# Patient Record
Sex: FEMALE | Race: WHITE | NOT HISPANIC OR LATINO | Employment: PART TIME | URBAN - METROPOLITAN AREA
[De-identification: names, ages, dates, MRNs, and addresses within clinical notes are randomized per-mention and may not be internally consistent; named-entity substitution may affect disease eponyms.]

---

## 2017-01-23 ENCOUNTER — GENERIC CONVERSION - ENCOUNTER (OUTPATIENT)
Dept: OTHER | Facility: OTHER | Age: 47
End: 2017-01-23

## 2017-01-31 ENCOUNTER — GENERIC CONVERSION - ENCOUNTER (OUTPATIENT)
Dept: OTHER | Facility: OTHER | Age: 47
End: 2017-01-31

## 2017-03-01 ENCOUNTER — GENERIC CONVERSION - ENCOUNTER (OUTPATIENT)
Dept: OTHER | Facility: OTHER | Age: 47
End: 2017-03-01

## 2017-03-09 ENCOUNTER — GENERIC CONVERSION - ENCOUNTER (OUTPATIENT)
Dept: OTHER | Facility: OTHER | Age: 47
End: 2017-03-09

## 2017-05-18 ENCOUNTER — ALLSCRIPTS OFFICE VISIT (OUTPATIENT)
Dept: OTHER | Facility: OTHER | Age: 47
End: 2017-05-18

## 2017-05-18 LAB
BILIRUB UR QL STRIP: NORMAL
CLARITY UR: NORMAL
COLOR UR: YELLOW
GLUCOSE (HISTORICAL): NORMAL
HGB UR QL STRIP.AUTO: NORMAL
KETONES UR STRIP-MCNC: NORMAL MG/DL
LEUKOCYTE ESTERASE UR QL STRIP: NORMAL
NITRITE UR QL STRIP: NORMAL
PH UR STRIP.AUTO: 5 [PH]
PROT UR STRIP-MCNC: NORMAL MG/DL
SP GR UR STRIP.AUTO: 1
UROBILINOGEN UR QL STRIP.AUTO: NORMAL

## 2017-06-23 ENCOUNTER — ALLSCRIPTS OFFICE VISIT (OUTPATIENT)
Dept: OTHER | Facility: OTHER | Age: 47
End: 2017-06-23

## 2017-06-23 PROCEDURE — 87624 HPV HI-RISK TYP POOLED RSLT: CPT | Performed by: OBSTETRICS & GYNECOLOGY

## 2017-06-23 PROCEDURE — G0145 SCR C/V CYTO,THINLAYER,RESCR: HCPCS | Performed by: OBSTETRICS & GYNECOLOGY

## 2017-06-27 ENCOUNTER — LAB REQUISITION (OUTPATIENT)
Dept: LAB | Facility: HOSPITAL | Age: 47
End: 2017-06-27
Payer: COMMERCIAL

## 2017-06-27 DIAGNOSIS — Z12.4 ENCOUNTER FOR SCREENING FOR MALIGNANT NEOPLASM OF CERVIX: ICD-10-CM

## 2017-06-27 DIAGNOSIS — Z11.51 ENCOUNTER FOR SCREENING FOR HUMAN PAPILLOMAVIRUS (HPV): ICD-10-CM

## 2017-06-29 LAB — HPV RRNA GENITAL QL NAA+PROBE: NORMAL

## 2017-07-10 LAB
LAB AP GYN PRIMARY INTERPRETATION: NORMAL
LAB AP LMP: NORMAL
Lab: NORMAL

## 2017-10-28 DIAGNOSIS — Z12.31 ENCOUNTER FOR SCREENING MAMMOGRAM FOR MALIGNANT NEOPLASM OF BREAST: ICD-10-CM

## 2017-10-28 DIAGNOSIS — R92.2 INCONCLUSIVE MAMMOGRAM: ICD-10-CM

## 2017-11-02 ENCOUNTER — HOSPITAL ENCOUNTER (OUTPATIENT)
Dept: MAMMOGRAPHY | Facility: CLINIC | Age: 47
Discharge: HOME | End: 2017-11-02
Payer: COMMERCIAL

## 2017-11-02 DIAGNOSIS — Z12.31 ENCOUNTER FOR SCREENING MAMMOGRAM FOR MALIGNANT NEOPLASM OF BREAST: ICD-10-CM

## 2017-11-02 PROCEDURE — G0202 SCR MAMMO BI INCL CAD: HCPCS

## 2017-11-02 PROCEDURE — 77063 BREAST TOMOSYNTHESIS BI: CPT

## 2017-11-03 ENCOUNTER — GENERIC CONVERSION - ENCOUNTER (OUTPATIENT)
Dept: OTHER | Facility: OTHER | Age: 47
End: 2017-11-03

## 2017-11-14 ENCOUNTER — HOSPITAL ENCOUNTER (OUTPATIENT)
Dept: ULTRASOUND IMAGING | Facility: CLINIC | Age: 47
Discharge: HOME | End: 2017-11-14
Payer: COMMERCIAL

## 2017-11-14 DIAGNOSIS — R92.2 INCONCLUSIVE MAMMOGRAM: ICD-10-CM

## 2017-11-14 PROCEDURE — 76641 ULTRASOUND BREAST COMPLETE: CPT

## 2017-11-14 PROCEDURE — 76377 3D RENDER W/INTRP POSTPROCES: CPT

## 2018-01-11 NOTE — MISCELLANEOUS
Message   Date: 03 Jun 2016 11:32 AM EST, Recorded By: Zelalem Zamudio For: Sterling Delgado   Caller: José Dockery   Phone: [phone] (Home), [phone] (Work)   Reason: Medical Complaint   Patient called c/o unhappy with using Nuva Ring for 28 days in and then going directly to another ring  Prefers to use it as she did before, 3 weeks in and 1 week out  States has had light bleeding x 2 weeks now  Advised ok to go back to previous directions  Count day she inserted last Nuva Ring as day 1  Advised call back if problems  Active Problems    1  Dense breast tissue (793 82) (R92 2)   2  Encounter for routine gynecological examination (V72 31) (Z01 419)   3  Heavy menses (626 2) (N92 0)   4  Denied: History of self breast exam   5  Submucous leiomyoma of uterus (218 0) (D25 0)   6  Visit for screening mammogram (V76 12) (Z12 31)    Current Meds   1  NuvaRing 0 12-0 015 MG/24HR Vaginal Ring; INSERT 1 RING VAGINALLY FOR 3   WEEKS THEN 1 WEEK OFF; Therapy: 29NZN1399 to Lenny Andrade)  Requested for: 87Xck8814; Last   Rx:05Gaj6474 Ordered    Allergies    1  Surgical TAPE    2   Adhesive Tape    Signatures   Electronically signed by : Mulugeta Abdalla, ; Gerardo  3 2016 11:36AM EST                       (Author)

## 2018-01-11 NOTE — MISCELLANEOUS
Message  patient notified of urine culture  bactrim was sensitive to infection      Signatures   Electronically signed by : Kevin Ennis DO; Jul 11 2016 10:08AM EST                       (Author)

## 2018-01-13 VITALS
TEMPERATURE: 99.5 F | SYSTOLIC BLOOD PRESSURE: 106 MMHG | HEART RATE: 72 BPM | HEIGHT: 67 IN | WEIGHT: 159 LBS | BODY MASS INDEX: 24.96 KG/M2 | DIASTOLIC BLOOD PRESSURE: 62 MMHG

## 2018-01-13 NOTE — MISCELLANEOUS
Message   Recorded as Task   Date: 11/03/2017 08:55 AM, Created By: Leveda Schlatter   Task Name: Miscellaneous   Assigned To: Isabela Landis   Regarding Patient: Linda Covarrubias, Status: Active   CommentBurnard Dies - 03 Nov 2017 8:55 AM     TASK CREATED  Mammogram with increased density, elevated Tyrer [place] risk of 27 6%  She did have left breast ultrasound last year  Could consider automated breast ultrasound soon and/or 3D mammogram in 1 year's time  Effie Carias - 03 Nov 2017 9:32 AM     TASK EDITED  letter and script mailed to pt        Active Problems    1  Abdominal pain, left lower quadrant (789 04) (R10 32)   2  Allergic rhinitis (477 9) (J30 9)   3  Breast disorder (611 9) (N64 9)   4  Bright red rectal bleeding (569 3) (K62 5)   5  Cervical cancer screening (V76 2) (Z12 4)   6  Chronic constipation (564 00) (K59 09)   7  Contraception (V25 9) (Z30 9)   8  Dense breast tissue (793 82) (R92 2)   9  Encounter for routine gynecological examination with Papanicolaou smear of cervix   (V72 31,V76 2) (Z01 419)   10  Heavy menses (626 2) (N92 0)   11  Ileitis (558 9) (K52 9)   12  Nausea (787 02) (R11 0)   13  Pelvic pain (R10 2)   14  Screening for HPV (human papillomavirus) (V73 81) (Z11 51)   15  Submucous leiomyoma of uterus (218 0) (D25 0)   16  Tension headache (307 81) (G44 209)   17  Visit for screening mammogram (V76 12) (Z12 31)    Current Meds   1  Allegra-D Allergy & Congestion 180-240 MG Oral Tablet Extended Release 24 Hour; TAKE   1 TABLET DAILY; Therapy: 81AJZ3631 to (Evaluate:81Mkb2406); Last Rx:62Xfk1892 Ordered   2  NuvaRing 0 12-0 015 MG/24HR Vaginal Ring; INSERT 1 RING VAGINALLY FOR 3   WEEKS THEN 1 WEEK OFF; Therapy: 24MWD2809 to (NYGLHIMX:71QQX9040)  Requested for: 58BHO3498; Last   YZ:98XGE8779 Ordered    Allergies    1  Surgical TAPE    2   Adhesive Tape    Signatures   Electronically signed by : Kem Lane, ; Nov  3 2017  9:32AM EST (Author)

## 2018-01-14 VITALS
BODY MASS INDEX: 24.74 KG/M2 | DIASTOLIC BLOOD PRESSURE: 68 MMHG | HEIGHT: 68 IN | SYSTOLIC BLOOD PRESSURE: 110 MMHG | WEIGHT: 163.25 LBS

## 2018-01-16 NOTE — MISCELLANEOUS
Message   Recorded as Task   Date: 07/08/2016 12:37 PM, Created By: Afshan Velazquez   Task Name: Medical Complaint Callback   Assigned To: Ronnie Alfonso   Regarding Patient: Cathryn Krishnamurthy, Status: Active   Comment:    Manisha Cuevas - 08 Jul 2016 12:37 PM     TASK CREATED  Caller: Self; Medical Complaint; [phone] (Home); [phone] (Work)  ANNA IS FEELS THE BACTRIM IS MAKING HER NAUSEOUS, SHE IS FEELING A BIT BETTER BUT STILL UNCOMFORTABLE, NOT SURE WHAT TO DO, IF SHE DIDN'T GIVE IT ENOUGH TIME OR IF SHE NEEDS A DIFFERENT MEDICATION?    [phone]   Ronnie Alfonso - 08 Jul 2016 4:02 PM     TASK EDITED  called and discussed  has only been 4 tabs -- would give over the weekend and see how she does before any medication changes          Signatures   Electronically signed by : Koko Roblero DO; Jul 8 2016  4:02PM EST                       (Author)

## 2018-01-16 NOTE — MISCELLANEOUS
Message   Recorded as Task   Date: 10/06/2016 08:29 AM, Created By: Nelia Chin   Task Name: Medical Complaint Callback   Assigned To: Hao Owen   Regarding Patient: Alexandre Keene, Status: Active   CommentJose Jones - 06 Oct 2016 8:29 AM     TASK CREATED  Caller: Self; Medical Complaint; [phone] (Home); [phone] (Work)  PT HAD TESTING YESTERDAY THAT WAS NORMAL  SHE WOKE UP THIS MORNING AND IS STILL NOT FEELING WELL  C/O HEADACHE, LOW BACK PAIN, BELLY   WHAT NOW? SHE CAN BE REACHED AT [address] - 06 Oct 2016 8:55 PM     TASK EDITED                 I spoke with the patient on 10/06/2016  The patient was seen in the emergency room last night to rule out diverticulitis  She had a negative CT and are unremarkable blood work  She states that she woke up this morning still having pain in the left lower quadrant area and having some low back pain  She does feel feverish on and off  There is no further nausea or vomiting  I advised her that we will order the pelvic ultrasound to rule out any ovarian pathology  We will follow-up with the results  It is possible that her symptoms could be part of a stomach virus  I advised her to follow a bland diet and to avoid dairy and to increase her fluid intake  She will call with any worsening symptoms          Signatures   Electronically signed by : Sunil Clements DO; Oct  6 2016  8:55PM EST                       (Author)

## 2018-01-17 NOTE — MISCELLANEOUS
Message   Recorded as Task   Date: 11/02/2016 11:25 AM, Created By: Elysia Oconnor   Task Name: Go to Result   Assigned To: Vipul Bennett   Regarding Patient: Desire Esteves, Status: In Progress   Rosalie Scruggs - 02 Nov 2016 11:25 AM     TASK CREATED  Ultrasound of left breast is unremarkable  Please inform the patient  Should she continue with burning sensation over the breast as documented at her mammogram, would recommend she come for breast check   Effie Carias - 03 Nov 2016 10:55 AM     TASK IN PROGRESS        Active Problems    1  Abdominal pain, left lower quadrant (789 04) (R10 32)   2  Breast disorder (611 9) (N64 9)   3  Bright red rectal bleeding (569 3) (K62 5)   4  Dense breast tissue (793 82) (R92 2)   5  Heavy menses (626 2) (N92 0)   6  Nausea (787 02) (R11 0)   7  Pelvic pain (R10 2)   8  Submucous leiomyoma of uterus (218 0) (D25 0)    Current Meds   1  Dicyclomine HCl - 20 MG Oral Tablet; TAKE 1 TABLET EVERY 6 HOURS AS NEEDED; Therapy: 19ODG6536 to (Evaluate:36Ycf1321)  Requested for: 11JRF9531; Last   Rx:37Nud1807 Ordered   2  NuvaRing 0 12-0 015 MG/24HR Vaginal Ring; INSERT 1 RING VAGINALLY FOR 3   WEEKS THEN 1 WEEK OFF; Therapy: 16BSD2950 to [phone])  Requested for: 62Mpe9599; Last   Rx:88Emv3283 Ordered    Allergies    1  Surgical TAPE    2   Adhesive Tape    Signatures   Electronically signed by : Fish Larry, ; Nov  3 2016 10:58AM EST                       (Author)

## 2018-01-17 NOTE — RESULT NOTES
Verified Results  (1) SED RATE 64Hod7627 03:32PZECHARIAH Chang    Order Number: [id number]     Test Name Result Flag Reference   SED RATE 5 mm/hour  0-20     (1) C-REACTIVE PROTEIN 77Tbj2432 03:32PZECHARIAH Smith Order Number: [id number]     Test Name Result Flag Reference   C-REACT PROTEIN 4 1 mg/L H <3 0     (1) BASIC METABOLIC PROFILE 04VAQ2028 03:GHAZALA Smith Order Number: [id number]     Test Name Result Flag Reference   GLUCOSE,RANDM 99 mg/dL     If the patient is fasting, the ADA then defines impaired fasting glucose as > 100 mg/dL and diabetes as > or equal to 123 mg/dL  SODIUM 142 mmol/L  136-145   POTASSIUM 3 6 mmol/L  3 5-5 3   CHLORIDE 109 mmol/L H 100-108   CARBON DIOXIDE 26 mmol/L  21-32   ANION GAP (CALC) 7 mmol/L  4-13   BLOOD UREA NITROGEN 15 mg/dL  5-25   CREATININE 0 78 mg/dL  0 60-1 30   Standardized to IDMS reference method   CALCIUM 8 3 mg/dL  8 3-10 1   eGFR Non-African American      >60 0 ml/min/1 73[address] [facility] Disease Education Program recommendations are as follows:  GFR calculation is accurate only with a steady state creatinine  Chronic Kidney disease less than 60 ml/min/1 73 sq  meters  Kidney failure less than 15 ml/min/1 73 sq  meters       (1) TISSUE EXAM [id number] 11:02AM Maddison Chang     Test Name Result Flag Reference   LAB AP CASE REPORT (Report)     Surgical Pathology Report             Case: [id number]                   Authorizing Provider: Marissa Sanchez MD      Collected:      11/14/2016 1102        Ordering Location:   [facility]    Received:      11/15/2016 [address] [place] [address] Endoscopy                            Pathologist:      Allison Sterling DO                               Specimens:  A) - Duodenum, duodenum biopsy r/o celiac disease                           B) - Stomach, antrum biopsy/r/o h pylori                                C) - Esophagus, distal esophagus biopsy/r/o eosinophil esophagus                    D) - Esophagus, mid esophagus                                     E) - Esophagus, proximal esophagus biopsy r/o eosinophil esophagus                   F) - Small Bowel, NOS, small bowel biopsy/ileitis/ro chrons disease                  G) - Colon, random colon biopsies/r/o colitis   LAB AP FINAL DIAGNOSIS (Report)     A  Duodenum, biopsy:  - Duodenal mucosa with no significant pathologic abnormalities  - No villous atrophy or increased intraepithelial lymphocytes identified  B  Stomach, antrum, biopsy:  - Minimal chronic inactive antral gastritis  - No H  pylori organisms identified on H&E and immunohistochemical stains  C  Esophagus, distal, biopsy:  - Esophageal squamous mucosa with no significant pathologic abnormalities  - No intraepithelial eosinophils, columnar mucosa, intestinal metaplasia   or dysplasia identified  D  Esophagus, mid, biopsy:  - Esophageal squamous mucosa with no significant pathologic abnormalities  - No intraepithelial eosinophils, columnar mucosa, intestinal metaplasia   or dysplasia identified  E  Esophagus, proximal, biopsy:  - Esophageal squamous mucosa with no significant pathologic abnormalities  - No intraepithelial eosinophils, columnar mucosa, intestinal metaplasia   or dysplasia identified  F  Small bowel, biopsy:  - Ileitis with foci of surface epithelial erosion, see note  - No granulomata, changes of chronicity or dysplasia identified  Note: Clinical and endoscopic correlation needed  G  Colon, random, biopsy:  - Colonic mucosa with intramucosal and submucosal lymphoid aggregates and   follicles  - No changes of chronic, active or microscopic colitis identified   - No dysplasia identified  Appropriate positive and negative controls obtained    Interpretation performed at [facility], [facility]    Electronically signed by Rufino Cheema DO on 11/17/2016 at 9:06 AM   LAB AP SURGICAL ADDITIONAL INFORMATION (Report)     These tests were developed and their performance characteristics   determined by [facility] or [address] [place] [address]  They may not be cleared or approved by the [place]  Food and   Drug Administration  The FDA has determined that such clearance or   approval is not necessary  These tests are used for clinical purposes  They should not be regarded as investigational or for research  This   laboratory has been approved by [place] [address], designated as a high-complexity   laboratory and is qualified to perform these tests  LAB AP GROSS DESCRIPTION (Report)     A  The specimen is received in formalin, labeled with the patient's name   and hospital number, and is designated duodenum biopsy rule out celiac   disease, are three irregularly shaped fragments of tan soft tissue   measuring 0 5 x 0 3 x 0 1 cm in aggregate  Entirely submitted  One   cassette  B  The specimen is received in formalin, labeled with the patient's name   and hospital number, and is designated antrum biopsy rule out H  pylori,   is a single irregularly shaped fragment of tan soft tissue measuring 0 4   cm in greatest dimension  Entirely submitted  One cassette  C  The specimen is received in formalin, labeled with the patient's name   and hospital number, and is designated distal esophagus biopsy/rule out   eosinophilic esophagitis, is a single irregularly shaped fragment of tan   soft tissue measuring 0 2 cm in greatest dimension  Entirely submitted  One cassette  D  The specimen is received in formalin, labeled with the patient's name   and hospital number, and is designated mid esophagus, are two   irregularly shaped fragments of pale tan soft tissue each measuring 0 3 cm   in greatest dimension  Entirely submitted  One cassette      E  The specimen is received in formalin, labeled with the patient's name   and hospital number, and is designated proximal esophagus biopsy rule out   eosinophilic esophagitis, is a single irregularly shaped fragment of pale   tan soft tissue measuring 0 2 cm in greatest dimension  Entirely   submitted  One cassette  F  The specimen is received in formalin, labeled with the patient's name   and hospital number, and is designated small bowel biopsy/ileitis/rule   out Crohn's disease, are multiple irregularly shaped fragments of tan   soft tissue measuring 1 0 x 0 5 x 0 1 cm in aggregate  Entirely submitted  One cassette  G  The specimen is received in formalin, labeled with the patient's name   and hospital number, and is designated random colon biopsy/rule out   colitis, are multiple irregularly shaped fragments of tan soft tissue   measuring 0 8 x 0 5 x 0 1 cm in aggregate  Entirely submitted  One   cassette  Note: The estimated total formalin fixation time based upon information   provided by the submitting clinician and the standard processing schedule   is 41 5 hours        RLR   LAB AP CLINICAL INFORMATION R/o celiac disease     R/o celiac disease

## 2018-02-22 ENCOUNTER — TELEPHONE (OUTPATIENT)
Dept: OBGYN CLINIC | Facility: CLINIC | Age: 48
End: 2018-02-22

## 2018-02-22 NOTE — TELEPHONE ENCOUNTER
Patient called c/o severe back pain and back spasms last month when she removed her Nuva Ring  Had trouble walking, went to chiropractor  Pain subsided when she inserted another Nuva Ring at proper time  Now patient states is due to remove it again tomorrow and is concerned the pain will recur  Seeks your advice  Questions if she should leave Nuva Ring in and skip the off week

## 2018-02-22 NOTE — TELEPHONE ENCOUNTER
As we discussed, recommend continue NuvaRing for continuous use for 4 weeks and then replace it for another 4 weeks  She could consider getting a menstrual cycle in the future at the appropriate time    Thanks

## 2018-03-16 ENCOUNTER — OFFICE VISIT (OUTPATIENT)
Dept: OBGYN CLINIC | Facility: CLINIC | Age: 48
End: 2018-03-16
Payer: COMMERCIAL

## 2018-03-16 VITALS — BODY MASS INDEX: 25.12 KG/M2 | DIASTOLIC BLOOD PRESSURE: 62 MMHG | WEIGHT: 162.8 LBS | SYSTOLIC BLOOD PRESSURE: 108 MMHG

## 2018-03-16 DIAGNOSIS — R10.2 PELVIC PAIN: ICD-10-CM

## 2018-03-16 DIAGNOSIS — N92.6 IRREGULAR MENSES: Primary | ICD-10-CM

## 2018-03-16 DIAGNOSIS — Z30.44 ENCOUNTER FOR SURVEILLANCE OF VAGINAL RING HORMONAL CONTRACEPTIVE DEVICE: ICD-10-CM

## 2018-03-16 PROBLEM — J30.9 ALLERGIC RHINITIS: Status: RESOLVED | Noted: 2017-05-18 | Resolved: 2018-03-16

## 2018-03-16 PROBLEM — G44.209 TENSION TYPE HEADACHE: Status: RESOLVED | Noted: 2017-05-18 | Resolved: 2018-03-16

## 2018-03-16 PROBLEM — J30.9 ALLERGIC RHINITIS: Status: ACTIVE | Noted: 2017-05-18

## 2018-03-16 PROBLEM — G44.209 TENSION TYPE HEADACHE: Status: ACTIVE | Noted: 2017-05-18

## 2018-03-16 PROCEDURE — 99214 OFFICE O/P EST MOD 30 MIN: CPT | Performed by: OBSTETRICS & GYNECOLOGY

## 2018-03-16 RX ORDER — ETONOGESTREL AND ETHINYL ESTRADIOL 11.7; 2.7 MG/1; MG/1
1 INSERT, EXTENDED RELEASE VAGINAL
COMMUNITY
Start: 2015-04-06 | End: 2018-07-02

## 2018-03-16 NOTE — PROGRESS NOTES
Assessment/Plan:  1  Irregular bleeding-patient with continuous bleeding since 2/27/18  She had call the office on 02/22 with complaints of back pain and cramps  She had an used NuvaRing continuously for 4 weeks and then another 2 weeks, now 6 weeks total   She has been having bleeding for the past 17 days  It could be related to this change in the NuvaRing usage  The patient will continue the ring for 1 more week and then take it out and then start at the usual 3 weeks on 1 week off as recommended by the device  Additionally, she will have laboratory studies with TSH, CBC, and HCG  Patient states she took a urine pregnancy test recently and was negative  She will return in the near future for sonohysterogram with endometrial biopsy  She was counseled to take ibuprofen prior to the procedure  2   History of submucous myoma-patient had D and C with hysteroscopy June 2015 with no definitive myoma appreciated  Pathology was negative at that time  3   History of menorrhagia-no heavy bleeding noted at this time  She will continue NuvaRing  4   Contraception-NuvaRing as above  5   Increased breast density-patient with elevated Tyrer [place] risk as well  She was counseled in the past about this  She had 3D mammogram and automated breast ultrasound November 2017 with negative results  She will continue with this  6   Family history of breast cancer-patient's mother and maternal aunt both developed breast cancer  She was previously counseled about genetic testing  She will follow-up in the next few weeks for sonohysterogram, endometrial biopsy, and possible RN/cc visit  No problem-specific Assessment & Plan notes found for this encounter  Diagnoses and all orders for this visit:    Irregular menses  -     CBC; Future  -     TSH, 3rd generation; Future  -     hCG, quantitative;  Future    Encounter for surveillance of vaginal ring hormonal contraceptive device    Pelvic pain    Other orders  - etonogestrel-ethinyl estradiol (NUVARING) 0 12-0 015 MG/24HR vaginal ring; Insert 1 Device into the vagina every 21 days          Subjective:      Patient ID: Quan Hancock is a 52 y o  female  The patient was seen today for follow-up visit  Please see assessment plan for details  The following portions of the patient's history were reviewed and updated as appropriate: allergies, current medications, past family history, past medical history, past social history, past surgical history and problem list     Review of Systems   Constitutional: Negative for chills, diaphoresis, fatigue and fever  Respiratory: Negative for apnea, cough, chest tightness, shortness of breath and wheezing  Cardiovascular: Negative for chest pain, palpitations and leg swelling  Gastrointestinal: Negative for abdominal distention, abdominal pain, anal bleeding, constipation, diarrhea, nausea, rectal pain and vomiting  Genitourinary: Positive for menstrual problem and pelvic pain  Negative for difficulty urinating, dyspareunia, dysuria, frequency, hematuria, urgency, vaginal bleeding, vaginal discharge and vaginal pain  Musculoskeletal: Negative for arthralgias, back pain and myalgias  Skin: Negative for color change and rash  Neurological: Negative for dizziness, syncope, light-headedness, numbness and headaches  Hematological: Negative for adenopathy  Does not bruise/bleed easily  Psychiatric/Behavioral: Negative for dysphoric mood and sleep disturbance  The patient is not nervous/anxious            Objective:      /62 (BP Location: Left arm, Patient Position: Sitting, Cuff Size: Standard)   Wt 73 8 kg (162 lb 12 8 oz)   LMP 02/28/2018 (Approximate)   BMI 25 12 kg/m²          Physical Exam    Objective      /62 (BP Location: Left arm, Patient Position: Sitting, Cuff Size: Standard)   Wt 73 8 kg (162 lb 12 8 oz)   LMP 02/28/2018 (Approximate)   BMI 25 12 kg/m²     General:   alert and oriented, in no acute distress, alert, appears stated age and cooperative   Neck:    Breast:    Heart:    Lungs:    Abdomen: soft, non-tender, without masses or organomegaly   Vulva: normal   Vagina: Small amount of dark menstrual blood, without erythema or lesions or discharge   Cervix: Small amount of dark menstrual blood, without lesions or cervicitis    No Cervical motion tenderness and normal   Uterus: top normal size, anteverted, non-tender   Adnexa: no mass, fullness, tenderness   Rectum: negative

## 2018-03-20 LAB
B-HCG SERPL-ACNC: <2 MIU/ML
BASOPHILS # BLD AUTO: 41 CELLS/UL (ref 0–200)
BASOPHILS NFR BLD AUTO: 0.6 %
EOSINOPHIL # BLD AUTO: 41 CELLS/UL (ref 15–500)
EOSINOPHIL NFR BLD AUTO: 0.6 %
ERYTHROCYTE [DISTWIDTH] IN BLOOD BY AUTOMATED COUNT: 12.7 % (ref 11–15)
HCT VFR BLD AUTO: 41.6 % (ref 35–45)
HGB BLD-MCNC: 14.5 G/DL (ref 11.7–15.5)
LYMPHOCYTES # BLD AUTO: 1816 CELLS/UL (ref 850–3900)
LYMPHOCYTES NFR BLD AUTO: 26.7 %
MCH RBC QN AUTO: 32.7 PG (ref 27–33)
MCHC RBC AUTO-ENTMCNC: 34.9 G/DL (ref 32–36)
MCV RBC AUTO: 93.7 FL (ref 80–100)
MONOCYTES # BLD AUTO: 279 CELLS/UL (ref 200–950)
MONOCYTES NFR BLD AUTO: 4.1 %
NEUTROPHILS # BLD AUTO: 4624 CELLS/UL (ref 1500–7800)
NEUTROPHILS NFR BLD AUTO: 68 %
PLATELET # BLD AUTO: 249 THOUSAND/UL (ref 140–400)
PMV BLD REES-ECKER: 11.5 FL (ref 7.5–12.5)
RBC # BLD AUTO: 4.44 MILLION/UL (ref 3.8–5.1)
TSH SERPL-ACNC: 1.13 MIU/L
WBC # BLD AUTO: 6.8 THOUSAND/UL (ref 3.8–10.8)

## 2018-06-25 ENCOUNTER — TELEPHONE (OUTPATIENT)
Dept: OBGYN CLINIC | Facility: CLINIC | Age: 48
End: 2018-06-25

## 2018-06-25 NOTE — TELEPHONE ENCOUNTER
Patient called stating she has been having random seizures  She has been evaluated and now is thinking that it may be related to her nuva ring as it is her only medication and she has been testing positive for everything else  Her mother reports a similar situation on oral contraceptives as well  I advised her to take out the ring and gave her an appointment in  on Wednesday

## 2018-06-27 ENCOUNTER — OFFICE VISIT (OUTPATIENT)
Dept: OBGYN CLINIC | Facility: CLINIC | Age: 48
End: 2018-06-27
Payer: COMMERCIAL

## 2018-06-27 ENCOUNTER — OFFICE VISIT (OUTPATIENT)
Dept: URGENT CARE | Facility: CLINIC | Age: 48
End: 2018-06-27
Payer: COMMERCIAL

## 2018-06-27 VITALS
WEIGHT: 166 LBS | TEMPERATURE: 100 F | HEART RATE: 109 BPM | RESPIRATION RATE: 16 BRPM | SYSTOLIC BLOOD PRESSURE: 124 MMHG | BODY MASS INDEX: 25.16 KG/M2 | DIASTOLIC BLOOD PRESSURE: 80 MMHG | HEIGHT: 68 IN

## 2018-06-27 VITALS
WEIGHT: 166 LBS | BODY MASS INDEX: 25.16 KG/M2 | HEIGHT: 68 IN | SYSTOLIC BLOOD PRESSURE: 122 MMHG | DIASTOLIC BLOOD PRESSURE: 74 MMHG

## 2018-06-27 DIAGNOSIS — Z87.898 HISTORY OF SEIZURE: ICD-10-CM

## 2018-06-27 DIAGNOSIS — Z30.44 ENCOUNTER FOR SURVEILLANCE OF VAGINAL RING HORMONAL CONTRACEPTIVE DEVICE: Primary | ICD-10-CM

## 2018-06-27 DIAGNOSIS — J01.90 ACUTE SINUSITIS, RECURRENCE NOT SPECIFIED, UNSPECIFIED LOCATION: Primary | ICD-10-CM

## 2018-06-27 PROCEDURE — S9083 URGENT CARE CENTER GLOBAL: HCPCS | Performed by: PHYSICIAN ASSISTANT

## 2018-06-27 PROCEDURE — 99212 OFFICE O/P EST SF 10 MIN: CPT | Performed by: OBSTETRICS & GYNECOLOGY

## 2018-06-27 PROCEDURE — G0382 LEV 3 HOSP TYPE B ED VISIT: HCPCS | Performed by: PHYSICIAN ASSISTANT

## 2018-06-27 RX ORDER — ETONOGESTREL AND ETHINYL ESTRADIOL 11.7; 2.7 MG/1; MG/1
1 INSERT, EXTENDED RELEASE VAGINAL
Qty: 1 EACH | Refills: 0 | Status: SHIPPED | OUTPATIENT
Start: 2018-06-27 | End: 2018-07-17 | Stop reason: SDUPTHER

## 2018-06-27 RX ORDER — AMOXICILLIN AND CLAVULANATE POTASSIUM 875; 125 MG/1; MG/1
1 TABLET, FILM COATED ORAL EVERY 12 HOURS SCHEDULED
Qty: 20 TABLET | Refills: 0 | Status: SHIPPED | OUTPATIENT
Start: 2018-06-27 | End: 2018-07-07

## 2018-06-27 NOTE — PATIENT INSTRUCTIONS
1  Drink plenty fluids  2   Take probiotics [i e  Yogurt, Acidophilus, Florastor (liquid)] daily  3   Over-the-counter cough and cold medications as needed for symptomatic care  4    Advance activities as tolerated  5    Follow-up with your primary care physician today as scheduled  6   Go to emergency room if symptoms are worsening  7   Recommend no driving until evaluated

## 2018-06-27 NOTE — PROGRESS NOTES
[facility]        NAME: Raji Segal is a 50 y o  female  : 1970    MRN: [id number]  DATE: 2018  TIME: 9:29 AM    Assessment and Plan   Acute sinusitis, recurrence not specified, unspecified location [J01 90]  1  Acute sinusitis, recurrence not specified, unspecified location  amoxicillin-clavulanate (AUGMENTIN) 875-125 mg per tablet   2  History of seizure           Patient Instructions       Follow up with PCP in 3-5 days  Proceed to  ER if symptoms worsen  Chief Complaint     Chief Complaint   Patient presents with    Cold Like Symptoms     chills, Ha, cough with thick green sputum since last thurs/friday         History of Present Illness       Patient here for evaluation of cough, congestion, sinus pressure, thick green sputum which started last Thursday  Patient has been taking over-the-counter medications with minimal relief  She denies any headache, dizziness, ear pain  The patient states that  night in the middle night while she was sleeping her  walker and states she had had a seizure  She has not seen anybody for this or had an evaluation  She has had a history of seizures in the past with the last one approximately 6 years ago  At the time patient had a full workup with an EEG which was negative for epilepsy  She has not had any activity since  She denies any dizziness, nausea, headache, vomiting, weakness, fatigue, double vision, blurred vision  Review of Systems   Review of Systems   Constitutional: Positive for fever  Negative for activity change, appetite change and chills  HENT: Positive for congestion, postnasal drip, rhinorrhea, sinus pressure and sore throat  Negative for ear discharge, ear pain, facial swelling, trouble swallowing and voice change  Eyes: Negative  Respiratory: Positive for cough  Negative for shortness of breath and wheezing  Cardiovascular: Negative      Neurological: Negative for dizziness, tremors, syncope, facial asymmetry, speech difficulty, weakness, light-headedness, numbness and headaches  Current Medications       Current Outpatient Prescriptions:     amoxicillin-clavulanate (AUGMENTIN) 875-125 mg per tablet, Take 1 tablet by mouth every 12 (twelve) hours for 10 days, Disp: 20 tablet, Rfl: 0    dicyclomine (BENTYL) 20 mg tablet, Take 20 mg by mouth every 6 (six) hours, Disp: , Rfl:     etonogestrel-ethinyl estradiol (NUVARING) 0 12-0 015 MG/24HR vaginal ring, Insert 1 each into the vagina every 28 days Insert vaginally and leave in place for 3 consecutive weeks, then remove for 1 week , Disp: , Rfl:     etonogestrel-ethinyl estradiol (NUVARING) 0 12-0 015 MG/24HR vaginal ring, Insert 1 Device into the vagina every 21 days, Disp: , Rfl:     Current Allergies     Allergies as of 06/27/2018 - Reviewed 03/16/2018   Allergen Reaction Noted    Medical tape Rash 05/18/2015    Wound dressings Rash 04/25/2014            The following portions of the patient's history were reviewed and updated as appropriate: allergies, current medications, past family history, past medical history, past social history, past surgical history and problem list      Past Medical History:   Diagnosis Date    Abdominal pain     Encounter for screening colonoscopy     Heavy menses     Intestinal ulcer     Nausea     Seizures ([address] )     Submucous leiomyoma of uterus        Past Surgical History:   Procedure Laterality Date    DILATION AND CURETTAGE OF UTERUS      INCONTINENCE SURGERY      bladder sling    [place] COLONOSCOPY FLX DX W/COLLJ SPEC WHEN PFRMD N/A 11/14/2016    Procedure: EGD AND COLONOSCOPY;  Surgeon: Franklin Pack MD;  Location: [facility]; Service: Gastroenterology       Family History   Problem Relation Age of Onset    Cancer Mother     Diabetes Father     Hypertension Father          Medications have been verified          Objective   /80   Pulse (!) 109   Temp 100 °F (37 8 °C) (Tympanic) Resp 16   Ht 5' 7 6" (1 717 m)   Wt 75 3 kg (166 lb)   BMI 25 54 kg/m²        Physical Exam     Physical Exam   Constitutional: She is oriented to person, place, and time  She appears well-developed and well-nourished  No distress  HENT:   Head: Normocephalic and atraumatic  Right Ear: External ear normal    Left Ear: External ear normal    Bilateral nasal congestion erythema with mucopurulent drainage  Bilateral tonsillar erythema with no soft tissue swelling  No exudate  TMs intact bilaterally with clear fluid in the middle ear bilaterally  No erythema  Eyes: Conjunctivae and EOM are normal  Pupils are equal, round, and reactive to light  Right eye exhibits no discharge  Left eye exhibits no discharge  Cardiovascular: Normal rate, regular rhythm and normal heart sounds  No murmur heard  Pulmonary/Chest: Effort normal and breath sounds normal  She has no wheezes  She has no rales  Lymphadenopathy:     She has cervical adenopathy  Neurological: She is alert and oriented to person, place, and time  No cranial nerve deficit  She exhibits normal muscle tone  Coordination normal    Patient with normal speech and gait  Patient does have a twitch of the left eye which she has had in the past   Skin: Skin is warm and dry  Psychiatric: She has a normal mood and affect  Her behavior is normal    Nursing note and vitals reviewed

## 2018-06-28 NOTE — PATIENT INSTRUCTIONS
Vaginal Ring   WHAT YOU NEED TO KNOW:   What is a vaginal ring? A vaginal ring is a small flexible ring that contains medicine  The vaginal ring may be used to prevent pregnancy  It may also be used to treat symptoms, such as vaginal dryness and itching, that occur with menopause  What do I need to know about the vaginal ring before I use it? You need to see your healthcare provider to get a prescription  Tell your healthcare provider if you are allergic to any medicine  You should not use this medicine if you are breastfeeding or you have vaginal bleeding that has not been checked by a doctor  You should not use the vaginal ring if you have certain medical conditions, such as breast cancer, endometrial cancer, or liver or heart disease  Other medical conditions include severe high blood pressure, circulation problems, and previous stroke or blood clot  You also should not use the vaginal ring if you are a heavy smoker and over the age of 28  Rarely, the vaginal ring may increase your risk for blood clots, heart attack, stroke, and toxic shock syndrome  How do I use my vaginal ring? Your healthcare provider will tell you when you can start using the vaginal ring  He will also show you how to put in your vaginal ring  You should receive another information sheet from your healthcare provider or pharmacist about this medicine  Read the information, and talk with your healthcare provider if you have questions  It is very important to use your vaginal ring exactly as directed  · If you are using the vaginal ring as birth control , keep it in place for 3 weeks in a row  Take your ring out for a 1-week break  When the week has gone by and you have probably had your menstrual period, place a new ring into your vagina  · If you are using the vaginal ring for menopause symptoms , keep the ring in place for 90 days (3 months)  After 3 months, take out your old ring and put in a new one    How do I insert my vaginal ring? · You may lie down, stand with one leg up, or squat when you insert the ring  Choose a position that feels best to you  · Wash your hands with soap and water  Dry your hands  Remove your vaginal ring from its pouch  · Press the sides of the ring together with your thumb and pointer finger  · Push the ring high up into your vagina like a tampon  · When your vaginal ring is in place, you should not feel anything  If you feel discomfort when the ring is in place, it may not be far enough inside your vagina  Gently use your finger to push the ring higher into your vagina  It is important that your vaginal ring is placed in the upper part of the vagina  · You do not have to worry that you will push the ring up too far or that it will get lost  Your cervix (the narrow, lower end of your uterus) will block the ring from going too high in your vagina  · If the ring slips down into your lower vagina, use your finger to push it back up  If the ring comes completely out, rinse it in warm (not hot) water and put it back in  How do I take out my vaginal ring? · Wash and dry your hands  · You may lie down, stand with one leg up, or squat when you remove the ring  Choose a position that feels best to you  · Use your finger to hook the ring and gently pull it out  Do not flush the old ring down the toilet  Throw the ring away where children or pets cannot get to it  When should I contact my healthcare provider? · You are bleeding from your vagina and you do not know why  · Your vaginal ring causes itching, pain, or burning  · You have stomach pain or tenderness, or you are vomiting  · You have trouble sleeping, feel tired, or your mood changes  · You have questions or concerns about your condition or care  When should I seek immediate care or call 911? · Your arm or leg feels warm, tender, and painful  It may look swollen and red      · You feel lightheaded, short of breath, and have chest pain  · You cough up blood  · You have any of the following signs of a stroke:      ¨ Numbness or drooping on one side of your face     ¨ Weakness in an arm or leg    ¨ Confusion or difficulty speaking    ¨ Dizziness, a severe headache, or vision loss  CARE AGREEMENT:   You have the right to help plan your care  Learn about your health condition and how it may be treated  Discuss treatment options with your caregivers to decide what care you want to receive  You always have the right to refuse treatment  The above information is an  only  It is not intended as medical advice for individual conditions or treatments  Talk to your doctor, nurse or pharmacist before following any medical regimen to see if it is safe and effective for you  © 2017 [facility] Information is for End User's use only and may not be sold, redistributed or otherwise used for commercial purposes  All illustrations and images included in CareNotes® are the copyrighted property of [employer]  or [employer] Ponce
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
## 2018-07-02 ENCOUNTER — OFFICE VISIT (OUTPATIENT)
Dept: FAMILY MEDICINE CLINIC | Facility: CLINIC | Age: 48
End: 2018-07-02
Payer: COMMERCIAL

## 2018-07-02 VITALS
RESPIRATION RATE: 14 BRPM | HEIGHT: 68 IN | DIASTOLIC BLOOD PRESSURE: 82 MMHG | SYSTOLIC BLOOD PRESSURE: 122 MMHG | BODY MASS INDEX: 25.16 KG/M2 | HEART RATE: 88 BPM | TEMPERATURE: 97.8 F | OXYGEN SATURATION: 97 % | WEIGHT: 166 LBS

## 2018-07-02 DIAGNOSIS — J30.9 ALLERGIC RHINITIS, UNSPECIFIED SEASONALITY, UNSPECIFIED TRIGGER: ICD-10-CM

## 2018-07-02 DIAGNOSIS — J45.21 MILD INTERMITTENT REACTIVE AIRWAY DISEASE WITH ACUTE EXACERBATION: ICD-10-CM

## 2018-07-02 DIAGNOSIS — J01.00 ACUTE MAXILLARY SINUSITIS, RECURRENCE NOT SPECIFIED: Primary | ICD-10-CM

## 2018-07-02 PROCEDURE — 3008F BODY MASS INDEX DOCD: CPT | Performed by: FAMILY MEDICINE

## 2018-07-02 PROCEDURE — 99213 OFFICE O/P EST LOW 20 MIN: CPT | Performed by: FAMILY MEDICINE

## 2018-07-02 RX ORDER — ALBUTEROL SULFATE 90 UG/1
2 AEROSOL, METERED RESPIRATORY (INHALATION) EVERY 4 HOURS PRN
Qty: 1 INHALER | Refills: 0 | Status: SHIPPED | OUTPATIENT
Start: 2018-07-02 | End: 2020-06-29 | Stop reason: ALTCHOICE

## 2018-07-02 RX ORDER — METHYLPREDNISOLONE 4 MG/1
TABLET ORAL
Qty: 21 TABLET | Refills: 0 | Status: SHIPPED | OUTPATIENT
Start: 2018-07-02 | End: 2019-08-24 | Stop reason: ALTCHOICE

## 2018-07-02 NOTE — PATIENT INSTRUCTIONS
How to Use a Metered-Dose Inhaler   AMBULATORY CARE:   A metered-dose inhaler  is a handheld device that gives you a dose of medicine as a mist  You breathe the medicine deep into your lungs to open your airways  The medicine either gives quick relief or long term control of symptoms  Common medicines include the following:  · Bronchodilators open your airways  · Mucolytics thin secretions and make them easier to cough up  · Steroids decrease inflammation in your airways  Seek immediate care if:   · Your lips or nails turn blue or gray  · The skin between your ribs or around your neck pulls in with every breath  · You feel short of breath, even after you use your inhaler  Contact your healthcare provider if:   · You feel the medicine spray on your tongue or throat, rather than going into your lungs  · You have to take more puffs from the inhaler than directed, in order to get relief  · You run out of medicine before your next refill is due  · You feel like your medicine is not making your symptoms better  · You have questions or concerns about your condition or care  How to use a metered-dose inhaler:  Practice using your inhaler  Your medicine will work best if you use them correctly  The following steps will help you use your inhaler correctly:  · Prepare your inhaler:     ¨ Remove the cap  Check to make sure there is nothing in the mouthpiece that could block the medicine from coming out  ¨ Shake the inhaler to mix the medicine  ¨ Hold the inhaler upright, with the mouthpiece pointing towards your mouth  · Get ready to breathe in the medicine:      ¨ Keep your mouth away from the inhaler mouthpiece, and breathe out fully to clear your lungs  ¨ Place the mouthpiece between your lips  Close your lips around the mouthpiece to form a seal and prevent a medicine leak  · Start to breathe in slowly through your mouth  as  you press down the canister   This helps the medicine get into your lungs  · Hold your breath for at least 5 seconds  This will help the medicine reach all parts of your lungs, including the smaller parts called the alveoli  · Breathe out slowly through pursed lips  This helps keep your airway open and allows the medicine to be absorbed into more areas  · Repeat puffs of medicine as directed by your healthcare provider  Wait about 2 minutes between puffs  If you need to use a bronchodilator and a steroid inhaler, use the bronchodilator first  Wait 5 minutes then use the steroid inhaler  · Gargle with warm water to remove any leftover medicine from your mouth and throat  Care for your inhaler properly:  Put the cap back on the inhaler after each use to keep the mouthpiece clean  Clean the inhaler at least once a week  Remove the canister and wash the delgado with warm soapy water  Rinse and allow to air dry  Make sure the delgado is completely dry before putting the canister back in  Follow up with your healthcare provider or specialist as directed:  Bring your inhaler to all of your visits  You may be asked to use your inhaler at these visits so your healthcare provider can make sure you are using it correctly  Write down your questions, so you remember to ask them during your visits  © 2017 [address] Information is for End User's use only and may not be sold, redistributed or otherwise used for commercial purposes  All illustrations and images included in CareNotes® are the copyrighted property of [employer]  or Jeff Ponce  The above information is an  only  It is not intended as medical advice for individual conditions or treatments  Talk to your doctor, nurse or pharmacist before following any medical regimen to see if it is safe and effective for you

## 2018-07-02 NOTE — PROGRESS NOTES
Assessment/Plan:  1  Acute maxillary sinusitis-the patient has had some improvement since starting the antibiotic that was prescribed Urgent Care  We will continue her on the course of the Augmentin  I have also added on a Medrol Dosepak to help with the sinus pressure and congestion  She will continue to increase her fluids and rest   2   Reactive airway disease-I believe that the patient is having reactive airway disease related to the sinus infection  I have given her a albuterol inhaler to use as needed along with the Medrol Dosepak  She will increase her fluids and rest   She will call or follow up in the emergency room with any worsening shortness of breath or chest pain or fever  We will see her back as needed  3  Allergic rhinitis-the patient will start taking her Allegra 180 milligrams on a daily basis  We will see her back in the office as needed  Diagnoses and all orders for this visit:    Acute maxillary sinusitis, recurrence not specified  -     albuterol (PROAIR HFA) 90 mcg/act inhaler; Inhale 2 puffs every 4 (four) hours as needed for wheezing or shortness of breath  -     Methylprednisolone 4 MG TBPK; Use as directed on package    Mild intermittent reactive airway disease with acute exacerbation  -     albuterol (PROAIR HFA) 90 mcg/act inhaler; Inhale 2 puffs every 4 (four) hours as needed for wheezing or shortness of breath  -     Methylprednisolone 4 MG TBPK; Use as directed on package    Allergic rhinitis, unspecified seasonality, unspecified trigger  -     albuterol (PROAIR HFA) 90 mcg/act inhaler; Inhale 2 puffs every 4 (four) hours as needed for wheezing or shortness of breath  -     Methylprednisolone 4 MG TBPK; Use as directed on package       Return if symptoms worsen or fail to improve  Subjective:   Chief Complaint   Patient presents with    Cough     cough with shortness of breath  begining approx 2 weeks ago   [facility] on [facility]18        Patient ID: Melburn Baptise Mary Pacheco is a 50 y o  female presents today for cough and shortness of breath for 2 weeks  Izaiah Garcia is a 50 y o  female who presents today with a cough and shortness of breath for 2 weeks  She was seen at [facility] on 6/27/2018 and was treated for a sinusitis with Augmentin  The fever has resolved, but she still has congestion and coughing  She is still taking the antibiotic  She has to take a deep breath to catch her breath  There is no wheezing and the cough is dry  There is no history of asthma  There is no nausea, vomiting, or diarrhea  There is less sinus pressure  There is PND and it is thick  She is taking Mucinex with no relief  She takes Allegra for her allergies as needed  Cough   This is a new problem  The current episode started 1 to 4 weeks ago  The problem has been gradually improving  The problem occurs constantly  The cough is non-productive  Associated symptoms include headaches, nasal congestion, postnasal drip, rhinorrhea and shortness of breath  Pertinent negatives include no chest pain, chills, ear congestion, ear pain, fever, heartburn, hemoptysis, myalgias, rash, sore throat, sweats, weight loss or wheezing  Nothing aggravates the symptoms  She has tried OTC cough suppressant for the symptoms  The treatment provided no relief       The following portions of the patient's history were reviewed and updated as appropriate: allergies, current medications, past family history, past medical history, past social history, past surgical history and problem list   Patient Active Problem List   Diagnosis    Irregular menses    Encounter for surveillance of vaginal ring hormonal contraceptive device    Pelvic pain    Dense breast tissue    Acute sinusitis    History of seizure     Past Medical History:   Diagnosis Date    Abdominal pain     Encounter for screening colonoscopy     Heavy menses     Intestinal ulcer     Nausea     Seizures ([address] )     Submucous leiomyoma of uterus      Past Surgical History:   Procedure Laterality Date    DILATION AND CURETTAGE OF UTERUS      INCONTINENCE SURGERY      bladder sling    [place] COLONOSCOPY FLX DX W/COLLJ SPEC WHEN PFRMD N/A 11/14/2016    Procedure: EGD AND COLONOSCOPY;  Surgeon: Dk Cisneros MD;  [facility]; Service: Gastroenterology     Allergies   Allergen Reactions    Medical Tape Rash    Wound Dressings Rash     Family History   Problem Relation Age of Onset    Cancer Mother     Diabetes Father     Hypertension Father      Social History     Social History    Marital status: /Civil Union     Spouse name: N/A    Number of children: N/A    Years of education: N/A     Occupational History    Not on file  Social History Main Topics    Smoking status: Never Smoker    Smokeless tobacco: Never Used    Alcohol use Yes      Comment: social     Drug use: No    Sexual activity: Yes     Partners: Male     Birth control/ protection: Ring      Comment: nuva ring     Other Topics Concern    Not on file     Social History Narrative    No narrative on file     Current Outpatient Prescriptions on File Prior to Visit   Medication Sig Dispense Refill    amoxicillin-clavulanate (AUGMENTIN) 875-125 mg per tablet Take 1 tablet by mouth every 12 (twelve) hours for 10 days 20 tablet 0    etonogestrel-ethinyl estradiol (NUVARING) 0 12-0 015 MG/24HR vaginal ring Insert 1 each into the vagina every 28 days Insert vaginally and leave in place for 3 consecutive weeks, then remove for 1 week  1 each 0    [DISCONTINUED] dicyclomine (BENTYL) 20 mg tablet Take 20 mg by mouth every 6 (six) hours      [DISCONTINUED] etonogestrel-ethinyl estradiol (NUVARING) 0 12-0 015 MG/24HR vaginal ring Insert 1 Device into the vagina every 21 days       No current facility-administered medications on file prior to visit  Review of Systems   Constitutional: Negative  Negative for chills, fever and weight loss     HENT: Positive for postnasal drip and rhinorrhea  Negative for ear pain and sore throat  Eyes: Negative  Respiratory: Positive for cough and shortness of breath  Negative for hemoptysis and wheezing  Cardiovascular: Negative  Negative for chest pain  Gastrointestinal: Negative  Negative for heartburn  Endocrine: Negative  Genitourinary: Negative  Musculoskeletal: Negative  Negative for myalgias  Skin: Negative  Negative for rash  Allergic/Immunologic: Negative  Neurological: Positive for headaches  Hematological: Negative  Psychiatric/Behavioral: Negative  Objective:  Vitals:    07/02/18 0944 07/02/18 1019   BP: 122/82    BP Location: Right arm    Patient Position: Sitting    Cuff Size: Standard    Pulse: 88    Resp: 14    Temp: 97 8 °F (36 6 °C)    TempSrc: Tympanic    SpO2:  97%   Weight: 75 3 kg (166 lb)    Height: 5' 7 5" (1 715 m)      Body mass index is 25 62 kg/m²  Wt Readings from Last 3 Encounters:   07/02/18 75 3 kg (166 lb)   06/27/18 75 3 kg (166 lb)   06/27/18 75 3 kg (166 lb)     Temp Readings from Last 3 Encounters:   07/02/18 97 8 °F (36 6 °C) (Tympanic)   06/27/18 100 °F (37 8 °C) (Tympanic)   05/18/17 99 5 °F (37 5 °C) (Tympanic)     BP Readings from Last 3 Encounters:   07/02/18 122/82   06/27/18 122/74   06/27/18 124/80     Pulse Readings from Last 3 Encounters:   07/02/18 88   06/27/18 (!) 109   05/18/17 72        Physical Exam   Constitutional: She is oriented to person, place, and time  She appears well-developed and well-nourished  HENT:   Head: Normocephalic and atraumatic  Nose: Mucosal edema, rhinorrhea and sinus tenderness present  Right sinus exhibits maxillary sinus tenderness  Left sinus exhibits maxillary sinus tenderness  Mouth/Throat: Posterior oropharyngeal erythema present  Eyes: Conjunctivae and EOM are normal  Pupils are equal, round, and reactive to light  Neck: Normal range of motion     Cardiovascular: Normal rate, regular rhythm and normal heart sounds  Pulmonary/Chest: Effort normal and breath sounds normal    Abdominal: Soft  Bowel sounds are normal    Musculoskeletal: Normal range of motion  Lymphadenopathy:     She has cervical adenopathy  Neurological: She is alert and oriented to person, place, and time  She has normal reflexes  Skin: Skin is warm and dry

## 2018-07-12 ENCOUNTER — TELEPHONE (OUTPATIENT)
Dept: OBGYN CLINIC | Facility: CLINIC | Age: 48
End: 2018-07-12

## 2018-07-12 DIAGNOSIS — Z30.44 ENCOUNTER FOR SURVEILLANCE OF VAGINAL RING HORMONAL CONTRACEPTIVE DEVICE: Primary | ICD-10-CM

## 2018-07-12 RX ORDER — ETONOGESTREL AND ETHINYL ESTRADIOL 11.7; 2.7 MG/1; MG/1
INSERT, EXTENDED RELEASE VAGINAL
Qty: 3 EACH | Refills: 4 | Status: SHIPPED | OUTPATIENT
Start: 2018-07-12 | End: 2019-09-14 | Stop reason: SDUPTHER

## 2018-07-12 NOTE — TELEPHONE ENCOUNTER
[facility] pharmacist called for refill of patient's Nuvaring  Patient had appointment in June, had a seizure, but was allowed to stay on [address]  [facility] pharmacy is requesting refill

## 2018-07-17 DIAGNOSIS — Z30.44 ENCOUNTER FOR SURVEILLANCE OF VAGINAL RING HORMONAL CONTRACEPTIVE DEVICE: ICD-10-CM

## 2018-07-18 RX ORDER — ETONOGESTREL AND ETHINYL ESTRADIOL 11.7; 2.7 MG/1; MG/1
1 INSERT, EXTENDED RELEASE VAGINAL
Qty: 1 EACH | Refills: 0 | Status: SHIPPED | OUTPATIENT
Start: 2018-07-18 | End: 2019-10-02 | Stop reason: SDUPTHER

## 2018-07-23 DIAGNOSIS — Z30.44 ENCOUNTER FOR SURVEILLANCE OF VAGINAL RING HORMONAL CONTRACEPTIVE DEVICE: ICD-10-CM

## 2018-07-24 RX ORDER — ETONOGESTREL/ETHINYL ESTRADIOL .12-.015MG
RING, VAGINAL VAGINAL
Qty: 3 EACH | Refills: 4 | Status: SHIPPED | OUTPATIENT
Start: 2018-07-24 | End: 2019-08-24 | Stop reason: ALTCHOICE

## 2019-01-04 ENCOUNTER — TELEPHONE (OUTPATIENT)
Dept: OBGYN CLINIC | Facility: CLINIC | Age: 49
End: 2019-01-04

## 2019-01-04 DIAGNOSIS — Z12.31 SCREENING MAMMOGRAM, ENCOUNTER FOR: Primary | ICD-10-CM

## 2019-01-09 ENCOUNTER — HOSPITAL ENCOUNTER (OUTPATIENT)
Dept: MAMMOGRAPHY | Facility: CLINIC | Age: 49
Discharge: HOME | End: 2019-01-09
Payer: COMMERCIAL

## 2019-01-09 VITALS — BODY MASS INDEX: 25.16 KG/M2 | HEIGHT: 68 IN | WEIGHT: 166 LBS

## 2019-01-09 DIAGNOSIS — Z12.31 SCREENING MAMMOGRAM, ENCOUNTER FOR: ICD-10-CM

## 2019-01-09 PROCEDURE — 77067 SCR MAMMO BI INCL CAD: CPT

## 2019-01-09 PROCEDURE — 77063 BREAST TOMOSYNTHESIS BI: CPT

## 2019-01-16 ENCOUNTER — TELEPHONE (OUTPATIENT)
Dept: OBGYN CLINIC | Facility: CLINIC | Age: 49
End: 2019-01-16

## 2019-01-16 DIAGNOSIS — R92.2 DENSE BREAST TISSUE: Primary | ICD-10-CM

## 2019-01-16 NOTE — TELEPHONE ENCOUNTER
----- Message from Madi Yeung MD sent at 1/10/2019  8:02 AM EST -----  3D Mammogram within normal limits with increased breast density noted  Could consider automated breast ultrasound soon and/or 3D mammogram in 1 year's time  Additionally, could consider referral to breast specialist as documented in the impression and patient could be given the number for the hope line

## 2019-03-05 ENCOUNTER — OFFICE VISIT (OUTPATIENT)
Dept: FAMILY MEDICINE CLINIC | Facility: CLINIC | Age: 49
End: 2019-03-05
Payer: COMMERCIAL

## 2019-03-05 VITALS
HEIGHT: 67 IN | WEIGHT: 175 LBS | TEMPERATURE: 97.6 F | RESPIRATION RATE: 12 BRPM | HEART RATE: 84 BPM | SYSTOLIC BLOOD PRESSURE: 120 MMHG | BODY MASS INDEX: 27.47 KG/M2 | DIASTOLIC BLOOD PRESSURE: 80 MMHG

## 2019-03-05 DIAGNOSIS — J20.9 ACUTE BRONCHITIS, UNSPECIFIED ORGANISM: Primary | ICD-10-CM

## 2019-03-05 PROCEDURE — 3008F BODY MASS INDEX DOCD: CPT | Performed by: FAMILY MEDICINE

## 2019-03-05 PROCEDURE — 99213 OFFICE O/P EST LOW 20 MIN: CPT | Performed by: FAMILY MEDICINE

## 2019-03-05 PROCEDURE — 1036F TOBACCO NON-USER: CPT | Performed by: FAMILY MEDICINE

## 2019-03-05 RX ORDER — PROMETHAZINE HYDROCHLORIDE AND CODEINE PHOSPHATE 6.25; 1 MG/5ML; MG/5ML
5 SYRUP ORAL
Qty: 120 ML | Refills: 0 | Status: SHIPPED | OUTPATIENT
Start: 2019-03-05 | End: 2019-08-24 | Stop reason: ALTCHOICE

## 2019-03-05 RX ORDER — LEVETIRACETAM 250 MG/1
TABLET ORAL
COMMUNITY
Start: 2019-01-29 | End: 2020-06-29 | Stop reason: ALTCHOICE

## 2019-03-05 RX ORDER — AZITHROMYCIN 250 MG/1
TABLET, FILM COATED ORAL
Qty: 6 TABLET | Refills: 0 | Status: SHIPPED | OUTPATIENT
Start: 2019-03-05 | End: 2019-03-09

## 2019-03-05 NOTE — PROGRESS NOTES
Assessment/Plan:     Diagnoses and all orders for this visit:    Acute bronchitis, unspecified organism  -     azithromycin (ZITHROMAX) 250 mg tablet; Take 2 tablets today then 1 tablet daily x 4 days  -     promethazine-codeine (PHENERGAN WITH CODEINE) 6 25-10 mg/5 mL syrup; Take 5 mL by mouth daily at bedtime as needed for cough    Other orders  -     levETIRAcetam (KEPPRA) 250 mg tablet; Take 2 tabs twice daily        Likely viral  Script for azithromycin given since her symptoms have been a week  But I instructed her to only take if her symptoms are worsening over the next few days  Promethazine with codeine as ordered  Follow-up as needed    Subjective:      CC: URI symptoms     Patient ID: Sejal Bird is a 50 y o  female  Patient states she has been having upper respiratory symptoms for a week  She states she has some cough congestion  She feels the cough is dry and deep in her chest  However she does feel that she is getting better over the past day and half      The following portions of the patient's history were reviewed and updated as appropriate: allergies, current medications, past family history, past medical history, past social history, past surgical history and problem list     Review of Systems   Constitutional: Negative  HENT: Positive for congestion  Eyes: Negative  Respiratory: Positive for cough  Cardiovascular: Negative  Gastrointestinal: Negative  Endocrine: Negative  Genitourinary: Negative  Musculoskeletal: Negative  Skin: Negative  Allergic/Immunologic: Negative  Neurological: Negative  Hematological: Negative  Psychiatric/Behavioral: Negative  All other systems reviewed and are negative  Objective:    Vitals:    03/05/19 0851   BP: 120/80   Pulse: 84   Resp: 12   Temp: 97 6 °F (36 4 °C)   Weight: 79 4 kg (175 lb)   Height: 5' 7" (1 702 m)          Physical Exam   Constitutional: She is oriented to person, place, and time   She appears well-developed and well-nourished  HENT:   Head: Normocephalic and atraumatic  Right Ear: External ear normal    Left Ear: External ear normal    Mouth/Throat: Oropharynx is clear and moist    Eyes: Pupils are equal, round, and reactive to light  Conjunctivae and EOM are normal    Neck: Normal range of motion  Cardiovascular: Normal rate, regular rhythm and normal heart sounds  Pulmonary/Chest: Effort normal and breath sounds normal    Abdominal: Soft  Bowel sounds are normal    Musculoskeletal: Normal range of motion  Neurological: She is alert and oriented to person, place, and time  She has normal reflexes  Skin: Skin is warm and dry  Psychiatric: She has a normal mood and affect  Her behavior is normal  Judgment and thought content normal    Nursing note and vitals reviewed

## 2019-07-17 ENCOUNTER — TELEPHONE (OUTPATIENT)
Dept: FAMILY MEDICINE CLINIC | Facility: CLINIC | Age: 49
End: 2019-07-17

## 2019-07-17 NOTE — TELEPHONE ENCOUNTER
Pt called, she needs a TB test done for her employment  I'm just checking to make sure that this is ok  I told her someone would call her back  [phone]   Thank you

## 2019-07-22 ENCOUNTER — CLINICAL SUPPORT (OUTPATIENT)
Dept: FAMILY MEDICINE CLINIC | Facility: CLINIC | Age: 49
End: 2019-07-22
Payer: COMMERCIAL

## 2019-07-22 DIAGNOSIS — Z11.1 SCREENING FOR TUBERCULOSIS: Primary | ICD-10-CM

## 2019-07-22 PROCEDURE — 86580 TB INTRADERMAL TEST: CPT

## 2019-07-25 ENCOUNTER — CLINICAL SUPPORT (OUTPATIENT)
Dept: FAMILY MEDICINE CLINIC | Facility: CLINIC | Age: 49
End: 2019-07-25

## 2019-07-25 DIAGNOSIS — Z11.1 SCREENING FOR TUBERCULOSIS: Primary | ICD-10-CM

## 2019-07-25 LAB
INDURATION: 0 MM
TB SKIN TEST: NEGATIVE

## 2019-08-24 ENCOUNTER — OFFICE VISIT (OUTPATIENT)
Dept: FAMILY MEDICINE CLINIC | Facility: CLINIC | Age: 49
End: 2019-08-24
Payer: COMMERCIAL

## 2019-08-24 VITALS
WEIGHT: 178 LBS | SYSTOLIC BLOOD PRESSURE: 100 MMHG | TEMPERATURE: 98.4 F | DIASTOLIC BLOOD PRESSURE: 74 MMHG | HEIGHT: 67 IN | OXYGEN SATURATION: 98 % | HEART RATE: 95 BPM | BODY MASS INDEX: 27.94 KG/M2

## 2019-08-24 DIAGNOSIS — G40.909 EPILEPSY UNDETERMINED AS TO FOCAL OR GENERALIZED (HCC): ICD-10-CM

## 2019-08-24 DIAGNOSIS — L23.9 ALLERGIC DERMATITIS: Primary | ICD-10-CM

## 2019-08-24 PROCEDURE — 99213 OFFICE O/P EST LOW 20 MIN: CPT | Performed by: FAMILY MEDICINE

## 2019-08-24 PROCEDURE — 3008F BODY MASS INDEX DOCD: CPT | Performed by: FAMILY MEDICINE

## 2019-08-24 RX ORDER — LAMOTRIGINE 25 MG/1
TABLET ORAL
Refills: 3 | COMMUNITY
Start: 2019-06-16 | End: 2020-06-29 | Stop reason: SDUPTHER

## 2019-08-24 RX ORDER — PREDNISONE 20 MG/1
TABLET ORAL
Qty: 15 TABLET | Refills: 0 | Status: SHIPPED | OUTPATIENT
Start: 2019-08-24 | End: 2020-06-29 | Stop reason: ALTCHOICE

## 2019-08-24 RX ORDER — LAMOTRIGINE 100 MG/1
150 TABLET ORAL 2 TIMES DAILY
Refills: 1 | COMMUNITY
Start: 2019-06-20 | End: 2020-06-29 | Stop reason: ALTCHOICE

## 2019-08-24 NOTE — PROGRESS NOTES
Assessment/Plan:  Allergic dermatitis-patient's rash is consistent with some type of allergic dermatitis  We will place her on the prednisone taper for few days to decrease the inflammation resolved the rash  She will try cool compresses to the affected areas  She can continue with Benadryl as needed for the itching  She will call if there is no improvement within the next 3-4 days or if the condition worsens  Diagnoses and all orders for this visit:    Allergic dermatitis  -     predniSONE 20 mg tablet; Take 3 tablets for 2 days then 2 tablets for 3 days then 1 tablet for 3 days then stop    BMI 27 0-27 9,adult    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 (HCC)  -     lamoTRIgine (LaMICtal) 100 mg tablet; Take 150 mg by mouth 2 (two) times a day  -     lamoTRIgine (LaMICtal) 25 mg tablet; PLEASE SEE ATTACHED FOR DETAILED DIRECTIONS       Return if symptoms worsen or fail to improve  Subjective:   Chief Complaint   Patient presents with    Rash     rash on face chest and left arm for one month  raised bumps, slight itching  Patient ID: Bruce Balderrama is a 52 y o  female presents today for [reason]  Bruce Balderrama is a 52 y o  Female who presents with a worsening rash for a month  It started on her face and now has spread to her chest and left arm  It is itchy  She tried acne medication - no relief  Zack Rena this before the rash started and there was no relief  She is trying Cedaphil with no relief       There are no new skin products or soaps before this started  She does go to the gym regularly  She denies any new facial lotions, make hip, skin products, or foods  She has not started any new medications  She does have a history of seasonal allergies  She did try Benadryl to help with the itching with mild relief  She denies any chest pain or shortness of breath  There is no wheezing  The rash is getting significantly worse  Rash   This is a new problem  The current episode started more than 1 month ago  The problem has been gradually worsening since onset  The affected locations include the neck, chest, face, left arm and right arm  The rash is characterized by redness and itchiness  It is unknown if there was an exposure to a precipitant  Pertinent negatives include no anorexia, congestion, cough, diarrhea, eye pain, facial edema, fatigue, fever, joint pain, nail changes, rhinorrhea, shortness of breath, sore throat or vomiting  Past treatments include antihistamine, cold compress and antibiotic cream  The treatment provided no relief  The following portions of the patient's history were reviewed and updated as appropriate: allergies, current medications, past family history, past medical history, past social history, past surgical history and problem list   Patient Active Problem List   Diagnosis    Irregular menses    Encounter for surveillance of vaginal ring hormonal contraceptive device    Dense breast tissue    History of seizure    Allergic dermatitis    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 [facility])     Past Medical History:   Diagnosis Date    Abdominal pain     Achrochordon     one in the left dorsal area and on the lower back- last assessed 04/30/13    Dysuria     resolved 10/05/16    Encounter for screening colonoscopy     Heavy menses     Intestinal ulcer     Nausea     Seizures ([address] )     Submucous leiomyoma of uterus      Past Surgical History:   Procedure Laterality Date    DILATION AND CURETTAGE OF UTERUS      HYSTEROSCOPY      INCONTINENCE SURGERY      bladder sling    [place] COLONOSCOPY FLX DX W/COLLJ SPEC WHEN PFRMD N/A 11/14/2016    Procedure: EGD AND COLONOSCOPY;  Surgeon: Chelly Reece MD;  Location: [facility];   Service: Gastroenterology     Allergies   Allergen Reactions    Medical Tape Rash    Wound Dressings Rash     Family History   Problem Relation Age of Onset    Cancer Mother     Breast cancer Mother  Colon polyps Mother     Diabetes Father         Type 2    Hypertension Father     Breast cancer Family      Social History     Socioeconomic History    Marital status: /Civil Union     Spouse name: Not on file    Number of children: 2    Years of education: Not on file    Highest education level: Not on file   Occupational History    Not on file   Social Needs    Financial resource strain: Not on file    Food insecurity:     Worry: Not on file     Inability: Not on file    Transportation needs:     Medical: Not on file     Non-medical: Not on file   Tobacco Use    Smoking status: Never Smoker    Smokeless tobacco: Never Used   Substance and Sexual Activity    Alcohol use: Yes     Comment: social     Drug use: No    Sexual activity: Yes     Partners: Male     Birth control/protection: Ring     Comment: nuva ring   Lifestyle    Physical activity:     Days per week: Not on file     Minutes per session: Not on file    Stress: Not on file   Relationships    Social connections:     Talks on phone: Not on file     Gets together: Not on file     Attends Ep[employer] service: Not on file     Active member of club or organization: Not on file     Attends meetings of clubs or organizations: Not on file     Relationship status: Not on file    Intimate partner violence:     Fear of current or ex partner: Not on file     Emotionally abused: Not on file     Physically abused: Not on file     Forced sexual activity: Not on file   Other Topics Concern    Not on file   Social History Narrative    Caffeine use    [address]    Uses safety equipment Seatbelts     Current Outpatient Medications on File Prior to Visit   Medication Sig Dispense Refill    albuterol (PROAIR HFA) 90 mcg/act inhaler Inhale 2 puffs every 4 (four) hours as needed for wheezing or shortness of breath 1 Inhaler 0    etonogestrel-ethinyl estradiol (NUVARING) 0 12-0 015 MG/24HR vaginal ring Insert vaginally and leave in place for 3 consecutive weeks, then remove for 1 week  3 each 4    etonogestrel-ethinyl estradiol (NUVARING) 0 12-0 015 MG/24HR vaginal ring Insert 1 each into the vagina every 28 days Insert vaginally and leave in place for 3 consecutive weeks, then remove for 1 week  1 each 0    lamoTRIgine (LaMICtal) 100 mg tablet Take 150 mg by mouth 2 (two) times a day  1    lamoTRIgine (LaMICtal) 25 mg tablet PLEASE SEE ATTACHED FOR DETAILED DIRECTIONS  3    levETIRAcetam (KEPPRA) 250 mg tablet Take 2 tabs twice daily       No current facility-administered medications on file prior to visit  Review of Systems   Constitutional: Negative  Negative for fatigue and fever  HENT: Negative  Negative for congestion, rhinorrhea and sore throat  Eyes: Negative  Negative for pain  Respiratory: Negative  Negative for cough and shortness of breath  Cardiovascular: Negative  Gastrointestinal: Negative  Negative for anorexia, diarrhea and vomiting  Endocrine: Negative  Genitourinary: Negative  Musculoskeletal: Negative  Negative for joint pain  Skin: Positive for rash  Negative for nail changes  Allergic/Immunologic: Negative  Neurological: Negative  Hematological: Negative  Psychiatric/Behavioral: Negative  Objective:  Vitals:    08/24/19 1029   BP: 100/74   BP Location: Left arm   Patient Position: Sitting   Cuff Size: Large   Pulse: 95   Temp: 98 4 °F (36 9 °C)   TempSrc: Tympanic   SpO2: 98%   Weight: 80 7 kg (178 lb)   Height: 5' 7" (1 702 m)     Body mass index is 27 88 kg/m²  Physical Exam   Constitutional: She is oriented to person, place, and time  She appears well-developed and well-nourished  HENT:   Head: Normocephalic and atraumatic  Mouth/Throat: No oropharyngeal exudate  Eyes: Pupils are equal, round, and reactive to light  Conjunctivae and EOM are normal    Neck: Normal range of motion  No JVD present  No tracheal deviation present   No thyromegaly present  Cardiovascular: Normal rate, regular rhythm, normal heart sounds and intact distal pulses  Exam reveals no gallop and no friction rub  No murmur heard  Pulmonary/Chest: Effort normal and breath sounds normal  No stridor  No respiratory distress  She has no wheezes  She has no rales  She exhibits no tenderness  Abdominal: Soft  Bowel sounds are normal  She exhibits no distension and no mass  There is no tenderness  There is no rebound and no guarding  Musculoskeletal: Normal range of motion  She exhibits no edema, tenderness or deformity  Lymphadenopathy:     She has no cervical adenopathy  Neurological: She is alert and oriented to person, place, and time  She has normal reflexes  She displays normal reflexes  No cranial nerve deficit  She exhibits normal muscle tone  Coordination normal    Skin: Skin is warm and dry  Rash noted  There is diffuse red macular papular rash in the patient's face, neck, upper chest, and upper arms  BMI Counseling: Body mass index is 27 88 kg/m²  Discussed the patient's BMI with her  The BMI is above average  BMI counseling and education was provided to the patient  Nutrition recommendations include reducing portion sizes, decreasing overall calorie intake, 3-5 servings of fruits/vegetables daily, reducing fast food intake, consuming healthier snacks, decreasing soda and/or juice intake, moderation in carbohydrate intake, increasing intake of lean protein, reducing intake of saturated fat and trans fat and reducing intake of cholesterol  Exercise recommendations include exercising 3-5 times per week and strength training exercises

## 2019-08-26 PROBLEM — R10.2 PELVIC PAIN: Status: RESOLVED | Noted: 2018-03-16 | Resolved: 2019-08-26

## 2019-08-26 PROBLEM — J01.90 ACUTE SINUSITIS: Status: RESOLVED | Noted: 2018-06-27 | Resolved: 2019-08-26

## 2019-08-26 PROBLEM — G40.909 EPILEPSY UNDETERMINED AS TO FOCAL OR GENERALIZED (HCC): Status: ACTIVE | Noted: 2018-10-02

## 2019-09-14 DIAGNOSIS — Z30.44 ENCOUNTER FOR SURVEILLANCE OF VAGINAL RING HORMONAL CONTRACEPTIVE DEVICE: ICD-10-CM

## 2019-09-24 RX ORDER — ETONOGESTREL AND ETHINYL ESTRADIOL 11.7; 2.7 MG/1; MG/1
INSERT, EXTENDED RELEASE VAGINAL
Qty: 3 EACH | Refills: 4 | Status: SHIPPED | OUTPATIENT
Start: 2019-09-24 | End: 2020-06-30 | Stop reason: SDUPTHER

## 2019-09-25 NOTE — TELEPHONE ENCOUNTER
Left message to schedule yearly 
Please make her and annual apt
Spontaneous, unlabored and symmetrical

## 2019-09-30 ENCOUNTER — TELEPHONE (OUTPATIENT)
Dept: OBGYN CLINIC | Facility: CLINIC | Age: 49
End: 2019-09-30

## 2019-10-02 DIAGNOSIS — Z30.44 ENCOUNTER FOR SURVEILLANCE OF VAGINAL RING HORMONAL CONTRACEPTIVE DEVICE: ICD-10-CM

## 2019-10-02 RX ORDER — ETONOGESTREL AND ETHINYL ESTRADIOL 11.7; 2.7 MG/1; MG/1
1 INSERT, EXTENDED RELEASE VAGINAL
Qty: 1 EACH | Refills: 0 | Status: SHIPPED | OUTPATIENT
Start: 2019-10-02 | End: 2019-10-09

## 2019-10-02 NOTE — TELEPHONE ENCOUNTER
Sent over her ring  She had a pap in 6/2017  Please make an annual exam apt for her I gave her 1 ring

## 2019-10-09 ENCOUNTER — ANNUAL EXAM (OUTPATIENT)
Dept: OBGYN CLINIC | Facility: CLINIC | Age: 49
End: 2019-10-09
Payer: COMMERCIAL

## 2019-10-09 VITALS
WEIGHT: 178 LBS | DIASTOLIC BLOOD PRESSURE: 68 MMHG | HEIGHT: 67 IN | SYSTOLIC BLOOD PRESSURE: 112 MMHG | BODY MASS INDEX: 27.94 KG/M2

## 2019-10-09 DIAGNOSIS — Z12.31 ENCOUNTER FOR SCREENING MAMMOGRAM FOR MALIGNANT NEOPLASM OF BREAST: ICD-10-CM

## 2019-10-09 DIAGNOSIS — Z30.44 ENCOUNTER FOR SURVEILLANCE OF VAGINAL RING HORMONAL CONTRACEPTIVE DEVICE: ICD-10-CM

## 2019-10-09 DIAGNOSIS — R92.2 DENSE BREAST TISSUE: ICD-10-CM

## 2019-10-09 DIAGNOSIS — Z12.4 SCREENING FOR CERVICAL CANCER: Primary | ICD-10-CM

## 2019-10-09 DIAGNOSIS — Z87.898 HISTORY OF SEIZURE: ICD-10-CM

## 2019-10-09 DIAGNOSIS — Z01.419 WOMEN'S ANNUAL ROUTINE GYNECOLOGICAL EXAMINATION: ICD-10-CM

## 2019-10-09 PROCEDURE — G0145 SCR C/V CYTO,THINLAYER,RESCR: HCPCS | Performed by: OBSTETRICS & GYNECOLOGY

## 2019-10-09 PROCEDURE — 99396 PREV VISIT EST AGE 40-64: CPT | Performed by: OBSTETRICS & GYNECOLOGY

## 2019-10-09 NOTE — PROGRESS NOTES
Assessment/Plan   Diagnoses and all orders for this visit:    Screening for cervical cancer  -     Liquid-based pap, screening    Encounter for screening mammogram for malignant neoplasm of breast  -     Mammo screening bilateral w 3d & cad; Future    Dense breast tissue    Encounter for surveillance of vaginal ring hormonal contraceptive device    Women's annual routine gynecological examination    History of seizure     1  Yearly exam-Pap smear done with reflex HPV at patient request, self-breast awareness reviewed, calcium/vitamin-D recommendations discussed, mammogram request given, colonoscopy recommended age 48   2  History of irregular menses/history of menorrhagia-patient did have 17 day bleeding episode last year for which sonohysterogram with endometrial biopsy were recommended  She never did get this done and she was encouraged to consider this but declines it today  She has had normal   Since last year  She will continue with NuvaRing for cycle regulation   She has current prescription  3  History of submucous myoma-not noted on previous D&C with hysteroscopy June 2015 with negative pathology  She will call with issues  4  Contraception- status post vasectomy  Additionally, she continues NuvaRing as noted above  5  Increased breast density-patient with increased density along with Tyrer Cami risk of 34 2% due to strong family history of breast cancer with mother and maternal aunt  She was counseled about seeing breast specialist and she was given card for Dr Denita Duff and she will consider this  Reportedly, her sister was tested and was negative for any genetic syndrome  6  History of seizure disorder-patient to continue follow-up with Neurology  7  Perimenopausal symptoms-patient with night sweats/hot flushes along with vaginal dryness  She was counseled about this  She will continue with NuvaRing  I did discuss her that she may artificially continue to get menses with NuvaRing    We will consider stopping it over the next few years  8  Mild vaginal atrophy-patient counseled about this  Vaginal lubrication/moisturizer sheet given  To follow-up 1 year for yearly exam or as needed  Subjective   Patient ID: Jeffery Irby is a 52 y o  female  Vitals:    10/09/19 1033   BP: 112/68     Patient was seen today for yearly exam   Please see assessment plan for details  The following portions of the patient's history were reviewed and updated as appropriate: allergies, current medications, past family history, past medical history, past social history, past surgical history and problem list   Past Medical History:   Diagnosis Date    Abdominal pain     Achrochordon     one in the left dorsal area and on the lower back- last assessed 13    Dysuria     resolved 10/05/16    Encounter for screening colonoscopy     Heavy menses     Intestinal ulcer     Nausea     Seizures ([address] )     Submucous leiomyoma of uterus      Past Surgical History:   Procedure Laterality Date    DILATION AND CURETTAGE OF UTERUS      HYSTEROSCOPY      INCONTINENCE SURGERY      bladder sling    [place] COLONOSCOPY FLX DX W/COLLJ SPEC WHEN PFRMD N/A 2016    Procedure: EGD AND COLONOSCOPY;  Surgeon: Sariah Michael MD;  Location: [facility]; Service: Gastroenterology     OB History    Para Term  AB Living   2 2 2         SAB TAB Ectopic Multiple Live Births                  # Outcome Date GA Lbr Noam/2nd Weight Sex Delivery Anes PTL Lv   2 Term            1 Term                Current Outpatient Medications:     etonogestrel-ethinyl estradiol (NUVARING) 0 12-0 015 MG/24HR vaginal ring, INSERT 1 RING VAGINALLY AS DIRECTED   REMOVE AFTER 3 WEEKS & WAIT 7 DAYS BEFORE INSERTING A NEW RING, Disp: 3 each, Rfl: 4    lamoTRIgine (LaMICtal) 25 mg tablet, PLEASE SEE ATTACHED FOR DETAILED DIRECTIONS, Disp: , Rfl: 3    levETIRAcetam (KEPPRA) 250 mg tablet, Take 2 tabs twice daily, Disp: , Rfl:   albuterol (PROAIR HFA) 90 mcg/act inhaler, Inhale 2 puffs every 4 (four) hours as needed for wheezing or shortness of breath (Patient not taking: Reported on 10/9/2019), Disp: 1 Inhaler, Rfl: 0    lamoTRIgine (LaMICtal) 100 mg tablet, Take 150 mg by mouth 2 (two) times a day, Disp: , Rfl: 1    predniSONE 20 mg tablet, Take 3 tablets for 2 days then 2 tablets for 3 days then 1 tablet for 3 days then stop (Patient not taking: Reported on 10/9/2019), Disp: 15 tablet, Rfl: 0  Allergies   Allergen Reactions    Medical Tape Rash    Wound Dressings Rash     Social History     Socioeconomic History    Marital status: /Civil Union     Spouse name: None    Number of children: 2    Years of education: None    Highest education level: None   Occupational History    None   Social Needs    Financial resource strain: None    Food insecurity:     Worry: None     Inability: None    Transportation needs:     Medical: None     Non-medical: None   Tobacco Use    Smoking status: Never Smoker    Smokeless tobacco: Never Used   Substance and Sexual Activity    Alcohol use: Yes     Comment: social     Drug use: No    Sexual activity: Yes     Partners: Male     Birth control/protection: Ring     Comment: nuva ring   Lifestyle    Physical activity:     Days per week: None     Minutes per session: None    Stress: None   Relationships    Social connections:     Talks on phone: None     Gets together: None     Attends Buddhist service: None     Active member of club or organization: None     Attends meetings of clubs or organizations: None     Relationship status: None    Intimate partner violence:     Fear of current or ex partner: None     Emotionally abused: None     Physically abused: None     Forced sexual activity: None   Other Topics Concern    None   Social History Narrative    Caffeine use    [employer]    Uses safety equipment Seatbelts     Family History   Problem Relation Age of Onset    Cancer Mother     Breast cancer Mother     Colon polyps Mother     Diabetes Father         Type 2    Hypertension Father     Breast cancer Family        Review of Systems   Constitutional: Negative for chills, diaphoresis, fatigue and fever  Respiratory: Negative for apnea, cough, chest tightness, shortness of breath and wheezing  Cardiovascular: Negative for chest pain, palpitations and leg swelling  Gastrointestinal: Negative for abdominal distention, abdominal pain, anal bleeding, constipation, diarrhea, nausea, rectal pain and vomiting  Genitourinary: Negative for difficulty urinating, dyspareunia, dysuria, frequency, hematuria, menstrual problem, pelvic pain, urgency, vaginal bleeding, vaginal discharge and vaginal pain  Musculoskeletal: Negative for arthralgias, back pain and myalgias  Skin: Negative for color change and rash  Neurological: Negative for dizziness, syncope, light-headedness, numbness and headaches  Hematological: Negative for adenopathy  Does not bruise/bleed easily  Psychiatric/Behavioral: Negative for dysphoric mood and sleep disturbance  The patient is not nervous/anxious  Objective   Physical Exam    Objective      /68 (BP Location: Left arm, Patient Position: Sitting, Cuff Size: Large)   Ht 5' 7" (1 702 m)   Wt 80 7 kg (178 lb)   LMP 09/28/2019 (Exact Date)   BMI 27 88 kg/m²     General:   alert and oriented, in no acute distress   Neck: normal to inspection and palpation   Breast: normal appearance, no masses or tenderness   Heart:    Lungs:    Abdomen: soft, non-tender, without masses or organomegaly   Vulva: normal   Vagina: Without erythema or lesions or discharge  Normal   NuvaRing in good position in the vagina  Cervix: Without lesions or discharge or cervicitis    No Cervical motion tenderness   Uterus: top normal size, anteverted, non-tender   Adnexa: no mass, fullness, tenderness   Rectum: negative    Psych:  Normal mood and affect   Skin:  Without obvious lesions   Eyes: symmetric, with normal movements and reactivity   Musculoskeletal:  Normal muscle tone and movements appreciated

## 2019-10-09 NOTE — PATIENT INSTRUCTIONS
Menopause   WHAT YOU NEED TO KNOW:   What is menopause? Menopause is a normal stage in a woman's life when her monthly periods stop  Menopause starts when the ovaries slowly stop making the female hormones estrogen and progesterone  After menopause, a woman is no longer able to become pregnant  A woman who has not had a period for a full year after the age of 39 is considered to be in menopause  Perimenopause is a stage before menopause that may cause signs and symptoms similar to menopause  Perimenopause can last an average of 4 to 5 years  What are the signs and symptoms of menopause? The signs and symptoms of menopause can be different from woman to woman:  · Menstrual period changes such as skipped periods or periods that are closer together, or lighter or heavier than usual    · Hot flashes (feeling warm, flushed, and sweaty)     · Mood changes such as irritability or decreased desire to have sex    · Breast changes such as tenderness or pain    · Hair changes such as thinning hair or increased hair on your face    · Vaginal changes such as increased dryness     · Urinary changes such as increased urinary tract infections (UTIs) or urgency (feeling that you need to urinate right away)    · Other symptoms such as headaches, trouble sleeping, fatigue, or heart palpitations (strong, fast heartbeats)  What do I need to know about menopause? · You can still get pregnant while you have periods  Continue to use birth control if you do not want to get pregnant  You may need to use birth control until it has been 1 year since your periods stopped  Ask your healthcare provider when you can stop using birth control to prevent pregnancy  · Hormone replacement therapy can be used to treat symptoms of menopause  Hormone replacement therapy (HRT) is medicine that replaces your low hormone levels  HRT contains estrogen and sometimes progestin  HRT has benefits and risks   HRT decreases your risk for bone fractures by helping to prevent osteoporosis  HRT also protects you from colon cancer  HRT may increase your risk for breast cancer, blood clots, heart disease, and stroke  Ask your healthcare provider if HRT is right for you  How can I live a healthy lifestyle during and after menopause? After menopause, your risk for heart disease and bone loss increases  Ask about these and other ways to stay healthy:  · Exercise regularly  Exercise helps you maintain a healthy weight  Exercise can also help to control your blood pressure and cholesterol levels  Include weight-bearing exercise for strong bones  Ask your healthcare provider about the best exercise plan for you  · Eat a variety of healthy foods  Include fruits, vegetables, whole grains (whole-wheat bread, pasta, and cereals), low-fat dairy, and lean protein foods (beans, poultry, and fish)  Limit foods high in sodium (salt)  Ask your healthcare provider for more information about a meal plan that is right for you  · Maintain a healthy weight  Check with your healthcare provider before you start any weight loss program      · Take supplements as directed  You may need extra calcium and vitamin D to help prevent osteoporosis  · Limit alcohol and caffeine  Alcohol and caffeine may worsen your symptoms  · Do not smoke  If you smoke, it is never too late to quit  You are more likely to have a heart attack, lung disease, blood clots, and cancer if you smoke  Ask your healthcare provider for information if you need help quitting  When should I contact my healthcare provider? · You have vaginal bleeding after menopause  · You have questions or concerns about your condition or care  CARE AGREEMENT:   You have the right to help plan your care  Learn about your health condition and how it may be treated  Discuss treatment options with your caregivers to decide what care you want to receive  You always have the right to refuse treatment   The above information is an  only  It is not intended as medical advice for individual conditions or treatments  Talk to your doctor, nurse or pharmacist before following any medical regimen to see if it is safe and effective for you  © 2017 [employer] Information is for End User's use only and may not be sold, redistributed or otherwise used for commercial purposes  All illustrations and images included in CareNotes® are the copyrighted property of A D A M [employer] Inc  or Jeff Ponce

## 2019-10-15 LAB
LAB AP GYN PRIMARY INTERPRETATION: NORMAL
Lab: NORMAL

## 2019-12-23 ENCOUNTER — TELEPHONE (OUTPATIENT)
Dept: OBGYN CLINIC | Facility: CLINIC | Age: 49
End: 2019-12-23

## 2019-12-24 ENCOUNTER — OFFICE VISIT (OUTPATIENT)
Dept: OBGYN CLINIC | Facility: CLINIC | Age: 49
End: 2019-12-24
Payer: COMMERCIAL

## 2019-12-24 VITALS
DIASTOLIC BLOOD PRESSURE: 82 MMHG | HEIGHT: 67 IN | SYSTOLIC BLOOD PRESSURE: 110 MMHG | BODY MASS INDEX: 27.94 KG/M2 | WEIGHT: 178 LBS

## 2019-12-24 DIAGNOSIS — G40.909 SEIZURE DISORDER (HCC): ICD-10-CM

## 2019-12-24 DIAGNOSIS — N60.01 BREAST CYST, RIGHT: ICD-10-CM

## 2019-12-24 DIAGNOSIS — Z80.3 FAMILY HISTORY OF BREAST CANCER IN FIRST DEGREE RELATIVE: Primary | ICD-10-CM

## 2019-12-24 PROCEDURE — 99213 OFFICE O/P EST LOW 20 MIN: CPT | Performed by: OBSTETRICS & GYNECOLOGY

## 2019-12-24 RX ORDER — LAMOTRIGINE 150 MG/1
150 TABLET ORAL 2 TIMES DAILY
COMMUNITY
Start: 2019-11-13

## 2019-12-24 RX ORDER — CEPHALEXIN 500 MG/1
500 CAPSULE ORAL EVERY 12 HOURS SCHEDULED
Qty: 10 CAPSULE | Refills: 1 | Status: SHIPPED | OUTPATIENT
Start: 2019-12-24 | End: 2019-12-29

## 2019-12-30 ENCOUNTER — TELEPHONE (OUTPATIENT)
Dept: HEMATOLOGY ONCOLOGY | Facility: CLINIC | Age: 49
End: 2019-12-30

## 2019-12-30 NOTE — TELEPHONE ENCOUNTER
New Patient Encounter    New Patient Intake Form   Patient Details:  So Villalba  1970  [id number]    Background Information:  [address] starts by opening a telephone encounter and gathering the following information   Who is calling to schedule? If not self, relationship to patient? self   Referring Provider Dr Manju Marshall   What is the diagnosis? Family history of cancer/ mom   Is there any prior history of Cancer? No   If yes, please explain n/a   When was the diagnosis? 12/2019   Is patient aware of diagnosis? Yes   Reason for visit? History Of   Have you had any testing done? If so: when, where? Yes   Are records in [place]? yes   Was the patient told to bring a disk? yes   Scheduling Information:   Preferred [place]:  [place]     Requesting Specific Provider? Dr Angel Stewart   Are there any dates/time the patient cannot be seen? no      Miscellaneous: n/a    After completing the above information, please route to Financial Counselor and the appropriate Nurse Navigator for review

## 2020-01-10 ENCOUNTER — HOSPITAL ENCOUNTER (OUTPATIENT)
Dept: BONE DENSITY | Facility: CLINIC | Age: 50
Discharge: HOME | End: 2020-01-10
Payer: COMMERCIAL

## 2020-01-10 VITALS — WEIGHT: 175 LBS | HEIGHT: 67 IN | BODY MASS INDEX: 27.47 KG/M2

## 2020-01-10 DIAGNOSIS — Z12.31 ENCOUNTER FOR SCREENING MAMMOGRAM FOR MALIGNANT NEOPLASM OF BREAST: ICD-10-CM

## 2020-01-10 PROCEDURE — 77063 BREAST TOMOSYNTHESIS BI: CPT

## 2020-01-10 PROCEDURE — 77067 SCR MAMMO BI INCL CAD: CPT

## 2020-01-15 DIAGNOSIS — Z30.44 ENCOUNTER FOR SURVEILLANCE OF VAGINAL RING HORMONAL CONTRACEPTIVE DEVICE: Primary | ICD-10-CM

## 2020-01-15 DIAGNOSIS — R92.2 DENSE BREAST TISSUE: Primary | ICD-10-CM

## 2020-01-15 RX ORDER — ETONOGESTREL AND ETHINYL ESTRADIOL 11.7; 2.7 MG/1; MG/1
INSERT, EXTENDED RELEASE VAGINAL
Qty: 3 EACH | Refills: 3 | Status: SHIPPED | OUTPATIENT
Start: 2020-01-15 | End: 2020-06-29 | Stop reason: SDUPTHER

## 2020-01-16 ENCOUNTER — OFFICE VISIT (OUTPATIENT)
Dept: OBGYN CLINIC | Facility: CLINIC | Age: 50
End: 2020-01-16
Payer: COMMERCIAL

## 2020-01-16 VITALS
BODY MASS INDEX: 28.12 KG/M2 | SYSTOLIC BLOOD PRESSURE: 126 MMHG | DIASTOLIC BLOOD PRESSURE: 80 MMHG | WEIGHT: 179.2 LBS | HEIGHT: 67 IN

## 2020-01-16 DIAGNOSIS — N60.01 BREAST CYST, RIGHT: ICD-10-CM

## 2020-01-16 DIAGNOSIS — Z80.3 FAMILY HISTORY OF BREAST CANCER IN MOTHER: ICD-10-CM

## 2020-01-16 DIAGNOSIS — B35.4 TINEA CORPORIS: Primary | ICD-10-CM

## 2020-01-16 PROCEDURE — 99213 OFFICE O/P EST LOW 20 MIN: CPT | Performed by: OBSTETRICS & GYNECOLOGY

## 2020-01-16 RX ORDER — METRONIDAZOLE 7.5 MG/G
LOTION TOPICAL
COMMUNITY
Start: 2020-01-04 | End: 2020-06-29 | Stop reason: ALTCHOICE

## 2020-01-16 RX ORDER — CLOTRIMAZOLE AND BETAMETHASONE DIPROPIONATE 10; .64 MG/G; MG/G
CREAM TOPICAL 2 TIMES DAILY
Qty: 30 G | Refills: 0 | Status: SHIPPED | OUTPATIENT
Start: 2020-01-16 | End: 2020-06-29 | Stop reason: ALTCHOICE

## 2020-01-16 NOTE — PROGRESS NOTES
Assessment/Plan:   Diagnoses and all orders for this visit:    Tinea corporis  -     clotrimazole-betamethasone (LOTRISONE) 1-0 05 % cream; Apply topically 2 (two) times a day    Breast cyst, right    Family history of breast cancer in mother    Other orders  -     METRONIDAZOLE, TOPICAL, 0 75 % LOTN        Subjective:  Here for follow-up     Patient ID: Felicitas Wild is a 52 y o  female  HPI    41-year-old female  2 para 2 recently here for annual exam in October this past year  She is seen here 2 weeks ago with complaints of a reddened slightly raised area on her right breast adjacent to her areola  Her breast exam was otherwise normal there was no drainage from this area it was nontender  There was no discharge from the nipple there is no supraclavicular axillary lymphadenopathy noted  Initially appeared to be a sebaceous cyst and was treated with antibiotics and warm compresses  There has been little resolution to this area which is approximately 1 cm in circumference now with appearance of possible tinea corporis  Will treat with topical Lotrisone cream   She does have a follow-up with breast surgery regarding this cyst and a positive family history of breast cancer in her mother and maternal aunt  Review of Systems   Respiratory: Negative  Cardiovascular: Negative  Gastrointestinal: Negative  Genitourinary: Negative  Neurological: Negative  Psychiatric/Behavioral: Negative  All other systems reviewed and are negative  Objective:  No acute distress  /80 (BP Location: Right arm, Patient Position: Sitting, Cuff Size: Standard)   Ht 5' 7" (1 702 m)   Wt 81 3 kg (179 lb 3 2 oz)   LMP 2019 (Exact Date)   BMI 28 07 kg/m²      Physical Exam   Pulmonary/Chest:            Bilateral breast exam in the sitting and supine position with chaperone present, no visible or palpable breast lesions identified  No breast masses noted      No supraclavicular or axillary lymphadenopathy noted  No nipple discharge  Reviewed self-breast exam techniques  red flat circumferential lesion consistent with tinea corporis no  Subcutaneous mass or lumps are identified

## 2020-01-27 ENCOUNTER — CONSULT (OUTPATIENT)
Dept: SURGICAL ONCOLOGY | Facility: CLINIC | Age: 50
End: 2020-01-27
Payer: COMMERCIAL

## 2020-01-27 VITALS
HEIGHT: 67 IN | RESPIRATION RATE: 16 BRPM | BODY MASS INDEX: 28.63 KG/M2 | HEART RATE: 86 BPM | TEMPERATURE: 98.7 F | SYSTOLIC BLOOD PRESSURE: 122 MMHG | WEIGHT: 182.4 LBS | DIASTOLIC BLOOD PRESSURE: 86 MMHG

## 2020-01-27 DIAGNOSIS — R92.2 DENSE BREAST TISSUE: ICD-10-CM

## 2020-01-27 DIAGNOSIS — L98.8 SKIN LESION OF BREAST: ICD-10-CM

## 2020-01-27 DIAGNOSIS — Z91.89 INCREASED RISK OF BREAST CANCER: Primary | ICD-10-CM

## 2020-01-27 DIAGNOSIS — Z80.3 FAMILY HISTORY OF BREAST CANCER: ICD-10-CM

## 2020-01-27 PROCEDURE — 99244 OFF/OP CNSLTJ NEW/EST MOD 40: CPT | Performed by: NURSE PRACTITIONER

## 2020-01-27 NOTE — PROGRESS NOTES
Surgical Oncology Follow Up       [facility]  [facility] [address]  [facility] [id number]    [facility]  1970  [id number]      Chief Complaint   Patient presents with    Consult     Family Hx of Breast Cancer small cyst on right breast       Assessment/Plan:  1  Family history of breast cancer  - Does not meet criteria for genetic testing at this time    2  Dense breast tissue  - MRI breast bilateral w and wo contrast w cad; Future    3  Increased risk of breast cancer  - MRI breast bilateral w and wo contrast w cad; Future  - Consume healthy diet, exercise regularly, maintain healthy weight  - Discussed chemoprevention- patient will call if she is interested in meeting with medical oncology  - Clinical breast exams every 6 months    4  Skin lesion of breast  -  F/u with dermatology    Discussion/Summary:  Patient is a 41-year-old female who presents today as a new consultation for a family history of breast cancer as well as dense breasts and a skin lesion of her right breast   I have calculated her lifetime TC risk to be 42 5%  She had a normal mammogram earlier this month  She does have extremely dense breast tissue and a family history of breast cancer in her mother and maternal aunt  She does not currently meet criteria for genetic testing  Her sister did have genetic testing which was negative  There are no concerns on her breast exam today  She does have a skin lesion of her right breast just lateral to her areolar tissue  This is not worrisome for Paget's disease and appears to only involve the skin  She states that she had a recent facial biopsy performed which was inconclusive for mycosis fungoides  She did meet with a specialist at [facility]   Given her recent dermatology concerns, I have recommended she f/u with her dermatologist  Of note, she was treated with oral antibiotics and a topical fungal cream by her gynecologist with no improvement  She states she will f/u with derm  In regards to her elevated risk,  I have recommended the patient have a clinical breast exam performed every 6 months and continue annual 3D mammography  I have made risk reducing recommendations  Given her increased risk I have recommended adjuvant screening with alternating breast MRI and automated breast ultrasound  I will order an MRI to be performed in July  I will plan to see the patient back in 3 months and then annually thereafter  She will follow with her gynecologist in October  She was instructed to call with any new concerns or symptoms prior to her next visit  She is in agreement this plan  All of her questions were answered today  History of Present Illness:     -Interval History:  Patient is a 51-year-old female who presents today as a new consultation for dense breasts, a skin lesion of the right breast and a family history of breast cancer  She had a bilateral 3D diagnostic mammogram on January 10, 2020 which was BI-RADS 2, category 4 density  She has a family history of breast cancer in her mother diagnosed at age 47 and a maternal aunt diagnosed at age 62  She has no other family history of cancers and no personal history of cancer  She is not of Ashkenazi Latter-day descent  Menarche age 15, 2 pregnancies, 2 live births  Age 32 at the time her 1st child was born  She has used birth control pills in the past and currently uses the new for ring  She has never used hormone replacement therapy she is a nonsmoker and consumes 1-2 alcoholic beverages per week  Review of Systems:  Review of Systems   Constitutional: Negative for activity change, appetite change, chills, fatigue, fever and unexpected weight change  HENT: Negative for trouble swallowing  Eyes: Negative for pain, redness and visual disturbance  Respiratory: Negative for cough, shortness of breath and wheezing      Cardiovascular: Negative for chest pain, palpitations and leg swelling  Gastrointestinal: Negative for abdominal pain, constipation, diarrhea, nausea and vomiting  Endocrine: Negative for cold intolerance and heat intolerance  Musculoskeletal: Negative for arthralgias, back pain, gait problem and myalgias  Skin: Negative for color change and rash  Neurological: Negative for dizziness, syncope, light-headedness, numbness and headaches  Hematological: Negative for adenopathy  Psychiatric/Behavioral: Negative for agitation and confusion  All other systems reviewed and are negative  Patient Active Problem List   Diagnosis    Irregular menses    Encounter for surveillance of vaginal ring hormonal contraceptive device    Dense breast tissue    History of seizure    Allergic dermatitis    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 ([facility] )    Family history of breast cancer     Past Medical History:   Diagnosis Date    Abdominal pain     Achrochordon     one in the left dorsal area and on the lower back- last assessed 04/30/13    Dysuria     resolved 10/05/16    Encounter for screening colonoscopy     Heavy menses     Intestinal ulcer     Nausea     Seizures ([facility] )     Submucous leiomyoma of uterus      Past Surgical History:   Procedure Laterality Date    DILATION AND CURETTAGE OF UTERUS      HYSTEROSCOPY      INCONTINENCE SURGERY      bladder sling    AK COLONOSCOPY FLX DX W/COLLJ SPEC WHEN PFRMD N/A 11/14/2016    Procedure: EGD AND COLONOSCOPY;  Surgeon: Dany Duffy MD;  Location: [facility];   Service: Gastroenterology     Family History   Problem Relation Age of Onset    Cancer Mother     Breast cancer Mother 64    Colon polyps Mother     Diabetes Father         Type 2    Hypertension Father     Breast cancer Family     BRCA1 Negative Sister     BRCA2 Negative Sister     Breast cancer Maternal Aunt 62     Social History     Socioeconomic History    Marital status: /Civil Union     Spouse name: Not on file  Number of children: 2    Years of education: Not on file    Highest education level: Not on file   Occupational History    Not on file   Social Needs    Financial resource strain: Not on file    Food insecurity:     Worry: Not on file     Inability: Not on file    Transportation needs:     Medical: Not on file     Non-medical: Not on file   Tobacco Use    Smoking status: Never Smoker    Smokeless tobacco: Never Used   Substance and Sexual Activity    Alcohol use: Yes     Comment: social     Drug use: No    Sexual activity: Yes     Partners: Male     Birth control/protection: Ring     Comment: nuva ring   Lifestyle    Physical activity:     Days per week: Not on file     Minutes per session: Not on file    Stress: Not on file   Relationships    Social connections:     Talks on phone: Not on file     Gets together: Not on file     Attends Confucianism service: Not on file     Active member of club or organization: Not on file     Attends meetings of clubs or organizations: Not on file     Relationship status: Not on file    Intimate partner violence:     Fear of current or ex partner: Not on file     Emotionally abused: Not on file     Physically abused: Not on file     Forced sexual activity: Not on file   Other Topics Concern    Not on file   Social History Narrative    Caffeine use    [address]    Uses safety equipment Seatbelts       Current Outpatient Medications:     albuterol (PROAIR HFA) 90 mcg/act inhaler, Inhale 2 puffs every 4 (four) hours as needed for wheezing or shortness of breath (Patient not taking: Reported on 10/9/2019), Disp: 1 Inhaler, Rfl: 0    clotrimazole-betamethasone (LOTRISONE) 1-0 05 % cream, Apply topically 2 (two) times a day, Disp: 30 g, Rfl: 0    etonogestrel-ethinyl estradiol (NUVARING) 0 12-0 015 MG/24HR vaginal ring, INSERT 1 RING VAGINALLY AS DIRECTED   REMOVE AFTER 3 WEEKS & WAIT 7 DAYS BEFORE INSERTING A NEW RING, Disp: 3 each, Rfl: 4    etonogestrel-ethinyl estradiol (NUVARING) 0 12-0 015 MG/24HR vaginal ring, Insert vaginally and leave in place for 3 consecutive weeks, then remove for 1 week , Disp: 3 each, Rfl: 3    lamoTRIgine (LaMICtal) 100 mg tablet, Take 150 mg by mouth 2 (two) times a day, Disp: , Rfl: 1    lamoTRIgine (LaMICtal) 150 MG tablet, Take 150 mg by mouth 2 (two) times a day, Disp: , Rfl:     lamoTRIgine (LaMICtal) 25 mg tablet, PLEASE SEE ATTACHED FOR DETAILED DIRECTIONS, Disp: , Rfl: 3    levETIRAcetam (KEPPRA) 250 mg tablet, Take 2 tabs twice daily, Disp: , Rfl:     METRONIDAZOLE, TOPICAL, 0 75 % LOTN, , Disp: , Rfl:     predniSONE 20 mg tablet, Take 3 tablets for 2 days then 2 tablets for 3 days then 1 tablet for 3 days then stop (Patient not taking: Reported on 10/9/2019), Disp: 15 tablet, Rfl: 0  Allergies   Allergen Reactions    Medical Tape Rash    Wound Dressings Rash     Vitals:    01/27/20 0905   BP: 122/86   Pulse: 86   Resp: 16   Temp: 98 7 °F (37 1 °C)       Physical Exam   Constitutional: She is oriented to person, place, and time  Vital signs are normal  She appears well-developed and well-nourished  No distress  HENT:   Head: Normocephalic and atraumatic  Neck: Normal range of motion  Cardiovascular: Normal rate, regular rhythm and normal heart sounds  Pulmonary/Chest: Effort normal and breath sounds normal    Bilateral breasts were examined in the sitting and supine position  At the 9:00 position of the right breast there is a 1 cm reddened skin lesion  There is no mass  This does not appear as a Paget's disease  There are no masses, skin nodules, nipple changes or nipple discharge  There is no bilateral supraclavicular or axillary lymphadenopathy noted  Abdominal: Soft  Normal appearance  She exhibits no mass  There is no hepatosplenomegaly  There is no tenderness  Musculoskeletal: Normal range of motion  Lymphadenopathy:     She has no axillary adenopathy          Right: No supraclavicular adenopathy present  Left: No supraclavicular adenopathy present  Neurological: She is alert and oriented to person, place, and time  Skin: Skin is warm, dry and intact  No rash noted  She is not diaphoretic  Psychiatric: She has a normal mood and affect  Her speech is normal    Vitals reviewed  Results:    Imaging      Mammo Screening Bilateral W 3d & Cad    Result Date: 1/14/2020  Narrative: DIAGNOSIS: Encounter for screening mammogram for malignant neoplasm of breast TECHNIQUE: Digital screening mammography was performed  Computer Aided Detection (CAD) analyzed all applicable images  COMPARISONS:  Multiple prior exams dating between 08/27/2010 and 01/09/2019  RELEVANT HISTORY: Family Breast Cancer History: History of breast cancer in Mother, Family, Maternal Aunt  Family Medical History: Family medical history includes BRCA1 Negative in sister, BRCA2 Negative in sister, breast cancer in family, breast cancer in maternal aunt, and breast cancer in mother  Personal History: Hormone history includes birth control  No known relevant surgical history  No known relevant medical history  The patient is scheduled in a reminder system for screening mammography  8-10% of cancers will be missed on mammography  Management of a palpable abnormality must be based on clinical grounds  Patients will be notified of their results via letter from our facility  Accredited by [employer] of Radiology and FDA  RISK ASSESSMENT: 5 Year Tyrer-Cuzick: 5 32 % 10 Year Tyrer-Cuzick: 10 93 % Lifetime Tyrer-Cuzick: 42 22 % TISSUE DENSITY: The breasts are extremely dense, which lowers the sensitivity of mammography  INDICATION: Sheryle Najjar is a 52 y o  female presenting for screening mammography  FINDINGS: Bilateral There are no suspicious masses, grouped microcalcifications or areas of architectural distortion  The skin and nipple areolar complex are unremarkable    Few diffusely distributed calcifications are noted in each breast      Impression: No mammographic evidence of malignancy  This patient has been identified as being at elevated risk for development of breast cancer based on the Tyrer-Cuzick model  She may benefit from supplemental screening  ASSESSMENT/BI-RADS CATEGORY: Left: 2 - Benign Right: 2 - Benign Overall: 2 - Benign RECOMMENDATION:      - Routine screening mammogram in 1 year for both breasts  Workstation ID: [id number]       I reviewed the above imaging data  Advance Care Planning/Advance Directives:  Discussed disease status and treatment goals with the patient

## 2020-04-08 ENCOUNTER — TELEPHONE (OUTPATIENT)
Dept: UROLOGY | Facility: MEDICAL CENTER | Age: 50
End: 2020-04-08

## 2020-04-08 ENCOUNTER — HOSPITAL ENCOUNTER (EMERGENCY)
Facility: HOSPITAL | Age: 50
Discharge: HOME | End: 2020-04-08
Attending: EMERGENCY MEDICINE | Admitting: EMERGENCY MEDICINE
Payer: COMMERCIAL

## 2020-04-08 ENCOUNTER — APPOINTMENT (EMERGENCY)
Dept: CT IMAGING | Facility: HOSPITAL | Age: 50
End: 2020-04-08
Payer: COMMERCIAL

## 2020-04-08 VITALS
RESPIRATION RATE: 18 BRPM | WEIGHT: 165 LBS | OXYGEN SATURATION: 98 % | HEART RATE: 72 BPM | TEMPERATURE: 97.1 F | SYSTOLIC BLOOD PRESSURE: 161 MMHG | DIASTOLIC BLOOD PRESSURE: 78 MMHG | HEIGHT: 67 IN | BODY MASS INDEX: 25.9 KG/M2

## 2020-04-08 DIAGNOSIS — N20.0 KIDNEY STONE ON LEFT SIDE: Primary | ICD-10-CM

## 2020-04-08 LAB
ALBUMIN SERPL BCP-MCNC: 3.9 G/DL (ref 3.5–5)
ALP SERPL-CCNC: 73 U/L (ref 46–116)
ALT SERPL W P-5'-P-CCNC: 23 U/L (ref 12–78)
ANION GAP SERPL CALCULATED.3IONS-SCNC: 11 MMOL/L (ref 4–13)
APTT PPP: 24 SECONDS (ref 23–37)
AST SERPL W P-5'-P-CCNC: 18 U/L (ref 5–45)
BACTERIA UR QL AUTO: ABNORMAL /HPF
BASOPHILS # BLD AUTO: 0.02 THOUSANDS/ΜL (ref 0–0.1)
BASOPHILS NFR BLD AUTO: 0 % (ref 0–1)
BILIRUB SERPL-MCNC: 0.4 MG/DL (ref 0.2–1)
BILIRUB UR QL STRIP: NEGATIVE
BUN SERPL-MCNC: 22 MG/DL (ref 5–25)
CALCIUM SERPL-MCNC: 8.9 MG/DL (ref 8.3–10.1)
CHLORIDE SERPL-SCNC: 104 MMOL/L (ref 100–108)
CLARITY UR: CLEAR
CO2 SERPL-SCNC: 24 MMOL/L (ref 21–32)
COLOR UR: YELLOW
CREAT SERPL-MCNC: 1.18 MG/DL (ref 0.6–1.3)
EOSINOPHIL # BLD AUTO: 0.01 THOUSAND/ΜL (ref 0–0.61)
EOSINOPHIL NFR BLD AUTO: 0 % (ref 0–6)
ERYTHROCYTE [DISTWIDTH] IN BLOOD BY AUTOMATED COUNT: 11.9 % (ref 11.6–15.1)
GFR SERPL CREATININE-BSD FRML MDRD: 54 ML/MIN/1.73SQ M
GLUCOSE SERPL-MCNC: 107 MG/DL (ref 65–140)
GLUCOSE UR STRIP-MCNC: NEGATIVE MG/DL
HCT VFR BLD AUTO: 44 % (ref 34.8–46.1)
HGB BLD-MCNC: 15.3 G/DL (ref 11.5–15.4)
HGB UR QL STRIP.AUTO: ABNORMAL
IMM GRANULOCYTES # BLD AUTO: 0.03 THOUSAND/UL (ref 0–0.2)
IMM GRANULOCYTES NFR BLD AUTO: 0 % (ref 0–2)
INR PPP: 0.99 (ref 0.84–1.19)
KETONES UR STRIP-MCNC: NEGATIVE MG/DL
LEUKOCYTE ESTERASE UR QL STRIP: NEGATIVE
LIPASE SERPL-CCNC: 157 U/L (ref 73–393)
LYMPHOCYTES # BLD AUTO: 0.98 THOUSANDS/ΜL (ref 0.6–4.47)
LYMPHOCYTES NFR BLD AUTO: 12 % (ref 14–44)
MCH RBC QN AUTO: 31.9 PG (ref 26.8–34.3)
MCHC RBC AUTO-ENTMCNC: 34.8 G/DL (ref 31.4–37.4)
MCV RBC AUTO: 92 FL (ref 82–98)
MONOCYTES # BLD AUTO: 0.21 THOUSAND/ΜL (ref 0.17–1.22)
MONOCYTES NFR BLD AUTO: 3 % (ref 4–12)
NEUTROPHILS # BLD AUTO: 7.3 THOUSANDS/ΜL (ref 1.85–7.62)
NEUTS SEG NFR BLD AUTO: 85 % (ref 43–75)
NITRITE UR QL STRIP: NEGATIVE
NON-SQ EPI CELLS URNS QL MICRO: ABNORMAL /HPF
NRBC BLD AUTO-RTO: 0 /100 WBCS
PH UR STRIP.AUTO: 6 [PH]
PLATELET # BLD AUTO: 238 THOUSANDS/UL (ref 149–390)
PMV BLD AUTO: 10.6 FL (ref 8.9–12.7)
POTASSIUM SERPL-SCNC: 4.2 MMOL/L (ref 3.5–5.3)
PROT SERPL-MCNC: 7.4 G/DL (ref 6.4–8.2)
PROT UR STRIP-MCNC: ABNORMAL MG/DL
PROTHROMBIN TIME: 12.8 SECONDS (ref 11.6–14.5)
RBC # BLD AUTO: 4.8 MILLION/UL (ref 3.81–5.12)
RBC #/AREA URNS AUTO: ABNORMAL /HPF
SODIUM SERPL-SCNC: 139 MMOL/L (ref 136–145)
SP GR UR STRIP.AUTO: 1.02 (ref 1–1.03)
UROBILINOGEN UR QL STRIP.AUTO: 0.2 E.U./DL
WBC # BLD AUTO: 8.55 THOUSAND/UL (ref 4.31–10.16)
WBC #/AREA URNS AUTO: ABNORMAL /HPF

## 2020-04-08 PROCEDURE — 96375 TX/PRO/DX INJ NEW DRUG ADDON: CPT

## 2020-04-08 PROCEDURE — 36415 COLL VENOUS BLD VENIPUNCTURE: CPT | Performed by: EMERGENCY MEDICINE

## 2020-04-08 PROCEDURE — 85025 COMPLETE CBC W/AUTO DIFF WBC: CPT | Performed by: EMERGENCY MEDICINE

## 2020-04-08 PROCEDURE — 74176 CT ABD & PELVIS W/O CONTRAST: CPT

## 2020-04-08 PROCEDURE — 99284 EMERGENCY DEPT VISIT MOD MDM: CPT

## 2020-04-08 PROCEDURE — 81001 URINALYSIS AUTO W/SCOPE: CPT | Performed by: EMERGENCY MEDICINE

## 2020-04-08 PROCEDURE — 80053 COMPREHEN METABOLIC PANEL: CPT | Performed by: EMERGENCY MEDICINE

## 2020-04-08 PROCEDURE — 83690 ASSAY OF LIPASE: CPT | Performed by: EMERGENCY MEDICINE

## 2020-04-08 PROCEDURE — 99284 EMERGENCY DEPT VISIT MOD MDM: CPT | Performed by: EMERGENCY MEDICINE

## 2020-04-08 PROCEDURE — 85730 THROMBOPLASTIN TIME PARTIAL: CPT | Performed by: EMERGENCY MEDICINE

## 2020-04-08 PROCEDURE — 96374 THER/PROPH/DIAG INJ IV PUSH: CPT

## 2020-04-08 PROCEDURE — 85610 PROTHROMBIN TIME: CPT | Performed by: EMERGENCY MEDICINE

## 2020-04-08 PROCEDURE — 96361 HYDRATE IV INFUSION ADD-ON: CPT

## 2020-04-08 RX ORDER — MORPHINE SULFATE 4 MG/ML
4 INJECTION, SOLUTION INTRAMUSCULAR; INTRAVENOUS ONCE
Status: COMPLETED | OUTPATIENT
Start: 2020-04-08 | End: 2020-04-08

## 2020-04-08 RX ORDER — KETOROLAC TROMETHAMINE 30 MG/ML
30 INJECTION, SOLUTION INTRAMUSCULAR; INTRAVENOUS ONCE
Status: COMPLETED | OUTPATIENT
Start: 2020-04-08 | End: 2020-04-08

## 2020-04-08 RX ORDER — OXYCODONE HYDROCHLORIDE AND ACETAMINOPHEN 5; 325 MG/1; MG/1
1 TABLET ORAL EVERY 6 HOURS PRN
Qty: 15 TABLET | Refills: 0 | Status: SHIPPED | OUTPATIENT
Start: 2020-04-08 | End: 2020-04-18

## 2020-04-08 RX ORDER — CEPHALEXIN 500 MG/1
500 CAPSULE ORAL 2 TIMES DAILY
Qty: 14 CAPSULE | Refills: 0 | Status: SHIPPED | OUTPATIENT
Start: 2020-04-08 | End: 2020-04-15

## 2020-04-08 RX ORDER — ONDANSETRON 2 MG/ML
4 INJECTION INTRAMUSCULAR; INTRAVENOUS ONCE
Status: COMPLETED | OUTPATIENT
Start: 2020-04-08 | End: 2020-04-08

## 2020-04-08 RX ORDER — ONDANSETRON 4 MG/1
4 TABLET, FILM COATED ORAL 3 TIMES DAILY PRN
Qty: 12 TABLET | Refills: 0 | Status: SHIPPED | OUTPATIENT
Start: 2020-04-08 | End: 2020-06-29 | Stop reason: ALTCHOICE

## 2020-04-08 RX ADMIN — MORPHINE SULFATE 4 MG: 4 INJECTION INTRAVENOUS at 08:38

## 2020-04-08 RX ADMIN — SODIUM CHLORIDE 1000 ML: 0.9 INJECTION, SOLUTION INTRAVENOUS at 07:24

## 2020-04-08 RX ADMIN — ONDANSETRON 4 MG: 2 INJECTION INTRAMUSCULAR; INTRAVENOUS at 07:24

## 2020-04-08 RX ADMIN — KETOROLAC TROMETHAMINE 30 MG: 30 INJECTION, SOLUTION INTRAMUSCULAR; INTRAVENOUS at 07:24

## 2020-04-14 ENCOUNTER — TELEMEDICINE (OUTPATIENT)
Dept: UROLOGY | Facility: AMBULATORY SURGERY CENTER | Age: 50
End: 2020-04-14
Payer: COMMERCIAL

## 2020-04-14 DIAGNOSIS — N20.0 CALCULUS OF KIDNEY: Primary | ICD-10-CM

## 2020-04-14 PROCEDURE — 99244 OFF/OP CNSLTJ NEW/EST MOD 40: CPT | Performed by: UROLOGY

## 2020-04-14 RX ORDER — TAMSULOSIN HYDROCHLORIDE 0.4 MG/1
0.4 CAPSULE ORAL
Qty: 14 CAPSULE | Refills: 0 | Status: SHIPPED | OUTPATIENT
Start: 2020-04-14 | End: 2020-05-28 | Stop reason: ALTCHOICE

## 2020-04-14 RX ORDER — OXYCODONE HYDROCHLORIDE AND ACETAMINOPHEN 5; 325 MG/1; MG/1
1 TABLET ORAL EVERY 4 HOURS PRN
Qty: 20 TABLET | Refills: 0 | Status: SHIPPED | OUTPATIENT
Start: 2020-04-14 | End: 2020-06-29 | Stop reason: ALTCHOICE

## 2020-05-13 ENCOUNTER — TELEPHONE (OUTPATIENT)
Dept: UROLOGY | Facility: AMBULATORY SURGERY CENTER | Age: 50
End: 2020-05-13

## 2020-05-27 ENCOUNTER — TELEPHONE (OUTPATIENT)
Dept: UROLOGY | Facility: AMBULATORY SURGERY CENTER | Age: 50
End: 2020-05-27

## 2020-05-28 ENCOUNTER — TELEMEDICINE (OUTPATIENT)
Dept: UROLOGY | Facility: AMBULATORY SURGERY CENTER | Age: 50
End: 2020-05-28
Payer: COMMERCIAL

## 2020-05-28 DIAGNOSIS — N20.0 NEPHROLITHIASIS: Primary | ICD-10-CM

## 2020-05-28 PROCEDURE — 99442 PR PHYS/QHP TELEPHONE EVALUATION 11-20 MIN: CPT | Performed by: NURSE PRACTITIONER

## 2020-06-01 ENCOUNTER — HOSPITAL ENCOUNTER (OUTPATIENT)
Dept: ULTRASOUND IMAGING | Facility: HOSPITAL | Age: 50
Discharge: HOME | End: 2020-06-01
Payer: COMMERCIAL

## 2020-06-01 DIAGNOSIS — N20.0 NEPHROLITHIASIS: ICD-10-CM

## 2020-06-01 PROCEDURE — 76770 US EXAM ABDO BACK WALL COMP: CPT

## 2020-06-02 ENCOUNTER — TELEPHONE (OUTPATIENT)
Dept: UROLOGY | Facility: AMBULATORY SURGERY CENTER | Age: 50
End: 2020-06-02

## 2020-06-29 ENCOUNTER — TELEMEDICINE (OUTPATIENT)
Dept: FAMILY MEDICINE CLINIC | Facility: CLINIC | Age: 50
End: 2020-06-29
Payer: COMMERCIAL

## 2020-06-29 ENCOUNTER — TELEPHONE (OUTPATIENT)
Dept: OBGYN CLINIC | Facility: CLINIC | Age: 50
End: 2020-06-29

## 2020-06-29 VITALS — TEMPERATURE: 97.7 F

## 2020-06-29 DIAGNOSIS — J01.00 ACUTE NON-RECURRENT MAXILLARY SINUSITIS: Primary | ICD-10-CM

## 2020-06-29 PROCEDURE — 99213 OFFICE O/P EST LOW 20 MIN: CPT | Performed by: FAMILY MEDICINE

## 2020-06-29 RX ORDER — METHYLPREDNISOLONE 4 MG/1
TABLET ORAL
Qty: 21 TABLET | Refills: 0 | Status: SHIPPED | OUTPATIENT
Start: 2020-06-29 | End: 2020-08-20 | Stop reason: ALTCHOICE

## 2020-06-29 RX ORDER — FEXOFENADINE HCL 180 MG/1
180 TABLET ORAL EVERY EVENING
COMMUNITY
End: 2022-04-06

## 2020-06-29 RX ORDER — AMOXICILLIN AND CLAVULANATE POTASSIUM 875; 125 MG/1; MG/1
1 TABLET, FILM COATED ORAL EVERY 12 HOURS SCHEDULED
Qty: 14 TABLET | Refills: 0 | Status: SHIPPED | OUTPATIENT
Start: 2020-06-29 | End: 2020-07-06

## 2020-06-30 DIAGNOSIS — Z30.44 ENCOUNTER FOR SURVEILLANCE OF VAGINAL RING HORMONAL CONTRACEPTIVE DEVICE: ICD-10-CM

## 2020-06-30 RX ORDER — ETONOGESTREL AND ETHINYL ESTRADIOL 11.7; 2.7 MG/1; MG/1
INSERT, EXTENDED RELEASE VAGINAL
Qty: 3 EACH | Refills: 1 | Status: SHIPPED | OUTPATIENT
Start: 2020-06-30 | End: 2020-11-04 | Stop reason: SDUPTHER

## 2020-08-19 ENCOUNTER — TELEPHONE (OUTPATIENT)
Dept: OBGYN CLINIC | Facility: CLINIC | Age: 50
End: 2020-08-19

## 2020-08-20 ENCOUNTER — OFFICE VISIT (OUTPATIENT)
Dept: FAMILY MEDICINE CLINIC | Facility: CLINIC | Age: 50
End: 2020-08-20
Payer: COMMERCIAL

## 2020-08-20 VITALS
TEMPERATURE: 99 F | BODY MASS INDEX: 28.72 KG/M2 | HEART RATE: 78 BPM | WEIGHT: 183 LBS | SYSTOLIC BLOOD PRESSURE: 120 MMHG | DIASTOLIC BLOOD PRESSURE: 86 MMHG | HEIGHT: 67 IN

## 2020-08-20 DIAGNOSIS — N39.0 URINARY TRACT INFECTION WITH HEMATURIA, SITE UNSPECIFIED: Primary | ICD-10-CM

## 2020-08-20 DIAGNOSIS — R31.9 URINARY TRACT INFECTION WITH HEMATURIA, SITE UNSPECIFIED: Primary | ICD-10-CM

## 2020-08-20 PROBLEM — G40.B09 JME (JUVENILE MYOCLONIC EPILEPSY) (HCC): Status: ACTIVE | Noted: 2018-10-02

## 2020-08-20 PROBLEM — J01.00 ACUTE NON-RECURRENT MAXILLARY SINUSITIS: Status: RESOLVED | Noted: 2018-06-27 | Resolved: 2020-08-20

## 2020-08-20 LAB
SL AMB  POCT GLUCOSE, UA: NEGATIVE
SL AMB LEUKOCYTE ESTERASE,UA: ABNORMAL
SL AMB POCT BILIRUBIN,UA: NEGATIVE
SL AMB POCT BLOOD,UA: ABNORMAL
SL AMB POCT CLARITY,UA: ABNORMAL
SL AMB POCT COLOR,UA: YELLOW
SL AMB POCT KETONES,UA: NEGATIVE
SL AMB POCT NITRITE,UA: POSITIVE
SL AMB POCT PH,UA: 6
SL AMB POCT SPECIFIC GRAVITY,UA: 1.02
SL AMB POCT URINE PROTEIN: ABNORMAL
SL AMB POCT UROBILINOGEN: NORMAL

## 2020-08-20 PROCEDURE — 99213 OFFICE O/P EST LOW 20 MIN: CPT | Performed by: FAMILY MEDICINE

## 2020-08-20 PROCEDURE — 3008F BODY MASS INDEX DOCD: CPT | Performed by: FAMILY MEDICINE

## 2020-08-20 PROCEDURE — 3725F SCREEN DEPRESSION PERFORMED: CPT | Performed by: FAMILY MEDICINE

## 2020-08-20 PROCEDURE — 81002 URINALYSIS NONAUTO W/O SCOPE: CPT | Performed by: FAMILY MEDICINE

## 2020-08-20 PROCEDURE — 1036F TOBACCO NON-USER: CPT | Performed by: FAMILY MEDICINE

## 2020-08-20 RX ORDER — NITROFURANTOIN 25; 75 MG/1; MG/1
100 CAPSULE ORAL 2 TIMES DAILY
Qty: 10 CAPSULE | Refills: 0 | Status: SHIPPED | OUTPATIENT
Start: 2020-08-20 | End: 2020-08-25 | Stop reason: SDUPTHER

## 2020-08-20 NOTE — PATIENT INSTRUCTIONS

## 2020-08-20 NOTE — PROGRESS NOTES
Assessment/Plan:  Problem List Items Addressed This Visit     None      Visit Diagnoses     Urinary tract infection with hematuria, site unspecified    -  Primary    Relevant Medications    nitrofurantoin (MACROBID) 100 mg capsule    Other Relevant Orders    POCT urine dip (Completed)    Urine culture      The patient has an acute urinary tract infection  We will treat her with the Macrobid as ordered  She will increase her fluids and rest   She will call if there is no improvement within 2-3 days or if the condition worsens  We will see her back as scheduled  Return if symptoms worsen or fail to improve  Subjective:   Chief Complaint   Patient presents with    Urinary Urgency     x 1 week    urinary burning        Patient ID: Rafael Morales is a 48 y o  female presents today for increased urgency of urination  Rafael Morales is a 48 y o  female who presents with urinary symptoms for 10 days  There is increased urgency and burning  There was some gross hematuria  She has increased frequency and is voiding in small amounts  She does have some urinary retention  She denies any fevers  There is no pelvic pain  She does have some lower abdominal pressure  There is no vaginal discharge or itching  There is no back pain  Urinary Frequency    This is a new problem  The current episode started 1 to 4 weeks ago  The problem occurs every urination  The problem has been unchanged  The quality of the pain is described as burning  The pain is at a severity of 5/10  The pain is moderate  There has been no fever  Associated symptoms include frequency, hematuria and urgency  Pertinent negatives include no chills, discharge, flank pain, hesitancy, nausea, possible pregnancy, sweats or vomiting  She has tried nothing for the symptoms       The following portions of the patient's history were reviewed and updated as appropriate: allergies, current medications, past family history, past medical history, past social history, past surgical history and problem list   Patient Active Problem List   Diagnosis    Irregular menses    Encounter for surveillance of vaginal ring hormonal contraceptive device    Dense breast tissue    History of seizure    Allergic dermatitis    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 ([facility] )    Family history of breast cancer    Skin lesion of breast    SANDRA (juvenile myoclonic epilepsy) ([facility] )     Past Medical History:   Diagnosis Date    Abdominal pain     Achrochordon     one in the left dorsal area and on the lower back- last assessed 04/30/13    Dysuria     resolved 10/05/16    Encounter for screening colonoscopy     Heavy menses     Intestinal ulcer     Nausea     Seizures ([facility] )     Submucous leiomyoma of uterus      Past Surgical History:   Procedure Laterality Date    DILATION AND CURETTAGE OF UTERUS      HYSTEROSCOPY      INCONTINENCE SURGERY      bladder sling    ME COLONOSCOPY FLX DX W/COLLJ SPEC WHEN PFRMD N/A 11/14/2016    Procedure: EGD AND COLONOSCOPY;  Surgeon: Karl Martin MD;  Location: [facility];   Service: Gastroenterology     Allergies   Allergen Reactions    Medical Tape Rash    Other Rash    Wound Dressings Rash     Family History   Problem Relation Age of Onset    Cancer Mother     Breast cancer Mother 64    Colon polyps Mother     Diabetes Father         Type 2    Hypertension Father     Breast cancer Family     BRCA1 Negative Sister     BRCA2 Negative Sister     Breast cancer Maternal Aunt 62     Social History     Socioeconomic History    Marital status: /Civil Union     Spouse name: Not on file    Number of children: 2    Years of education: Not on file    Highest education level: Not on file   Occupational History    Not on file   Social Needs    Financial resource strain: Not hard at all   [place] insecurity     Worry: Never true     Inability: Never true   [employer] needs     Medical: No Non-medical: No   Tobacco Use    Smoking status: Never Smoker    Smokeless tobacco: Never Used   Substance and Sexual Activity    Alcohol use: Yes     Frequency: Monthly or less     Drinks per session: 1 or 2     Binge frequency: Never    Drug use: No    Sexual activity: Yes     Partners: Male     Birth control/protection: Ring     Comment: nuva ring   Lifestyle    Physical activity     Days per week: 0 days     Minutes per session: 0 min    Stress: Only a little   Relationships    Social connections     Talks on phone: More than three times a week     Gets together: Once a week     Attends Synagogue service: Never     Active member of club or organization: No     Attends meetings of clubs or organizations: Never     Relationship status:     Intimate partner violence     Fear of current or ex partner: No     Emotionally abused: No     Physically abused: No     Forced sexual activity: No   Other Topics Concern    Not on file   Social History Narrative    Caffeine use    [address]    Uses safety equipment Seatbelts     Current Outpatient Medications on File Prior to Visit   Medication Sig Dispense Refill    etonogestrel-ethinyl estradiol (NuvaRing) 0 12-0 015 MG/24HR vaginal ring INSERT 1 RING VAGINALLY AS DIRECTED  REMOVE AFTER 3 WEEKS & WAIT 7 DAYS BEFORE INSERTING A NEW RING 3 each 1    fexofenadine (ALLEGRA) 180 MG tablet Take 180 mg by mouth daily      lamoTRIgine (LaMICtal) 150 MG tablet Take 150 mg by mouth 2 (two) times a day       No current facility-administered medications on file prior to visit  Review of Systems   Constitutional: Negative  Negative for chills  HENT: Negative  Eyes: Negative  Respiratory: Negative  Cardiovascular: Negative  Gastrointestinal: Negative  Negative for nausea and vomiting  Endocrine: Negative  Genitourinary: Positive for dysuria, frequency, hematuria and urgency   Negative for decreased urine volume, difficulty urinating, dyspareunia, enuresis, flank pain, genital sores, hesitancy, menstrual problem, pelvic pain, vaginal bleeding, vaginal discharge and vaginal pain  Musculoskeletal: Negative  Skin: Negative  Allergic/Immunologic: Negative  Neurological: Negative  Hematological: Negative  Psychiatric/Behavioral: Negative  Objective:  Vitals:    08/20/20 0858   BP: 120/86   BP Location: Left arm   Patient Position: Sitting   Cuff Size: Standard   Pulse: 78   Temp: 99 °F (37 2 °C)   TempSrc: Tympanic   Weight: 83 kg (183 lb)   Height: 5' 7" (1 702 m)     Body mass index is 28 66 kg/m²  Physical Exam  Constitutional:       Appearance: She is well-developed  HENT:      Head: Normocephalic and atraumatic  Mouth/Throat:      Pharynx: No oropharyngeal exudate  Eyes:      Conjunctiva/sclera: Conjunctivae normal       Pupils: Pupils are equal, round, and reactive to light  Neck:      Musculoskeletal: Normal range of motion  Thyroid: No thyromegaly  Vascular: No JVD  Trachea: No tracheal deviation  Cardiovascular:      Rate and Rhythm: Normal rate and regular rhythm  Heart sounds: Normal heart sounds  No murmur  No friction rub  No gallop  Pulmonary:      Effort: Pulmonary effort is normal  No respiratory distress  Breath sounds: Normal breath sounds  No stridor  No wheezing or rales  Chest:      Chest wall: No tenderness  Abdominal:      General: Bowel sounds are normal  There is no distension  Palpations: Abdomen is soft  There is no mass  Tenderness: There is no abdominal tenderness  There is no guarding or rebound  Musculoskeletal: Normal range of motion  General: No tenderness or deformity  Lymphadenopathy:      Cervical: No cervical adenopathy  Skin:     General: Skin is warm and dry  Neurological:      Mental Status: She is alert and oriented to person, place, and time  Cranial Nerves: No cranial nerve deficit  Motor: No abnormal muscle tone  Coordination: Coordination normal       Deep Tendon Reflexes: Reflexes are normal and symmetric  Reflexes normal          Recent Results (from the past 1008 hour(s))   POCT urine dip    Collection Time: 08/20/20  9:09 AM   Result Value Ref Range    LEUKOCYTE ESTERASE,UA 2+     NITRITE,UA positive     SL AMB POCT UROBILINOGEN normal     POCT URINE PROTEIN trace      PH,UA 6 0     BLOOD,UA hemolyzed trace     SPECIFIC GRAVITY,UA 1 020     KETONES,UA negative     BILIRUBIN,UA negative     GLUCOSE, UA negative      COLOR,UA yellow     CLARITY,UA cloudy          BMI Counseling: Body mass index is 28 66 kg/m²  The BMI is above normal  Nutrition recommendations include decreasing portion sizes, encouraging healthy choices of fruits and vegetables, decreasing fast food intake, consuming healthier snacks, limiting drinks that contain sugar, moderation in carbohydrate intake, increasing intake of lean protein, reducing intake of saturated and trans fat and reducing intake of cholesterol  Exercise recommendations include exercising 3-5 times per week and strength training exercises  No pharmacotherapy was ordered  Patient referred to PCP due to patient being overweight

## 2020-08-25 ENCOUNTER — TELEPHONE (OUTPATIENT)
Dept: FAMILY MEDICINE CLINIC | Facility: CLINIC | Age: 50
End: 2020-08-25

## 2020-08-25 DIAGNOSIS — R31.9 URINARY TRACT INFECTION WITH HEMATURIA, SITE UNSPECIFIED: ICD-10-CM

## 2020-08-25 DIAGNOSIS — N39.0 URINARY TRACT INFECTION WITH HEMATURIA, SITE UNSPECIFIED: ICD-10-CM

## 2020-08-25 RX ORDER — NITROFURANTOIN 25; 75 MG/1; MG/1
100 CAPSULE ORAL 2 TIMES DAILY
Qty: 10 CAPSULE | Refills: 0 | Status: SHIPPED | OUTPATIENT
Start: 2020-08-25 | End: 2020-08-30

## 2020-08-25 NOTE — PROGRESS NOTES
Reports 50 % better but urine remains cloudy, will extend for another 5 days for resolution, initially thought to change to cipro, but currently on lamictal and interaction of two medications

## 2020-08-25 NOTE — TELEPHONE ENCOUNTER
PT CALLED WAS SEEN LAST WEEK FOR UTI BUT STILL HAVING CLOUDY URINE ,SLIGHT BURNING FINISHED THE ANTIBIOTIC REQUESTING SOMETHING ELSE PT GOES TO [address]  [phone]

## 2020-11-04 ENCOUNTER — ANNUAL EXAM (OUTPATIENT)
Dept: OBGYN CLINIC | Facility: CLINIC | Age: 50
End: 2020-11-04
Payer: COMMERCIAL

## 2020-11-04 ENCOUNTER — DOCUMENTATION (OUTPATIENT)
Dept: OBGYN CLINIC | Facility: CLINIC | Age: 50
End: 2020-11-04

## 2020-11-04 VITALS
DIASTOLIC BLOOD PRESSURE: 84 MMHG | TEMPERATURE: 97.6 F | WEIGHT: 184 LBS | HEIGHT: 67 IN | BODY MASS INDEX: 28.88 KG/M2 | SYSTOLIC BLOOD PRESSURE: 148 MMHG

## 2020-11-04 DIAGNOSIS — Z12.31 ENCOUNTER FOR SCREENING MAMMOGRAM FOR MALIGNANT NEOPLASM OF BREAST: ICD-10-CM

## 2020-11-04 DIAGNOSIS — B37.3 VAGINAL CANDIDIASIS: ICD-10-CM

## 2020-11-04 DIAGNOSIS — Z30.44 ENCOUNTER FOR SURVEILLANCE OF VAGINAL RING HORMONAL CONTRACEPTIVE DEVICE: ICD-10-CM

## 2020-11-04 DIAGNOSIS — Z01.419 WOMEN'S ANNUAL ROUTINE GYNECOLOGICAL EXAMINATION: Primary | ICD-10-CM

## 2020-11-04 DIAGNOSIS — R92.2 DENSE BREAST TISSUE: ICD-10-CM

## 2020-11-04 DIAGNOSIS — R35.0 URINARY FREQUENCY: ICD-10-CM

## 2020-11-04 LAB
BILIRUB UR QL STRIP: NEGATIVE
BV WHIFF TEST VAG QL: NEGATIVE
CLARITY UR: CLEAR
CLUE CELLS SPEC QL WET PREP: NEGATIVE
COLOR UR: YELLOW
GLUCOSE UR STRIP-MCNC: NEGATIVE MG/DL
HGB UR QL STRIP.AUTO: NEGATIVE
KETONES UR STRIP-MCNC: NEGATIVE MG/DL
LEUKOCYTE ESTERASE UR QL STRIP: NEGATIVE
NITRITE UR QL STRIP: NEGATIVE
PH SMN: 4.5 [PH]
PH UR STRIP.AUTO: 6.5 [PH]
PROT UR STRIP-MCNC: NEGATIVE MG/DL
SL AMB POCT WET MOUNT: YES
SP GR UR STRIP.AUTO: 1.01 (ref 1–1.03)
T VAGINALIS VAG QL WET PREP: NEGATIVE
UROBILINOGEN UR QL STRIP.AUTO: 0.2 E.U./DL
YEAST VAG QL WET PREP: POSITIVE

## 2020-11-04 PROCEDURE — 87210 SMEAR WET MOUNT SALINE/INK: CPT | Performed by: OBSTETRICS & GYNECOLOGY

## 2020-11-04 PROCEDURE — 87186 SC STD MICRODIL/AGAR DIL: CPT | Performed by: OBSTETRICS & GYNECOLOGY

## 2020-11-04 PROCEDURE — 87086 URINE CULTURE/COLONY COUNT: CPT | Performed by: OBSTETRICS & GYNECOLOGY

## 2020-11-04 PROCEDURE — 81003 URINALYSIS AUTO W/O SCOPE: CPT | Performed by: OBSTETRICS & GYNECOLOGY

## 2020-11-04 PROCEDURE — 87077 CULTURE AEROBIC IDENTIFY: CPT | Performed by: OBSTETRICS & GYNECOLOGY

## 2020-11-04 PROCEDURE — S0612 ANNUAL GYNECOLOGICAL EXAMINA: HCPCS | Performed by: OBSTETRICS & GYNECOLOGY

## 2020-11-04 RX ORDER — ETONOGESTREL AND ETHINYL ESTRADIOL 11.7; 2.7 MG/1; MG/1
INSERT, EXTENDED RELEASE VAGINAL
Qty: 3 EACH | Refills: 1 | Status: SHIPPED | OUTPATIENT
Start: 2020-11-04 | End: 2020-11-04 | Stop reason: SDUPTHER

## 2020-11-04 RX ORDER — FLUCONAZOLE 150 MG/1
150 TABLET ORAL ONCE
Qty: 2 TABLET | Refills: 0 | Status: SHIPPED | OUTPATIENT
Start: 2020-11-04 | End: 2020-11-04

## 2020-11-04 RX ORDER — ETONOGESTREL AND ETHINYL ESTRADIOL 11.7; 2.7 MG/1; MG/1
INSERT, EXTENDED RELEASE VAGINAL
Qty: 3 EACH | Refills: 3 | Status: SHIPPED | OUTPATIENT
Start: 2020-11-04 | End: 2021-04-08 | Stop reason: ALTCHOICE

## 2020-11-05 ENCOUNTER — TELEPHONE (OUTPATIENT)
Dept: OBGYN CLINIC | Facility: CLINIC | Age: 50
End: 2020-11-05

## 2020-11-06 ENCOUNTER — TELEPHONE (OUTPATIENT)
Dept: GASTROENTEROLOGY | Facility: CLINIC | Age: 50
End: 2020-11-06

## 2020-11-06 ENCOUNTER — OFFICE VISIT (OUTPATIENT)
Dept: GASTROENTEROLOGY | Facility: CLINIC | Age: 50
End: 2020-11-06
Payer: COMMERCIAL

## 2020-11-06 VITALS
HEART RATE: 93 BPM | HEIGHT: 67 IN | WEIGHT: 183.6 LBS | BODY MASS INDEX: 28.82 KG/M2 | DIASTOLIC BLOOD PRESSURE: 82 MMHG | SYSTOLIC BLOOD PRESSURE: 126 MMHG | TEMPERATURE: 98 F

## 2020-11-06 DIAGNOSIS — R13.19 ESOPHAGEAL DYSPHAGIA: ICD-10-CM

## 2020-11-06 DIAGNOSIS — R12 HEARTBURN: Primary | ICD-10-CM

## 2020-11-06 DIAGNOSIS — Z83.71 FAMILY HISTORY OF COLONIC POLYPS: ICD-10-CM

## 2020-11-06 DIAGNOSIS — K50.00 TERMINAL ILEITIS WITHOUT COMPLICATION (HCC): ICD-10-CM

## 2020-11-06 LAB
BACTERIA UR CULT: ABNORMAL
BACTERIA UR CULT: ABNORMAL

## 2020-11-06 PROCEDURE — 1036F TOBACCO NON-USER: CPT | Performed by: INTERNAL MEDICINE

## 2020-11-06 PROCEDURE — 99214 OFFICE O/P EST MOD 30 MIN: CPT | Performed by: INTERNAL MEDICINE

## 2020-11-06 PROCEDURE — 3008F BODY MASS INDEX DOCD: CPT | Performed by: INTERNAL MEDICINE

## 2020-11-06 RX ORDER — OMEPRAZOLE 40 MG/1
40 CAPSULE, DELAYED RELEASE ORAL DAILY
Qty: 30 CAPSULE | Refills: 2 | Status: SHIPPED | OUTPATIENT
Start: 2020-11-06 | End: 2021-01-29

## 2020-11-09 DIAGNOSIS — N39.0 URINARY TRACT INFECTION WITHOUT HEMATURIA, SITE UNSPECIFIED: Primary | ICD-10-CM

## 2020-11-09 RX ORDER — SULFAMETHOXAZOLE AND TRIMETHOPRIM 800; 160 MG/1; MG/1
1 TABLET ORAL EVERY 12 HOURS SCHEDULED
Qty: 6 TABLET | Refills: 0 | Status: SHIPPED | OUTPATIENT
Start: 2020-11-09 | End: 2020-11-12

## 2020-11-12 ENCOUNTER — ANESTHESIA (OUTPATIENT)
Dept: GASTROENTEROLOGY | Facility: AMBULATORY SURGERY CENTER | Age: 50
End: 2020-11-12

## 2020-11-12 ENCOUNTER — HOSPITAL ENCOUNTER (OUTPATIENT)
Dept: GASTROENTEROLOGY | Facility: AMBULATORY SURGERY CENTER | Age: 50
Discharge: HOME | End: 2020-11-12
Payer: COMMERCIAL

## 2020-11-12 ENCOUNTER — ANESTHESIA EVENT (OUTPATIENT)
Dept: GASTROENTEROLOGY | Facility: AMBULATORY SURGERY CENTER | Age: 50
End: 2020-11-12

## 2020-11-12 VITALS
RESPIRATION RATE: 18 BRPM | WEIGHT: 183 LBS | HEART RATE: 79 BPM | DIASTOLIC BLOOD PRESSURE: 77 MMHG | OXYGEN SATURATION: 100 % | SYSTOLIC BLOOD PRESSURE: 131 MMHG | TEMPERATURE: 98.1 F | HEIGHT: 67 IN | BODY MASS INDEX: 28.72 KG/M2

## 2020-11-12 VITALS — HEART RATE: 92 BPM

## 2020-11-12 DIAGNOSIS — R13.19 ESOPHAGEAL DYSPHAGIA: ICD-10-CM

## 2020-11-12 DIAGNOSIS — R12 HEARTBURN: ICD-10-CM

## 2020-11-12 LAB
EXT PREGNANCY TEST URINE: NEGATIVE
EXT. CONTROL: NORMAL

## 2020-11-12 PROCEDURE — 88305 TISSUE EXAM BY PATHOLOGIST: CPT | Performed by: PATHOLOGY

## 2020-11-12 PROCEDURE — 43239 EGD BIOPSY SINGLE/MULTIPLE: CPT | Performed by: INTERNAL MEDICINE

## 2020-11-12 RX ORDER — MELATONIN
1000 DAILY
COMMUNITY

## 2020-11-12 RX ORDER — PROPOFOL 10 MG/ML
INJECTION, EMULSION INTRAVENOUS AS NEEDED
Status: DISCONTINUED | OUTPATIENT
Start: 2020-11-12 | End: 2020-11-12

## 2020-11-12 RX ORDER — FAMOTIDINE 40 MG/1
40 TABLET, FILM COATED ORAL
Qty: 30 TABLET | Refills: 5 | Status: SHIPPED | OUTPATIENT
Start: 2020-11-12 | End: 2021-02-03

## 2020-11-12 RX ORDER — SODIUM CHLORIDE 9 MG/ML
50 INJECTION, SOLUTION INTRAVENOUS CONTINUOUS
Status: DISCONTINUED | OUTPATIENT
Start: 2020-11-12 | End: 2020-11-16 | Stop reason: HOSPADM

## 2020-11-12 RX ADMIN — PROPOFOL 20 MG: 10 INJECTION, EMULSION INTRAVENOUS at 11:16

## 2020-11-12 RX ADMIN — PROPOFOL 20 MG: 10 INJECTION, EMULSION INTRAVENOUS at 11:04

## 2020-11-12 RX ADMIN — PROPOFOL 50 MG: 10 INJECTION, EMULSION INTRAVENOUS at 11:15

## 2020-11-12 RX ADMIN — SODIUM CHLORIDE 50 ML/HR: 9 INJECTION, SOLUTION INTRAVENOUS at 11:06

## 2020-11-12 RX ADMIN — PROPOFOL 150 MG: 10 INJECTION, EMULSION INTRAVENOUS at 11:13

## 2020-11-12 RX ADMIN — PROPOFOL 30 MG: 10 INJECTION, EMULSION INTRAVENOUS at 11:18

## 2021-01-28 DIAGNOSIS — R13.19 ESOPHAGEAL DYSPHAGIA: ICD-10-CM

## 2021-01-28 DIAGNOSIS — R12 HEARTBURN: ICD-10-CM

## 2021-01-29 RX ORDER — OMEPRAZOLE 40 MG/1
CAPSULE, DELAYED RELEASE ORAL
Qty: 90 CAPSULE | Refills: 0 | Status: SHIPPED | OUTPATIENT
Start: 2021-01-29 | End: 2021-02-03

## 2021-02-03 ENCOUNTER — OFFICE VISIT (OUTPATIENT)
Dept: GASTROENTEROLOGY | Facility: CLINIC | Age: 51
End: 2021-02-03
Payer: COMMERCIAL

## 2021-02-03 VITALS
HEIGHT: 67 IN | DIASTOLIC BLOOD PRESSURE: 78 MMHG | BODY MASS INDEX: 28.09 KG/M2 | WEIGHT: 179 LBS | SYSTOLIC BLOOD PRESSURE: 132 MMHG

## 2021-02-03 DIAGNOSIS — K50.00 TERMINAL ILEITIS WITHOUT COMPLICATION (HCC): Primary | ICD-10-CM

## 2021-02-03 DIAGNOSIS — R13.19 ESOPHAGEAL DYSPHAGIA: ICD-10-CM

## 2021-02-03 DIAGNOSIS — Z83.71 FAMILY HISTORY OF COLONIC POLYPS: ICD-10-CM

## 2021-02-03 DIAGNOSIS — R12 HEARTBURN: ICD-10-CM

## 2021-02-03 PROCEDURE — 99214 OFFICE O/P EST MOD 30 MIN: CPT | Performed by: INTERNAL MEDICINE

## 2021-02-03 RX ORDER — FAMOTIDINE 40 MG/1
40 TABLET, FILM COATED ORAL
Qty: 90 TABLET | Refills: 3 | Status: SHIPPED | OUTPATIENT
Start: 2021-02-03 | End: 2022-02-01

## 2021-02-03 NOTE — PROGRESS NOTES
[facility] - Outpatient Follow-up Note  [place] 48 y o  female MRN: [id number]  Encounter: [id number]    ASSESSMENT AND PLAN:      1  Esophageal dysphagia  50F here today for follow up  Doing better better after EGD and dilation  Bx neg for EOE  However, intermittently still having some issues with dry food causing her to cough  Unclear if 2/2 post nasal drip of an esophageal dysmotility  - famotidine (PEPCID) 40 MG tablet; Take 1 tablet (40 mg total) by mouth daily at bedtime  Dispense: 90 tablet; Refill: 3  - Check FL barium swallow; Future  - If symptoms worsen, consider esoph manometry at one point  2  Heartburn  Improved on pepcid alone  Not on PPI      - GERD lifestyle modifications  - famotidine (PEPCID) 40 MG tablet; Take 1 tablet (40 mg total) by mouth daily at bedtime  Dispense: 90 tablet; Refill: 3    3  Terminal ileitis without complication ([facility] )  H/o terminal ileitis while on NSAIDS  Work up negative in 2017  Will just continue to monitor for evidence of inflammatory bowel disease  4  Family history of colonic polyps  Neg colon in 2017, recall 3/2022      Followup Appointment: 1 year  ______________________________________________________________________    Chief Complaint   Patient presents with    Follow-up     HPI:  50F here today for f/u  After EGD/dilation in 11/2020, overall symptoms are better  She is only on pepcid at night  No PPI  Heartburn ok  However, still having intermittent issues with dry foods feeling like they get hung up  Sometimes with coughing after swallowing  Does have post nasal drip  Allergies possible  Trying to lose weight - lost about 4lbs since November  Working out more and trying to eat slower  Also feels like she gets full faster       Historical Information   Past Medical History:   Diagnosis Date    Abdominal pain     Achrochordon     one in the left dorsal area and on the lower back- last assessed 04/30/13    Dysuria     resolved 10/05/16    Encounter for screening colonoscopy     Heavy menses     Intestinal ulcer     Irritable bowel syndrome     Nausea     Seizures (HCC)     Submucous leiomyoma of uterus     Terminal ileitis ([address] )     unclear etiology, work up w EGD/COLON/TI bx/SBC/CT done  Past Surgical History:   Procedure Laterality Date    COLONOSCOPY  03/01/2017    5 yr recall    DILATION AND CURETTAGE OF UTERUS      HYSTEROSCOPY      INCONTINENCE SURGERY      bladder sling    CO COLONOSCOPY FLX DX W/COLLJ SPEC WHEN PFRMD N/A 11/14/2016    Procedure: EGD AND COLONOSCOPY;  Surgeon: Ava Franklin MD;  Location: [facility];   Service: Gastroenterology     Social History     Substance and Sexual Activity   Alcohol Use Yes    Frequency: Monthly or less    Drinks per session: 1 or 2    Binge frequency: Never     Social History     Substance and Sexual Activity   Drug Use No     Social History     Tobacco Use   Smoking Status Never Smoker   Smokeless Tobacco Never Used     Family History   Problem Relation Age of Onset    Cancer Mother     Breast cancer Mother 64    Colon polyps Mother     Diabetes Father         Type 2    Hypertension Father     Breast cancer Family     BRCA1 Negative Sister     BRCA2 Negative Sister     Breast cancer Maternal Aunt 62    Colon cancer Neg Hx          Current Outpatient Medications:     cholecalciferol (VITAMIN D3) 1,000 units tablet    etonogestrel-ethinyl estradiol (NuvaRing) 0 12-0 015 MG/24HR vaginal ring    famotidine (PEPCID) 40 MG tablet    fexofenadine (ALLEGRA) 180 MG tablet    lamoTRIgine (LaMICtal) 150 MG tablet    magnesium oxide (MAG-OX) 400 mg    Probiotic Product (PROBIOTIC PO)    nystatin-triamcinolone (MYCOLOG-II) cream  Allergies   Allergen Reactions    Medical Tape Rash    Other Rash    Wound Dressings Rash     Reviewed medications and allergies and updated as indicated    PHYSICAL EXAM:    Blood pressure 132/78, height 5' 7" (1 702 m), weight 81 2 kg (179 lb), not currently breastfeeding  Body mass index is 28 04 kg/m²  General Appearance: NAD, cooperative, alert  Eyes: Anicteric, PERRLA, EOMI  ENT:  Normocephalic, atraumatic, normal mucosa  Neck:  Supple, symmetrical, trachea midline  Resp:  Clear to auscultation bilaterally; no rales, rhonchi or wheezing; respirations unlabored   CV:  S1 S2, Regular rate and rhythm; no murmur, rub, or gallop  GI:  Soft, non-tender, non-distended; normal bowel sounds; no masses, no organomegaly   Rectal: Deferred  Musculoskeletal: No cyanosis, clubbing or edema  Normal ROM  Skin:  No jaundice, rashes, or lesions   Heme/Lymph: No palpable cervical lymphadenopathy  Psych: Normal affect, good eye contact  Neuro: No gross deficits, AAOx3    Lab Results:   Lab Results   Component Value Date    WBC 8 55 04/08/2020    HGB 15 3 04/08/2020    HCT 44 0 04/08/2020    MCV 92 04/08/2020     04/08/2020     Lab Results   Component Value Date     07/22/2015    K 4 2 04/08/2020     04/08/2020    CO2 24 04/08/2020    ANIONGAP 10 07/22/2015    BUN 22 04/08/2020    CREATININE 1 18 04/08/2020    GLUCOSE 78 07/22/2015    CALCIUM 8 9 04/08/2020    AST 18 04/08/2020    ALT 23 04/08/2020    ALKPHOS 73 04/08/2020    PROT 6 7 07/22/2015    BILITOT 0 43 07/22/2015    EGFR 54 04/08/2020     No results found for: IRON, TIBC, FERRITIN  Lab Results   Component Value Date    LIPASE 157 04/08/2020       Radiology Results:   No results found

## 2021-02-03 NOTE — LETTER
February 3, 2021     Adis Lowe, [address] [facility] [address] [facility] [address]  [facility] [address]    Patient: Felecia Arroyo   YOB: 1970   Date of Visit: 2/3/2021       Dear Dr Ismael Pierre: Thank you for referring Faye Mpcherson to me for evaluation  Below are my notes for this consultation  If you have questions, please do not hesitate to call me  I look forward to following your patient along with you  Sincerely,        Jose Alberto Dumas MD        CC: No Recipients  Jose Alberto Dumas MD  2/3/2021  1:50 PM  Sign when Signing Visit  [facility] - Outpatient Follow-up Note  Felecia Arroyo 48 y o  female MRN: [id number]  Encounter: [id number]    ASSESSMENT AND PLAN:      1  Esophageal dysphagia  50F here today for follow up  Doing better better after EGD and dilation  Bx neg for EOE  However, intermittently still having some issues with dry food causing her to cough  Unclear if 2/2 post nasal drip of an esophageal dysmotility  - famotidine (PEPCID) 40 MG tablet; Take 1 tablet (40 mg total) by mouth daily at bedtime  Dispense: 90 tablet; Refill: 3  - Check FL barium swallow; Future  - If symptoms worsen, consider esoph manometry at one point  2  Heartburn  Improved on pepcid alone  Not on PPI      - GERD lifestyle modifications  - famotidine (PEPCID) 40 MG tablet; Take 1 tablet (40 mg total) by mouth daily at bedtime  Dispense: 90 tablet; Refill: 3    3  Terminal ileitis without complication ([address] )  H/o terminal ileitis while on NSAIDS  Work up negative in 2017  Will just continue to monitor for evidence of inflammatory bowel disease  4  Family history of colonic polyps  Neg colon in 2017, recall 3/2022      Followup Appointment: 1 year  ______________________________________________________________________    Chief Complaint   Patient presents with    Follow-up     HPI:  50F here today for f/u  After EGD/dilation in 11/2020, overall symptoms are better   She is only on pepcid at night  No PPI  Heartburn ok  However, still having intermittent issues with dry foods feeling like they get hung up  Sometimes with coughing after swallowing  Does have post nasal drip  Allergies possible  Trying to lose weight - lost about 4lbs since November  Working out more and trying to eat slower  Also feels like she gets full faster  Historical Information   Past Medical History:   Diagnosis Date    Abdominal pain     Achrochordon     one in the left dorsal area and on the lower back- last assessed 04/30/13    Dysuria     resolved 10/05/16    Encounter for screening colonoscopy     Heavy menses     Intestinal ulcer     Irritable bowel syndrome     Nausea     Seizures (HCC)     Submucous leiomyoma of uterus     Terminal ileitis ([address] )     unclear etiology, work up w EGD/COLON/TI bx/SBC/CT done  Past Surgical History:   Procedure Laterality Date    COLONOSCOPY  03/01/2017    5 yr recall    DILATION AND CURETTAGE OF UTERUS      HYSTEROSCOPY      INCONTINENCE SURGERY      bladder sling    AL COLONOSCOPY FLX DX W/COLLJ SPEC WHEN PFRMD N/A 11/14/2016    Procedure: EGD AND COLONOSCOPY;  Surgeon: Celina Chapman MD;  Location: [facility];   Service: Gastroenterology     Social History     Substance and Sexual Activity   Alcohol Use Yes    Frequency: Monthly or less    Drinks per session: 1 or 2    Binge frequency: Never     Social History     Substance and Sexual Activity   Drug Use No     Social History     Tobacco Use   Smoking Status Never Smoker   Smokeless Tobacco Never Used     Family History   Problem Relation Age of Onset    Cancer Mother     Breast cancer Mother 64    Colon polyps Mother     Diabetes Father         Type 2    Hypertension Father     Breast cancer Family     BRCA1 Negative Sister     BRCA2 Negative Sister     Breast cancer Maternal Aunt 62    Colon cancer Neg Hx          Current Outpatient Medications:     cholecalciferol (VITAMIN D3) 1,000 units tablet    etonogestrel-ethinyl estradiol (NuvaRing) 0 12-0 015 MG/24HR vaginal ring    famotidine (PEPCID) 40 MG tablet    fexofenadine (ALLEGRA) 180 MG tablet    lamoTRIgine (LaMICtal) 150 MG tablet    magnesium oxide (MAG-OX) 400 mg    Probiotic Product (PROBIOTIC PO)    nystatin-triamcinolone (MYCOLOG-II) cream  Allergies   Allergen Reactions    Medical Tape Rash    Other Rash    Wound Dressings Rash     Reviewed medications and allergies and updated as indicated    PHYSICAL EXAM:    Blood pressure 132/78, height 5' 7" (1 702 m), weight 81 2 kg (179 lb), not currently breastfeeding  Body mass index is 28 04 kg/m²  General Appearance: NAD, cooperative, alert  Eyes: Anicteric, PERRLA, EOMI  ENT:  Normocephalic, atraumatic, normal mucosa  Neck:  Supple, symmetrical, trachea midline  Resp:  Clear to auscultation bilaterally; no rales, rhonchi or wheezing; respirations unlabored   CV:  S1 S2, Regular rate and rhythm; no murmur, rub, or gallop  GI:  Soft, non-tender, non-distended; normal bowel sounds; no masses, no organomegaly   Rectal: Deferred  Musculoskeletal: No cyanosis, clubbing or edema  Normal ROM    Skin:  No jaundice, rashes, or lesions   Heme/Lymph: No palpable cervical lymphadenopathy  Psych: Normal affect, good eye contact  Neuro: No gross deficits, AAOx3    Lab Results:   Lab Results   Component Value Date    WBC 8 55 04/08/2020    HGB 15 3 04/08/2020    HCT 44 0 04/08/2020    MCV 92 04/08/2020     04/08/2020     Lab Results   Component Value Date     07/22/2015    K 4 2 04/08/2020     04/08/2020    CO2 24 04/08/2020    ANIONGAP 10 07/22/2015    BUN 22 04/08/2020    CREATININE 1 18 04/08/2020    GLUCOSE 78 07/22/2015    CALCIUM 8 9 04/08/2020    AST 18 04/08/2020    ALT 23 04/08/2020    ALKPHOS 73 04/08/2020    PROT 6 7 07/22/2015    BILITOT 0 43 07/22/2015    EGFR 54 04/08/2020     No results found for: IRON, TIBC, FERRITIN  Lab Results Component Value Date    LIPASE 157 04/08/2020       Radiology Results:   No results found

## 2021-02-03 NOTE — PATIENT INSTRUCTIONS
Gastroesophageal Reflux Disease   WHAT YOU NEED TO KNOW:   Gastroesophageal reflux disease (GERD) is reflux that occurs more than twice a week for a few weeks  Reflux means acid and food in the stomach back up into the esophagus  It usually causes heartburn and other symptoms  GERD can cause other health problems over time if it is not treated  DISCHARGE INSTRUCTIONS:   Call your local emergency number (911 in the [address] [facility] [facility]) if:   · You have severe chest pain and sudden trouble breathing  Return to the emergency department if:   · You have trouble breathing after you vomit  · You have trouble swallowing, or pain with swallowing  · Your bowel movements are black, bloody, or tarry-looking  · Your vomit looks like coffee grounds or has blood in it  Call your doctor or gastroenterologist if:   · You feel full and cannot burp or vomit  · You vomit large amounts, or you vomit often  · You are losing weight without trying  · Your symptoms get worse or do not improve with treatment  · You have questions or concerns about your condition or care  Medicines:   · Medicines  are used to decrease stomach acid  Medicine may also be used to help your lower esophageal sphincter and stomach contract (tighten) more  · Take your medicine as directed  Contact your healthcare provider if you think your medicine is not helping or if you have side effects  Tell him of her if you are allergic to any medicine  Keep a list of the medicines, vitamins, and herbs you take  Include the amounts, and when and why you take them  Bring the list or the pill bottles to follow-up visits  Carry your medicine list with you in case of an emergency  Manage GERD:   · Do not have foods or drinks that may increase heartburn  These include chocolate, peppermint, fried or fatty foods, drinks that contain caffeine, or carbonated drinks (soda)  Other foods include spicy foods, onions, tomatoes, and tomato-based foods   Do not have foods or drinks that can irritate your esophagus, such as citrus fruits, juices, and alcohol  · Do not eat large meals  When you eat a lot of food at one time, your stomach needs more acid to digest it  Eat 6 small meals each day instead of 3 large ones, and eat slowly  Do not eat meals 2 to 3 hours before bedtime  · Elevate the head of your bed  Place 6-inch blocks under the head of your bed frame  You may also use more than one pillow under your head and shoulders while you sleep  · Maintain a healthy weight  If you are overweight, weight loss may help relieve symptoms of GERD  · Do not smoke  Smoking weakens the lower esophageal sphincter and increases the risk of GERD  Ask your healthcare provider for information if you currently smoke and need help to quit  E-cigarettes or smokeless tobacco still contain nicotine  Talk to your healthcare provider before you use these products  · Do not wear clothing that is tight around your waist   Tight clothing can put pressure on your stomach and cause or worsen GERD symptoms  Follow up with your doctor or gastroenterologist as directed:  Write down your questions so you remember to ask them during your visits  © Copyright [address] [place] [address] Drive Information is for End User's use only and may not be sold, redistributed or otherwise used for commercial purposes  All illustrations and images included in CareNotes® are the copyrighted property of A D A M , Inc  or [facility] Eliza Tate   The above information is an  only  It is not intended as medical advice for individual conditions or treatments  Talk to your doctor, nurse or pharmacist before following any medical regimen to see if it is safe and effective for you

## 2021-02-04 ENCOUNTER — HOSPITAL ENCOUNTER (OUTPATIENT)
Dept: MAMMOGRAPHY | Facility: CLINIC | Age: 51
Discharge: HOME | End: 2021-02-04
Payer: COMMERCIAL

## 2021-02-04 VITALS — WEIGHT: 179 LBS | BODY MASS INDEX: 28.09 KG/M2 | HEIGHT: 67 IN

## 2021-02-04 DIAGNOSIS — Z12.31 ENCOUNTER FOR SCREENING MAMMOGRAM FOR MALIGNANT NEOPLASM OF BREAST: ICD-10-CM

## 2021-02-04 PROCEDURE — 77067 SCR MAMMO BI INCL CAD: CPT

## 2021-02-04 PROCEDURE — 77063 BREAST TOMOSYNTHESIS BI: CPT

## 2021-02-10 ENCOUNTER — TELEPHONE (OUTPATIENT)
Dept: GASTROENTEROLOGY | Facility: CLINIC | Age: 51
End: 2021-02-10

## 2021-02-10 ENCOUNTER — HOSPITAL ENCOUNTER (OUTPATIENT)
Dept: RADIOLOGY | Facility: HOSPITAL | Age: 51
Discharge: HOME | End: 2021-02-10
Attending: INTERNAL MEDICINE
Payer: COMMERCIAL

## 2021-02-10 DIAGNOSIS — R13.19 ESOPHAGEAL DYSPHAGIA: ICD-10-CM

## 2021-02-10 PROCEDURE — 74220 X-RAY XM ESOPHAGUS 1CNTRST: CPT

## 2021-02-10 NOTE — TELEPHONE ENCOUNTER
Pt left  mssg stating she had barium swallow this morn at [facility]; tech said [facility] would have results this afternoon  # [phone]

## 2021-02-16 ENCOUNTER — HOSPITAL ENCOUNTER (OUTPATIENT)
Dept: MAMMOGRAPHY | Facility: CLINIC | Age: 51
Discharge: HOME | End: 2021-02-16
Payer: COMMERCIAL

## 2021-02-16 VITALS — BODY MASS INDEX: 28.09 KG/M2 | HEIGHT: 67 IN | WEIGHT: 179 LBS

## 2021-02-16 DIAGNOSIS — R92.8 ABNORMAL MAMMOGRAM: ICD-10-CM

## 2021-02-16 PROCEDURE — 77065 DX MAMMO INCL CAD UNI: CPT

## 2021-02-22 ENCOUNTER — TELEMEDICINE (OUTPATIENT)
Dept: FAMILY MEDICINE CLINIC | Facility: CLINIC | Age: 51
End: 2021-02-22
Payer: COMMERCIAL

## 2021-02-22 VITALS — BODY MASS INDEX: 28.04 KG/M2 | TEMPERATURE: 99.2 F | WEIGHT: 179 LBS

## 2021-02-22 DIAGNOSIS — Z03.818 ENCOUNTER FOR OBSERVATION FOR SUSPECTED EXPOSURE TO OTHER BIOLOGICAL AGENTS RULED OUT: ICD-10-CM

## 2021-02-22 DIAGNOSIS — J01.00 ACUTE NON-RECURRENT MAXILLARY SINUSITIS: Primary | ICD-10-CM

## 2021-02-22 DIAGNOSIS — B34.9 VIRAL INFECTION, UNSPECIFIED: ICD-10-CM

## 2021-02-22 PROCEDURE — 3725F SCREEN DEPRESSION PERFORMED: CPT | Performed by: FAMILY MEDICINE

## 2021-02-22 PROCEDURE — 99213 OFFICE O/P EST LOW 20 MIN: CPT | Performed by: FAMILY MEDICINE

## 2021-02-22 RX ORDER — AMOXICILLIN AND CLAVULANATE POTASSIUM 875; 125 MG/1; MG/1
1 TABLET, FILM COATED ORAL EVERY 12 HOURS SCHEDULED
Qty: 14 TABLET | Refills: 0 | Status: SHIPPED | OUTPATIENT
Start: 2021-02-22 | End: 2021-03-01

## 2021-02-22 NOTE — PROGRESS NOTES
Virtual Regular Visit      Assessment/Plan:    Problem List Items Addressed This Visit     None      Visit Diagnoses     Acute non-recurrent maxillary sinusitis    -  Primary    Relevant Medications    amoxicillin-clavulanate (AUGMENTIN) 875-125 mg per tablet    Encounter for observation for suspected exposure to other biological agents ruled out        Relevant Orders    Novel Coronavirus (Covid-19),PCR [facility] - Collected at [facility] or [facility]    Viral infection, unspecified        Relevant Orders    Novel Coronavirus (Covid-19),PCR [facility] - Collected at [facility] or [facility]      The patient will take the antibiotic as ordered and will increase her fluids and rest   She can try nasal saline rinses as well to help with her sinus symptoms  She can also try either Tylenol or Motrin as needed for fever  She will call if there's no improvement in her symptoms within a week or if the condition worsens  We will follow-up with her as needed  I will send her for the COVID-19 test as precaution to rule that out  I have asked her to quarantine until we get the results back from her testing  Fortunately, she works from home  We will see her back as needed  BMI Counseling: Body mass index is 28 04 kg/m²  The BMI is above normal  Nutrition recommendations include decreasing portion sizes, encouraging healthy choices of fruits and vegetables, decreasing fast food intake, consuming healthier snacks, limiting drinks that contain sugar, moderation in carbohydrate intake, increasing intake of lean protein, reducing intake of saturated and trans fat and reducing intake of cholesterol  Exercise recommendations include exercising 3-5 times per week and strength training exercises  No pharmacotherapy was ordered  Patient referred to PCP due to patient being overweight             Reason for visit is   Chief Complaint   Patient presents with    Headache     Pressure eye, green nasal mucous and headaches for 2-3 weeks  Virtual Regular Visit        Encounter provider Hyacinth Doyle DO    Provider located at [address] [address] [phone]      Recent Visits  No visits were found meeting these conditions  Showing recent visits within past 7 days and meeting all other requirements     Today's Visits  Date Type Provider Dept   02/22/21 Telemedicine Hyacinth Doyle DO [facility]   Showing today's visits and meeting all other requirements     Future Appointments  No visits were found meeting these conditions  Showing future appointments within next 150 days and meeting all other requirements        The patient was identified by name and date of birth  Felicitas Wild was informed that this is a telemedicine visit and that the visit is being conducted through [facility] [place] [place] and patient was informed that this is a secure, HIPAA-compliant platform  She agrees to proceed     My office door was closed  No one else was in the room  She acknowledged consent and understanding of privacy and security of the video platform  The patient has agreed to participate and understands they can discontinue the visit at any time  Patient is aware this is a billable service  Chief Complaint   Patient presents with    Headache     Pressure eye, green nasal mucous and headaches for 2-3 weeks    Virtual Regular Visit       Subjective  Felicitas Wild is a 48 y o  female  who presents with sinus symptoms for 2-3 weeks that are getting worse  Antelmo Gaona is a 48 y o  female who presents with cold symptoms for 2-3 weeks  She has been taking Sudafed as needed at night mostly for relief  She has sinus pain and pressure with green nasal mucus  There is a lot of postnasal drainage  She does have a mildly elevated temp at 99 2° but no true fever  She does have a mild productive cough  There is no shortness of breath    There is no nausea, vomiting, or diarrhea  She has not been exposed to anyone with COVID-19 as far she knows  She feels that her symptoms are getting worse  URI   This is a new problem  The current episode started 1 to 4 weeks ago  The problem has been gradually worsening  There has been no fever  Associated symptoms include congestion, coughing, headaches, rhinorrhea, sinus pain, sneezing and swollen glands  Pertinent negatives include no abdominal pain, chest pain, diarrhea, dysuria, ear pain, joint pain, joint swelling, nausea, neck pain, plugged ear sensation, rash, sore throat, vomiting or wheezing  She has tried decongestant for the symptoms  The treatment provided mild relief  Past Medical History:   Diagnosis Date    Abdominal pain     Achrochordon     one in the left dorsal area and on the lower back- last assessed 04/30/13    Dysuria     resolved 10/05/16    Encounter for screening colonoscopy     Heavy menses     Intestinal ulcer     Irritable bowel syndrome     Nausea     Seizures (HCC)     Submucous leiomyoma of uterus     Terminal ileitis ([address] )     unclear etiology, work up w EGD/COLON/TI bx/SBC/CT done  Past Surgical History:   Procedure Laterality Date    COLONOSCOPY  03/01/2017    5 yr recall    DILATION AND CURETTAGE OF UTERUS      HYSTEROSCOPY      INCONTINENCE SURGERY      bladder sling    MN COLONOSCOPY FLX DX W/COLLJ SPEC WHEN PFRMD N/A 11/14/2016    Procedure: EGD AND COLONOSCOPY;  Surgeon: Franca Dolan MD;  Location: [facility]; Service: Gastroenterology       Current Outpatient Medications   Medication Sig Dispense Refill    cholecalciferol (VITAMIN D3) 1,000 units tablet Take 1,000 Units by mouth daily      etonogestrel-ethinyl estradiol (NuvaRing) 0 12-0 015 MG/24HR vaginal ring INSERT 1 RING VAGINALLY AS DIRECTED   REMOVE AFTER 3 WEEKS & WAIT 7 DAYS BEFORE INSERTING A NEW RING 3 each 3    famotidine (PEPCID) 40 MG tablet Take 1 tablet (40 mg total) by mouth daily at bedtime 90 tablet 3    fexofenadine (ALLEGRA) 180 MG tablet Take 180 mg by mouth daily      lamoTRIgine (LaMICtal) 150 MG tablet Take 150 mg by mouth 2 (two) times a day      magnesium oxide (MAG-OX) 400 mg Take 400 mg by mouth daily      amoxicillin-clavulanate (AUGMENTIN) 875-125 mg per tablet Take 1 tablet by mouth every 12 (twelve) hours for 7 days 14 tablet 0    nystatin-triamcinolone (MYCOLOG-II) cream Apply topically 2 (two) times a day as needed (Itching/irritation) (Patient not taking: Reported on 2/3/2021) 30 g 0    Probiotic Product (PROBIOTIC PO) Take by mouth       No current facility-administered medications for this visit  Allergies   Allergen Reactions    Medical Tape Rash    Other Rash    Wound Dressings Rash       Review of Systems   Constitutional: Negative  HENT: Positive for congestion, rhinorrhea, sinus pain and sneezing  Negative for ear pain and sore throat  Eyes: Negative  Respiratory: Positive for cough  Negative for wheezing  Cardiovascular: Negative  Negative for chest pain  Gastrointestinal: Negative  Negative for abdominal pain, diarrhea, nausea and vomiting  Endocrine: Negative  Genitourinary: Negative  Negative for dysuria  Musculoskeletal: Negative  Negative for joint pain and neck pain  Skin: Negative  Negative for rash  Allergic/Immunologic: Negative  Neurological: Positive for headaches  Hematological: Negative  Psychiatric/Behavioral: Negative  Video Exam    Vitals:    02/22/21 1251   Temp: 99 2 °F (37 3 °C)   TempSrc: Tympanic   Weight: 81 2 kg (179 lb)   The above vitals were obtained by the patient at home and reported to our office since this is a virtual visit  Physical Exam  Constitutional:       General: She is not in acute distress  Appearance: She is well-developed  She is not diaphoretic  HENT:      Head: Normocephalic and atraumatic  Eyes:      Pupils: Pupils are equal, round, and reactive to light  Neck:      Musculoskeletal: Normal range of motion and neck supple  Pulmonary:      Effort: Pulmonary effort is normal       Breath sounds: Normal breath sounds  Neurological:      Mental Status: She is alert and oriented to person, place, and time  Psychiatric:         Behavior: Behavior normal          Thought Content: Thought content normal          Judgment: Judgment normal           I spent 15 minutes directly with the patient during this visit      [facility] acknowledges that she has consented to an online visit or consultation  She understands that the online visit is based solely on information provided by her, and that, in the absence of a face-to-face physical evaluation by the physician, the diagnosis she receives is both limited and provisional in terms of accuracy and completeness  This is not intended to replace a full medical face-to-face evaluation by the physician  Fabiano Porter understands and accepts these terms

## 2021-02-23 DIAGNOSIS — Z03.818 ENCOUNTER FOR OBSERVATION FOR SUSPECTED EXPOSURE TO OTHER BIOLOGICAL AGENTS RULED OUT: ICD-10-CM

## 2021-02-23 DIAGNOSIS — B34.9 VIRAL INFECTION, UNSPECIFIED: ICD-10-CM

## 2021-02-23 LAB — SARS-COV-2 RNA RESP QL NAA+PROBE: NEGATIVE

## 2021-02-23 PROCEDURE — U0005 INFEC AGEN DETEC AMPLI PROBE: HCPCS | Performed by: FAMILY MEDICINE

## 2021-02-23 PROCEDURE — U0003 INFECTIOUS AGENT DETECTION BY NUCLEIC ACID (DNA OR RNA); SEVERE ACUTE RESPIRATORY SYNDROME CORONAVIRUS 2 (SARS-COV-2) (CORONAVIRUS DISEASE [COVID-19]), AMPLIFIED PROBE TECHNIQUE, MAKING USE OF HIGH THROUGHPUT TECHNOLOGIES AS DESCRIBED BY CMS-2020-01-R: HCPCS | Performed by: FAMILY MEDICINE

## 2021-02-23 NOTE — RESULT ENCOUNTER NOTE
Please call Lisa Buchanan and let her know that her COVID-19 swab was negative  I would advise her to continue social distancing procedures  She should take the antibiotic as ordered

## 2021-03-01 ENCOUNTER — HOSPITAL ENCOUNTER (OUTPATIENT)
Dept: MAMMOGRAPHY | Facility: CLINIC | Age: 51
Discharge: HOME | End: 2021-03-01
Payer: COMMERCIAL

## 2021-03-01 VITALS
HEART RATE: 99 BPM | HEIGHT: 67 IN | BODY MASS INDEX: 28.09 KG/M2 | DIASTOLIC BLOOD PRESSURE: 77 MMHG | SYSTOLIC BLOOD PRESSURE: 135 MMHG | WEIGHT: 179 LBS

## 2021-03-01 DIAGNOSIS — R92.8 ABNORMAL MAMMOGRAM: ICD-10-CM

## 2021-03-01 PROCEDURE — 88342 IMHCHEM/IMCYTCHM 1ST ANTB: CPT | Performed by: PATHOLOGY

## 2021-03-01 PROCEDURE — 88341 IMHCHEM/IMCYTCHM EA ADD ANTB: CPT | Performed by: PATHOLOGY

## 2021-03-01 PROCEDURE — 19081 BX BREAST 1ST LESION STRTCTC: CPT

## 2021-03-01 PROCEDURE — 88305 TISSUE EXAM BY PATHOLOGIST: CPT | Performed by: PATHOLOGY

## 2021-03-01 PROCEDURE — A4648 IMPLANTABLE TISSUE MARKER: HCPCS

## 2021-03-01 PROCEDURE — 88361 TUMOR IMMUNOHISTOCHEM/COMPUT: CPT | Performed by: PATHOLOGY

## 2021-03-01 RX ORDER — LIDOCAINE HYDROCHLORIDE AND EPINEPHRINE BITARTRATE 20; .01 MG/ML; MG/ML
10 INJECTION, SOLUTION SUBCUTANEOUS ONCE
Status: COMPLETED | OUTPATIENT
Start: 2021-03-01 | End: 2021-03-01

## 2021-03-01 RX ORDER — LIDOCAINE HYDROCHLORIDE 10 MG/ML
5 INJECTION, SOLUTION EPIDURAL; INFILTRATION; INTRACAUDAL; PERINEURAL ONCE
Status: COMPLETED | OUTPATIENT
Start: 2021-03-01 | End: 2021-03-01

## 2021-03-01 RX ADMIN — LIDOCAINE HYDROCHLORIDE 5 ML: 10 INJECTION, SOLUTION EPIDURAL; INFILTRATION; INTRACAUDAL; PERINEURAL at 13:19

## 2021-03-01 RX ADMIN — LIDOCAINE HYDROCHLORIDE AND EPINEPHRINE 10 ML: 20; 10 INJECTION, SOLUTION INFILTRATION; PERINEURAL at 13:19

## 2021-03-01 NOTE — PROGRESS NOTES
Procedure type:    _____ultrasound guided ___x__stereotactic    Breast:    _____Left __x___Right    Location: 12 oclock    Needle: 8g Revolve    # of passes: 10 cores (3 cores with calcs, 7 cores without calcs)    Clip: Mammomark Triple Twist     Performed by: Dr Anila Navarro held for 5 minutes by: Katheryne Gosselin    Sterbrenton Strips:    __x___yes _____no    Band aid:    __x___yes_____no    Tape and guaze:    _____yes __x___no    Tolerated procedure:    ___x__yes _____no

## 2021-03-01 NOTE — PROGRESS NOTES
Ice pack given:    __x__yes _____no    Discharge instructions signed by patient:    __x___yes _____no    Discharge instructions given to patient:    __x___yes _____no    Discharged via:    __x___amulatory    _____wheelchair    _____stretcher    Stable on discharge:    __x___yes ____no

## 2021-03-01 NOTE — DISCHARGE INSTR - OTHER ORDERS
POST LARGE CORE BREAST BIOPSY PATIENT INFORMATION      1  Place an ice pack inside your bra over the top of the dressing every hour for 20 minutes (20 minutes on, 60 minutes off)  Do this until bedtime  2  Do not shower or bathe until the following morning  3  You may bathe your breast carefully with the steri-strips in place  Be careful    Not to loosen them  The steri-strips will fall off in 3-5 days  4  You may have mild discomfort, and you may have some bruising where the   Needle entered the skin  This should clear within 5-7 days  5  If you need medicine for discomfort, take acetaminophen products such as   Tylenol  You may also take Advil or Motrin products  6  Do not participate in strenuous activities such as-tennis, aerobics, skiing,  Weight lifting, etc  for 24 hours  Refrain from swimming/soaking for 72 hours  7  Wearing a bra for sleeping may be more comfortable for the first 24-48 hours  8  Watch for continued bleeding, pain or fever over 101; please call with any questions or concerns  For procedures done at the [facility] call:  Breanna Solares RN at [place] [address] RN at [phone]                    *After 4 PM call the Interventional Radiology Department                    [phone] and ask to speak with the nurse on call  For procedures done at the [facility] call:         Belinda Richards RN at   *After 4 PM call the Interventional Radiology Department   [phone] and ask to speak with the nurse on call  For procedures done at [address] [place] [address] call: The Radiology Nurse at [phone]  *After 4 PM call your physician, or go to the Emergency Department  9          The final results of your biopsy are usually available within one week

## 2021-03-02 NOTE — PROGRESS NOTES
Post procedure call completed    Bleeding: _____yes __X___no    Pain: _____yes ___X___no    Redness/Swelling: ______yes ___X___no    Band aid removed: _____yes __X___no (patient will remove later today)      Steri-Strips intact: ___X___yes _____no (discussed with patient to remove steri strips after 5 days if they have not come off on their own)    Pt with no questions at this time, adv will call when results available, adv to call with any questions or concerns, has name/# for contact

## 2021-03-03 ENCOUNTER — TELEPHONE (OUTPATIENT)
Dept: HEMATOLOGY ONCOLOGY | Facility: CLINIC | Age: 51
End: 2021-03-03

## 2021-03-03 ENCOUNTER — TELEPHONE (OUTPATIENT)
Dept: MAMMOGRAPHY | Facility: CLINIC | Age: 51
End: 2021-03-03

## 2021-03-03 NOTE — TELEPHONE ENCOUNTER
New Patient Breast Form   Patient Details:     Ketty Cuevas     1970     [id number]     Background Information:   [address] starts by opening a telephone encounter and gathering the following information   Who is calling to schedule and relationship? [facility]   Referring Provider [facility]   To which speciality is the referral?  Surgical Oncology   Reason for Visit? 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 Yes   Date of Tissue Diagnosis (If done outside of [facility] please obtain report and slides) 3/1/21   Is patient aware of diagnosis, and who made them aware? Yes   If no tissue diagnosis, please stop and discuss with Navigator prior to scheduling   When was the diagnosis made? 3/3/21   Were outside slides requested  No   If biopsy done externally, obtain reports and slides for internal review   Have you had any imaging or labs done? Yes   If YES, when and  where was the blood work done? [facility]   If outside of [facility] obtain records   Was the patient told to bring a disk? no   Are records in EPIC? yes   Is there a personal history of cancer? no   If YES please list type and YR diagnosed    If patient has a prior history of breast cancer were old records obtained? na   Have you ever had genetic testing done for breast cancer? If so, can you please bring a copy to appointment for timely treatment planning No   Is there a family history of cancer? Yes   If YES please list type Mom/mat aunt - breast   Does the patient smoke or Vape? no   If yes, how many packs or cartridges per day? Scheduling Information:   Preferred [address]   Are there any days the patient cannot be seen? Miscellaneous:   After completing the above information, please route to finance, nurse navigation and clinical trials for review

## 2021-03-16 NOTE — TELEPHONE ENCOUNTER
Left detailed message and offered for pt to call back if she needs to discuss her situation  Isreal Chamorro

## 2021-03-16 NOTE — TELEPHONE ENCOUNTER
Pt called back and she is doing ok    she is happy to be able to see Dr Bruce Bishop and feels like she just wants everything taken care of, whatever it may entail  Leida Serum

## 2021-03-30 ENCOUNTER — TELEPHONE (OUTPATIENT)
Dept: SURGICAL ONCOLOGY | Facility: CLINIC | Age: 51
End: 2021-03-30

## 2021-03-30 NOTE — TELEPHONE ENCOUNTER
[facility] answered no to symptoms, yes to having a covid test back in February, and no to not being exposed to anyone with covid

## 2021-03-31 ENCOUNTER — PATIENT OUTREACH (OUTPATIENT)
Dept: SURGICAL ONCOLOGY | Facility: CLINIC | Age: 51
End: 2021-03-31

## 2021-03-31 ENCOUNTER — CONSULT (OUTPATIENT)
Dept: SURGICAL ONCOLOGY | Facility: CLINIC | Age: 51
End: 2021-03-31
Payer: COMMERCIAL

## 2021-03-31 ENCOUNTER — APPOINTMENT (OUTPATIENT)
Dept: LAB | Facility: MEDICAL CENTER | Age: 51
End: 2021-03-31
Payer: COMMERCIAL

## 2021-03-31 ENCOUNTER — TELEPHONE (OUTPATIENT)
Dept: HEMATOLOGY ONCOLOGY | Facility: CLINIC | Age: 51
End: 2021-03-31

## 2021-03-31 ENCOUNTER — CLINICAL SUPPORT (OUTPATIENT)
Dept: GENETICS | Facility: CLINIC | Age: 51
End: 2021-03-31

## 2021-03-31 ENCOUNTER — TRANSCRIBE ORDERS (OUTPATIENT)
Dept: URGENT CARE | Facility: MEDICAL CENTER | Age: 51
End: 2021-03-31

## 2021-03-31 VITALS
TEMPERATURE: 97.1 F | SYSTOLIC BLOOD PRESSURE: 120 MMHG | WEIGHT: 180 LBS | HEIGHT: 67 IN | BODY MASS INDEX: 28.25 KG/M2 | DIASTOLIC BLOOD PRESSURE: 84 MMHG | HEART RATE: 108 BPM

## 2021-03-31 DIAGNOSIS — C50.911 MALIGNANT NEOPLASM OF RIGHT FEMALE BREAST, UNSPECIFIED ESTROGEN RECEPTOR STATUS, UNSPECIFIED SITE OF BREAST (HCC): ICD-10-CM

## 2021-03-31 DIAGNOSIS — Z19.1 MALIGNANT NEOPLASM WITH HORMONE SENSITIVE STATUS: ICD-10-CM

## 2021-03-31 DIAGNOSIS — C50.111 MALIGNANT NEOPLASM OF CENTRAL PORTION OF RIGHT BREAST IN FEMALE, ESTROGEN RECEPTOR POSITIVE (HCC): Primary | ICD-10-CM

## 2021-03-31 DIAGNOSIS — Z80.3 FAMILY HISTORY OF BREAST CANCER: ICD-10-CM

## 2021-03-31 DIAGNOSIS — Z80.3 FAMILY HISTORY OF MALIGNANT NEOPLASM OF BREAST: ICD-10-CM

## 2021-03-31 DIAGNOSIS — Z17.0 MALIGNANT NEOPLASM OF CENTRAL PORTION OF RIGHT BREAST IN FEMALE, ESTROGEN RECEPTOR POSITIVE (HCC): ICD-10-CM

## 2021-03-31 DIAGNOSIS — R92.2 DENSE BREAST TISSUE: ICD-10-CM

## 2021-03-31 DIAGNOSIS — C50.111 MALIGNANT NEOPLASM OF CENTRAL PORTION OF RIGHT BREAST IN FEMALE, ESTROGEN RECEPTOR POSITIVE (HCC): ICD-10-CM

## 2021-03-31 DIAGNOSIS — Z17.0 MALIGNANT NEOPLASM OF CENTRAL PORTION OF RIGHT BREAST IN FEMALE, ESTROGEN RECEPTOR POSITIVE (HCC): Primary | ICD-10-CM

## 2021-03-31 DIAGNOSIS — C50.911 MALIGNANT NEOPLASM OF RIGHT FEMALE BREAST, UNSPECIFIED ESTROGEN RECEPTOR STATUS, UNSPECIFIED SITE OF BREAST (HCC): Primary | ICD-10-CM

## 2021-03-31 PROCEDURE — NC001 PR NO CHARGE: Performed by: GENETIC COUNSELOR, MS

## 2021-03-31 PROCEDURE — 1036F TOBACCO NON-USER: CPT | Performed by: SURGERY

## 2021-03-31 PROCEDURE — 3008F BODY MASS INDEX DOCD: CPT | Performed by: SURGERY

## 2021-03-31 PROCEDURE — 99204 OFFICE O/P NEW MOD 45 MIN: CPT | Performed by: SURGERY

## 2021-03-31 PROCEDURE — 36415 COLL VENOUS BLD VENIPUNCTURE: CPT

## 2021-03-31 NOTE — PROGRESS NOTES
Breast Oncology Nurse Navigator    Met with patient, who was accompanied by her spouse, during consult with Dr Christine Daniel  Explained the role of breast oncology nurse navigator as well as additional cancer support services available to utilize as needed  Patient well supported by her spouse, she does not work but stays home and takes care of her special needs son  She appeared to be holding back her emotions and I advised that if she needs emotional support  is available to assist with those and other needs throughout treatment course  She denies any questions or support needs at this time and stated that she needs time to process information received today  Offered emotional support  Provided with my contact information and availability  Encourage to call as needed throughout treatment course with any questions or support needs  I will follow up with patient as needed

## 2021-03-31 NOTE — TELEPHONE ENCOUNTER
Genetics New Patient Intake Form     Patient Details:     Henna Moore     1970     [id number]     Background Information:           Who is calling to schedule?                                            self   If not self, relationship to patient? Referring Provider Mane Montero   Is the referral marked STAT Yes   Is patient newly diagnosed with cancer, have metastatic disease, or pending surgery? Yes   If yes, which is it?  new dx   If the patient is pending surgery Needs to be scheduled within 48 hours   If none available, schedule patient then email Stat cancer genetics   If the patient is metastatic or newly diagnosed Needs to be scheduled within 2 weeks   If none available, schedule patient then email Stat cancer genetics   Have you had genetic testing that showed a positive genetic mutation? (If yes, schedule within 2 weeks or email STAT cancer genetics) No   Has your family member had genetic testing that resulted in a positive genetic mutation? (If yes in the last 6 months, schedule within 3 weeks )  (If yes but over 6 months, schedule as usual)    Is this a personal or family history? personal and family   What is the type of tumor? breast   Scheduling Information:   Preferred [place]:  [place]   Are there any dates/time the patient cannot be seen? No   Did the patient schedule an appointment?  Pt was seen today w/ [facility]   If yes, list appointment date and provider name    If no, briefly state why    Miscellaneous    After completing the above information, please route to Oncology Genetics

## 2021-03-31 NOTE — LETTER
2021     Magalis Vicente MD  [address]  [facility],[address]    Patient: Jocelynn Villanueva  YOB: 1970  Date of Visit: 3/31/2021      Dear Dr Restrepo Im: Thank you for referring Matt Estrella to me for evaluation  Below are my notes for this consultation  If you have questions, please do not hesitate to call me  I look forward to following your patient along with you  Sincerely,        Bebe Green GC        CC: No Recipients        Pre-Test Genetic Counseling Consult Note    Patient Name: Jocelynn Villanueva   /Age: 1970/51 y o  Referring Provider: Magalis Vicente MD, FACS    Date of Service: 2021  Genetic Counselor: Ashli Nguyen, MS, [facility]  Interpretation Services: None  [facility]      Morris Beverly was referred to the [address] [place] [address] and Genetic Assessment Program due to her personal history of breast cancer and family history of breast cancer  She presents today to discuss the possibility of a hereditary cancer syndrome, options for genetic testing, and implications for her and her family  Cancer History and Treatment:     Personal History: Personal history of breast cancer at age 46    Oncology History   Malignant neoplasm of central portion of right breast in female, estrogen receptor positive ([facility] [address] )   3/31/2021 Initial Diagnosis     Malignant neoplasm of central portion of right breast in female, estrogen receptor positive ([facility] [address] )      3/31/2021 -  Cancer Staged     Staging form: Breast, AJCC 8th Edition  - Clinical: Stage IA (cT1, cN0, cM0, G2, ER+, AL+, HER2-) - Signed by Magalis Vicente MD on 3/31/2021  Stage prefix: Initial diagnosis  Method of lymph node assessment: Clinical  Histologic grading system: 3 grade system        Per Dr [place] Note 3 31 21: breast density is in the extremely dense category    On her stereotactic biopsy specimen radiograph, there were very few  If any calcifications present and none were noted on her surgical pathology  I am therefore referring her for breast MRI as well  If her genetic testing is negative and there are no other areas noted are further extent of disease on the breast MRI, she may be a candidate for breast conservation  Medical and Surgical History  Pertinent surgical history:   Past Surgical History:   Procedure Laterality Date    BREAST BIOPSY Right 03/01/2021    stereo    COLONOSCOPY  03/01/2017    5 yr recall    DILATION AND CURETTAGE OF UTERUS      HYSTEROSCOPY      INCONTINENCE SURGERY      bladder sling    MAMMO STEREOTACTIC BREAST BIOPSY RIGHT (ALL INC) Right 3/1/2021    NC COLONOSCOPY FLX DX W/COLLJ SPEC WHEN PFRMD N/A 11/14/2016    Procedure: EGD AND COLONOSCOPY;  Surgeon: Pennie Castellanos MD;  Location: [facility]; Service: Gastroenterology      Pertinent medical history:  Past Medical History:   Diagnosis Date    Abdominal pain     Achrochordon     one in the left dorsal area and on the lower back- last assessed 04/30/13    Dysuria     resolved 10/05/16    Encounter for screening colonoscopy     Heavy menses     History of kidney stones     Intestinal ulcer     Irritable bowel syndrome     Nausea     Seasonal allergies     Seizures (HCC)     Submucous leiomyoma of uterus     Terminal ileitis ([address] )     unclear etiology, work up w EGD/COLON/TI bx/SBC/CT done  Other History:  Height:   Ht Readings from Last 1 Encounters:   03/31/21 5' 7" (1 702 m)     Weight:   Wt Readings from Last 1 Encounters:   03/31/21 81 6 kg (180 lb)     Relevant Family History   Patient reports no Ashkenazi Zoroastrianism ancestry  - Mother: Breast cancer age 54  - Maternal Aunt: Breast cancer age 64    Please refer to the scanned pedigree in the Media Tab for a complete family history     *All history is reported as provided by the patient; records are not available for review, except where indicated  Assessment:      We reviewed the indications suggestive of a hereditary predisposition to cancer  Genetic testing is indicated for John Vazquez based on the following criteria: Meets NCCN V2 2021 Testing Criteria for High-Penetrance Breast and/or Ovarian Cancer Susceptibility Genes based on 3 women Christina Rome, her mother and maternal aunt) diagnosed with breast cancer in their 52's  The risks, benefits, and limitations of genetic testing were reviewed with the patient, as well as genetic discrimination laws, and possible test results (positive, negative, variants of uncertain significance) and their clinical implications  If positive for a mutation, options for managing cancer risk including increased surveillance, chemoprevention, and in some cases prophylactic surgery were discussed  John Vazquez was informed that if a hereditary cancer syndrome was identified in her, first degree relatives (parents, siblings, and children) have a chance of also inheriting the condition  Genetic testing would allow for predictive genetic testing in other relatives, who may also be at risk depending on their degree of relation  Plan: Patient decided to proceed with testing and provided consent  Summary:     Sample Collection:  The patient was given a blood kit and instructed to go to a [facility] lab    Genetic Testing Preformed:    1  [facility] BRCAplus (8 genes): ALEXANDER, BRCA1, BRCA2, CDH1, CHEK2, PALB2, PTEN, TP53 - STAT    2  CancerNext (36 genes): APC, ALEXANDER, AXIN2 BARD1, BRCA1, BRCA2, BRIP1, BMPR1A, CDH1, CDK4, CDKN2A, CHEK2, DICER1, EPCAM, GREM1, HOXB13, MLH1, MSH2, MSH3, MSH6, MUTYH, NBN, NF1, NTHL1, PALB2, PMS2, POLD1, POLE, PTEN, RAD51C, RAD51D, RECQL SMAD4, SMARCA4, STK11, TP53    Results of the STAT test take approximately 7-10 days to complete with final results in 2-3 weeks  We will contact John Vazquez once results are available  Additional recommendations for surveillance/medical management will be made pending genetic test results

## 2021-03-31 NOTE — PROGRESS NOTES
Breast Consultation-Surgical Oncology     [address] [facility] SURGICAL [address] [id number]    Name:  Fabiano Porter  YOB: 1970  MRN:  [id number]    Assessment/Plan   Diagnoses and all orders for this visit:    Malignant neoplasm of central portion of right breast in female, estrogen receptor positive ([facility] [address] )  -     MRI breast bilateral w and wo contrast w cad; Future  -     Ambulatory Referral to Oncology Genetics; Future    Dense breast tissue  -     MRI breast bilateral w and wo contrast w cad; Future    Family history of breast cancer  -     Ambulatory Referral to Oncology Genetics; Future            HPI: Fabiano Porter is a 46y o  year old female who presents with   Newly diagnosed carcinoma of the right breast   She does report family history of breast cancer in her mother and maternal aunt  She states that her sister had genetic testing a few years ago and was negative  She is not sure if the sister had just BRCA testing or a panel test   The patient was asymptomatic referable to the breast     Surgical treatment to date consisted of   Not applicable      Oncology History:    Oncology History   Malignant neoplasm of central portion of right breast in female, estrogen receptor positive [facility] )   3/31/2021 Initial Diagnosis    Malignant neoplasm of central portion of right breast in female, estrogen receptor positive ([facility] )     3/31/2021 -  Cancer Staged    Staging form: Breast, AJCC 8th Edition  - Clinical: Stage IA (cT1, cN0, cM0, G2, ER+, NC+, HER2-) - Signed by Enrique Millan MD on 3/31/2021  Stage prefix: Initial diagnosis  Method of lymph node assessment: Clinical  Histologic grading system: 3 grade system           Pertinent reproductive history:    OB History        2    Para   2    Term   2            AB        Living           SAB        TAB        Ectopic        Multiple        Live Births Obstetric Comments   Menarche-13  Age at first pregnancy-26  nuvaring-about 8 yrs, currently using               Problem List:   Patient Active Problem List   Diagnosis    Irregular menses    Encounter for surveillance of vaginal ring hormonal contraceptive device    Dense breast tissue    History of seizure    Allergic dermatitis    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 ([facility] [address] )    Family history of breast cancer    Skin lesion of breast    SANDRA (juvenile myoclonic epilepsy) ([facility] [facility] )    Terminal ileitis ([facility] [facility] )    Malignant neoplasm of central portion of right breast in female, estrogen receptor positive ([facility] [address] )     Past Medical History:   Diagnosis Date    Abdominal pain     Achrochordon     one in the left dorsal area and on the lower back- last assessed 04/30/13    Dysuria     resolved 10/05/16    Encounter for screening colonoscopy     Heavy menses     History of kidney stones     Intestinal ulcer     Irritable bowel syndrome     Nausea     Seasonal allergies     Seizures (HCC)     Submucous leiomyoma of uterus     Terminal ileitis ([facility] )     unclear etiology, work up w EGD/COLON/TI bx/SBC/CT done  Past Surgical History:   Procedure Laterality Date    BREAST BIOPSY Right 03/01/2021    stereo    COLONOSCOPY  03/01/2017    5 yr recall    DILATION AND CURETTAGE OF UTERUS      HYSTEROSCOPY      INCONTINENCE SURGERY      bladder sling    MAMMO STEREOTACTIC BREAST BIOPSY RIGHT (ALL INC) Right 3/1/2021    OR COLONOSCOPY FLX DX W/COLLJ SPEC WHEN PFRMD N/A 11/14/2016    Procedure: EGD AND COLONOSCOPY;  Surgeon: Franca Dolan MD;  Location: [facility] GI LAB;   Service: Gastroenterology     Family History   Problem Relation Age of Onset   Normie Glory Cancer Mother     Breast cancer Mother 54    Colon polyps Mother     Diabetes Father         Type 2    Hypertension Father     Breast cancer Family     BRCA1 Negative Sister     BRCA2 Negative Sister     Breast cancer Maternal Aunt 64    Colon cancer Neg Hx      Social History     Socioeconomic History    Marital status: /Civil Union     Spouse name: Not on file    Number of children: 2    Years of education: Not on file    Highest education level: Not on file   Occupational History    Not on file   Social Needs    Financial resource strain: Not hard at all   Heath-Jina insecurity     Worry: Never true     Inability: Never true   [employer] needs     Medical: No     Non-medical: No   Tobacco Use    Smoking status: Never Smoker    Smokeless tobacco: Never Used   Substance and Sexual Activity    Alcohol use: Yes     Frequency: Monthly or less     Drinks per session: 1 or 2     Binge frequency: Never    Drug use: No    Sexual activity: Yes     Partners: Male     Birth control/protection: Ring     Comment: nuva ring   Lifestyle    Physical activity     Days per week: 0 days     Minutes per session: 0 min    Stress: Only a little   Relationships    Social connections     Talks on phone: More than three times a week     Gets together: Once a week     Attends Catholic service: Never     Active member of club or organization: No     Attends meetings of clubs or organizations: Never     Relationship status:     Intimate partner violence     Fear of current or ex partner: No     Emotionally abused: No     Physically abused: No     Forced sexual activity: No   Other Topics Concern    Not on file   Social History Narrative    Caffeine use    [address]    Uses safety equipment Seatbelts     Current Outpatient Medications   Medication Sig Dispense Refill    cholecalciferol (VITAMIN D3) 1,000 units tablet Take 1,000 Units by mouth daily      etonogestrel-ethinyl estradiol (NuvaRing) 0 12-0 015 MG/24HR vaginal ring INSERT 1 RING VAGINALLY AS DIRECTED   REMOVE AFTER 3 WEEKS & WAIT 7 DAYS BEFORE INSERTING A NEW RING 3 each 3    famotidine (PEPCID) 40 MG tablet Take 1 tablet (40 mg total) by mouth daily at bedtime 90 tablet 3    fexofenadine (ALLEGRA) 180 MG tablet Take 180 mg by mouth daily      lamoTRIgine (LaMICtal) 150 MG tablet Take 150 mg by mouth 2 (two) times a day      magnesium oxide (MAG-OX) 400 mg Take 400 mg by mouth daily      Probiotic Product (PROBIOTIC PO) Take by mouth      nystatin-triamcinolone (MYCOLOG-II) cream Apply topically 2 (two) times a day as needed (Itching/irritation) (Patient not taking: Reported on 2/3/2021) 30 g 0     No current facility-administered medications for this visit  Allergies   Allergen Reactions    Medical Tape Rash    Other Sneezing     seasonal    Wound Dressings Rash         The following portions of the patient's history were reviewed and updated as appropriate: allergies, current medications, past family history, past medical history, past social history, past surgical history and problem list     Review of Systems:  Review of Systems   Constitutional: Negative  Negative for appetite change and fever  Eyes: Negative  Respiratory: Negative for shortness of breath  Cardiovascular: Negative  Gastrointestinal: Negative  Endocrine: Negative  Genitourinary: Negative  Musculoskeletal: Negative  Negative for arthralgias and myalgias  Skin: Negative  Allergic/Immunologic: Negative  Neurological: Positive for seizures (last sz 3 years ago)  Hematological: Negative  Negative for adenopathy  Does not bruise/bleed easily  Psychiatric/Behavioral: Negative  Physical Exam:  Physical Exam  Constitutional:       General: She is not in acute distress  Appearance: She is well-developed  HENT:      Head: Normocephalic and atraumatic  Cardiovascular:      Heart sounds: Normal heart sounds  Pulmonary:      Breath sounds: Normal breath sounds  Chest:      Breasts:         Right: Mass present  No inverted nipple, nipple discharge, skin change or tenderness           Left: No inverted nipple, mass, nipple discharge, skin change or tenderness  Abdominal:      Palpations: Abdomen is soft  Lymphadenopathy:      Upper Body:      Right upper body: No supraclavicular, axillary or pectoral adenopathy  Left upper body: No supraclavicular, axillary or pectoral adenopathy  Neurological:      Mental Status: She is alert and oriented to person, place, and time  Psychiatric:         Mood and Affect: Mood normal          Laboratory:   2021 stereotactic biopsy right breast 1200 hours    Pathology revealed: invasive ductal carcinoma    Histologic grade: moderately differentiated     Angiolymphatic invasion:  absent    Tumor node status:  clinically Negative    Hormone receptor status:   ER/CO highly positive, HER2 Zari is negative      Imagin2021 bilateral 3D screening mammogram is a BI-RADS 0 secondary to calcifications on the right side retroareolar, breast density is a four   2021 right diagnostic mammogram show suspicious retroareolar calcifications roughly 1 3 cm behind the nipple and spanning 3 1 cm in extent, biopsy was recommended as noted above          Discussion/Summary: 49-year-old female here today secondary to a newly diagnosed carcinoma of the right breast   This was detected as calcifications on a screening mammogram   She does however have a mass on my examination today  She also has family history of breast cancer in her mother and maternal aunt  I am referring her to our genetic counselor  Her breast density is in the extremely dense category  On her stereotactic biopsy specimen radiograph, there were very few  If any calcifications present and none were noted on her surgical pathology  I am therefore referring her for breast MRI as well  If her genetic testing is negative and there are no other areas noted are further extent of disease on the breast MRI, she may be a candidate for breast conservation  The location in the breast will be considered    She understands that she will definitely be referred to Medical Oncology and will need at least adjuvant endocrine therapy  She does have a nuvaring in place  I advised her to discuss this further with her gynecologist   We will call her with the results of the genetic testing and breast MRI  Further recommendations will be made at that time

## 2021-04-01 NOTE — PROGRESS NOTES
Pre-Test Genetic Counseling Consult Note    Patient Name: Jacki Carrasquillo DOB/Age: 1970/51 y o  Referring Provider: Lesley Smith MD, FACS    Date of Service: 2021  Genetic Counselor: Frances Castaneda MS, [facility]  Interpretation Services: None  [facility]: [facility]      Genevieve Spencer was referred to the [address] [place] [address] and Genetic Assessment Program due to her personal history of breast cancer and family history of breast cancer  She presents today to discuss the possibility of a hereditary cancer syndrome, options for genetic testing, and implications for her and her family  Cancer History and Treatment:     Personal History: Personal history of breast cancer at age 46    Oncology History   Malignant neoplasm of central portion of right breast in female, estrogen receptor positive ([facility] [address] )   3/31/2021 Initial Diagnosis     Malignant neoplasm of central portion of right breast in female, estrogen receptor positive ([facility] [address] )      3/31/2021 -  Cancer Staged     Staging form: Breast, AJCC 8th Edition  - Clinical: Stage IA (cT1, cN0, cM0, G2, ER+, NH+, HER2-) - Signed by Lesley Smith MD on 3/31/2021  Stage prefix: Initial diagnosis  Method of lymph node assessment: Clinical  Histologic grading system: 3 grade system        Per Dr Yonny Lofton Note 3 31 21: breast density is in the extremely dense category  On her stereotactic biopsy specimen radiograph, there were very few  If any calcifications present and none were noted on her surgical pathology  I am therefore referring her for breast MRI as well  If her genetic testing is negative and there are no other areas noted are further extent of disease on the breast MRI, she may be a candidate for breast conservation      Medical and Surgical History  Pertinent surgical history:   Past Surgical History:   Procedure Laterality Date    BREAST BIOPSY Right 2021    stereo    COLONOSCOPY  2017    5 yr recall  DILATION AND CURETTAGE OF UTERUS      HYSTEROSCOPY      INCONTINENCE SURGERY      bladder sling    MAMMO STEREOTACTIC BREAST BIOPSY RIGHT (ALL INC) Right 3/1/2021    IA COLONOSCOPY FLX DX W/COLLJ SPEC WHEN PFRMD N/A 11/14/2016    Procedure: EGD AND COLONOSCOPY;  Surgeon: Scott Sauceda MD;  Location: [facility] GI LAB; Service: Gastroenterology      Pertinent medical history:  Past Medical History:   Diagnosis Date    Abdominal pain     Achrochordon     one in the left dorsal area and on the lower back- last assessed 04/30/13    Dysuria     resolved 10/05/16    Encounter for screening colonoscopy     Heavy menses     History of kidney stones     Intestinal ulcer     Irritable bowel syndrome     Nausea     Seasonal allergies     Seizures (HCC)     Submucous leiomyoma of uterus     Terminal ileitis ([address] )     unclear etiology, work up w EGD/COLON/TI bx/SBC/CT done  Other History:  Height:   Ht Readings from Last 1 Encounters:   03/31/21 5' 7" (1 702 m)     Weight:   Wt Readings from Last 1 Encounters:   03/31/21 81 6 kg (180 lb)     Relevant Family History   Patient reports no Ashkenazi Restorationist ancestry  - Mother: Breast cancer age 54  - Maternal Aunt: Breast cancer age 64    Please refer to the scanned pedigree in the Media Tab for a complete family history     *All history is reported as provided by the patient; records are not available for review, except where indicated  Assessment:     We reviewed the indications suggestive of a hereditary predisposition to cancer  Genetic testing is indicated for Stella Le based on the following criteria: Meets NCCN V2 2021 Testing Criteria for High-Penetrance Breast and/or Ovarian Cancer Susceptibility Genes based on 3 women Marty Deneen, her mother and maternal aunt) diagnosed with breast cancer in their 52's      The risks, benefits, and limitations of genetic testing were reviewed with the patient, as well as genetic discrimination laws, and possible test results (positive, negative, variants of uncertain significance) and their clinical implications  If positive for a mutation, options for managing cancer risk including increased surveillance, chemoprevention, and in some cases prophylactic surgery were discussed  Wayne Streeter was informed that if a hereditary cancer syndrome was identified in her, first degree relatives (parents, siblings, and children) have a chance of also inheriting the condition  Genetic testing would allow for predictive genetic testing in other relatives, who may also be at risk depending on their degree of relation  Plan: Patient decided to proceed with testing and provided consent  Summary:     Sample Collection:  The patient was given a blood kit and instructed to go to a [facility] lab    Genetic Testing Preformed:    1  [facility] BRCAplus (8 genes): ALEXANDER, BRCA1, BRCA2, CDH1, CHEK2, PALB2, PTEN, TP53 - STAT    2  CancerNext (36 genes): APC, ALEXANDER, AXIN2 BARD1, BRCA1, BRCA2, BRIP1, BMPR1A, CDH1, CDK4, CDKN2A, CHEK2, DICER1, EPCAM, GREM1, HOXB13, MLH1, MSH2, MSH3, MSH6, MUTYH, NBN, NF1, NTHL1, PALB2, PMS2, POLD1, POLE, PTEN, RAD51C, RAD51D, RECQL SMAD4, SMARCA4, STK11, TP53    Results of the STAT test take approximately 7-10 days to complete with final results in 2-3 weeks  We will contact Wayne Streeter once results are available  Additional recommendations for surveillance/medical management will be made pending genetic test results

## 2021-04-07 ENCOUNTER — TELEPHONE (OUTPATIENT)
Dept: GENETICS | Facility: CLINIC | Age: 51
End: 2021-04-07

## 2021-04-07 ENCOUNTER — HOSPITAL ENCOUNTER (OUTPATIENT)
Dept: MRI IMAGING | Facility: HOSPITAL | Age: 51
Discharge: HOME | End: 2021-04-07
Payer: COMMERCIAL

## 2021-04-07 VITALS — HEIGHT: 67 IN | WEIGHT: 180 LBS | BODY MASS INDEX: 28.25 KG/M2

## 2021-04-07 DIAGNOSIS — C50.111 MALIGNANT NEOPLASM OF CENTRAL PORTION OF RIGHT BREAST IN FEMALE, ESTROGEN RECEPTOR POSITIVE (HCC): ICD-10-CM

## 2021-04-07 DIAGNOSIS — R92.2 DENSE BREAST TISSUE: ICD-10-CM

## 2021-04-07 DIAGNOSIS — Z17.0 MALIGNANT NEOPLASM OF CENTRAL PORTION OF RIGHT BREAST IN FEMALE, ESTROGEN RECEPTOR POSITIVE (HCC): ICD-10-CM

## 2021-04-07 PROCEDURE — G1004 CDSM NDSC: HCPCS

## 2021-04-07 PROCEDURE — C8908 MRI W/O FOL W/CONT, BREAST,: HCPCS

## 2021-04-07 PROCEDURE — A9585 GADOBUTROL INJECTION: HCPCS | Performed by: SURGERY

## 2021-04-07 RX ADMIN — GADOBUTROL 8 ML: 604.72 INJECTION INTRAVENOUS at 18:18

## 2021-04-07 NOTE — TELEPHONE ENCOUNTER
I spoke with Stella Le to review the result of her STAT genetic test      Test: [facility] BRCAplus (8 genes): ALEXANDER, BRCA1, BRCA2, CDH1, CHEK2, PALB2, PTEN, TP53     Result:   Negative - No Clinically Significant Variants Detected      Assessment:     Negative   A negative result significantly reduces the likelihood that Stella Le has a hereditary cancer syndrome related to the high-risk breast cancer genes listed above  This result does not have surgical implications and surgical options should be discussed with her healthcare provider  Additional Testing: The CancerNext panel test is pending  We will contact Stella Le once results are available  Additional recommendations for surveillance/medical management will be made upon final genetic test result  We will mail a copy of the test result and consult note upon completion of the final result  Sugar's surgeon was made aware of this test result

## 2021-04-08 ENCOUNTER — TELEPHONE (OUTPATIENT)
Dept: SURGICAL ONCOLOGY | Facility: CLINIC | Age: 51
End: 2021-04-08

## 2021-04-08 ENCOUNTER — TELEPHONE (OUTPATIENT)
Dept: OBGYN CLINIC | Facility: CLINIC | Age: 51
End: 2021-04-08

## 2021-04-08 NOTE — TELEPHONE ENCOUNTER
Agree, patient should stop NuvaRing given recent diagnosis of breast cancer  Could use condoms for contraception if wishes  If needs contraceptive counseling visit, please arrange for office visit    Otherwise, follow-up for yearly exam

## 2021-04-08 NOTE — TELEPHONE ENCOUNTER
----- Message from Jj Subramanian sent at 4/8/2021 12:28 PM EDT -----  Appointment has been booked in EPIC  ----- Message -----  From: Magalis Vicente MD  Sent: 4/8/2021  12:26 PM EDT  To: Rebekah Leung RN, Alecia Angeles MA    We need to bring her back in to set up surgery  The only opening we have next week is at 2pm on the 14th

## 2021-04-13 DIAGNOSIS — Z23 ENCOUNTER FOR IMMUNIZATION: ICD-10-CM

## 2021-04-14 ENCOUNTER — OFFICE VISIT (OUTPATIENT)
Dept: SURGICAL ONCOLOGY | Facility: CLINIC | Age: 51
End: 2021-04-14
Payer: COMMERCIAL

## 2021-04-14 VITALS
HEART RATE: 89 BPM | TEMPERATURE: 97.8 F | SYSTOLIC BLOOD PRESSURE: 112 MMHG | WEIGHT: 178 LBS | BODY MASS INDEX: 27.94 KG/M2 | HEIGHT: 67 IN | DIASTOLIC BLOOD PRESSURE: 82 MMHG

## 2021-04-14 DIAGNOSIS — Z13.71 BRCA NEGATIVE: ICD-10-CM

## 2021-04-14 DIAGNOSIS — Z17.0 MALIGNANT NEOPLASM OF CENTRAL PORTION OF RIGHT BREAST IN FEMALE, ESTROGEN RECEPTOR POSITIVE (HCC): Primary | ICD-10-CM

## 2021-04-14 DIAGNOSIS — C50.111 MALIGNANT NEOPLASM OF CENTRAL PORTION OF RIGHT BREAST IN FEMALE, ESTROGEN RECEPTOR POSITIVE (HCC): Primary | ICD-10-CM

## 2021-04-14 PROCEDURE — 99215 OFFICE O/P EST HI 40 MIN: CPT | Performed by: SURGERY

## 2021-04-14 RX ORDER — TRAMADOL HYDROCHLORIDE 50 MG/1
50 TABLET ORAL EVERY 8 HOURS PRN
Qty: 9 TABLET | Refills: 0 | Status: SHIPPED | OUTPATIENT
Start: 2021-04-14 | End: 2021-09-27

## 2021-04-14 RX ORDER — CEFAZOLIN SODIUM 2 G/50ML
2000 SOLUTION INTRAVENOUS
Status: CANCELLED | OUTPATIENT
Start: 2021-04-30

## 2021-04-14 NOTE — H&P (VIEW-ONLY)
Surgical Oncology Follow Up       [facility] SURGICAL ONCOLOGY DK  [facility] [address]    [facility] [place]  1970  [id number]  [facility] SURGICAL ONCOLOGY [place] [address]    Chief Complaint   Patient presents with    Follow-up       Assessment/Plan   Diagnoses and all orders for this visit:    Malignant neoplasm of central portion of right breast in female, estrogen receptor positive ([facility] )  -     traMADol (ULTRAM) 50 mg tablet; Take 1 tablet (50 mg total) by mouth every 8 (eight) hours as needed for moderate pain    BRCA negative    Other orders  -     ceFAZolin (ANCEF) IVPB (premix in dextrose) 2,000 mg 50 mL  -     Apply Sequential Compression Device; Standing        Advance Care Planning/Advance Directives:  Discussed disease status, cancer treatment plans and/or cancer treatment goals with the patient  Oncology History:    Oncology History   Malignant neoplasm of central portion of right breast in female, estrogen receptor positive ([facility] )   3/31/2021 Initial Diagnosis    Malignant neoplasm of central portion of right breast in female, estrogen receptor positive ([facility] )     3/31/2021 -  Cancer Staged    Staging form: Breast, AJCC 8th Edition  - Clinical: Stage IA (cT1, cN0, cM0, G2, ER+, DE+, HER2-) - Signed by Katty Nayak MD on 3/31/2021  Stage prefix: Initial diagnosis  Method of lymph node assessment: Clinical  Histologic grading system: 3 grade system           History of Present Illness:  Right breast cancer, here today to discuss surgery  -Interval History: genetic testing and breast MRI    Review of Systems:  Review of Systems   Constitutional: Negative  Negative for appetite change and fever  Eyes: Negative  Respiratory: Negative for shortness of breath  Cardiovascular: Negative  Gastrointestinal: Negative  Endocrine: Negative  Genitourinary: Negative  Musculoskeletal: Negative  Negative for arthralgias and myalgias  Skin: Negative  Allergic/Immunologic: Negative  Neurological: Negative  Hematological: Negative  Negative for adenopathy  Does not bruise/bleed easily  Psychiatric/Behavioral: Negative  Patient Active Problem List   Diagnosis    Irregular menses    Encounter for surveillance of vaginal ring hormonal contraceptive device    Dense breast tissue    History of seizure    Allergic dermatitis    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 ([facility] )    Family history of breast cancer    Skin lesion of breast    SANDRA (juvenile myoclonic epilepsy) ([facility] )    Terminal ileitis ([facility] )    Malignant neoplasm of central portion of right breast in female, estrogen receptor positive ([facility] )    BRCA negative     Past Medical History:   Diagnosis Date    Abdominal pain     Achrochordon     one in the left dorsal area and on the lower back- last assessed 04/30/13    Dysuria     resolved 10/05/16    Encounter for screening colonoscopy     Heavy menses     History of kidney stones     Intestinal ulcer     Irritable bowel syndrome     Nausea     Seasonal allergies     Seizures (HCC)     Submucous leiomyoma of uterus     Terminal ileitis ([facility] )     unclear etiology, work up w EGD/COLON/TI bx/SBC/CT done  Past Surgical History:   Procedure Laterality Date    BREAST BIOPSY Right 03/01/2021    stereo    COLONOSCOPY  03/01/2017    5 yr recall    DILATION AND CURETTAGE OF UTERUS      HYSTEROSCOPY      INCONTINENCE SURGERY      bladder sling    MAMMO STEREOTACTIC BREAST BIOPSY RIGHT (ALL INC) Right 3/1/2021    [place] COLONOSCOPY FLX DX W/COLLJ SPEC WHEN PFRMD N/A 11/14/2016    Procedure: EGD AND COLONOSCOPY;  Surgeon: Eduin Marion MD;  Location: [facility];   Service: Gastroenterology     Family History   Problem Relation Age of Onset   Leigh Dong Cancer Mother     Breast cancer Mother 54    Colon polyps Mother     Diabetes Father Type 2    Hypertension Father     Breast cancer Family     BRCA1 Negative Sister     BRCA2 Negative Sister     Breast cancer Maternal Aunt 64    Colon cancer Neg Hx      Social History     Socioeconomic History    Marital status: /Civil Union     Spouse name: Not on file    Number of children: 2    Years of education: Not on file    Highest education level: Not on file   Occupational History    Not on file   Social Needs    Financial resource strain: Not hard at all   [employer] insecurity     Worry: Never true     Inability: Never true   [employer] needs     Medical: No     Non-medical: No   Tobacco Use    Smoking status: Never Smoker    Smokeless tobacco: Never Used   Substance and Sexual Activity    Alcohol use: Yes     Frequency: Monthly or less     Drinks per session: 1 or 2     Binge frequency: Never    Drug use: No    Sexual activity: Yes     Partners: Male     Birth control/protection: Ring     Comment: nuva ring   Lifestyle    Physical activity     Days per week: 0 days     Minutes per session: 0 min    Stress:  Only a little   Relationships    Social connections     Talks on phone: More than three times a week     Gets together: Once a week     Attends Mosque service: Never     Active member of club or organization: No     Attends meetings of clubs or organizations: Never     Relationship status:     Intimate partner violence     Fear of current or ex partner: No     Emotionally abused: No     Physically abused: No     Forced sexual activity: No   Other Topics Concern    Not on file   Social History Narrative    Caffeine use    [address] [facility]    Uses safety equipment Seatbelts       Current Outpatient Medications:     cholecalciferol (VITAMIN D3) 1,000 units tablet, Take 1,000 Units by mouth daily, Disp: , Rfl:     famotidine (PEPCID) 40 MG tablet, Take 1 tablet (40 mg total) by mouth daily at bedtime, Disp: 90 tablet, Rfl: 3   fexofenadine (ALLEGRA) 180 MG tablet, Take 180 mg by mouth daily, Disp: , Rfl:     lamoTRIgine (LaMICtal) 150 MG tablet, Take 150 mg by mouth 2 (two) times a day, Disp: , Rfl:     magnesium oxide (MAG-OX) 400 mg, Take 400 mg by mouth daily, Disp: , Rfl:     nystatin-triamcinolone (MYCOLOG-II) cream, Apply topically 2 (two) times a day as needed (Itching/irritation) (Patient not taking: Reported on 2/3/2021), Disp: 30 g, Rfl: 0    Probiotic Product (PROBIOTIC PO), Take by mouth, Disp: , Rfl:     traMADol (ULTRAM) 50 mg tablet, Take 1 tablet (50 mg total) by mouth every 8 (eight) hours as needed for moderate pain, Disp: 9 tablet, Rfl: 0  Allergies   Allergen Reactions    Medical Tape Rash    Other Sneezing     seasonal    Wound Dressings Rash       The following portions of the patient's history were reviewed and updated as appropriate: allergies, current medications, past family history, past medical history, past social history, past surgical history and problem list         Vitals:    04/14/21 1357   BP: 112/82   Pulse: 89   Temp: 97 8 °F (36 6 °C)       Physical Exam  Constitutional:       General: She is not in acute distress  Cardiovascular:      Heart sounds: Normal heart sounds  Pulmonary:      Breath sounds: Normal breath sounds  Chest:      Breasts:         Right: Inverted nipple: Mild  Mass:  nodularity adjacent to the nipple on the lateral aspect  Neurological:      Mental Status: She is alert and oriented to person, place, and time  Psychiatric:         Mood and Affect: Mood normal            Results:  Labs:   genetic testing is negative    Imaging   04/07/2021 breast MRI shows two masses in the right breast measuring 19 mm and 8 mm both within the area of calcification on her mammogram and biopsy clip artifact in between the two masses with total extent of disease measuring 3 cm, left side is benign    I reviewed the above laboratory and imaging data      Discussion/Summary: 46year-old female here today  To discuss further management of her right breast cancer  She had genetic testing which was negative  Her breast MRI shows the extent of disease in the breast being 3 cm corresponding to the mammographic calcifications  She is therefore a candidate for breast conservation  I am recommending a bracketed localization with DINA reflector is a along with lymphatic mapping and sentinel node biopsy  She understands that she will need to meet with both Medical and Radiation Oncology in the postoperative setting  All of her questions were answered today  Consent was signed in the office  She will need to have the DINA reflectors was placed preoperatively  Surgery will be scheduled for the near term

## 2021-04-14 NOTE — PROGRESS NOTES
Surgical Oncology Follow Up       [facility] SURGICAL ONCOLOGY D[facility]  [facility] [address]    [facility] [place]  1970  [id number]  [facility] SURGICAL ONCOLOGY [facility] [address]    Chief Complaint   Patient presents with    Follow-up       Assessment/Plan   Diagnoses and all orders for this visit:    Malignant neoplasm of central portion of right breast in female, estrogen receptor positive ([facility] )  -     traMADol (ULTRAM) 50 mg tablet; Take 1 tablet (50 mg total) by mouth every 8 (eight) hours as needed for moderate pain    BRCA negative    Other orders  -     ceFAZolin (ANCEF) IVPB (premix in dextrose) 2,000 mg 50 mL  -     Apply Sequential Compression Device; Standing        Advance Care Planning/Advance Directives:  Discussed disease status, cancer treatment plans and/or cancer treatment goals with the patient  Oncology History:    Oncology History   Malignant [facility] of central portion of right breast in female, [facility]   3/31/2021 Initial Diagnosis    Malignant neoplasm of central portion of right breast in female, estrogen receptor positive ([facility] )     3/31/2021 -  Cancer Staged    Staging form: Breast, AJCC 8th Edition  - Clinical: Stage IA (cT1, cN0, cM0, G2, ER+, RI+, HER2-) - Signed by Urszula Oneal MD on 3/31/2021  Stage prefix: Initial diagnosis  Method of lymph node assessment: Clinical  Histologic grading system: 3 grade system           History of Present Illness:  Right breast cancer, here today to discuss surgery  -Interval History: genetic testing and breast MRI    Review of Systems:  Review of Systems   Constitutional: Negative  Negative for appetite change and fever  Eyes: Negative  Respiratory: Negative for shortness of breath  Cardiovascular: Negative  Gastrointestinal: Negative  Endocrine: Negative  Genitourinary: Negative  Musculoskeletal: Negative  Negative for arthralgias and myalgias  Skin: Negative  Allergic/Immunologic: Negative  Neurological: Negative  Hematological: Negative  Negative for adenopathy  Does not bruise/bleed easily  Psychiatric/Behavioral: Negative  Patient Active Problem List   Diagnosis    Irregular menses    Encounter for surveillance of vaginal ring hormonal contraceptive device    Dense breast tissue    History of seizure    Allergic dermatitis    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 [facility]    Family history of breast cancer    Skin lesion of breast    SANDRA (juvenile myoclonic epilepsy) ([facility] )    Terminal ileitis ([facility] )    Malignant neoplasm of central portion of right breast in female, estrogen receptor positive ([facility] )    BRCA negative     Past Medical History:   Diagnosis Date    Abdominal pain     Achrochordon     one in the left dorsal area and on the lower back- last assessed 04/30/13    Dysuria     resolved 10/05/16    Encounter for screening colonoscopy     Heavy menses     History of kidney stones     Intestinal ulcer     Irritable bowel syndrome     Nausea     Seasonal allergies     Seizures (HCC)     Submucous leiomyoma of uterus     Terminal ileitis ([facility] )     unclear etiology, work up w EGD/COLON/TI bx/SBC/CT done  Past Surgical History:   Procedure Laterality Date    BREAST BIOPSY Right 03/01/2021    stereo    COLONOSCOPY  03/01/2017    5 yr recall    DILATION AND CURETTAGE OF UTERUS      HYSTEROSCOPY      INCONTINENCE SURGERY      bladder sling    MAMMO STEREOTACTIC BREAST BIOPSY RIGHT (ALL INC) Right 3/1/2021    [place] COLONOSCOPY FLX DX W/COLLJ SPEC WHEN PFRMD N/A 11/14/2016    Procedure: EGD AND COLONOSCOPY;  Surgeon: Chelsea Key MD;  [facility]: [facility];   Service: Gastroenterology     Family History   Problem Relation Age of Onset   [employer] Cancer Mother     Breast cancer Mother 54    Colon polyps Mother     Diabetes Father Type 2    Hypertension Father     Breast cancer Family     BRCA1 Negative Sister     BRCA2 Negative Sister     Breast cancer Maternal Aunt 64    Colon cancer Neg Hx      Social History     Socioeconomic History    Marital status: /Civil Union     Spouse name: Not on file    Number of children: 2    Years of education: Not on file    Highest education level: Not on file   Occupational History    Not on file   Social Needs    Financial resource strain: Not hard at all   [employer] insecurity     Worry: Never true     Inability: Never true   [employer] needs     Medical: No     Non-medical: No   Tobacco Use    Smoking status: Never Smoker    Smokeless tobacco: Never Used   Substance and Sexual Activity    Alcohol use: Yes     Frequency: Monthly or less     Drinks per session: 1 or 2     Binge frequency: Never    Drug use: No    Sexual activity: Yes     Partners: Male     Birth control/protection: Ring     Comment: nuva ring   Lifestyle    Physical activity     Days per week: 0 days     Minutes per session: 0 min    Stress:  Only a little   Relationships    Social connections     Talks on phone: More than three times a week     Gets together: Once a week     Attends [employer] service: Never     Active member of club or organization: No     Attends meetings of clubs or organizations: Never     Relationship status:     Intimate partner violence     Fear of current or ex partner: No     Emotionally abused: No     Physically abused: No     Forced sexual activity: No   Other Topics Concern    Not on file   Social History Narrative    Caffeine use    [address] [facility]    Uses safety equipment Seatbelts       Current Outpatient Medications:     cholecalciferol (VITAMIN D3) 1,000 units tablet, Take 1,000 Units by mouth daily, Disp: , Rfl:     famotidine (PEPCID) 40 MG tablet, Take 1 tablet (40 mg total) by mouth daily at bedtime, Disp: 90 tablet, Rfl: 3   fexofenadine (ALLEGRA) 180 MG tablet, Take 180 mg by mouth daily, Disp: , Rfl:     lamoTRIgine (LaMICtal) 150 MG tablet, Take 150 mg by mouth 2 (two) times a day, Disp: , Rfl:     magnesium oxide (MAG-OX) 400 mg, Take 400 mg by mouth daily, Disp: , Rfl:     nystatin-triamcinolone (MYCOLOG-II) cream, Apply topically 2 (two) times a day as needed (Itching/irritation) (Patient not taking: Reported on 2/3/2021), Disp: 30 g, Rfl: 0    Probiotic Product (PROBIOTIC PO), Take by mouth, Disp: , Rfl:     traMADol (ULTRAM) 50 mg tablet, Take 1 tablet (50 mg total) by mouth every 8 (eight) hours as needed for moderate pain, Disp: 9 tablet, Rfl: 0  Allergies   Allergen Reactions    Medical Tape Rash    Other Sneezing     seasonal    Wound Dressings Rash       The following portions of the patient's history were reviewed and updated as appropriate: allergies, current medications, past family history, past medical history, past social history, past surgical history and problem list         Vitals:    04/14/21 1357   BP: 112/82   Pulse: 89   Temp: 97 8 °F (36 6 °C)       Physical Exam  Constitutional:       General: She is not in acute distress  Cardiovascular:      Heart sounds: Normal heart sounds  Pulmonary:      Breath sounds: Normal breath sounds  Chest:      Breasts:         Right: Inverted nipple: Mild  Mass:  nodularity adjacent to the nipple on the lateral aspect  Neurological:      Mental Status: She is alert and oriented to person, place, and time  Psychiatric:         Mood and Affect: Mood normal            Results:  Labs:   genetic testing is negative    Imaging   04/07/2021 breast MRI shows two masses in the right breast measuring 19 mm and 8 mm both within the area of calcification on her mammogram and biopsy clip artifact in between the two masses with total extent of disease measuring 3 cm, left side is benign    I reviewed the above laboratory and imaging data      Discussion/Summary: 46year-old female here today  To discuss further management of her right breast cancer  She had genetic testing which was negative  Her breast MRI shows the extent of disease in the breast being 3 cm corresponding to the mammographic calcifications  She is therefore a candidate for breast conservation  I am recommending a bracketed localization with DINA reflector is a along with lymphatic mapping and sentinel node biopsy  She understands that she will need to meet with both Medical and Radiation Oncology in the postoperative setting  All of her questions were answered today  Consent was signed in the office  She will need to have the DINA reflectors was placed preoperatively  Surgery will be scheduled for the near term

## 2021-04-15 ENCOUNTER — TELEPHONE (OUTPATIENT)
Dept: MAMMOGRAPHY | Facility: CLINIC | Age: 51
End: 2021-04-15

## 2021-04-15 NOTE — PROGRESS NOTES
Call placed to patient regarding recommendation for;    __X___ RIGHTmammo ______LEFT      __X___SAVI  placement  Procedure explained to patient, additional questions answered at this time    __X___Verbalized understanding        Blood thinners:  _____yes __X___no    Date stopped: ___N/A____    All teaching points discussed during call, pt with no questions at this time, pt adv to arrive at 0830 for 0900 insertion    Pt given name/# for any further questions/needs

## 2021-04-16 ENCOUNTER — TELEPHONE (OUTPATIENT)
Dept: GENETICS | Facility: CLINIC | Age: 51
End: 2021-04-16

## 2021-04-16 NOTE — TELEPHONE ENCOUNTER
Reflexed Result Call    Today I spoke with John Vazquez over the phone to review the reflexed results of her genetic test for hereditary cancer  She underwent genetic testing through our program on 3/31/2021 due to her recent diagnosis of breast cancer  Her initial genetic test results were released to her by Hipolito Ponce, [address] [place] [address] on 4/7/2021  Initial Test: BRCAplus    Result: Negative - No Clinically Significant Variants Detected    Reflexed Test: Cancernext    Result: Negative - No Additional Findings    Assessment:    I reviewed that Sugar's reflexed result revealed no additional findings  Sugar's initial result, which was disclosed to her by Hipolito Ponce, MS, CGC did not identify any significant genetic variants  This result similarly did not identify any pathogenic or likely pathogenic mutations in a cancer risk gene  John Vazquez will be receiving a copy of her genetic counselor's consult note via [employer] or mail which will review her result  The genetics team has no additional recommendations based on this reflexed result  Copy of her final report will be mailed home, and her breast surgeon Dr Brian Amaya will be notified  John Vazquez specifically asked about risk of pancreatic cancer, based on the absent family history, she would be at a general population risk of approximately 1-2%  All of Sugar's questions were answered  Assessment for a Negative Result:   A negative result significantly reduces the likelihood that John Vazquez has a hereditary cancer syndrome  However, this testing is unable to completely rule out the presence of hereditary cancer  It remains possible that:  - There is a variant in an area of a gene which was not tested or there is a variant not detectable due to technical limitations of this test      - There is a variant in another gene that was not included in this test or in a gene not known to be linked to cancer or tumors  - A family member has a genetic variant that the patient did not inherit     - The cancer in the family is sporadic and is related to non-hereditary factors  Risks and Testing for Family Members:    Despite a negative result, Sugar's first-degree relatives may be at increased risk for the cancers based on the family history  We recommend they discuss screening and management recommendations with their healthcare providers  At this time we do not recommend testing for Sandra Lisa 's children based on her negative test result  Sandra Lisa 's children still need to consider the history of cancer on the other side of their family when determining their risks  If Sandra Lisa has any affected family members with a cancer diagnosis, especially at a young age, they may still consider genetic testing  Relatives who wish to pursue genetic testing can reach out to the [address] [place] [address] Road ([phone] to schedule an appointment or visit www [facility] org to identify a local genetic counselor  Plan:   There are no additional recommendations based on Sugar's negative result  she should continue cancer screening and medical management as clinically indicated and as determined appropriate by her healthcare providers  Negative Result: Sandra Lisa was strongly encouraged to contact us regarding any changes in her personal or family history of cancer as these changes could alter our recommendation regarding genetic testing and/or cancer screening

## 2021-04-19 ENCOUNTER — HOSPITAL ENCOUNTER (OUTPATIENT)
Dept: MAMMOGRAPHY | Facility: CLINIC | Age: 51
Discharge: HOME | End: 2021-04-19
Admitting: RADIOLOGY
Payer: COMMERCIAL

## 2021-04-19 VITALS
SYSTOLIC BLOOD PRESSURE: 122 MMHG | HEART RATE: 84 BPM | HEIGHT: 67 IN | WEIGHT: 178 LBS | DIASTOLIC BLOOD PRESSURE: 84 MMHG | BODY MASS INDEX: 27.94 KG/M2

## 2021-04-19 DIAGNOSIS — C50.111 MALIGNANT NEOPLASM OF CENTRAL PORTION OF RIGHT BREAST IN FEMALE, ESTROGEN RECEPTOR POSITIVE (HCC): ICD-10-CM

## 2021-04-19 DIAGNOSIS — Z17.0 MALIGNANT NEOPLASM OF CENTRAL PORTION OF RIGHT BREAST IN FEMALE, ESTROGEN RECEPTOR POSITIVE (HCC): ICD-10-CM

## 2021-04-19 PROCEDURE — A4648 IMPLANTABLE TISSUE MARKER: HCPCS

## 2021-04-19 PROCEDURE — 19282 PERQ DEVICE BREAST EA IMAG: CPT

## 2021-04-19 PROCEDURE — 19281 PERQ DEVICE BREAST 1ST IMAG: CPT

## 2021-04-19 RX ORDER — LIDOCAINE HYDROCHLORIDE 10 MG/ML
5 INJECTION, SOLUTION EPIDURAL; INFILTRATION; INTRACAUDAL; PERINEURAL ONCE
Status: COMPLETED | OUTPATIENT
Start: 2021-04-19 | End: 2021-04-19

## 2021-04-19 RX ADMIN — LIDOCAINE HYDROCHLORIDE 5 ML: 10 INJECTION, SOLUTION EPIDURAL; INFILTRATION; INTRACAUDAL; PERINEURAL at 09:27

## 2021-04-19 RX ADMIN — LIDOCAINE HYDROCHLORIDE 5 ML: 10 INJECTION, SOLUTION EPIDURAL; INFILTRATION; INTRACAUDAL; PERINEURAL at 09:26

## 2021-04-19 NOTE — PROGRESS NOTES
Procedure type:    _____ultrasound guided _____mammo guided anton  placement    Breast:    _____Left __x___Right    Location: Retro Posterior     Needle: 7 5 cm [employer]    # of passes: 1    Clip: Anton      Performed by: Dr Cleo Teixeira held for 5 minutes by: Ellis Bynum    Steri Strips:    _____yes __x___no    Robert Black aid:    __x___yes_____no    Tape and guaze:    _____yes __x___no    Tolerated procedure:    __x___yes _____no                Procedure type:    _____ultrasound guided _____mammo guided anton  placement    Breast:    _____Left __x___Right    Location: Retro Anterior     Needle: 7 5 cm [employer]     # of passes: 1    Clip: Anton     Performed by: Dr Cleo Teixeira held for 5 minutes by: Ellis Bynum    Steri Strips:    _____yes _x____no    Band aid:    ___x__yes_____no    Tape and guaze:    _____yes __x___no    Tolerated procedure:    __x___yes _____no

## 2021-04-19 NOTE — DISCHARGE INSTR - OTHER ORDERS
POST LARGE CORE BREAST BIOPSY PATIENT INFORMATION      1  Place an ice pack inside your bra over the top of the dressing every hour for 20 minutes (20 minutes on, 60 minutes off)  Do this until bedtime  2  Do not shower or bathe until the following morning  3  You may bathe your breast carefully with the steri-strips in place  Be careful    Not to loosen them  The steri-strips will fall off in 3-5 days  4  You may have mild discomfort, and you may have some bruising where the   Needle entered the skin  This should clear within 5-7 days  5  If you need medicine for discomfort, take acetaminophen products such as   Tylenol  You may also take Advil or Motrin products  6  Do not participate in strenuous activities such as-tennis, aerobics, skiing,  Weight lifting, etc  for 24 hours  Refrain from swimming/soaking for 72 hours  7  Wearing a bra for sleeping may be more comfortable for the first 24-48 hours  8  Watch for continued bleeding, pain or fever over 101; please call with any questions or concerns  For procedures done at the [facility] call:  Bella Khalil RN at [facility] RN at [phone]                    *After 4 PM call the Interventional Radiology Department                    [phone] and ask to speak with the nurse on call  For procedures done at the [address] [place] [address] call:         Myrna Guadarrama RN at   *After 4 PM call the Interventional Radiology Department   [phone] and ask to speak with the nurse on call  For procedures done at [address] [place] [address] call: The Radiology Nurse at [phone]  *After 4 PM call your physician, or go to the Emergency Department  9          The final results of your biopsy are usually available within one week

## 2021-04-20 ENCOUNTER — APPOINTMENT (OUTPATIENT)
Dept: LAB | Facility: HOSPITAL | Age: 51
End: 2021-04-20
Attending: SURGERY
Payer: COMMERCIAL

## 2021-04-20 ENCOUNTER — HOSPITAL ENCOUNTER (OUTPATIENT)
Dept: RADIOLOGY | Facility: HOSPITAL | Age: 51
Discharge: HOME | End: 2021-04-20
Attending: SURGERY
Payer: COMMERCIAL

## 2021-04-20 ENCOUNTER — LAB (OUTPATIENT)
Dept: LAB | Facility: HOSPITAL | Age: 51
End: 2021-04-20
Attending: SURGERY
Payer: COMMERCIAL

## 2021-04-20 DIAGNOSIS — Z17.0 MALIGNANT NEOPLASM OF CENTRAL PORTION OF RIGHT BREAST IN FEMALE, ESTROGEN RECEPTOR POSITIVE (HCC): ICD-10-CM

## 2021-04-20 DIAGNOSIS — C50.111 MALIGNANT NEOPLASM OF CENTRAL PORTION OF RIGHT BREAST IN FEMALE, ESTROGEN RECEPTOR POSITIVE (HCC): ICD-10-CM

## 2021-04-20 LAB
ALBUMIN SERPL BCP-MCNC: 3.6 G/DL (ref 3.5–5)
ALP SERPL-CCNC: 84 U/L (ref 46–116)
ALT SERPL W P-5'-P-CCNC: 35 U/L (ref 12–78)
ANION GAP SERPL CALCULATED.3IONS-SCNC: 10 MMOL/L (ref 4–13)
AST SERPL W P-5'-P-CCNC: 20 U/L (ref 5–45)
BACTERIA UR QL AUTO: NORMAL /HPF
BASOPHILS # BLD AUTO: 0.04 THOUSANDS/ΜL (ref 0–0.1)
BASOPHILS NFR BLD AUTO: 1 % (ref 0–1)
BILIRUB SERPL-MCNC: 0.3 MG/DL (ref 0.2–1)
BILIRUB UR QL STRIP: NEGATIVE
BUN SERPL-MCNC: 17 MG/DL (ref 5–25)
CALCIUM SERPL-MCNC: 9 MG/DL (ref 8.3–10.1)
CHLORIDE SERPL-SCNC: 105 MMOL/L (ref 100–108)
CLARITY UR: CLEAR
CO2 SERPL-SCNC: 26 MMOL/L (ref 21–32)
COLOR UR: YELLOW
CREAT SERPL-MCNC: 1.04 MG/DL (ref 0.6–1.3)
EOSINOPHIL # BLD AUTO: 0.09 THOUSAND/ΜL (ref 0–0.61)
EOSINOPHIL NFR BLD AUTO: 2 % (ref 0–6)
ERYTHROCYTE [DISTWIDTH] IN BLOOD BY AUTOMATED COUNT: 11.9 % (ref 11.6–15.1)
GFR SERPL CREATININE-BSD FRML MDRD: 62 ML/MIN/1.73SQ M
GLUCOSE SERPL-MCNC: 89 MG/DL (ref 65–140)
GLUCOSE UR STRIP-MCNC: NEGATIVE MG/DL
HCT VFR BLD AUTO: 46.2 % (ref 34.8–46.1)
HGB BLD-MCNC: 15.7 G/DL (ref 11.5–15.4)
HGB UR QL STRIP.AUTO: NEGATIVE
IMM GRANULOCYTES # BLD AUTO: 0.01 THOUSAND/UL (ref 0–0.2)
IMM GRANULOCYTES NFR BLD AUTO: 0 % (ref 0–2)
KETONES UR STRIP-MCNC: NEGATIVE MG/DL
LEUKOCYTE ESTERASE UR QL STRIP: ABNORMAL
LYMPHOCYTES # BLD AUTO: 1.85 THOUSANDS/ΜL (ref 0.6–4.47)
LYMPHOCYTES NFR BLD AUTO: 34 % (ref 14–44)
MCH RBC QN AUTO: 31.3 PG (ref 26.8–34.3)
MCHC RBC AUTO-ENTMCNC: 34 G/DL (ref 31.4–37.4)
MCV RBC AUTO: 92 FL (ref 82–98)
MONOCYTES # BLD AUTO: 0.39 THOUSAND/ΜL (ref 0.17–1.22)
MONOCYTES NFR BLD AUTO: 7 % (ref 4–12)
NEUTROPHILS # BLD AUTO: 3.05 THOUSANDS/ΜL (ref 1.85–7.62)
NEUTS SEG NFR BLD AUTO: 56 % (ref 43–75)
NITRITE UR QL STRIP: NEGATIVE
NON-SQ EPI CELLS URNS QL MICRO: NORMAL /HPF
NRBC BLD AUTO-RTO: 0 /100 WBCS
OTHER STN SPEC: NORMAL
PH UR STRIP.AUTO: 7 [PH]
PLATELET # BLD AUTO: 282 THOUSANDS/UL (ref 149–390)
PMV BLD AUTO: 10.6 FL (ref 8.9–12.7)
POTASSIUM SERPL-SCNC: 4.2 MMOL/L (ref 3.5–5.3)
PROT SERPL-MCNC: 6.8 G/DL (ref 6.4–8.2)
PROT UR STRIP-MCNC: NEGATIVE MG/DL
RBC # BLD AUTO: 5.02 MILLION/UL (ref 3.81–5.12)
RBC #/AREA URNS AUTO: NORMAL /HPF
SODIUM SERPL-SCNC: 141 MMOL/L (ref 136–145)
SP GR UR STRIP.AUTO: 1.01 (ref 1–1.03)
UROBILINOGEN UR QL STRIP.AUTO: 0.2 E.U./DL
WBC # BLD AUTO: 5.43 THOUSAND/UL (ref 4.31–10.16)
WBC #/AREA URNS AUTO: NORMAL /HPF

## 2021-04-20 PROCEDURE — 85025 COMPLETE CBC W/AUTO DIFF WBC: CPT

## 2021-04-20 PROCEDURE — 93005 ELECTROCARDIOGRAM TRACING: CPT

## 2021-04-20 PROCEDURE — 80053 COMPREHEN METABOLIC PANEL: CPT

## 2021-04-20 PROCEDURE — 71046 X-RAY EXAM CHEST 2 VIEWS: CPT

## 2021-04-20 PROCEDURE — 81001 URINALYSIS AUTO W/SCOPE: CPT | Performed by: SURGERY

## 2021-04-20 PROCEDURE — 36415 COLL VENOUS BLD VENIPUNCTURE: CPT

## 2021-04-21 LAB
ATRIAL RATE: 95 BPM
P AXIS: 61 DEGREES
PR INTERVAL: 122 MS
QRS AXIS: 17 DEGREES
QRSD INTERVAL: 78 MS
QT INTERVAL: 352 MS
QTC INTERVAL: 442 MS
T WAVE AXIS: 11 DEGREES
VENTRICULAR RATE: 95 BPM

## 2021-04-21 PROCEDURE — 93010 ELECTROCARDIOGRAM REPORT: CPT | Performed by: INTERNAL MEDICINE

## 2021-04-21 NOTE — PROGRESS NOTES
Post procedure call completed    Bleeding: _____yes _x____no    Pain: _____yes ___x___no    Redness/Swelling: ______yes ___x___no "some bruising"    Band aid removed: __x___yes _____no    Steri-Strips intact: ______yes _____none applied

## 2021-04-22 ENCOUNTER — TELEPHONE (OUTPATIENT)
Dept: FAMILY MEDICINE CLINIC | Facility: CLINIC | Age: 51
End: 2021-04-22

## 2021-04-22 ENCOUNTER — TELEPHONE (OUTPATIENT)
Dept: SURGICAL ONCOLOGY | Facility: CLINIC | Age: 51
End: 2021-04-22

## 2021-04-22 NOTE — TELEPHONE ENCOUNTER
Please advise Dr Willis Fuel office that this is a new finding on the EKG and I would recommend a cardiac clearance before surgery  We can help arrange if necessary- I reviewed it

## 2021-04-22 NOTE — TELEPHONE ENCOUNTER
Just an FYI that [facility] called  They will be faxing over a clearance that the surgeon wants you to review before the patient has her breast cancer surgery  She had an abnormal ekg when she had her clearances there

## 2021-04-22 NOTE — TELEPHONE ENCOUNTER
As per dr Africa Villar, patient was called with abnormal EKG results from her pre admission testing and the recommendation for clearance from her PCP, Dr Ryann Saleem  Pt understands   Clearance form faxed to dr Ryann Saleem office

## 2021-04-23 ENCOUNTER — TELEPHONE (OUTPATIENT)
Dept: HEMATOLOGY ONCOLOGY | Facility: CLINIC | Age: 51
End: 2021-04-23

## 2021-04-23 NOTE — TELEPHONE ENCOUNTER
Received call from [facility] stating they are unable to provided surgery clearance for patient due to an abnormal EKG, recommends reaching out to cardio   [facility] call back number [phone]

## 2021-04-27 ENCOUNTER — OFFICE VISIT (OUTPATIENT)
Dept: CARDIOLOGY CLINIC | Facility: CLINIC | Age: 51
End: 2021-04-27
Payer: COMMERCIAL

## 2021-04-27 VITALS
WEIGHT: 177 LBS | HEIGHT: 67 IN | SYSTOLIC BLOOD PRESSURE: 110 MMHG | DIASTOLIC BLOOD PRESSURE: 78 MMHG | BODY MASS INDEX: 27.78 KG/M2 | HEART RATE: 90 BPM | OXYGEN SATURATION: 97 %

## 2021-04-27 DIAGNOSIS — Z01.818 PREOPERATIVE CLEARANCE: ICD-10-CM

## 2021-04-27 DIAGNOSIS — R06.02 SHORTNESS OF BREATH: ICD-10-CM

## 2021-04-27 DIAGNOSIS — R94.31 EKG ABNORMALITIES: Primary | ICD-10-CM

## 2021-04-27 PROCEDURE — 1036F TOBACCO NON-USER: CPT | Performed by: INTERNAL MEDICINE

## 2021-04-27 PROCEDURE — 3008F BODY MASS INDEX DOCD: CPT | Performed by: INTERNAL MEDICINE

## 2021-04-27 PROCEDURE — 99214 OFFICE O/P EST MOD 30 MIN: CPT | Performed by: INTERNAL MEDICINE

## 2021-04-27 NOTE — PROGRESS NOTES
Consult - Cardiology   Shaun García 46 y o  female MRN: [id number]        Problems    Problem List Items Addressed This Visit     None      Visit Diagnoses     EKG abnormalities    -  Primary    Preoperative clearance                Plan  We will attempt to get a stress echo prior to her scheduled lumpectomy  However, I was still approve her for the lumpectomy we can do the stress echo postoperatively  I do not think this EKG in her symptoms are and adequate reason to cancel her surgery  She is quite anxious about getting this lobectomy done        Reason for Consult / Principal Problem:  Abnormal EKG 3 days prior to a scheduled lumpectomy and sentinel low biopsy  This patient does admit to having some shortness of breath  However that shortness of breath is with of exercise such as "spinning" period   She has never had any cardiac issues  EKG shows poor progression of anterior forces  I am more inclined to think this is lead placement  She is asymptomatic with regard to angina  Here past medical history she does have a history of seizures  She has not had 1 for rather prolonged period of time  She is on medication for that  Her review systems is essentially unremarkable  She has had no previous surgery  She is   She is not smoke  She has not drink  She does have a lot of stress with a autistic son  She is quite anxious but not having this surgery canceled  HPI: Shaun García is a 46y o  year old female        Review of Systems   Constitutional: Negative  Respiratory:        States she is "short of breath" with exercise   Cardiovascular: Negative  Gastrointestinal: Negative  Musculoskeletal: Negative      Neurological:        History of seizures period on treatment         Past Medical History:   Diagnosis Date    Abdominal pain     Achrochordon     one in the left dorsal area and on the lower back- last assessed 04/30/13    Breast cancer ([facility] ) 03/01/2021    Right    Dysuria     resolved 10/05/16    Encounter for screening colonoscopy     Heavy menses     History of kidney stones     Intestinal ulcer     Irritable bowel syndrome     Nausea     Seasonal allergies     Seizures (HCC)     Submucous leiomyoma of uterus     Terminal ileitis ([facility] )     unclear etiology, work up w EGD/COLON/TI bx/SBC/CT done  Past Surgical History:   Procedure Laterality Date    BREAST BIOPSY Right 03/01/2021    stereo    COLONOSCOPY  03/01/2017    5 yr recall    DILATION AND CURETTAGE OF UTERUS      HYSTEROSCOPY      INCONTINENCE SURGERY      bladder sling    MAMMO NEEDLE LOCALIZATION LEFT (ALL INC) Right 4/19/2021    MAMMO NEEDLE LOCALIZATION LEFT (ALL INC) EACH ADD Right 4/19/2021    MAMMO STEREOTACTIC BREAST BIOPSY RIGHT (ALL INC) Right 3/1/2021    VA COLONOSCOPY FLX DX W/COLLJ SPEC WHEN PFRMD N/A 11/14/2016    Procedure: EGD AND COLONOSCOPY;  Surgeon: Johny Rosado MD;  Location: [facility] GI LAB; Service: Gastroenterology     Social History     Substance and Sexual Activity   Alcohol Use Yes    Frequency: Monthly or less    Drinks per session: 1 or 2    Binge frequency: Never     Social History     Substance and Sexual Activity   Drug Use No     Social History     Tobacco Use   Smoking Status Never Smoker   Smokeless Tobacco Never Used     Family History   Problem Relation Age of Onset    Cancer Mother     Breast cancer Mother 54    Colon polyps Mother     Diabetes Father         Type 2    Hypertension Father     Breast cancer Family     BRCA1 Negative Sister     BRCA2 Negative Sister     Breast cancer Maternal Aunt 64    Colon cancer Neg Hx        Allergies:   Allergies   Allergen Reactions    Medical Tape Rash    Other Sneezing     seasonal    Wound Dressings Rash       Medications:     Current Outpatient Medications:     cholecalciferol (VITAMIN D3) 1,000 units tablet, Take 1,000 Units by mouth daily, Disp: , Rfl:    famotidine (PEPCID) 40 MG tablet, Take 1 tablet (40 mg total) by mouth daily at bedtime, Disp: 90 tablet, Rfl: 3    fexofenadine (ALLEGRA) 180 MG tablet, Take 180 mg by mouth daily, Disp: , Rfl:     lamoTRIgine (LaMICtal) 150 MG tablet, Take 150 mg by mouth 2 (two) times a day, Disp: , Rfl:     magnesium oxide (MAG-OX) 400 mg, Take 400 mg by mouth daily, Disp: , Rfl:     nystatin-triamcinolone (MYCOLOG-II) cream, Apply topically 2 (two) times a day as needed (Itching/irritation) (Patient not taking: Reported on 2/3/2021), Disp: 30 g, Rfl: 0    Probiotic Product (PROBIOTIC PO), Take by mouth, Disp: , Rfl:     traMADol (ULTRAM) 50 mg tablet, Take 1 tablet (50 mg total) by mouth every 8 (eight) hours as needed for moderate pain, Disp: 9 tablet, Rfl: 0      Physical Exam  Constitutional:       Appearance: She is normal weight  Neck:      Musculoskeletal: Normal range of motion  Cardiovascular:      Rate and Rhythm: Normal rate  Rhythm irregular  Pulses: Normal pulses  Heart sounds: Normal heart sounds  No murmur  No friction rub  No gallop  Pulmonary:      Effort: Pulmonary effort is normal  No respiratory distress  Breath sounds: Normal breath sounds  No wheezing or rales  Musculoskeletal:         General: No swelling  Right lower leg: No edema  Left lower leg: Edema present  Skin:     Coloration: Skin is not pale  Findings: No bruising, erythema, lesion or rash  Neurological:      Mental Status: She is oriented to person, place, and time     Psychiatric:         Behavior: Behavior normal            Laboratory Studies:  CMP:  Lab Results   Component Value Date    CREATININE 1 04 04/20/2021    CREATININE 0 67 07/22/2015    BUN 17 04/20/2021    BUN 17 07/22/2015     07/22/2015    K 4 2 04/20/2021    K 4 0 07/22/2015     04/20/2021     07/22/2015    CO2 26 04/20/2021    CO2 27 07/22/2015    GLUCOSE 78 07/22/2015    PROT 6 7 07/22/2015    ALKPHOS 84 04/20/2021    ALKPHOS 57 07/22/2015    ALT 35 04/20/2021    ALT 27 07/22/2015    AST 20 04/20/2021    AST 19 07/22/2015     NT-proBNP: No results found for: NTBNP   CBC:  Lab Results   Component Value Date    WBC 5 43 04/20/2021    WBC 4 17 (L) 07/22/2015    RBC 5 02 04/20/2021    RBC 4 53 07/22/2015    HGB 15 7 (H) 04/20/2021    HGB 14 2 07/22/2015    HCT 46 2 (H) 04/20/2021    HCT 41 6 07/22/2015    MCV 92 04/20/2021    MCV 92 07/22/2015    MCH 31 3 04/20/2021    MCH 31 3 07/22/2015    RDW 11 9 04/20/2021    RDW 12 6 07/22/2015     04/20/2021     07/22/2015     Coags:    Lipid Profile:   Lab Results   Component Value Date    CHOL 192 07/22/2015     Lab Results   Component Value Date     07/22/2015     Lab Results   Component Value Date    LDLCALC 78 07/22/2015     Lab Results   Component Value Date    TRIG 50 07/22/2015       Cardiac testing:     EKG reviewed personally:   EKG available from several days ago shows poor progression of anterior forces  Read as an abnormal EKG  I believe it is normal    No results found for this or any previous visit  No results found for this or any previous visit  No results found for this or any previous visit  No results found for this or any previous visit  Jennifer Duncan MD    Portions of the record may have been created with voice recognition software   Occasional wrong word or "sound a like" substitutions may have occurred due to the inherent limitations of voice recognition software   Read the chart carefully and recognize, using context, where substitutions have occurred

## 2021-04-28 NOTE — PRE-PROCEDURE INSTRUCTIONS
Pre-Surgery Instructions:   Medication Instructions    cholecalciferol (VITAMIN D3) 1,000 units tablet Instructed patient per Anesthesia Guidelines   famotidine (PEPCID) 40 MG tablet Instructed patient per Anesthesia Guidelines   fexofenadine (ALLEGRA) 180 MG tablet Instructed patient per Anesthesia Guidelines   lamoTRIgine (LaMICtal) 150 MG tablet Instructed patient per Anesthesia Guidelines   magnesium oxide (MAG-OX) 400 mg Instructed patient per Anesthesia Guidelines   Probiotic Product (PROBIOTIC PO) Instructed patient per Anesthesia Guidelines   traMADol (ULTRAM) 50 mg tablet Instructed patient per Anesthesia Guidelines  Patient instructed to take*lamotrogine*with a sip of water the morning of surgery  Patient  instructed on use of chlorhexidine soap per hospital protocol    Patient instructed to stop all ASA, NSAIDS, vitamins and herbal supplements one week prior to surgery or per Dr Blanca Bradford

## 2021-04-29 ENCOUNTER — ANESTHESIA EVENT (OUTPATIENT)
Dept: PERIOP | Facility: HOSPITAL | Age: 51
End: 2021-04-29
Payer: COMMERCIAL

## 2021-04-29 ENCOUNTER — HOSPITAL ENCOUNTER (OUTPATIENT)
Dept: NON INVASIVE DIAGNOSTICS | Facility: HOSPITAL | Age: 51
Discharge: HOME | End: 2021-04-29
Payer: COMMERCIAL

## 2021-04-29 DIAGNOSIS — R06.02 SHORTNESS OF BREATH: ICD-10-CM

## 2021-04-29 DIAGNOSIS — Z01.818 PREOPERATIVE CLEARANCE: ICD-10-CM

## 2021-04-29 DIAGNOSIS — R94.31 EKG ABNORMALITIES: ICD-10-CM

## 2021-04-29 PROCEDURE — 93350 STRESS TTE ONLY: CPT

## 2021-04-29 PROCEDURE — 93351 STRESS TTE COMPLETE: CPT | Performed by: INTERNAL MEDICINE

## 2021-04-30 ENCOUNTER — TELEPHONE (OUTPATIENT)
Dept: OTHER | Facility: OTHER | Age: 51
End: 2021-04-30

## 2021-04-30 ENCOUNTER — ANESTHESIA (OUTPATIENT)
Dept: PERIOP | Facility: HOSPITAL | Age: 51
End: 2021-04-30
Payer: COMMERCIAL

## 2021-04-30 ENCOUNTER — HOSPITAL ENCOUNTER (OUTPATIENT)
Dept: MAMMOGRAPHY | Facility: HOSPITAL | Age: 51
Setting detail: OUTPATIENT SURGERY
Discharge: HOME | End: 2021-04-30
Payer: COMMERCIAL

## 2021-04-30 ENCOUNTER — HOSPITAL ENCOUNTER (OUTPATIENT)
Facility: HOSPITAL | Age: 51
Setting detail: OUTPATIENT SURGERY
Discharge: HOME | End: 2021-04-30
Attending: SURGERY | Admitting: SURGERY
Payer: COMMERCIAL

## 2021-04-30 ENCOUNTER — HOSPITAL ENCOUNTER (OUTPATIENT)
Dept: NUCLEAR MEDICINE | Facility: HOSPITAL | Age: 51
Discharge: HOME | End: 2021-04-30
Attending: SURGERY
Payer: COMMERCIAL

## 2021-04-30 VITALS
BODY MASS INDEX: 27.78 KG/M2 | RESPIRATION RATE: 16 BRPM | TEMPERATURE: 98.1 F | HEART RATE: 68 BPM | HEIGHT: 67 IN | WEIGHT: 177 LBS | DIASTOLIC BLOOD PRESSURE: 72 MMHG | OXYGEN SATURATION: 97 % | SYSTOLIC BLOOD PRESSURE: 140 MMHG

## 2021-04-30 DIAGNOSIS — Z17.0 MALIGNANT NEOPLASM OF CENTRAL PORTION OF RIGHT BREAST IN FEMALE, ESTROGEN RECEPTOR POSITIVE (HCC): ICD-10-CM

## 2021-04-30 DIAGNOSIS — C50.111 MALIGNANT NEOPLASM OF CENTRAL PORTION OF RIGHT BREAST IN FEMALE, ESTROGEN RECEPTOR POSITIVE (HCC): ICD-10-CM

## 2021-04-30 PROBLEM — Z87.898 HISTORY OF ANESTHESIA COMPLICATIONS: Status: ACTIVE | Noted: 2021-04-30

## 2021-04-30 LAB
CHEST PAIN STATEMENT: NORMAL
EXT PREGNANCY TEST URINE: NEGATIVE
EXT. CONTROL: NORMAL
MAX DIASTOLIC BP: 80 MMHG
MAX HEART RATE: 169 BPM
MAX PREDICTED HEART RATE: 169 BPM
MAX. SYSTOLIC BP: 160 MMHG
PROTOCOL NAME: NORMAL
REASON FOR TERMINATION: NORMAL
TARGET HR FORMULA: NORMAL
TEST INDICATION: NORMAL
TIME IN EXERCISE PHASE: NORMAL

## 2021-04-30 PROCEDURE — 81025 URINE PREGNANCY TEST: CPT | Performed by: SURGERY

## 2021-04-30 PROCEDURE — 38525 BIOPSY/REMOVAL LYMPH NODES: CPT | Performed by: SURGERY

## 2021-04-30 PROCEDURE — 88307 TISSUE EXAM BY PATHOLOGIST: CPT | Performed by: PATHOLOGY

## 2021-04-30 PROCEDURE — 19301 PARTIAL MASTECTOMY: CPT | Performed by: SURGERY

## 2021-04-30 PROCEDURE — G1004 CDSM NDSC: HCPCS

## 2021-04-30 PROCEDURE — 88363 XM ARCHIVE TISSUE MOLEC ANAL: CPT | Performed by: PATHOLOGY

## 2021-04-30 PROCEDURE — A9541 TC99M SULFUR COLLOID: HCPCS

## 2021-04-30 PROCEDURE — 78195 LYMPH SYSTEM IMAGING: CPT

## 2021-04-30 PROCEDURE — 38900 IO MAP OF SENT LYMPH NODE: CPT | Performed by: SURGERY

## 2021-04-30 RX ORDER — FENTANYL CITRATE/PF 50 MCG/ML
25 SYRINGE (ML) INJECTION
Status: COMPLETED | OUTPATIENT
Start: 2021-04-30 | End: 2021-04-30

## 2021-04-30 RX ORDER — CEFAZOLIN SODIUM 2 G/50ML
2000 SOLUTION INTRAVENOUS
Status: DISCONTINUED | OUTPATIENT
Start: 2021-04-30 | End: 2021-04-30 | Stop reason: HOSPADM

## 2021-04-30 RX ORDER — ACETAMINOPHEN 325 MG/1
650 TABLET ORAL EVERY 6 HOURS PRN
Status: DISCONTINUED | OUTPATIENT
Start: 2021-04-30 | End: 2021-04-30 | Stop reason: HOSPADM

## 2021-04-30 RX ORDER — DEXAMETHASONE SODIUM PHOSPHATE 4 MG/ML
INJECTION, SOLUTION INTRA-ARTICULAR; INTRALESIONAL; INTRAMUSCULAR; INTRAVENOUS; SOFT TISSUE AS NEEDED
Status: DISCONTINUED | OUTPATIENT
Start: 2021-04-30 | End: 2021-04-30

## 2021-04-30 RX ORDER — LIDOCAINE HYDROCHLORIDE 20 MG/ML
INJECTION, SOLUTION EPIDURAL; INFILTRATION; INTRACAUDAL; PERINEURAL AS NEEDED
Status: DISCONTINUED | OUTPATIENT
Start: 2021-04-30 | End: 2021-04-30

## 2021-04-30 RX ORDER — FENTANYL CITRATE 50 UG/ML
INJECTION, SOLUTION INTRAMUSCULAR; INTRAVENOUS AS NEEDED
Status: DISCONTINUED | OUTPATIENT
Start: 2021-04-30 | End: 2021-04-30

## 2021-04-30 RX ORDER — ONDANSETRON 2 MG/ML
INJECTION INTRAMUSCULAR; INTRAVENOUS AS NEEDED
Status: DISCONTINUED | OUTPATIENT
Start: 2021-04-30 | End: 2021-04-30

## 2021-04-30 RX ORDER — PROPOFOL 10 MG/ML
INJECTION, EMULSION INTRAVENOUS AS NEEDED
Status: DISCONTINUED | OUTPATIENT
Start: 2021-04-30 | End: 2021-04-30

## 2021-04-30 RX ORDER — SODIUM CHLORIDE 9 MG/ML
125 INJECTION, SOLUTION INTRAVENOUS CONTINUOUS
Status: DISCONTINUED | OUTPATIENT
Start: 2021-04-30 | End: 2021-04-30 | Stop reason: HOSPADM

## 2021-04-30 RX ORDER — ONDANSETRON 2 MG/ML
4 INJECTION INTRAMUSCULAR; INTRAVENOUS ONCE AS NEEDED
Status: DISCONTINUED | OUTPATIENT
Start: 2021-04-30 | End: 2021-04-30 | Stop reason: HOSPADM

## 2021-04-30 RX ORDER — TRAMADOL HYDROCHLORIDE 50 MG/1
50 TABLET ORAL EVERY 6 HOURS PRN
Status: DISCONTINUED | OUTPATIENT
Start: 2021-04-30 | End: 2021-04-30 | Stop reason: HOSPADM

## 2021-04-30 RX ORDER — MIDAZOLAM HYDROCHLORIDE 2 MG/2ML
INJECTION, SOLUTION INTRAMUSCULAR; INTRAVENOUS AS NEEDED
Status: DISCONTINUED | OUTPATIENT
Start: 2021-04-30 | End: 2021-04-30

## 2021-04-30 RX ORDER — MAGNESIUM HYDROXIDE 1200 MG/15ML
LIQUID ORAL AS NEEDED
Status: DISCONTINUED | OUTPATIENT
Start: 2021-04-30 | End: 2021-04-30 | Stop reason: HOSPADM

## 2021-04-30 RX ORDER — HYDROMORPHONE HCL/PF 1 MG/ML
0.5 SYRINGE (ML) INJECTION
Status: DISCONTINUED | OUTPATIENT
Start: 2021-04-30 | End: 2021-04-30 | Stop reason: HOSPADM

## 2021-04-30 RX ADMIN — FENTANYL CITRATE 25 MCG: 50 INJECTION, SOLUTION INTRAMUSCULAR; INTRAVENOUS at 16:48

## 2021-04-30 RX ADMIN — FENTANYL CITRATE 50 MCG: 50 INJECTION INTRAMUSCULAR; INTRAVENOUS at 15:12

## 2021-04-30 RX ADMIN — ACETAMINOPHEN 650 MG: 325 TABLET ORAL at 18:04

## 2021-04-30 RX ADMIN — HYDROMORPHONE HYDROCHLORIDE 0.5 MG: 1 INJECTION, SOLUTION INTRAMUSCULAR; INTRAVENOUS; SUBCUTANEOUS at 17:14

## 2021-04-30 RX ADMIN — FENTANYL CITRATE 25 MCG: 50 INJECTION, SOLUTION INTRAMUSCULAR; INTRAVENOUS at 17:03

## 2021-04-30 RX ADMIN — TRAMADOL HYDROCHLORIDE 50 MG: 50 TABLET, FILM COATED ORAL at 18:04

## 2021-04-30 RX ADMIN — MIDAZOLAM 2 MG: 1 INJECTION INTRAMUSCULAR; INTRAVENOUS at 14:40

## 2021-04-30 RX ADMIN — FENTANYL CITRATE 50 MCG: 50 INJECTION INTRAMUSCULAR; INTRAVENOUS at 15:31

## 2021-04-30 RX ADMIN — LIDOCAINE HYDROCHLORIDE 100 MG: 20 INJECTION, SOLUTION EPIDURAL; INFILTRATION; INTRACAUDAL; PERINEURAL at 14:43

## 2021-04-30 RX ADMIN — DEXAMETHASONE SODIUM PHOSPHATE 4 MG: 4 INJECTION INTRA-ARTICULAR; INTRALESIONAL; INTRAMUSCULAR; INTRAVENOUS; SOFT TISSUE at 14:43

## 2021-04-30 RX ADMIN — SODIUM CHLORIDE: 0.9 INJECTION, SOLUTION INTRAVENOUS at 15:22

## 2021-04-30 RX ADMIN — FENTANYL CITRATE 25 MCG: 50 INJECTION, SOLUTION INTRAMUSCULAR; INTRAVENOUS at 16:42

## 2021-04-30 RX ADMIN — FENTANYL CITRATE 25 MCG: 50 INJECTION INTRAMUSCULAR; INTRAVENOUS at 14:46

## 2021-04-30 RX ADMIN — CEFAZOLIN SODIUM 2000 MG: 2 SOLUTION INTRAVENOUS at 14:36

## 2021-04-30 RX ADMIN — SODIUM CHLORIDE 125 ML/HR: 0.9 INJECTION, SOLUTION INTRAVENOUS at 12:26

## 2021-04-30 RX ADMIN — PROPOFOL 200 MG: 10 INJECTION, EMULSION INTRAVENOUS at 14:43

## 2021-04-30 RX ADMIN — FENTANYL CITRATE 75 MCG: 50 INJECTION INTRAMUSCULAR; INTRAVENOUS at 14:52

## 2021-04-30 RX ADMIN — ONDANSETRON 4 MG: 2 INJECTION INTRAMUSCULAR; INTRAVENOUS at 16:12

## 2021-04-30 RX ADMIN — FENTANYL CITRATE 25 MCG: 50 INJECTION, SOLUTION INTRAMUSCULAR; INTRAVENOUS at 16:37

## 2021-04-30 NOTE — OP NOTE
OPERATIVE REPORT  PATIENT NAME: Angela Messina    :  1970  MRN: [id number]  Pt Location: [facility] OR ROOM 05    SURGERY DATE: 2021    Surgeon(s) and Role:     * Karli Aguila MD - Primary     * Ginna Cisneros MD - Assisting    Preop Diagnosis:  Malignant neoplasm of central portion of right breast in female, estrogen receptor positive ([address] ) [C50 111, Z17 0]    Post-Op Diagnosis Codes:     * Malignant neoplasm of central portion of right breast in female, estrogen receptor positive ([address] ) [C50 111, Z17 0]    Procedure(s) (LRB):  RIGHT BREAST BRACKETED ANTON LOCALIZATION LUMPECTOMY; (Right)  Bx LYMPH NODE SENTINEL (Right)   Injection of blue dye  Use of gamma probe  Use of anton     Specimen(s):  ID Type Source Tests Collected by Time Destination   1 : RIGHT LUMPECTOMY, SHORT=SUPERIOR, LONG=LATERAL Tissue Breast, Right TISSUE EXAM Karli Aguila MD 2021 1502    2 : NEW MEDIAL MARGIN RIGHT BREAST, SUTURE MARKS TRUE MARGIN Tissue Breast, Right TISSUE EXAM Karli Aguila MD 2021 1528    3 : NEW ANTERIOR/MEDIAL MARGIN RIGHT BREAST, SUTURE MARKS TRUE MARGIN Tissue Breast, Right TISSUE EXAM Karli Aguila MD 2021 1534    4 : NEW SUPERIOR MARGIN, SUTURE MARKS NEW MARGIN Tissue Breast, Right TISSUE EXAM Karli Aguila MD 2021 1535    5 : SENTINEL LYMPH NODE #1 RIGHT BREAST Tissue Lymph Node, [place] TISSUE EXAM Karli Aguila MD 2021 1502    6 : SENTINEL LYMPH NODE #2 RIGHT BREAST Tissue Lymph Node, [place] TISSUE EXAM Karli Aguila MD 2021 1553        Estimated Blood Loss:   Minimal    Drains:  * No LDAs found *    Anesthesia Type:   General    Operative Indications:  Malignant neoplasm of central portion of right breast in female, estrogen receptor positive ([address] ) [C50 111, Z17 0]      Operative Findings:  anton reflectors x 2 in specimen    Complications:   None    Procedure and Technique:  Celestino Davey is a 75-year-old female who presented with right breast carcinoma    This extended roughly 3 cm in the breast extending subareolar  She was counseled on a bracketed lumpectomy with ANTON localization along with sentinel node biopsy  She had the anton reflectors placed preoperatively  She presented the day of surgery to the radiology suite and underwent lymphoscintigraphy of the right breast   From there she went to the operating room  She was given general anesthesia  She had preoperative antibiotics  She had a 5 cc injection of blue dye into the right subareolar plexus for lymphatic mapping  She was prepped and draped in the usual standard fashion  Time-out was performed  Attention was turned to the right breast   The ANTON probe was used to identify the ANTON reflectors x2 and the skin was marked in these locations  0 5% Marcaine plain was injected for local anesthesia  An elliptical incision was created from the nipple edge lateral in the 0900 hours axis  Electrocautery was used to dissect margins superior, lateral, inferior, medial to the subareolar aspect and posterior  The ANTON probe was used throughout to help guide dissection  The specimen was marked with a short stitch superior and a long stitch lateral   The ANTON probe was used to interrogate the specimen to confirm removal of both reflectors  The specimen was also imaged in the operating room revealing both reflector was with the closest margin being medial and anterior in the medial aspect and questionably superior  Therefore new margins were excised in these locations and sutures were placed on the true margins  All breast specimens were sent to pathology in formalin  Attention was then turned to the right axilla  There was an area of increased radioactive uptake at the lateral pectoralis edge  Additional 0 5% Marcaine plain was injected for local anesthesia  An incision was through the skin and subcutaneous tissue    Electrocautery was used to dissect through the remaining subcutaneous tissue and clavipectoral fascia to enter the axillary fat pad  Deep in the mid axilla were two adjacent radioactive nodes  These were elevated into the wound using an Allis clamp  Hemoclips were placed on draining vessels  Both the nodes were excised completely  These were  and submitted as sentinel node one and two of the right axilla  Following removal of these two lymph nodes, there were no additional blue-stained, radioactive or palpable nodes  Both of her wounds were irrigated and hemostasis was achieved  Hemoclips were placed within the lumpectomy cavity for radiation therapy  Surgicel powder was used for additional hemostasis  The wounds were then closed in a layered fashion using multiple interrupted 3-0 Monocryl suture and a running 4-0 Monocryl subcuticular stitch  All counts were correct  The skin was cleaned and dried  Surgical glue, fluffs and a bra were applied  The patient was extubated and taken to recovery in stable condition        Patient Disposition:  extubated and stable    SIGNATURE: Urszula Oneal MD  DATE: April 30, 2021  TIME: 4:23 PM

## 2021-04-30 NOTE — DISCHARGE INSTRUCTIONS
POST-OPERATIVE CARE INSTRUCTIONS       Care after your procedure:   General  · Rest and relax for 24 hours, then gradually return to normal activities  · Do not preform any heavy lifting or strenuous physical activities for 14 days  · Your activity restrictions will be re-evaluated at your post op visit  · Drink clear liquids until you are certain there is no nausea, then resume a normal diet  · Do not drink alcohol, drive any vehicle, operate mechanical equipment or make critical decisions for at least 24 hours and until you are off any narcotic pain medications  The Incision  · Your incision is closed with:   dissolvable stiches just underneath the skin  · The incision is also covered with:                          clear waterproof glue  · A gauze-pad is covering the wound  Wound care  · Remove your gauze-pad after 24 hours  · You may then shower using soap and water to clean your incision  Gently dry the wound  · You may redress your wound with additional gauze and tape if you choose  · A little bruising at the wound site is normal     Medication  · Resume all previous medications  · Take either Naproxen (Aleve) one tablet every 8 hours or Ibuprofen(Advil/Motrin) one(1) to two(2) tablets every 6 hours around the clock for the first 2-3 days  Take this even if you don't think you need it for at least the first 24 hours  · Pain Medication Instructions: may also use over the counter tylenol or prescription tramadol if needed          Other (If applicable)  · Wear a post-surgical bra around the clock  · May use ice to the incision site(s) for the next 24-48 hours, twice daily     Call your  doctor if you have any of the following:  · Redness, swelling, heat, drainage, and/or bleeding from your wound  · Chills or fever ( above 101' F )  · Pain, not relieved with the above medications  · If you have any questions or problems call our office [phone]    Follow-up appointment:  · As scheduled

## 2021-04-30 NOTE — ANESTHESIA PREPROCEDURE EVALUATION
Procedure:  RIGHT BREAST BRACKETED DINA LOCALIZATION LUMPECTOMY; [address] (Right Breast)  Bx LYMPH NODE SENTINEL (Right Axilla)    Relevant Problems   ANESTHESIA   (+) History of anesthesia complications      GYN   (+) Malignant neoplasm of central portion of right breast in female, estrogen receptor positive (HCC)      NEURO/PSYCH   (+) 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 ([facility] ) (none for three years, on Lamictal, grand mal)   (+) History of anesthesia complications   (+) History of seizure   (+) SANDRA (juvenile myoclonic epilepsy) ([facility] )        Physical Exam    Airway    Mallampati score: II  TM Distance: >3 FB  Neck ROM: full     Dental   No notable dental hx     Cardiovascular  Rhythm: regular, Rate: normal, Cardiovascular exam normal    Pulmonary  Pulmonary exam normal Breath sounds clear to auscultation,     Other Findings        Anesthesia Plan  ASA Score- 2     Anesthesia Type- general with ASA Monitors  Additional Monitors:   Airway Plan: LMA  Plan Factors-Exercise tolerance (METS): >4 METS  Chart reviewed  EKG reviewed  Imaging results reviewed  Existing labs reviewed  Patient summary reviewed  Patient is not a current smoker  Induction- intravenous  Postoperative Plan-     Informed Consent- Anesthetic plan and risks discussed with patient

## 2021-04-30 NOTE — INTERVAL H&P NOTE
H&P reviewed  After examining the patient I find no changes in the patients condition since the H&P had been written      Vitals:    04/30/21 1202   BP: 128/82   Pulse: 92   Resp: 16   Temp: 98 4 °F (36 9 °C)   SpO2: 97%

## 2021-05-01 NOTE — TELEPHONE ENCOUNTER
Pt was just released from the hospital  Pt of Dr Frank Jewell   She states she is in a lot of pain and wanted to know if she could take another tramadol now instead of waiting for her next dose at 2 am     Paged Dr Malika Alarcon via Coby Cockayne

## 2021-05-20 ENCOUNTER — TELEPHONE (OUTPATIENT)
Dept: SURGICAL ONCOLOGY | Facility: CLINIC | Age: 51
End: 2021-05-20

## 2021-05-20 ENCOUNTER — OFFICE VISIT (OUTPATIENT)
Dept: SURGICAL ONCOLOGY | Facility: CLINIC | Age: 51
End: 2021-05-20

## 2021-05-20 ENCOUNTER — TELEPHONE (OUTPATIENT)
Dept: HEMATOLOGY ONCOLOGY | Facility: CLINIC | Age: 51
End: 2021-05-20

## 2021-05-20 VITALS
SYSTOLIC BLOOD PRESSURE: 118 MMHG | DIASTOLIC BLOOD PRESSURE: 82 MMHG | HEIGHT: 67 IN | WEIGHT: 177 LBS | BODY MASS INDEX: 27.78 KG/M2 | HEART RATE: 80 BPM | TEMPERATURE: 97.1 F

## 2021-05-20 DIAGNOSIS — C50.111 MALIGNANT NEOPLASM OF CENTRAL PORTION OF RIGHT BREAST IN FEMALE, ESTROGEN RECEPTOR POSITIVE (HCC): Primary | ICD-10-CM

## 2021-05-20 DIAGNOSIS — Z17.0 MALIGNANT NEOPLASM OF CENTRAL PORTION OF RIGHT BREAST IN FEMALE, ESTROGEN RECEPTOR POSITIVE (HCC): Primary | ICD-10-CM

## 2021-05-20 PROCEDURE — 99024 POSTOP FOLLOW-UP VISIT: CPT | Performed by: SURGERY

## 2021-05-20 NOTE — TELEPHONE ENCOUNTER
Intra-Department Referral    Patient Details:  Beronica Retana  1970  [id number]   Background Information:  [address] starts by opening a telephone encounter and gathering the following information   Who is calling to schedule and relationship? provider   Diagnosis Breast ca   What department was patient seen in last? Surg Onc   Scheduling Information:    Preferred [address]   Are there any days the patient cannot be seen? na   Miscellaneous:    After completing the above information, please route to nurse navigation, clinical trials (for re-evaluation) and Alicia

## 2021-05-20 NOTE — PROGRESS NOTES
46 y o  female is here today s/p  Right lumpectomy and sentinel node biopsy  She reports  Some soreness but no other concerns  Physical Exam  Constitutional:       General: She is not in acute distress  Chest:      Breasts:         Right: Swelling ( mild, soft in the axilla) and skin change (  Well-healing incision in the breast with no signs of infection) present  Neurological:      Mental Status: She is alert and oriented to person, place, and time  Psychiatric:         Mood and Affect: Mood normal          Data:    04/30/2021 right lumpectomy and sentinel node biopsy    Staging:     multifocal invasive ductal carcinoma with DCIS, largest component 14 mm  2nd component 5 mm  Tumor grade  two  LVI  Not identified  Margins  clean  Estrogen receptor and progesterone receptor status  positive  HER2 status and test method  negative   Lymph node assessment/status  0/2      Neoadjuvant therapy:  Not applicable  Stage: IA        Diagnoses and all orders for this visit:    Malignant neoplasm of central portion of right breast in female, estrogen receptor positive ([facility] )  -     Ambulatory referral to Hematology / Oncology; Future  -     Ambulatory referral to Radiation Oncology; Future        Assessment/Plan:  70-year-old female status post right lumpectomy and sentinel node biopsy for multifocal invasive ductal carcinoma with DCIS  This is grade 2, ER/MD positive and HER2 Zari negative  She is healing well with no signs of infection  I am referring her to both medical and radiation oncology  I will plan to see her again in six months or sooner should the need arise

## 2021-05-20 NOTE — TELEPHONE ENCOUNTER
Dr Shlomo Humphries with [facility] is calling for Dr Yonny Lofton records on this patient  Thier fax number is [phone]  Please address

## 2021-05-20 NOTE — TELEPHONE ENCOUNTER
Patient called declines to be seen in \[facility]\"" for RT consult/treatment stating too far to drive requested someone closer to her home  Referred her to Dr Su Basurto  Gave patient their office number to contact  Records and referral to RT faxed along with insurance cards to Dr Werner Crook office  Dr Heath Pineda made aware of above

## 2021-05-20 NOTE — TELEPHONE ENCOUNTER
Called patient and left message with appointment for medical oncology consult with Dr Tod Gowers in his [facility] office for 6/2/2021 at 3:20 pm  I also stated that I notified the RT Admin about a need for a consult and that someone will be reaching out to her from that department with an appointment as well I requested she call me if she has any questions or if I can be of further assistance

## 2021-05-21 ENCOUNTER — TELEPHONE (OUTPATIENT)
Dept: RADIATION ONCOLOGY | Facility: CLINIC | Age: 51
End: 2021-05-21

## 2021-05-21 NOTE — TELEPHONE ENCOUNTER
[facility] - Spoke with Penny Garrido this afternoon who confirmed that she has an appointment w/ Dr Carolyn Patrick Oncologist at [facility] on 6/9/21  NP referral to [address] [place] [address] Department has been closed   KD

## 2021-06-02 ENCOUNTER — CONSULT (OUTPATIENT)
Dept: HEMATOLOGY ONCOLOGY | Facility: HOSPITAL | Age: 51
End: 2021-06-02
Payer: COMMERCIAL

## 2021-06-02 VITALS
BODY MASS INDEX: 27.94 KG/M2 | TEMPERATURE: 98.3 F | OXYGEN SATURATION: 96 % | SYSTOLIC BLOOD PRESSURE: 138 MMHG | DIASTOLIC BLOOD PRESSURE: 88 MMHG | HEIGHT: 67 IN | RESPIRATION RATE: 16 BRPM | WEIGHT: 178 LBS | HEART RATE: 82 BPM

## 2021-06-02 DIAGNOSIS — Z17.0 MALIGNANT NEOPLASM OF CENTRAL PORTION OF RIGHT BREAST IN FEMALE, ESTROGEN RECEPTOR POSITIVE (HCC): ICD-10-CM

## 2021-06-02 DIAGNOSIS — C50.111 MALIGNANT NEOPLASM OF CENTRAL PORTION OF RIGHT BREAST IN FEMALE, ESTROGEN RECEPTOR POSITIVE (HCC): ICD-10-CM

## 2021-06-02 PROCEDURE — 1036F TOBACCO NON-USER: CPT | Performed by: INTERNAL MEDICINE

## 2021-06-02 PROCEDURE — 99205 OFFICE O/P NEW HI 60 MIN: CPT | Performed by: INTERNAL MEDICINE

## 2021-06-02 PROCEDURE — 3008F BODY MASS INDEX DOCD: CPT | Performed by: INTERNAL MEDICINE

## 2021-06-02 NOTE — PROGRESS NOTES
Oncology Outpatient Consult Note  Sejal Bird 46 y o  female MRN: @ Encounter: [id number]        Date:  6/2/2021        CC:   ER positive VA positive HER2 negative pathologic T1c pathologic and 0 grade to invasive mammary carcinoma status post  Lumpectomy  The largest focus was 14 mm in maximal diameter  There is another smaller focus which was 5 mm in maximal dimension  DCIS was present  No lymphovascular invasion was seen  Margins were negative  HPI:  Sejal Bird is seen for initial consultation 6/2/2021 regarding   A newly diagnosed invasive mammary carcinoma of the right breast   The patient had 2 small foci of disease 1 measured 5 mm and another measured 14 mm  Tumor was ER positive VA positive HER2 negative  The patient had a lumpectomy  She is here for discussion about adjuvant therapy  Oncotype DX has not been done yet from what I can tell  Chest x-ray did not show any acute cardiopulmonary disease  The patient herself is recovering well from surgery  Denies any complaints today  Does have some soreness in her right arm and I advised her talk to her surgeon about consideration of physical therapy and exercises for lymphedema  Denies any other complaints and the rest of her 14 point review of systems today was negative          Cancer Staging:  Cancer Staging  Malignant neoplasm of central portion of right breast in female, estrogen receptor positive ([facility] [address] )  Staging form: Breast, AJCC 8th Edition  - Clinical: Stage IA (cT1, cN0, cM0, G2, ER+, VA+, HER2-) - Signed by Juan Manuel Hough MD on 3/31/2021  Stage prefix: Initial diagnosis  Method of lymph node assessment: Clinical  Histologic grading system: 3 grade system  - Pathologic: Stage IA (pT1c, pN0(sn), cM0, G2, ER+, VA+, HER2-) - Signed by Juan Manuel Hough MD on 5/20/2021  Stage prefix: Initial diagnosis  Method of lymph node assessment: [place] lymph node biopsy  Histologic grading system: 3 grade system      Molecular Testing: Previous Hematologic/ Oncologic History:    Oncology History   Malignant neoplasm of central portion of right breast in female, estrogen receptor positive ([facility] )   3/31/2021 Initial Diagnosis    Malignant neoplasm of central portion of right breast in female, estrogen receptor positive ([facility] )     3/31/2021 -  Cancer Staged    Staging form: Breast, AJCC 8th Edition  - Clinical: Stage IA (cT1, cN0, cM0, G2, ER+, NJ+, HER2-) - Signed by Ezio Garcia MD on 3/31/2021  Stage prefix: Initial diagnosis  Method of lymph node assessment: Clinical  Histologic grading system: 3 grade system       5/20/2021 -  Cancer Staged    Staging form: Breast, AJCC 8th Edition  - Pathologic: Stage IA (pT1c, pN0(sn), cM0, G2, ER+, NJ+, HER2-) - Signed by Ezio Garcia MD on 5/20/2021  Stage prefix: Initial diagnosis  Method of lymph node assessment: [place] lymph node biopsy  Histologic grading system: 3 grade system               Test Results:    Imaging: No results found  Labs:   Lab Results   Component Value Date    WBC 5 43 04/20/2021    HGB 15 7 (H) 04/20/2021    HCT 46 2 (H) 04/20/2021    MCV 92 04/20/2021     04/20/2021     Lab Results   Component Value Date     07/22/2015    K 4 2 04/20/2021     04/20/2021    CO2 26 04/20/2021    ANIONGAP 10 07/22/2015    BUN 17 04/20/2021    CREATININE 1 04 04/20/2021    GLUCOSE 78 07/22/2015    CALCIUM 9 0 04/20/2021    AST 20 04/20/2021    ALT 35 04/20/2021    ALKPHOS 84 04/20/2021    PROT 6 7 07/22/2015    BILITOT 0 43 07/22/2015    EGFR 62 04/20/2021         ROS: As stated in history of present illness otherwise her 14 point review of systems today was negative          Active Problems:   Patient Active Problem List   Diagnosis    Irregular menses    Encounter for surveillance of vaginal ring hormonal contraceptive device    Dense breast tissue    History of seizure    Allergic dermatitis    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 ([place] [address] )    Family history of breast cancer    Skin lesion of breast    SANDRA (juvenile myoclonic epilepsy) ([facility] [address] )    Terminal ileitis ([facility] [address] )    Malignant neoplasm of central portion of right breast in female, estrogen receptor positive ([facility] [address] )    BRCA negative    History of anesthesia complications       Past Medical History:   Past Medical History:   Diagnosis Date    Abdominal pain     Anesthesia     "after tape removed from both eyes/developed swelling"    Anxiety     has autistic/special needs son    Chronic UTI     2x/year or so /no symptoms today    Dysuria     resolved 10/05/16/occas/not lately    Encounter for screening colonoscopy     Environmental and seasonal allergies     Exercise involving cycling     2-3x/week spin classes    GERD (gastroesophageal reflux disease)     Heavy menses     Hiatal hernia     History of kidney stones     Intestinal ulcer     Irritable bowel syndrome     Nausea     Seizures ([facility] [address] )     last seizure approx 3 yrs ago    Shortness of breath     "sometimes just sitting in chair or activity and having Echo 4/29"    Submucous leiomyoma of uterus     no recent problem    Terminal ileitis ([facility] [address] )     unclear etiology, work up w EGD/COLON/TI bx/SBC/CT done      Wears glasses        Surgical History:   Past Surgical History:   Procedure Laterality Date    BREAST BIOPSY Right 03/01/2021    stereo    BREAST LUMPECTOMY Right 4/30/2021    Procedure: RIGHT BREAST BRACKETED DINA LOCALIZATION LUMPECTOMY;;  Surgeon: Shira Shore MD;  Location: [facility];  Service: Surgical Oncology    COLONOSCOPY  03/01/2017    5 yr recall    DILATION AND CURETTAGE OF UTERUS      EGD      HYSTEROSCOPY      INCONTINENCE SURGERY      bladder sling    LYMPH NODE BIOPSY Right 4/30/2021    Procedure: Bx LYMPH NODE SENTINEL;  Surgeon: Shira Shore MD;  Location: [facility];  Service: Surgical Oncology    MAMMO NEEDLE LOCALIZATION LEFT (ALL INC) Right 4/19/2021    MAMMO NEEDLE LOCALIZATION LEFT (ALL INC) EACH ADD Right 4/19/2021    MAMMO STEREOTACTIC BREAST BIOPSY RIGHT (ALL INC) Right 3/1/2021    NV COLONOSCOPY FLX DX W/COLLJ SPEC WHEN PFRMD N/A 11/14/2016    Procedure: EGD AND COLONOSCOPY;  Surgeon: Kala Hameed MD;  Location: [facility]; Service: Gastroenterology    WISDOM TOOTH EXTRACTION         Family History:    Family History   Problem Relation Age of Onset    Cancer Mother     Breast cancer Mother 54    Colon polyps Mother     Diabetes Father         Type 2    Hypertension Father     Breast cancer Family     BRCA1 Negative Sister     BRCA2 Negative Sister     Breast cancer Maternal Aunt 64    Colon cancer Neg Hx        Cancer-related family history includes Breast cancer in her family; Breast cancer (age of onset: 54) in her mother; Breast cancer (age of onset: 64) in her maternal aunt; Cancer in her mother  There is no history of Colon cancer  Social History:   Social History     Socioeconomic History    Marital status: /Civil Union     Spouse name: Not on file    Number of children: 2    Years of education: Not on file    Highest education level: Not on file   Occupational History    Not on file   Social Needs    Financial resource strain: Not hard at all   PayProp insecurity     Worry: Never true     Inability: Never true   [place] needs     Medical: No     Non-medical: No   Tobacco Use    Smoking status: Never Smoker    Smokeless tobacco: Never Used   Substance and Sexual Activity    Alcohol use: Yes     Alcohol/week: 1 0 standard drinks     Types: 1 Cans of beer per week     Frequency: Monthly or less     Drinks per session: 1 or 2     Binge frequency: Never    Drug use: No    Sexual activity: Yes     Partners: Male     Birth control/protection: Male Sterilization     Comment: nuva ring   Lifestyle    Physical activity     Days per week: 0 days     Minutes per session: 0 min    Stress:  Only a little   Relationships    Social connections     Talks on phone: More than three times a week     Gets together: Once a week     Attends Mosque service: Never     Active member of club or organization: No     Attends meetings of clubs or organizations: Never     Relationship status:     Intimate partner violence     Fear of current or ex partner: No     Emotionally abused: No     Physically abused: No     Forced sexual activity: No   Other Topics Concern    Not on file   Social History Narrative    Caffeine use    [address] [facility]    Uses safety equipment Seatbelts       Current Medications:   Current Outpatient Medications   Medication Sig Dispense Refill    cholecalciferol (VITAMIN D3) 1,000 units tablet Take 1,000 Units by mouth daily      famotidine (PEPCID) 40 MG tablet Take 1 tablet (40 mg total) by mouth daily at bedtime 90 tablet 3    fexofenadine (ALLEGRA) 180 MG tablet Take 180 mg by mouth every evening       lamoTRIgine (LaMICtal) 150 MG tablet Take 150 mg by mouth 2 (two) times a day      magnesium oxide (MAG-OX) 400 mg Take 400 mg by mouth every evening       nystatin-triamcinolone (MYCOLOG-II) cream Apply topically 2 (two) times a day as needed (Itching/irritation) 30 g 0    Probiotic Product (PROBIOTIC PO) Take by mouth as needed       traMADol (ULTRAM) 50 mg tablet Take 1 tablet (50 mg total) by mouth every 8 (eight) hours as needed for moderate pain 9 tablet 0     No current facility-administered medications for this visit  Allergies: Allergies   Allergen Reactions    Medical Tape Swelling and Rash     When tape from eyes removed after surgery developed high swelling of eyes/watery    Wound Dressings Swelling and Rash     After a surgery when tape removed from eyes high swelling noted/tears    Other Sneezing     seasonal         Physical Exam:    Body surface area is 1 92 meters squared      Wt Readings from Last 3 Encounters:   06/02/21 80 7 kg (178 lb)   05/20/21 80 3 kg (177 lb)   04/30/21 80 3 kg (177 lb) Temp Readings from Last 3 Encounters:   06/02/21 98 3 °F (36 8 °C) (Temporal)   05/20/21 (!) 97 1 °F (36 2 °C) (Tympanic)   04/30/21 98 1 °F (36 7 °C) (Temporal)        BP Readings from Last 3 Encounters:   06/02/21 138/88   05/20/21 118/82   04/30/21 140/72         Pulse Readings from Last 3 Encounters:   06/02/21 82   05/20/21 80   04/30/21 68       Physical Exam     Constitutional   General appearance: No acute distress, well appearing and well nourished  Eyes   Conjunctiva and lids: No swelling, erythema or discharge  Pupils and irises: Equal, round and reactive to light  Ears, Nose, Mouth, and Throat   External inspection of ears and nose: Normal     Nasal mucosa, septum, and turbinates: Normal without edema or erythema  Oropharynx: Normal with no erythema, edema, exudate or lesions  Pulmonary   Respiratory effort: No increased work of breathing or signs of respiratory distress  Auscultation of lungs: Clear to auscultation  Cardiovascular   Palpation of heart: Normal PMI, no thrills  Auscultation of heart: Normal rate and rhythm, normal S1 and S2, without murmurs  Examination of extremities for edema and/or varicosities: Normal     Carotid pulses: Normal     Abdomen   Abdomen: Non-tender, no masses  Liver and spleen: No hepatomegaly or splenomegaly  Lymphatic   Palpation of lymph nodes in neck: No lymphadenopathy  Musculoskeletal   Gait and station: Normal     Digits and nails: Normal without clubbing or cyanosis  Inspection/palpation of joints, bones, and muscles: Normal     Skin   Skin and subcutaneous tissue: Normal without rashes or lesions  Neurologic   Cranial nerves: Cranial nerves 2-12 intact  Sensation: No sensory loss      Psychiatric   Orientation to person, place, and time: Normal     Mood and affect: Normal       Assessment/ Plan:        The patient is a pleasant 59-year-old female with newly diagnosed stage I breast cancer which was ER positive, NM positive, HER2 negative  She had 2 foci of disease 1 measuring 1 5 cm the other measuring 0 4 cm  Lymphovascular invasion was not present  Lymph nodes were negative  We had a discussion today about adjuvant treatment  My recommendation would be to do Oncotype DX as the patient is young and would be a candidate   For chemotherapy if she were to have a high risk malignancy  After explained everything the patient agreed to have her Oncotype DX testing done  I will see her back in 2-3 weeks with results of the testing  If she has a high risk malignancy depending on her score we would consider Taxotere and Cytoxan every 3 weeks x4  If she has a low risk malignancy she will just go on antiestrogen therapy  I went over all this with her and she agreed  She will sign a consent for the Oncotype DX today and we will send off  It should be back within 2 weeks  I will see her back in 3 weeks with results  I advised her she should hold off on adjuvant radiation until we have the results back  If she goes on antiestrogen therapy she may start radiation but if she ends up going on chemotherapy and has a high risk malignancy she would have to wait until after chemotherapy was done to start her radiation  The patient is aware  We will print out the note from today and give it to her so she can show it to the radiation facility she is going to near her house which is [facility] of [facility]  If she has any questions she will call our office  Goals and Barriers:  Current Goal:  Prolong Survival from   Breast Cancer  Barriers: None  Patient's Capacity to Self Care:  Patient able to self care  Portions of the record may have been created with voice recognition software   Occasional wrong word or "sound a like" substitutions may have occurred due to the inherent limitations of voice recognition software   Read the chart carefully and recognize, using context, where substitutions have occurred

## 2021-06-09 ENCOUNTER — TELEPHONE (OUTPATIENT)
Dept: HEMATOLOGY ONCOLOGY | Facility: CLINIC | Age: 51
End: 2021-06-09

## 2021-06-09 NOTE — TELEPHONE ENCOUNTER
Keri Chavez from radiology oncology [facility]  called to have office note faxed to them for appt today, faxed to # [phone]

## 2021-06-16 ENCOUNTER — TELEPHONE (OUTPATIENT)
Dept: HEMATOLOGY ONCOLOGY | Facility: CLINIC | Age: 51
End: 2021-06-16

## 2021-06-16 NOTE — TELEPHONE ENCOUNTER
Oncotype testing faxed to [address]  Faxed to above fax number  Patient has a scheduled 6/23/21 follow up appointment with Dr Jana Lee notified as [facility]

## 2021-06-16 NOTE — TELEPHONE ENCOUNTER
Call placed to [address]  Left message on her voicemail requesting fax number to send Oncotype testing results to  Awaiting return call  Oncotype testing results are in Epic under lab section dated 6/4/21

## 2021-06-22 ENCOUNTER — TELEPHONE (OUTPATIENT)
Dept: HEMATOLOGY ONCOLOGY | Facility: CLINIC | Age: 51
End: 2021-06-22

## 2021-06-22 NOTE — TELEPHONE ENCOUNTER
I called patient and left her a message that we would need to see her back in order to consent her for oral therapy as discussed at her initial visit  I am not sure if she wished to continue care with another provider  I advised her to call back and schedule a follow up or to advise us on her plan of care

## 2021-06-22 NOTE — TELEPHONE ENCOUNTER
Patient cancelled appt for 6-23-21 @ 9:00am with Dr Randolph  Patient does not want to reschedule  Patient is starting Radiation with [facility] and was given her results

## 2021-06-22 NOTE — TELEPHONE ENCOUNTER
Patient called requesting to speak to Richar Luz  Patient rescheduled follow up for 6-  Patient would like a call back regarding some questions she has about oral therapy and Radiation   Patient can be reached at [phone]

## 2021-06-25 ENCOUNTER — OFFICE VISIT (OUTPATIENT)
Dept: HEMATOLOGY ONCOLOGY | Facility: HOSPITAL | Age: 51
End: 2021-06-25
Payer: COMMERCIAL

## 2021-06-25 VITALS
TEMPERATURE: 97.5 F | SYSTOLIC BLOOD PRESSURE: 116 MMHG | RESPIRATION RATE: 16 BRPM | HEART RATE: 98 BPM | BODY MASS INDEX: 28.56 KG/M2 | OXYGEN SATURATION: 98 % | DIASTOLIC BLOOD PRESSURE: 74 MMHG | WEIGHT: 182 LBS | HEIGHT: 67 IN

## 2021-06-25 DIAGNOSIS — C50.111 MALIGNANT NEOPLASM OF CENTRAL PORTION OF RIGHT BREAST IN FEMALE, ESTROGEN RECEPTOR POSITIVE (HCC): Primary | ICD-10-CM

## 2021-06-25 DIAGNOSIS — Z17.0 MALIGNANT NEOPLASM OF CENTRAL PORTION OF RIGHT BREAST IN FEMALE, ESTROGEN RECEPTOR POSITIVE (HCC): Primary | ICD-10-CM

## 2021-06-25 DIAGNOSIS — C86.6 LYMPHOMATOID PAPULOSIS (HCC): ICD-10-CM

## 2021-06-25 PROBLEM — C86.60 LYMPHOMATOID PAPULOSIS: Status: ACTIVE | Noted: 2021-06-25

## 2021-06-25 PROCEDURE — 99214 OFFICE O/P EST MOD 30 MIN: CPT | Performed by: INTERNAL MEDICINE

## 2021-06-25 PROCEDURE — 3008F BODY MASS INDEX DOCD: CPT | Performed by: INTERNAL MEDICINE

## 2021-06-25 RX ORDER — TAMOXIFEN CITRATE 20 MG/1
20 TABLET ORAL 2 TIMES DAILY
Qty: 90 TABLET | Refills: 3 | Status: SHIPPED | OUTPATIENT
Start: 2021-06-25 | End: 2021-06-28

## 2021-06-25 NOTE — PROGRESS NOTES
Hematology/Oncology Outpatient Follow- up Note  Domi Murray 46 y o  female MRN: @ Encounter: [id number]        Date:  6/25/2021    Presenting Complaint/Diagnosis :  ER positive NJ positive HER2 negative pathologic T1c pathologic and 0 grade to invasive mammary carcinoma status post  Lumpectomy  The largest focus was 14 mm in maximal diameter  There is another smaller focus which was 5 mm in maximal dimension  DCIS was present  No lymphovascular invasion was seen  Margins were negative  Oncotype recurrence score was  Twelve I e  low risk  HPI:      Domi Murray is seen for initial consultation 6/2/2021 regarding   A newly diagnosed invasive mammary carcinoma of the right breast   The patient had 2 small foci of disease 1 measured 5 mm and another measured 14 mm  Tumor was ER positive NJ positive HER2 negative  The patient had a lumpectomy        Previous Hematologic/ Oncologic History:    Oncology History   Malignant neoplasm of central portion of right breast in female, estrogen receptor positive ([facility] [address] )   3/31/2021 Initial Diagnosis    Malignant neoplasm of central portion of right breast in female, estrogen receptor positive ([facility] [address] )     3/31/2021 -  Cancer Staged    Staging form: Breast, AJCC 8th Edition  - Clinical: Stage IA (cT1, cN0, cM0, G2, ER+, NJ+, HER2-) - Signed by Neo Pompa MD on 3/31/2021  Stage prefix: Initial diagnosis  Method of lymph node assessment: Clinical  Histologic grading system: 3 grade system       5/20/2021 -  Cancer Staged    Staging form: Breast, AJCC 8th Edition  - Pathologic: Stage IA (pT1c, pN0(sn), cM0, G2, ER+, NJ+, HER2-) - Signed by Neo Pompa MD on 5/20/2021  Stage prefix: Initial diagnosis  Method of lymph node assessment: [place] lymph node biopsy  Histologic grading system: 3 grade system           Current Hematologic/ Oncologic Treatment:      The patient has had surgery and is now going to get adjuvant radiation and will be started on an aromatase inhibitor  Interval History:      The patient returns for follow-up visit  She states she is doing well  Her Oncotype DX score came back as 12 I e  low risk  She is still perimenopausal so we will start her on tamoxifen  Denies any nausea denies any vomiting denies any diarrhea  Is healing from her surgery  Is exercising regularly  The rest of her 14 point review of systems today was negative  Cancer Staging:  Cancer Staging  Malignant neoplasm of central portion of right breast in female, estrogen receptor positive ([facility] [address] )  Staging form: Breast, AJCC 8th Edition  - Clinical: Stage IA (cT1, cN0, cM0, G2, ER+, [place]+, HER2-) - Signed by Patience Salgado MD on 3/31/2021  Stage prefix: Initial diagnosis  Method of lymph node assessment: Clinical  Histologic grading system: 3 grade system  - Pathologic: Stage IA (pT1c, pN0(sn), cM0, G2, ER+, WY+, HER2-) - Signed by Patience Salgado MD on 5/20/2021  Stage prefix: Initial diagnosis  Method of lymph node assessment: [place] lymph node biopsy  Histologic grading system: 3 grade system    Test Results:    Imaging: No results found  Labs:   Lab Results   Component Value Date    WBC 5 43 04/20/2021    HGB 15 7 (H) 04/20/2021    HCT 46 2 (H) 04/20/2021    MCV 92 04/20/2021     04/20/2021     Lab Results   Component Value Date     07/22/2015    K 4 2 04/20/2021     04/20/2021    CO2 26 04/20/2021    ANIONGAP 10 07/22/2015    BUN 17 04/20/2021    CREATININE 1 04 04/20/2021    GLUCOSE 78 07/22/2015    CALCIUM 9 0 04/20/2021    AST 20 04/20/2021    ALT 35 04/20/2021    ALKPHOS 84 04/20/2021    PROT 6 7 07/22/2015    BILITOT 0 43 07/22/2015    EGFR 62 04/20/2021     ROS: As stated in the history of present illness otherwise his 14 point review of systems today was negative        Active Problems:   Patient Active Problem List   Diagnosis    Irregular menses    Encounter for surveillance of vaginal ring hormonal contraceptive device    Dense breast tissue    History of seizure    Allergic dermatitis    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 ([facility] [address] [facility]    Family history of breast cancer    Skin lesion of breast    SANDRA (juvenile myoclonic epilepsy) ([facility] [address]    Terminal ileitis ([facility] [address] [facility]    Malignant neoplasm of central portion of right breast in female, estrogen receptor positive ([facility] [address] )    BRCA negative    History of anesthesia complications       Past Medical History:   Past Medical History:   Diagnosis Date    Abdominal pain     Anesthesia     "after tape removed from both eyes/developed swelling"    Anxiety     has autistic/special needs son    Chronic UTI     2x/year or so /no symptoms today    Dysuria     resolved 10/05/16/occas/not lately    Encounter for screening colonoscopy     Environmental and seasonal allergies     Exercise involving cycling     2-3x/week spin classes    GERD (gastroesophageal reflux disease)     Heavy menses     Hiatal hernia     History of kidney stones     Intestinal ulcer     Irritable bowel syndrome     Nausea     Seizures ([facility] [address] )     last seizure approx 3 yrs ago    Shortness of breath     "sometimes just sitting in chair or activity and having Echo 4/29"    Submucous leiomyoma of uterus     no recent problem    Terminal ileitis ([facility] [address] )     unclear etiology, work up w EGD/COLON/TI bx/SBC/CT done      Wears glasses        Surgical History:   Past Surgical History:   Procedure Laterality Date    BREAST BIOPSY Right 03/01/2021    stereo    BREAST LUMPECTOMY Right 4/30/2021    Procedure: RIGHT BREAST BRACKETED DINA LOCALIZATION LUMPECTOMY;;  Surgeon: Karli Aguila MD;  Location: [facility];  Service: Surgical Oncology    COLONOSCOPY  03/01/2017    5 yr recall    DILATION AND CURETTAGE OF UTERUS      EGD      HYSTEROSCOPY      INCONTINENCE SURGERY      bladder sling    LYMPH NODE BIOPSY Right 4/30/2021    Procedure: Bx LYMPH NODE SENTINEL;  Surgeon: Karli Aguila MD;  Location: [facility]; Service: Surgical Oncology    MAMMO NEEDLE LOCALIZATION LEFT (ALL INC) Right 4/19/2021    MAMMO NEEDLE LOCALIZATION LEFT (ALL INC) EACH ADD Right 4/19/2021    MAMMO STEREOTACTIC BREAST BIOPSY RIGHT (ALL INC) Right 3/1/2021    ME COLONOSCOPY FLX DX W/COLLJ SPEC WHEN PFRMD N/A 11/14/2016    Procedure: EGD AND COLONOSCOPY;  Surgeon: Karl Martin MD;  Location: [facility]; Service: Gastroenterology    WISDOM TOOTH EXTRACTION         Family History:    Family History   Problem Relation Age of Onset    Cancer Mother     Breast cancer Mother 54    Colon polyps Mother     Diabetes Father         Type 2    Hypertension Father     Breast cancer Family     BRCA1 Negative Sister     BRCA2 Negative Sister     Breast cancer Maternal Aunt 64    Colon cancer Neg Hx        Cancer-related family history includes Breast cancer in her family; Breast cancer (age of onset: 54) in her mother; Breast cancer (age of onset: 64) in her maternal aunt; Cancer in her mother  There is no history of Colon cancer  Social History:   Social History     Socioeconomic History    Marital status: /Civil Union     Spouse name: Not on file    Number of children: 2    Years of education: Not on file    Highest education level: Not on file   Occupational History    Not on file   Tobacco Use    Smoking status: Never Smoker    Smokeless tobacco: Never Used   Vaping Use    Vaping Use: Never used   Substance and Sexual Activity    Alcohol use:  Yes     Alcohol/week: 1 0 standard drinks     Types: 1 Cans of beer per week    Drug use: No    Sexual activity: Yes     Partners: Male     Birth control/protection: Male Sterilization     Comment: nuva ring   Other Topics Concern    Not on file   Social History Narrative    Caffeine use    [address]    Uses safety equipment Seatbelts     Social Determinants of Health     Financial Resource Strain: Low Risk     Difficulty of Paying Living Expenses: Not hard at all   Food Insecurity: No Food Insecurity    Worried About [facility] in the Last Year: Never true    Rodolfo of Food in the Last Year: Never true   Transportation Needs: No Transportation Needs    Lack of Transportation (Medical): No    Lack of Transportation (Non-Medical): No   Physical Activity: Inactive    Days of Exercise per Week: 0 days    Minutes of Exercise per Session: 0 min   Stress: No Stress Concern Present    Feeling of Stress : Only a little   Social Connections: Moderately Isolated    Frequency of Communication with Friends and Family: More than three times a week    Frequency of Social Gatherings with Friends and Family: Once a week    Attends [employer] Services: Never    Active Member of Clubs or Organizations: No    Attends Club or Organization Meetings: Never    Marital Status:    Intimate Partner Violence: Not At Risk    Fear of Current or Ex-Partner: No    Emotionally Abused: No    Physically Abused: No    Sexually Abused: No       Current Medications:   Current Outpatient Medications   Medication Sig Dispense Refill    cholecalciferol (VITAMIN D3) 1,000 units tablet Take 1,000 Units by mouth daily      famotidine (PEPCID) 40 MG tablet Take 1 tablet (40 mg total) by mouth daily at bedtime 90 tablet 3    fexofenadine (ALLEGRA) 180 MG tablet Take 180 mg by mouth every evening       lamoTRIgine (LaMICtal) 150 MG tablet Take 150 mg by mouth 2 (two) times a day      magnesium oxide (MAG-OX) 400 mg Take 400 mg by mouth every evening       nystatin-triamcinolone (MYCOLOG-II) cream Apply topically 2 (two) times a day as needed (Itching/irritation) 30 g 0    Probiotic Product (PROBIOTIC PO) Take by mouth as needed       traMADol (ULTRAM) 50 mg tablet Take 1 tablet (50 mg total) by mouth every 8 (eight) hours as needed for moderate pain 9 tablet 0     No current facility-administered medications for this visit  Allergies:    Allergies   Allergen Reactions    Medical Tape Swelling and Rash     When tape from eyes removed after surgery developed high swelling of eyes/watery    Wound Dressings Swelling and Rash     After a surgery when tape removed from eyes high swelling noted/tears    Other Sneezing     seasonal       Physical Exam:    Body surface area is 1 94 meters squared  Wt Readings from Last 3 Encounters:   06/25/21 82 6 kg (182 lb)   06/02/21 80 7 kg (178 lb)   05/20/21 80 3 kg (177 lb)        Temp Readings from Last 3 Encounters:   06/25/21 97 5 °F (36 4 °C) (Temporal)   06/02/21 98 3 °F (36 8 °C) (Temporal)   05/20/21 (!) 97 1 °F (36 2 °C) (Tympanic)        BP Readings from Last 3 Encounters:   06/25/21 116/74   06/02/21 138/88   05/20/21 118/82         Pulse Readings from Last 3 Encounters:   06/25/21 98   06/02/21 82   05/20/21 80         Physical Exam     Constitutional   General appearance: No acute distress, well appearing and well nourished  Eyes   Conjunctiva and lids: No swelling, erythema or discharge  Pupils and irises: Equal, round and reactive to light  Ears, Nose, Mouth, and Throat   External inspection of ears and nose: Normal     Nasal mucosa, septum, and turbinates: Normal without edema or erythema  Oropharynx: Normal with no erythema, edema, exudate or lesions  Pulmonary   Respiratory effort: No increased work of breathing or signs of respiratory distress  Auscultation of lungs: Clear to auscultation  Cardiovascular   Palpation of heart: Normal PMI, no thrills  Auscultation of heart: Normal rate and rhythm, normal S1 and S2, without murmurs  Examination of extremities for edema and/or varicosities: Normal     Carotid pulses: Normal     Abdomen   Abdomen: Non-tender, no masses  Liver and spleen: No hepatomegaly or splenomegaly  Lymphatic   Palpation of lymph nodes in neck: No lymphadenopathy      Musculoskeletal   Gait and station: Normal     Digits and nails: Normal without clubbing or cyanosis  Inspection/palpation of joints, bones, and muscles: Normal     Skin   Skin and subcutaneous tissue: Normal without rashes or lesions  Neurologic   Cranial nerves: Cranial nerves 2-12 intact  Sensation: No sensory loss  Psychiatric   Orientation to person, place, and time: Normal     Mood and affect: Normal         Assessment / Plan:      The patient is a pleasant 49-year-old female with newly diagnosed stage I breast cancer which was ER positive, NC positive, HER2 negative  She had 2 foci of disease 1 measuring 1 5 cm the other measuring 0 4 cm  Lymphovascular invasion was not present  Lymph nodes were negative  We had a discussion today about adjuvant treatment  We ordered her Oncotype DX score neck came back as 12 I e  low risk  She is perimenopausal so we will start her on tamoxifen  I went over the risks benefits and alternatives of the drug  I explained the possible side effects to include but not limited to hot flashes, endometrial cancer, blood clots and other menopausal symptoms  The patient is agreeable to proceeding  She will sign a consent form today  She will start  She will get her radiation  I will see her back in 3 months to make sure she is doing okay with blood work  After that I will see her every 6 months  I plan to switch her to an aromatase inhibitor after 2 years of tamoxifen  If she has any questions she will call our office  She will be switched to an aromatase inhibitor if menopausal in July of 2023  Goals and Barriers:  Current Goal:  Prolong Survival from  Breast cancer  Barriers: None  Patient's Capacity to Self Care:  Patient able to self care      Portions of the record may have been created with voice recognition software   Occasional wrong word or "sound a like" substitutions may have occurred due to the inherent limitations of voice recognition software   Read the chart carefully and recognize, using context, where substitutions have occurred

## 2021-06-28 DIAGNOSIS — Z17.0 MALIGNANT NEOPLASM OF CENTRAL PORTION OF RIGHT BREAST IN FEMALE, ESTROGEN RECEPTOR POSITIVE (HCC): ICD-10-CM

## 2021-06-28 DIAGNOSIS — C50.111 MALIGNANT NEOPLASM OF CENTRAL PORTION OF RIGHT BREAST IN FEMALE, ESTROGEN RECEPTOR POSITIVE (HCC): ICD-10-CM

## 2021-06-28 RX ORDER — TAMOXIFEN CITRATE 20 MG/1
20 TABLET ORAL DAILY
Qty: 90 TABLET | Refills: 3 | Status: SHIPPED | OUTPATIENT
Start: 2021-06-28 | End: 2022-04-01 | Stop reason: SDUPTHER

## 2021-06-28 NOTE — TELEPHONE ENCOUNTER
I called [facility] and updated the SIG on her Tamoxifen to once daily instead of previously prescribed twice daily  Patient had not picked up her prescription yet  I did call patient and leave her a message to call in to review dosing of her medication  When patient returns call we will review with her that the medication was called into her [facility] pharmacy and review that this medication is to be taken once daily

## 2021-07-19 ENCOUNTER — TELEPHONE (OUTPATIENT)
Dept: HEMATOLOGY ONCOLOGY | Facility: CLINIC | Age: 51
End: 2021-07-19

## 2021-07-19 NOTE — TELEPHONE ENCOUNTER
Call from patient  Patient reports continuous hot flashes and night sweats since being on tamoxifen   Patient does not want to take medicine any longer  Will send to [facility] RN

## 2021-07-26 DIAGNOSIS — T45.1X5A HOT FLASHES DUE TO TAMOXIFEN: Primary | ICD-10-CM

## 2021-07-26 DIAGNOSIS — R23.2 HOT FLASHES DUE TO TAMOXIFEN: Primary | ICD-10-CM

## 2021-07-26 RX ORDER — VENLAFAXINE HYDROCHLORIDE 37.5 MG/1
37.5 CAPSULE, EXTENDED RELEASE ORAL DAILY
Qty: 30 CAPSULE | Refills: 11 | Status: SHIPPED | OUTPATIENT
Start: 2021-07-26 | End: 2022-04-01 | Stop reason: SDUPTHER

## 2021-07-26 NOTE — TELEPHONE ENCOUNTER
I returned phone call to patient from last week to discuss further if her symptoms have resolved or are they still ongoing? I also wished to confirm that she is able to restart today and if she is willing to trial the effexor  If she is ok to proceed I will call in the effexor to her pharmacy to see if this helps  I have asked for the patient to call back and advise if symptoms have resolved or are still ongoing and if she is willing to restart the medication today and trial effexor  I will wait for her response in order to send in a trial of effexor for her

## 2021-09-01 ENCOUNTER — TELEPHONE (OUTPATIENT)
Dept: HEMATOLOGY ONCOLOGY | Facility: CLINIC | Age: 51
End: 2021-09-01

## 2021-09-01 NOTE — TELEPHONE ENCOUNTER
Chitra Burns from [facility] would like to request last office notes from 08/25 to be faxed over to: [phone], best call back # [phone] opt 0

## 2021-09-15 LAB — MISCELLANEOUS LAB TEST RESULT: NORMAL

## 2021-09-24 ENCOUNTER — TELEPHONE (OUTPATIENT)
Dept: HEMATOLOGY ONCOLOGY | Facility: CLINIC | Age: 51
End: 2021-09-24

## 2021-09-24 ENCOUNTER — APPOINTMENT (OUTPATIENT)
Dept: LAB | Facility: CLINIC | Age: 51
End: 2021-09-24
Payer: COMMERCIAL

## 2021-09-24 DIAGNOSIS — C86.6 LYMPHOMATOID PAPULOSIS (HCC): ICD-10-CM

## 2021-09-24 DIAGNOSIS — C50.111 MALIGNANT NEOPLASM OF CENTRAL PORTION OF RIGHT BREAST IN FEMALE, ESTROGEN RECEPTOR POSITIVE (HCC): ICD-10-CM

## 2021-09-24 DIAGNOSIS — Z17.0 MALIGNANT NEOPLASM OF CENTRAL PORTION OF RIGHT BREAST IN FEMALE, ESTROGEN RECEPTOR POSITIVE (HCC): ICD-10-CM

## 2021-09-24 LAB
ALBUMIN SERPL BCP-MCNC: 3.8 G/DL (ref 3.5–5)
ALP SERPL-CCNC: 72 U/L (ref 46–116)
ALT SERPL W P-5'-P-CCNC: 25 U/L (ref 12–78)
ANION GAP SERPL CALCULATED.3IONS-SCNC: 0 MMOL/L (ref 4–13)
AST SERPL W P-5'-P-CCNC: 18 U/L (ref 5–45)
BASOPHILS # BLD AUTO: 0.03 THOUSANDS/ΜL (ref 0–0.1)
BASOPHILS NFR BLD AUTO: 1 % (ref 0–1)
BILIRUB SERPL-MCNC: 0.95 MG/DL (ref 0.2–1)
BUN SERPL-MCNC: 16 MG/DL (ref 5–25)
CALCIUM SERPL-MCNC: 9 MG/DL (ref 8.3–10.1)
CHLORIDE SERPL-SCNC: 107 MMOL/L (ref 100–108)
CO2 SERPL-SCNC: 32 MMOL/L (ref 21–32)
CREAT SERPL-MCNC: 0.95 MG/DL (ref 0.6–1.3)
EOSINOPHIL # BLD AUTO: 0.06 THOUSAND/ΜL (ref 0–0.61)
EOSINOPHIL NFR BLD AUTO: 1 % (ref 0–6)
ERYTHROCYTE [DISTWIDTH] IN BLOOD BY AUTOMATED COUNT: 12.6 % (ref 11.6–15.1)
GFR SERPL CREATININE-BSD FRML MDRD: 70 ML/MIN/1.73SQ M
GLUCOSE SERPL-MCNC: 105 MG/DL (ref 65–140)
HCT VFR BLD AUTO: 42.2 % (ref 34.8–46.1)
HGB BLD-MCNC: 14 G/DL (ref 11.5–15.4)
IMM GRANULOCYTES # BLD AUTO: 0.01 THOUSAND/UL (ref 0–0.2)
IMM GRANULOCYTES NFR BLD AUTO: 0 % (ref 0–2)
LYMPHOCYTES # BLD AUTO: 1.29 THOUSANDS/ΜL (ref 0.6–4.47)
LYMPHOCYTES NFR BLD AUTO: 25 % (ref 14–44)
MCH RBC QN AUTO: 31.4 PG (ref 26.8–34.3)
MCHC RBC AUTO-ENTMCNC: 33.2 G/DL (ref 31.4–37.4)
MCV RBC AUTO: 95 FL (ref 82–98)
MONOCYTES # BLD AUTO: 0.32 THOUSAND/ΜL (ref 0.17–1.22)
MONOCYTES NFR BLD AUTO: 6 % (ref 4–12)
NEUTROPHILS # BLD AUTO: 3.54 THOUSANDS/ΜL (ref 1.85–7.62)
NEUTS SEG NFR BLD AUTO: 67 % (ref 43–75)
NRBC BLD AUTO-RTO: 0 /100 WBCS
PLATELET # BLD AUTO: 248 THOUSANDS/UL (ref 149–390)
PMV BLD AUTO: 10.8 FL (ref 8.9–12.7)
POTASSIUM SERPL-SCNC: 3.9 MMOL/L (ref 3.5–5.3)
PROT SERPL-MCNC: 7 G/DL (ref 6.4–8.2)
RBC # BLD AUTO: 4.46 MILLION/UL (ref 3.81–5.12)
SODIUM SERPL-SCNC: 139 MMOL/L (ref 136–145)
WBC # BLD AUTO: 5.25 THOUSAND/UL (ref 4.31–10.16)

## 2021-09-24 PROCEDURE — 86300 IMMUNOASSAY TUMOR CA 15-3: CPT

## 2021-09-24 PROCEDURE — 85025 COMPLETE CBC W/AUTO DIFF WBC: CPT

## 2021-09-24 PROCEDURE — 36415 COLL VENOUS BLD VENIPUNCTURE: CPT

## 2021-09-24 PROCEDURE — 80053 COMPREHEN METABOLIC PANEL: CPT

## 2021-09-25 LAB — CANCER AG27-29 SERPL-ACNC: 18.9 U/ML (ref 0–42.3)

## 2021-09-27 ENCOUNTER — OFFICE VISIT (OUTPATIENT)
Dept: HEMATOLOGY ONCOLOGY | Facility: HOSPITAL | Age: 51
End: 2021-09-27
Payer: COMMERCIAL

## 2021-09-27 VITALS
HEIGHT: 67 IN | BODY MASS INDEX: 28.09 KG/M2 | WEIGHT: 179 LBS | DIASTOLIC BLOOD PRESSURE: 72 MMHG | HEART RATE: 85 BPM | RESPIRATION RATE: 18 BRPM | OXYGEN SATURATION: 95 % | SYSTOLIC BLOOD PRESSURE: 116 MMHG | TEMPERATURE: 98.5 F

## 2021-09-27 DIAGNOSIS — R23.2 HOT FLASHES DUE TO TAMOXIFEN: ICD-10-CM

## 2021-09-27 DIAGNOSIS — T45.1X5A HOT FLASHES DUE TO TAMOXIFEN: ICD-10-CM

## 2021-09-27 DIAGNOSIS — C50.111 MALIGNANT NEOPLASM OF CENTRAL PORTION OF RIGHT BREAST IN FEMALE, ESTROGEN RECEPTOR POSITIVE (HCC): Primary | ICD-10-CM

## 2021-09-27 DIAGNOSIS — Z17.0 MALIGNANT NEOPLASM OF CENTRAL PORTION OF RIGHT BREAST IN FEMALE, ESTROGEN RECEPTOR POSITIVE (HCC): Primary | ICD-10-CM

## 2021-09-27 PROCEDURE — 99214 OFFICE O/P EST MOD 30 MIN: CPT | Performed by: INTERNAL MEDICINE

## 2021-09-27 PROCEDURE — 3008F BODY MASS INDEX DOCD: CPT | Performed by: INTERNAL MEDICINE

## 2021-09-27 NOTE — PROGRESS NOTES
Hematology/Oncology Outpatient Follow- up Note  Willam Lane 46 y o  female MRN: @ Encounter: [id number]        Date:  9/27/2021    Presenting Complaint/Diagnosis :  ER positive NM positive HER2 negative pathologic T1c pathologic and 0 grade to invasive mammary carcinoma status post  Lumpectomy   The largest focus was 14 mm in maximal diameter   There is another smaller focus which was 5 mm in maximal dimension   DCIS was present   No lymphovascular invasion was seen   Margins were negative  Oncotype recurrence score was  Twelve I e  low risk      HPI:    Concepcion Sosa seen for initial consultation 6/2/2021 regarding   A newly diagnosed invasive mammary carcinoma of the right breast   The patient had 2 small foci of disease 1 measured 5 mm and another measured 14 mm   Tumor was ER positive NM positive HER2 negative   The patient had a lumpectomy       Previous Hematologic/ Oncologic History:    Oncology History   Malignant neoplasm of central portion of right breast in female, estrogen receptor positive ([facility] [address] )   3/31/2021 Initial Diagnosis    Malignant neoplasm of central portion of right breast in female, estrogen receptor positive ([facility] [address] )     3/31/2021 -  Cancer Staged    Staging form: Breast, AJCC 8th Edition  - Clinical: Stage IA (cT1, cN0, cM0, G2, ER+, NM+, HER2-) - Signed by Galileo Forde MD on 3/31/2021  Stage prefix: Initial diagnosis  Method of lymph node assessment: Clinical  Histologic grading system: 3 grade system       5/20/2021 -  Cancer Staged    Staging form: Breast, AJCC 8th Edition  - Pathologic: Stage IA (pT1c, pN0(sn), cM0, G2, ER+, NM+, HER2-) - Signed by Galileo Forde MD on 5/20/2021  Stage prefix: Initial diagnosis  Method of lymph node assessment: [place] lymph node biopsy  Histologic grading system: 3 grade system          Lumpectomy   Adjuvant radiation    Current Hematologic/ Oncologic Treatment:       Tamoxifen    Interval History:      The patient returns for follow-up visit   She states she is doing reasonably well  She had a lot of hot flashes initially when she was started on tamoxifen  These have improved considerably since she started taking Effexor  She still has side effects however  Overall she states it is now tolerable  Denies any nausea vomiting  Denies any diarrhea  Otherwise is at baseline  Is following regularly with her surgeon who is ordering her screening mammograms  The rest of her 14 point review of systems today was negative  Cancer Staging:  Cancer Staging  Malignant neoplasm of central portion of right breast in female, estrogen receptor positive ([facility] [address] )  Staging form: Breast, AJCC 8th Edition  - Clinical: Stage IA (cT1, cN0, cM0, G2, ER+, LA+, HER2-) - Signed by Misael Griffith MD on 3/31/2021  Stage prefix: Initial diagnosis  Method of lymph node assessment: Clinical  Histologic grading system: 3 grade system  - Pathologic: Stage IA (pT1c, pN0(sn), cM0, G2, ER+, LA+, HER2-) - Signed by Misael Griffith MD on 5/20/2021  Stage prefix: Initial diagnosis  Method of lymph node assessment: [place] lymph node biopsy  Histologic grading system: 3 grade system    Test Results:    Imaging: No results found  Labs:   Lab Results   Component Value Date    WBC 5 25 09/24/2021    HGB 14 0 09/24/2021    HCT 42 2 09/24/2021    MCV 95 09/24/2021     09/24/2021     Lab Results   Component Value Date     07/22/2015    K 3 9 09/24/2021     09/24/2021    CO2 32 09/24/2021    ANIONGAP 10 07/22/2015    BUN 16 09/24/2021    CREATININE 0 95 09/24/2021    GLUCOSE 78 07/22/2015    CALCIUM 9 0 09/24/2021    AST 18 09/24/2021    ALT 25 09/24/2021    ALKPHOS 72 09/24/2021    PROT 6 7 07/22/2015    BILITOT 0 43 07/22/2015    EGFR 70 09/24/2021     ROS: As stated in the history of present illness otherwise his 14 point review of systems today was negative        Active Problems:   Patient Active Problem List   Diagnosis    Irregular menses    Encounter for surveillance of vaginal ring hormonal contraceptive device    Dense breast tissue    History of seizure    Allergic dermatitis    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 ([facility] [address] )    Family history of breast cancer    Skin lesion of breast    SANDRA (juvenile myoclonic epilepsy) ([facility] [address] )    Terminal ileitis ([facility] [address] )    Malignant neoplasm of central portion of right breast in female, estrogen receptor positive ([facility] [address] )    BRCA negative    History of anesthesia complications    Lymphomatoid papulosis ([facility] [address] )       Past Medical History:   Past Medical History:   Diagnosis Date    Abdominal pain     Anesthesia     "after tape removed from both eyes/developed swelling"    Anxiety     has autistic/special needs son    Chronic UTI     2x/year or so /no symptoms today    Dysuria     resolved 10/05/16/occas/not lately    Encounter for screening colonoscopy     Environmental and seasonal allergies     Exercise involving cycling     2-3x/week spin classes    GERD (gastroesophageal reflux disease)     Heavy menses     Hiatal hernia     History of kidney stones     Intestinal ulcer     Irritable bowel syndrome     Nausea     Seizures ([facility] [address] )     last seizure approx 3 yrs ago    Shortness of breath     "sometimes just sitting in chair or activity and having Echo 4/29"    Submucous leiomyoma of uterus     no recent problem    Terminal ileitis ([facility] [address] )     unclear etiology, work up w EGD/COLON/TI bx/SBC/CT done      Wears glasses        Surgical History:   Past Surgical History:   Procedure Laterality Date    BREAST BIOPSY Right 03/01/2021    stereo    BREAST LUMPECTOMY Right 4/30/2021    Procedure: RIGHT BREAST BRACKETED DINA LOCALIZATION LUMPECTOMY;;  Surgeon: Sabino Newman MD;  Location: [facility];  Service: Surgical Oncology    COLONOSCOPY  03/01/2017    5 yr recall    DILATION AND CURETTAGE OF UTERUS      EGD      HYSTEROSCOPY      INCONTINENCE SURGERY      bladder sling    LYMPH NODE BIOPSY Right 4/30/2021    Procedure: Bx LYMPH NODE SENTINEL;  Surgeon: Jamaica Vazquez MD;  Location: [facility] OR;  Service: Surgical Oncology    MAMMO NEEDLE LOCALIZATION LEFT (ALL INC) Right 4/19/2021    MAMMO NEEDLE LOCALIZATION LEFT (ALL INC) EACH ADD Right 4/19/2021    MAMMO STEREOTACTIC BREAST BIOPSY RIGHT (ALL INC) Right 3/1/2021    VT COLONOSCOPY FLX DX W/COLLJ SPEC WHEN PFRMD N/A 11/14/2016    Procedure: EGD AND COLONOSCOPY;  Surgeon: Joey Perez MD;  Location: [facility]; Service: Gastroenterology    WISDOM TOOTH EXTRACTION         Family History:    Family History   Problem Relation Age of Onset    Cancer Mother     Breast cancer Mother 54    Colon polyps Mother     Diabetes Father         Type 2    Hypertension Father     Breast cancer Family     BRCA1 Negative Sister     BRCA2 Negative Sister     Breast cancer Maternal Aunt 64    Colon cancer Neg Hx        Cancer-related family history includes Breast cancer in her family; Breast cancer (age of onset: 54) in her mother; Breast cancer (age of onset: 64) in her maternal aunt; Cancer in her mother  There is no history of Colon cancer  Social History:   Social History     Socioeconomic History    Marital status: /Civil Union     Spouse name: Not on file    Number of children: 2    Years of education: Not on file    Highest education level: Not on file   Occupational History    Not on file   Tobacco Use    Smoking status: Never Smoker    Smokeless tobacco: Never Used   Vaping Use    Vaping Use: Never used   Substance and Sexual Activity    Alcohol use:  Yes     Alcohol/week: 1 0 standard drinks     Types: 1 Cans of beer per week    Drug use: No    Sexual activity: Yes     Partners: Male     Birth control/protection: Male Sterilization     Comment: nuva ring   Other Topics Concern    Not on file   Social History Narrative    Caffeine use    [facility] Determinants of Health     Financial Resource Strain: Low Risk     Difficulty of Paying Living Expenses: Not hard at all   Food Insecurity: No Food Insecurity    Worried About Running Out of Food in the Last Year: Never true    Rodolfo of Food in the Last Year: Never true   Transportation Needs: No Transportation Needs    Lack of Transportation (Medical): No    Lack of Transportation (Non-Medical): No   Physical Activity: Inactive    Days of Exercise per Week: 0 days    Minutes of Exercise per Session: 0 min   Stress: No Stress Concern Present    Feeling of Stress : Only a little   Social Connections:  Moderately Isolated    Frequency of Communication with Friends and Family: More than three times a week    Frequency of Social Gatherings with Friends and Family: Once a week    Attends Anabaptism Services: Never    Active Member of Clubs or Organizations: No    Attends Club or Organization Meetings: Never    Marital Status:    Intimate Partner Violence: Not At Risk    Fear of Current or Ex-Partner: No    Emotionally Abused: No    Physically Abused: No    Sexually Abused: No       Current Medications:   Current Outpatient Medications   Medication Sig Dispense Refill    cholecalciferol (VITAMIN D3) 1,000 units tablet Take 1,000 Units by mouth daily      famotidine (PEPCID) 40 MG tablet Take 1 tablet (40 mg total) by mouth daily at bedtime 90 tablet 3    fexofenadine (ALLEGRA) 180 MG tablet Take 180 mg by mouth every evening       lamoTRIgine (LaMICtal) 150 MG tablet Take 150 mg by mouth 2 (two) times a day      magnesium oxide (MAG-OX) 400 mg Take 400 mg by mouth every evening       nystatin-triamcinolone (MYCOLOG-II) cream Apply topically 2 (two) times a day as needed (Itching/irritation) 30 g 0    Probiotic Product (PROBIOTIC PO) Take by mouth as needed       tamoxifen (NOLVADEX) 20 mg tablet Take 1 tablet (20 mg total) by mouth daily 90 tablet 3    venlafaxine (EFFEXOR-XR) 37 5 mg 24 hr capsule Take 1 capsule (37 5 mg total) by mouth daily 30 capsule 11     No current facility-administered medications for this visit  Allergies: Allergies   Allergen Reactions    Medical Tape Swelling and Rash     When tape from eyes removed after surgery developed high swelling of eyes/watery    Wound Dressings Swelling and Rash     After a surgery when tape removed from eyes high swelling noted/tears    Other Sneezing     seasonal       Physical Exam:    Body surface area is 1 93 meters squared  Wt Readings from Last 3 Encounters:   09/27/21 81 2 kg (179 lb)   06/25/21 82 6 kg (182 lb)   06/02/21 80 7 kg (178 lb)        Temp Readings from Last 3 Encounters:   09/27/21 98 5 °F (36 9 °C) (Tympanic)   06/25/21 97 5 °F (36 4 °C) (Temporal)   06/02/21 98 3 °F (36 8 °C) (Temporal)        BP Readings from Last 3 Encounters:   09/27/21 116/72   06/25/21 116/74   06/02/21 138/88         Pulse Readings from Last 3 Encounters:   09/27/21 85   06/25/21 98   06/02/21 82       Physical Exam     Constitutional   General appearance: No acute distress, well appearing and well nourished  Eyes   Conjunctiva and lids: No swelling, erythema or discharge  Pupils and irises: Equal, round and reactive to light  Ears, Nose, Mouth, and Throat   External inspection of ears and nose: Normal     Nasal mucosa, septum, and turbinates: Normal without edema or erythema  Oropharynx: Normal with no erythema, edema, exudate or lesions  Pulmonary   Respiratory effort: No increased work of breathing or signs of respiratory distress  Auscultation of lungs: Clear to auscultation  Cardiovascular   Palpation of heart: Normal PMI, no thrills  Auscultation of heart: Normal rate and rhythm, normal S1 and S2, without murmurs  Examination of extremities for edema and/or varicosities: Normal     Carotid pulses: Normal     Abdomen   Abdomen: Non-tender, no masses  Liver and spleen: No hepatomegaly or splenomegaly  Lymphatic   Palpation of lymph nodes in neck: No lymphadenopathy  Musculoskeletal   Gait and station: Normal     Digits and nails: Normal without clubbing or cyanosis  Inspection/palpation of joints, bones, and muscles: Normal     Skin   Skin and subcutaneous tissue: Normal without rashes or lesions  Neurologic   Cranial nerves: Cranial nerves 2-12 intact  Sensation: No sensory loss  Psychiatric   Orientation to person, place, and time: Normal     Mood and affect: Normal         Assessment / Plan:      The patient is a pleasant 27-year-old female with newly diagnosed stage I breast cancer which was ER positive, IN positive, HER2 negative   She had 2 foci of disease 1 measuring 1 5 cm the other measuring 0 4 cm   Lymphovascular invasion was not present   Lymph nodes were negative   We had a discussion today about adjuvant treatment  We ordered her Oncotype DX score neck came back as 12 I e  low risk  She is perimenopausal so we   Started her on tamoxifen and the plan is for her to be switched to an aromatase inhibitor in 2 years I e  when she has completed 2 years of tamoxifen  Her last menstrual period was in April of 2021  We will continue her on her current regimen with no changes  She will continue on the Effexor for the hot flashes and symptoms  I will see her back in 6 months with repeat blood work  If she has any questions she will call our office  Goals and Barriers:  Current Goal:  Prolong Survival from   Breast cancer  Barriers: None  Patient's Capacity to Self Care:  Patient able to self care  Portions of the record may have been created with voice recognition software  Occasional wrong word or "sound a like" substitutions may have occurred due to the inherent limitations of voice recognition software  Read the chart carefully and recognize, using context, where substitutions have occurred

## 2021-12-06 ENCOUNTER — OFFICE VISIT (OUTPATIENT)
Dept: SURGICAL ONCOLOGY | Facility: CLINIC | Age: 51
End: 2021-12-06
Payer: COMMERCIAL

## 2021-12-06 VITALS
BODY MASS INDEX: 28.56 KG/M2 | WEIGHT: 182 LBS | OXYGEN SATURATION: 97 % | SYSTOLIC BLOOD PRESSURE: 118 MMHG | RESPIRATION RATE: 16 BRPM | TEMPERATURE: 97.1 F | HEIGHT: 67 IN | DIASTOLIC BLOOD PRESSURE: 70 MMHG | HEART RATE: 83 BPM

## 2021-12-06 DIAGNOSIS — Z17.0 MALIGNANT NEOPLASM OF CENTRAL PORTION OF RIGHT BREAST IN FEMALE, ESTROGEN RECEPTOR POSITIVE (HCC): Primary | ICD-10-CM

## 2021-12-06 DIAGNOSIS — C50.111 MALIGNANT NEOPLASM OF CENTRAL PORTION OF RIGHT BREAST IN FEMALE, ESTROGEN RECEPTOR POSITIVE (HCC): Primary | ICD-10-CM

## 2021-12-06 DIAGNOSIS — Z13.71 BRCA NEGATIVE: ICD-10-CM

## 2021-12-06 DIAGNOSIS — Z92.3 HISTORY OF RADIATION THERAPY: ICD-10-CM

## 2021-12-06 DIAGNOSIS — R92.2 DENSE BREAST TISSUE: ICD-10-CM

## 2021-12-06 DIAGNOSIS — Z79.810 USE OF TAMOXIFEN (NOLVADEX): ICD-10-CM
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
## 2021-12-06 DIAGNOSIS — L59.8 RADIATION FIBROSIS OF SOFT TISSUE FROM THERAPEUTIC PROCEDURE: ICD-10-CM

## 2021-12-06 PROCEDURE — 99214 OFFICE O/P EST MOD 30 MIN: CPT | Performed by: SURGERY

## 2021-12-06 PROCEDURE — 1036F TOBACCO NON-USER: CPT | Performed by: SURGERY

## 2021-12-06 PROCEDURE — 3008F BODY MASS INDEX DOCD: CPT | Performed by: SURGERY

## 2021-12-14 ENCOUNTER — EVALUATION (OUTPATIENT)
Dept: PHYSICAL THERAPY | Facility: CLINIC | Age: 51
End: 2021-12-14
Payer: COMMERCIAL

## 2021-12-14 DIAGNOSIS — C50.111 MALIGNANT NEOPLASM OF CENTRAL PORTION OF RIGHT BREAST IN FEMALE, ESTROGEN RECEPTOR POSITIVE (HCC): ICD-10-CM

## 2021-12-14 DIAGNOSIS — Z17.0 MALIGNANT NEOPLASM OF CENTRAL PORTION OF RIGHT BREAST IN FEMALE, ESTROGEN RECEPTOR POSITIVE (HCC): ICD-10-CM

## 2021-12-14 DIAGNOSIS — I89.0 LYMPHEDEMA: Primary | ICD-10-CM

## 2021-12-14 DIAGNOSIS — L59.8 RADIATION FIBROSIS OF SOFT TISSUE FROM THERAPEUTIC PROCEDURE: ICD-10-CM
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
## 2021-12-14 PROCEDURE — 97162 PT EVAL MOD COMPLEX 30 MIN: CPT | Performed by: PHYSICAL THERAPIST

## 2021-12-16 ENCOUNTER — OFFICE VISIT (OUTPATIENT)
Dept: PHYSICAL THERAPY | Facility: CLINIC | Age: 51
End: 2021-12-16
Payer: COMMERCIAL

## 2021-12-16 DIAGNOSIS — L59.8 RADIATION FIBROSIS OF SOFT TISSUE FROM THERAPEUTIC PROCEDURE: ICD-10-CM

## 2021-12-16 DIAGNOSIS — C50.111 MALIGNANT NEOPLASM OF CENTRAL PORTION OF RIGHT BREAST IN FEMALE, ESTROGEN RECEPTOR POSITIVE (HCC): ICD-10-CM

## 2021-12-16 DIAGNOSIS — I89.0 LYMPHEDEMA: Primary | ICD-10-CM

## 2021-12-16 DIAGNOSIS — Z17.0 MALIGNANT NEOPLASM OF CENTRAL PORTION OF RIGHT BREAST IN FEMALE, ESTROGEN RECEPTOR POSITIVE (HCC): ICD-10-CM
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
## 2021-12-16 PROCEDURE — 97110 THERAPEUTIC EXERCISES: CPT

## 2021-12-16 PROCEDURE — 97140 MANUAL THERAPY 1/> REGIONS: CPT

## 2021-12-16 PROCEDURE — 97112 NEUROMUSCULAR REEDUCATION: CPT

## 2021-12-21 ENCOUNTER — OFFICE VISIT (OUTPATIENT)
Dept: PHYSICAL THERAPY | Facility: CLINIC | Age: 51
End: 2021-12-21
Payer: COMMERCIAL

## 2021-12-21 DIAGNOSIS — I89.0 LYMPHEDEMA: Primary | ICD-10-CM

## 2021-12-21 DIAGNOSIS — L59.8 RADIATION FIBROSIS OF SOFT TISSUE FROM THERAPEUTIC PROCEDURE: ICD-10-CM

## 2021-12-21 DIAGNOSIS — Z17.0 MALIGNANT NEOPLASM OF CENTRAL PORTION OF RIGHT BREAST IN FEMALE, ESTROGEN RECEPTOR POSITIVE (HCC): ICD-10-CM

## 2021-12-21 DIAGNOSIS — C50.111 MALIGNANT NEOPLASM OF CENTRAL PORTION OF RIGHT BREAST IN FEMALE, ESTROGEN RECEPTOR POSITIVE (HCC): ICD-10-CM
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
## 2021-12-21 PROCEDURE — 97112 NEUROMUSCULAR REEDUCATION: CPT

## 2021-12-21 PROCEDURE — 97110 THERAPEUTIC EXERCISES: CPT

## 2021-12-21 PROCEDURE — 97140 MANUAL THERAPY 1/> REGIONS: CPT

## 2021-12-23 ENCOUNTER — OFFICE VISIT (OUTPATIENT)
Dept: PHYSICAL THERAPY | Facility: CLINIC | Age: 51
End: 2021-12-23
Payer: COMMERCIAL

## 2021-12-23 DIAGNOSIS — I89.0 LYMPHEDEMA: Primary | ICD-10-CM

## 2021-12-23 DIAGNOSIS — L59.8 RADIATION FIBROSIS OF SOFT TISSUE FROM THERAPEUTIC PROCEDURE: ICD-10-CM
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
## 2021-12-23 PROCEDURE — 97112 NEUROMUSCULAR REEDUCATION: CPT | Performed by: PHYSICAL THERAPIST

## 2021-12-23 PROCEDURE — 97110 THERAPEUTIC EXERCISES: CPT | Performed by: PHYSICAL THERAPIST

## 2021-12-23 PROCEDURE — 97140 MANUAL THERAPY 1/> REGIONS: CPT | Performed by: PHYSICAL THERAPIST

## 2021-12-28 ENCOUNTER — OFFICE VISIT (OUTPATIENT)
Dept: PHYSICAL THERAPY | Facility: CLINIC | Age: 51
End: 2021-12-28
Payer: COMMERCIAL

## 2021-12-28 DIAGNOSIS — I89.0 LYMPHEDEMA: Primary | ICD-10-CM

## 2021-12-28 DIAGNOSIS — Z17.0 MALIGNANT NEOPLASM OF CENTRAL PORTION OF RIGHT BREAST IN FEMALE, ESTROGEN RECEPTOR POSITIVE (HCC): ICD-10-CM

## 2021-12-28 DIAGNOSIS — C50.111 MALIGNANT NEOPLASM OF CENTRAL PORTION OF RIGHT BREAST IN FEMALE, ESTROGEN RECEPTOR POSITIVE (HCC): ICD-10-CM
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
## 2021-12-28 DIAGNOSIS — L59.8 RADIATION FIBROSIS OF SOFT TISSUE FROM THERAPEUTIC PROCEDURE: ICD-10-CM

## 2021-12-28 PROCEDURE — 97140 MANUAL THERAPY 1/> REGIONS: CPT

## 2021-12-28 PROCEDURE — 97110 THERAPEUTIC EXERCISES: CPT

## 2021-12-28 PROCEDURE — 97112 NEUROMUSCULAR REEDUCATION: CPT

## 2021-12-30 ENCOUNTER — OFFICE VISIT (OUTPATIENT)
Dept: PHYSICAL THERAPY | Facility: CLINIC | Age: 51
End: 2021-12-30
Payer: COMMERCIAL
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
## 2021-12-30 DIAGNOSIS — L59.8 RADIATION FIBROSIS OF SOFT TISSUE FROM THERAPEUTIC PROCEDURE: ICD-10-CM

## 2021-12-30 DIAGNOSIS — I89.0 LYMPHEDEMA: Primary | ICD-10-CM

## 2021-12-30 PROCEDURE — 97140 MANUAL THERAPY 1/> REGIONS: CPT | Performed by: PHYSICAL THERAPIST

## 2021-12-30 PROCEDURE — 97112 NEUROMUSCULAR REEDUCATION: CPT | Performed by: PHYSICAL THERAPIST

## 2021-12-30 PROCEDURE — 97110 THERAPEUTIC EXERCISES: CPT | Performed by: PHYSICAL THERAPIST

## 2022-01-04 ENCOUNTER — OFFICE VISIT (OUTPATIENT)
Dept: PHYSICAL THERAPY | Facility: CLINIC | Age: 52
End: 2022-01-04
Payer: COMMERCIAL

## 2022-01-04 DIAGNOSIS — I89.0 LYMPHEDEMA: Primary | ICD-10-CM
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
## 2022-01-04 DIAGNOSIS — L59.8 RADIATION FIBROSIS OF SOFT TISSUE FROM THERAPEUTIC PROCEDURE: ICD-10-CM

## 2022-01-04 PROCEDURE — 97110 THERAPEUTIC EXERCISES: CPT | Performed by: PHYSICAL THERAPIST

## 2022-01-04 PROCEDURE — 97140 MANUAL THERAPY 1/> REGIONS: CPT | Performed by: PHYSICAL THERAPIST

## 2022-01-04 NOTE — PROGRESS NOTES
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  L59 8     Y84 2                   Assessment  Assessment details: Since starting skilled PT, pain levels are decreased, R shoulder ROM and strength has improved with good functional progress  Recommend pt decrease frequency to 1 time every 2 weeks  Pt will return to exercising at the gym  Pt will follow up as needed  Pt was issued a chip bag  Impairments: pain with function  Understanding of Dx/Px/POC: good   Prognosis: good    Goals  STG's ( 3-4 weeks)  1  Pt will be independent in HEP- met  2  Pt will have decreased fibrotic tissue R pectoral region- met  LTG's ( 6- 8 weeks)  1  Improve FOTO score by 4-6 points-met  2  Pt will have improved R shoulder abduction AROM-met  3  Pt will be able to reach overhead with greater ease-met  4  Pt will have less pain with recreational exercise-met  5  Pt will be independent in donning and doffing compression sleeve for exercise-met      Plan  Patient would benefit from: skilled physical therapy  Planned therapy interventions: manual therapy, joint mobilization, neuromuscular re-education, strengthening, stretching, therapeutic activities, therapeutic exercise, functional ROM exercises and home exercise program  Frequency: 1x week  Duration in weeks: 6  Plan of Care beginning date: 2022  Plan of Care expiration date: 2/15/2022  Treatment plan discussed with: patient        Subjective Evaluation    History of Present Illness  Mechanism of injury:  I E: Pt underwent R lumpectomy on 21, had 2 sentinel lymph nodes removed with 20 radiation treatments  Pt reports increased pain and stiffness when reaching out to the side and overhead  Pt has increased pain when stretching overhead  Pt is fully functional with dressing and household cleaning activities  Pt modifies her body pump workout with overhead movement  22: Pt has less stiffness when reaching out the side and overhead  Pt feels like she has better ROM  Pt is compliant in HEP  Pt has a R UE compression sleeve to wear during body pump class       Work: not working  Hobbies: exercise, walking the dogs, hiking  Gait: no abnormalities  Pain  At best pain ratin  At worst pain rating: 3  Location: R pectoral   Quality: tight    Social Support  Steps to enter house: yes  2  Stairs in house: yes   14    Employment status: not working  Hand dominance: right    Treatments  No previous or current treatments  Patient Goals  Patient goals for therapy: decreased pain and return to sport/leisure activities  Patient goal: to pain when reaching overhead        Objective     Neurological Testing     Sensation     Shoulder   Left Shoulder   Intact: light touch    Right Shoulder   Intact: light touch    Reflexes   Left   Biceps (C5/C6): normal (2+)  Brachioradialis (C6): normal (2+)  Triceps (C7): normal (2+)    Right   Biceps (C5/C6): normal (2+)  Brachioradialis (C6): normal (2+)  Triceps (C7): normal (2+)    Active Range of Motion   Left Shoulder   Flexion: 160 degrees   Abduction: 160 degrees     Right Shoulder   Flexion: 165 degrees   Abduction: 150 degrees with pain  External rotation 45°: 80 degrees   Internal rotation 45°: 80 degrees     Additional Active Range of Motion Details  (+) fibrotic tissue R pectoral region  (-) TTP R shoulder  Cervical ROM: WFL's    (-) stemmer sign; (-) edema noted upon visual inspection  Mild median ULTT    Passive Range of Motion     Right Shoulder   Flexion: 165 degrees   Abduction: [facility]  External rotation 45°: [facility]  Internal rotation 45°: [facility]    Strength/Myotome Testing     Left Shoulder     Planes of Motion   Flexion: 5   Abduction: 5   External rotation at 0°: 5   Internal rotation at 0°: 5     Right Shoulder     Planes of Motion   Flexion: 5   Abduction: 5   External rotation at 0°: 5   Internal rotation at 0°: 5           Dx: R pectoral radiation fibrosis  EPOC: 1/25/22  CO-MORBIDITIES:  PERSONAL FACTORS: enjoys exercise  Precautions: h/o CA      Manuals 12/14 12/6 12/21 12/23 12/28 12/30 1/4      R pectoral MFR/ GISTM 8' 5 5 th 5 th th      Progress note       th      R shoulder PROM  5 5 th 5 th th      Median N  glide  5 5 th 5 th             25' 25'      Neuro Re-Ed             TB LPD otb 2x10  gtb 10x GTB x10 GTB x15 GTB x15 GTB x10      TB row   gtb 10x GTB x10 GTB x15 GTB x15 GTB x10      TB IR    GTB x10 GTB x15 gTB x15 GTB x10      TB ER    GTB x20 GTB x15 GTB x15 GTB x10      Median n slider    x5 x5 x10 x10                                Ther Ex             Pulleys: flex  2' 2' 3' 3' 3' 3'      Pulleys: scap  2' 2' 3' 3' 3' 3'      Doorway stretch  15" 3 15" 3 20"x3 20" 3 20"x3 20"x3      HEP instruct 5'            Supine cane flexion             Supine cane ER             Wall slide: flex             Wall slide: scap                          Ther Activity                                       Gait Training                                       Modalities

## 2022-01-05 ENCOUNTER — ANNUAL EXAM (OUTPATIENT)
Dept: OBGYN CLINIC | Facility: CLINIC | Age: 52
End: 2022-01-05
Payer: COMMERCIAL

## 2022-01-05 VITALS
HEIGHT: 67 IN | BODY MASS INDEX: 28.41 KG/M2 | DIASTOLIC BLOOD PRESSURE: 80 MMHG | SYSTOLIC BLOOD PRESSURE: 126 MMHG | WEIGHT: 181 LBS

## 2022-01-05 DIAGNOSIS — C50.111 MALIGNANT NEOPLASM OF CENTRAL PORTION OF RIGHT BREAST IN FEMALE, ESTROGEN RECEPTOR POSITIVE (HCC): ICD-10-CM

## 2022-01-05 DIAGNOSIS — N95.2 VAGINAL ATROPHY: ICD-10-CM

## 2022-01-05 DIAGNOSIS — Z17.0 MALIGNANT NEOPLASM OF CENTRAL PORTION OF RIGHT BREAST IN FEMALE, ESTROGEN RECEPTOR POSITIVE (HCC): ICD-10-CM

## 2022-01-05 DIAGNOSIS — Z01.419 WOMEN'S ANNUAL ROUTINE GYNECOLOGICAL EXAMINATION: Primary | ICD-10-CM

## 2022-01-05 LAB
BACTERIA UR QL AUTO: NORMAL /HPF
BILIRUB UR QL STRIP: NEGATIVE
CLARITY UR: CLEAR
COLOR UR: YELLOW
GLUCOSE UR STRIP-MCNC: NEGATIVE MG/DL
HGB UR QL STRIP.AUTO: NEGATIVE
HYALINE CASTS #/AREA URNS LPF: NORMAL /LPF
KETONES UR STRIP-MCNC: NEGATIVE MG/DL
LEUKOCYTE ESTERASE UR QL STRIP: NEGATIVE
NITRITE UR QL STRIP: NEGATIVE
NON-SQ EPI CELLS URNS QL MICRO: NORMAL /HPF
PH UR STRIP.AUTO: 7 [PH]
PROT UR STRIP-MCNC: NEGATIVE MG/DL
RBC #/AREA URNS AUTO: NORMAL /HPF
SP GR UR STRIP.AUTO: 1.01 (ref 1–1.03)
UROBILINOGEN UR QL STRIP.AUTO: 0.2 E.U./DL
WBC #/AREA URNS AUTO: NORMAL /HPF

## 2022-01-05 PROCEDURE — G0145 SCR C/V CYTO,THINLAYER,RESCR: HCPCS | Performed by: OBSTETRICS & GYNECOLOGY

## 2022-01-05 PROCEDURE — G0476 HPV COMBO ASSAY CA SCREEN: HCPCS | Performed by: OBSTETRICS & GYNECOLOGY

## 2022-01-05 PROCEDURE — S0612 ANNUAL GYNECOLOGICAL EXAMINA: HCPCS | Performed by: OBSTETRICS & GYNECOLOGY

## 2022-01-05 PROCEDURE — 87086 URINE CULTURE/COLONY COUNT: CPT | Performed by: OBSTETRICS & GYNECOLOGY

## 2022-01-05 PROCEDURE — 81001 URINALYSIS AUTO W/SCOPE: CPT | Performed by: OBSTETRICS & GYNECOLOGY

## 2022-01-05 PROCEDURE — 3008F BODY MASS INDEX DOCD: CPT | Performed by: SURGERY

## 2022-01-05 NOTE — PATIENT INSTRUCTIONS
Menopause   WHAT YOU NEED TO KNOW:   What is menopause? Menopause is a normal stage in a woman's life when her monthly periods stop  A woman who has not had a period for a full year after the age of 36 is considered to be in menopause  Menopause usually occurs between ages 52 to 48  Perimenopause is a stage before menopause that may cause signs and symptoms similar to menopause  Perimenopause may start about 4 years before menopause  What causes menopause? Menopause starts when the ovaries stop making the female hormones estrogen and progesterone  After menopause, a woman is no longer able to become pregnant  Any of the following may trigger menopause or early menopause:  · Older age    · Surgery, including a hysterectomy or oophorectomy    · Family history of early menopause    · Smoking    · Chemotherapy or pelvic radiation    · Chromosome abnormalities, including Coombs syndrome and Fragile X syndrome    What are the signs and symptoms of menopause? The signs and symptoms of menopause can be different from woman to woman:  · Irregular menstrual cycles with heavy vaginal bleeding followed by decreased bleeding until it stops    · Hot flashes (feeling warm, flushed, and sweaty)     · Vaginal changes such as increased dryness     · Mood changes such as anxiety, depression, or decreased desire to have sex    · Trouble sleeping, joint pain, headaches    · Brittle nails, hair on chin or chest where it is normally absent    · Decrease in breast size and change in skin texture    What do I need to know about menopause? · You can still get pregnant while you have periods  Continue to use birth control if you do not want to get pregnant  You may need to use birth control until it has been 1 year since your periods stopped  · Hormone replacement therapy (HRT) can be used to treat symptoms of menopause  HRT is medicine that replaces your low hormone levels  HRT contains estrogen and sometimes progestin   HRT has benefits and risks  HRT decreases your risk for bone fractures by helping to prevent osteoporosis  HRT also protects you from colon cancer  HRT may increase your risk for breast cancer, blood clots, heart disease, and stroke  Ask your healthcare provider if HRT is right for you  How can I care for myself? · Manage hot flashes  Hot flashes are brief periods of feeling very warm, flushed, and sweaty  Hot flashes can last from a few seconds to several minutes  They may happen many times during the day, and are common at night  Layer your clothing so that you can easily remove some clothing and cool yourself during a hot flash  Cold drinks may also be helpful  · Reduce vaginal dryness  by using over-the-counter vaginal creams  Vaginal dryness may cause you to have pain or discomfort during sex  Only use creams that are made for vaginal use  Do  not  use petroleum jelly  You may need an estrogen cream to put in and around your vagina  Estrogen cream may help decrease vaginal dryness and lower your risk of vaginal infections  · Continue to use birth control  during perimenopause if you do not want to get pregnant  You may need to use birth control until it has been 1 year since your periods stopped  Ask your healthcare provider when you can stop using birth control to prevent pregnancy  How can I live a healthy lifestyle during and after menopause? After menopause, your risk for heart disease and bone loss increases  Ask about these and other ways to stay healthy:  · Exercise regularly  Exercise helps you maintain a healthy weight  Exercise can also help to control your blood pressure and cholesterol levels  Include weight-bearing exercise for strong bones  Weight bearing exercise is recommended for at least 30 minutes, 3 times a week  Ask your healthcare provider about the best exercise plan for you  · Eat a variety of healthy foods    Include fruits, vegetables, whole grains (whole-wheat bread, pasta, and cereals), low-fat dairy, and lean protein foods (beans, poultry, and fish)  Limit foods high in sodium (salt)  Ask your healthcare provider for more information about a meal plan that is right for you  · Do not smoke  If you smoke, it is never too late to quit  You are more likely to have a heart attack, lung disease, blood clots, and cancer if you smoke  Ask your healthcare provider for information if you need help quitting  · Take supplements as directed  You may need extra calcium and vitamin D to help prevent osteoporosis  · Limit alcohol and caffeine  Alcohol and caffeine may worsen your symptoms  When should I contact my healthcare provider? · You have vaginal bleeding after menopause  · You have questions or concerns about your condition or care  CARE AGREEMENT:   You have the right to help plan your care  Learn about your health condition and how it may be treated  Discuss treatment options with your healthcare providers to decide what care you want to receive  You always have the right to refuse treatment  The above information is an  only  It is not intended as medical advice for individual conditions or treatments  Talk to your doctor, nurse or pharmacist before following any medical regimen to see if it is safe and effective for you  © Copyright [employer] 2021 Information is for End User's use only and may not be sold, redistributed or otherwise used for commercial purposes  All illustrations and images included in CareNotes® are the copyrighted property of [employer]  or [facility]  Vaginal Atrophy   WHAT YOU NEED TO KNOW:   What is vaginal atrophy? Vaginal atrophy is a condition that causes thinning, drying, and inflammation of vaginal tissue  This condition is caused by decreased levels of estrogen (a female sex hormone)  Vaginal atrophy can increase your risk for vaginal and urinary tract infections   Vaginal atrophy can worsen over time if not treated  What causes or increases your risk of vaginal atrophy? · Menopause     · Medicines that lower your estrogen levels, such as those used to treat breast cancer, endometriosis, or fibroids    · Radiation to your pelvic area     · Surgery to remove the ovaries    · Breastfeeding    What are the signs and symptoms of vaginal atrophy? · Vaginal dryness, itching, and burning    · Vaginal discharge    · Pain or discomfort during sex    · Light bleeding after sex    · Burning during urination    · Frequent, sudden, strong urges to urinate    · Urinary incontinence (loss of control of your bladder)    How is vaginal atrophy diagnosed? Your healthcare provider will ask about your symptoms  A pelvic exam will be done to examine your vagina and cervix  Your healthcare provider will place a speculum into your vagina to open and examine it  A sample of discharge from your vagina may be collected and tested  A urine test may also be done  How is vaginal atrophy treated? · Over-the counter vaginal moisturizers  can help reduce dryness  Your healthcare provider may recommend that you use a vaginal moisturizer several times each week and during sex  Only use creams that are made for vaginal use  Do  not  use petroleum jelly  Lubricants can be used during sex to decrease pain and discomfort  · Estrogen  may help decrease dryness  It may also lower your risk of vaginal infections if you are going through menopause  It can also help to relieve urinary symptoms  Estrogen may be prescribed in the form of a cream, tablet, or ring  These medicines can be applied or inserted into the vagina  Estrogen can also be prescribed in the form of a pill  When should I contact my healthcare provider? · You have a foul-smelling odor coming from your vagina  · You have a thick, cheese-like discharge from your vagina  · You have itching, swelling, or redness in your vagina       · You have pain or burning when you urinate  · Your urine smells bad  · Your symptoms do not improve, or they get worse  · You have questions or concerns about your condition or care  CARE AGREEMENT:   You have the right to help plan your care  Learn about your health condition and how it may be treated  Discuss treatment options with your healthcare providers to decide what care you want to receive  You always have the right to refuse treatment  The above information is an  only  It is not intended as medical advice for individual conditions or treatments  Talk to your doctor, nurse or pharmacist before following any medical regimen to see if it is safe and effective for you  © Copyright [employer] 2021 Information is for End User's use only and may not be sold, redistributed or otherwise used for commercial purposes   All illustrations and images included in CareNotes® are the copyrighted property of [employer] Inc  or [address] [place] [address]

## 2022-01-05 NOTE — PROGRESS NOTES
Assessment/Plan   Diagnoses and all orders for this visit:    Women's annual routine gynecological examination  -     Liquid-based pap, screening  -     Urinalysis with microscopic  -     Urine culture    Malignant neoplasm of central portion of right breast in female, estrogen receptor positive ([facility] [address] )    Vaginal atrophy    1  Yearly exam-Pap smear done with HPV testing, self-breast awareness reviewed, calcium/vitamin-D recommendations discussed, mammogram request given, colonoscopy as per specialist  2  Vaginal atrophy-mild changes noted on exam   Vaginal lubrication/moisturizer sheet given, to use accordingly  3  History of menorrhagia/irregular menses-no current concerns  Last menses around April 2020  She will call with any recurrence of bleeding  4  History yeast infection-no current concerns  5  Right breast cancer-status post lumpectomy, radiation therapy, now on tamoxifen  Counseled to call with any vaginal bleeding  She does have some scarring on the right breast on exam   She was sent to physical therapy by Dr Wili Dawson with improved symptoms  She will continue with this treatment  6  Increased breast density-follow-up imaging as per Dr Wili Dawson  She is scheduled for bilateral diagnostic mammogram February 2022   7  History of seizure disorder/perimenopause-no current symptoms  8  Urinary frequency-continues with this with some intermittent discomfort  Denies any kajal hematuria or urgency  Urinalysis and culture sent  To treat accordingly  Follow-up 1 year for yearly exam as needed  Subjective   Patient ID: Rafael Morales is a 46 y o  female  Vitals:    01/05/22 0935   BP: 126/80     Patient was seen today for yearly exam   Please see assessment plan for details        The following portions of the patient's history were reviewed and updated as appropriate: allergies, current medications, past family history, past medical history, past social history, past surgical history and problem list   Past Medical History:   Diagnosis Date    Abdominal pain     Anesthesia     "after tape removed from both eyes/developed swelling"    Anxiety     has autistic/special needs son    Chronic UTI     2x/year or so /no symptoms today    Dysuria     resolved 10/05/16//not lately    Encounter for screening colonoscopy     Environmental and seasonal allergies     Exercise involving cycling     2-3x/week spin classes    GERD (gastroesophageal reflux disease)     Heavy menses     Hiatal hernia     History of kidney stones     Intestinal ulcer     Irritable bowel syndrome     Nausea     Seizures (HCC)     last seizure approx 3 yrs ago    Shortness of breath     "sometimes just sitting in chair or activity and having Echo "    Submucous leiomyoma of uterus     no recent problem    Terminal ileitis ([facility] [address] )     unclear etiology, work up w EGD/COLON/TI bx/SBC/CT done   Wears glasses      Past Surgical History:   Procedure Laterality Date    BREAST BIOPSY Right 2021    stereo    BREAST LUMPECTOMY Right 2021    Procedure: RIGHT BREAST BRACKETED DINA LOCALIZATION LUMPECTOMY;;  Surgeon: Gilberto Mccoy MD;  Location: [facility];  Service: Surgical Oncology    COLONOSCOPY  2017    5 yr recall    DILATION AND CURETTAGE OF UTERUS      EGD      HYSTEROSCOPY      INCONTINENCE SURGERY      bladder sling    LYMPH NODE BIOPSY Right 2021    Procedure: Bx LYMPH NODE SENTINEL;  Surgeon: Gilberto Mccoy MD;  Location: [facility];  Service: Surgical Oncology    MAMMO NEEDLE LOCALIZATION LEFT (ALL INC) Right 2021    MAMMO NEEDLE LOCALIZATION LEFT (ALL INC) EACH ADD Right 2021    MAMMO STEREOTACTIC BREAST BIOPSY RIGHT (ALL INC) Right 3/1/2021    HI COLONOSCOPY FLX DX W/COLLJ SPEC WHEN PFRMD N/A 2016    Procedure: EGD AND COLONOSCOPY;  Surgeon: Shana Coker MD;  Location: [facility];   Service: Gastroenterology    WISDOM TOOTH EXTRACTION       OB History    Para Term  AB Living   2 2 2         SAB IAB Ectopic Multiple Live Births                  # Outcome Date GA Lbr Noam/2nd Weight Sex Delivery Anes PTL Lv   2 Term            1 Term               Obstetric Comments   Menarche-13   Age at first pregnancy-26   nuvaring-about 8 yrs, currently using       Current Outpatient Medications:     cholecalciferol (VITAMIN D3) 1,000 units tablet, Take 1,000 Units by mouth daily, Disp: , Rfl:     famotidine (PEPCID) 40 MG tablet, Take 1 tablet (40 mg total) by mouth daily at bedtime, Disp: 90 tablet, Rfl: 3    fexofenadine (ALLEGRA) 180 MG tablet, Take 180 mg by mouth every evening , Disp: , Rfl:     lamoTRIgine (LaMICtal) 150 MG tablet, Take 150 mg by mouth 2 (two) times a day, Disp: , Rfl:     magnesium oxide (MAG-OX) 400 mg, Take 400 mg by mouth every evening , Disp: , Rfl:     nystatin-triamcinolone (MYCOLOG-II) cream, Apply topically 2 (two) times a day as needed (Itching/irritation), Disp: 30 g, Rfl: 0    Probiotic Product (PROBIOTIC PO), Take by mouth as needed , Disp: , Rfl:     tamoxifen (NOLVADEX) 20 mg tablet, Take 1 tablet (20 mg total) by mouth daily, Disp: 90 tablet, Rfl: 3    venlafaxine (EFFEXOR-XR) 37 5 mg 24 hr capsule, Take 1 capsule (37 5 mg total) by mouth daily, Disp: 30 capsule, Rfl: 11  Allergies   Allergen Reactions    Medical Tape Swelling and Rash     When tape from eyes removed after surgery developed high swelling of eyes/watery    Wound Dressings Swelling and Rash     After a surgery when tape removed from eyes high swelling noted/tears    Other Sneezing     seasonal     Social History     Socioeconomic History    Marital status: /Civil Union     Spouse name: None    Number of children: 2    Years of education: None    Highest education level: None   Occupational History    None   Tobacco Use    Smoking status: Never Smoker    Smokeless tobacco: Never Used   Vaping Use    Vaping Use: Never used   Substance and Sexual Activity    Alcohol use: Yes     Alcohol/week: 1 0 standard drink     Types: 1 Cans of beer per week    Drug use: No    Sexual activity: Yes     Partners: Male     Birth control/protection: Male Sterilization     Comment: nuva ring   Other Topics Concern    None   Social History Narrative    Caffeine use    [address] [facility]    Uses safety equipment Seatbelts     Social Determinants of Health     Financial Resource Strain: Low Risk     Difficulty of Paying Living Expenses: Not hard at all   Food Insecurity: No Food Insecurity    Worried About [facility] in the Last Year: Never true    Rodolfo of Food in the Last Year: Never true   Transportation Needs: No Transportation Needs    Lack of Transportation (Medical): No    Lack of Transportation (Non-Medical): No   Physical Activity: Inactive    Days of Exercise per Week: 0 days    Minutes of Exercise per Session: 0 min   Stress: No Stress Concern Present    Feeling of Stress : Only a little   Social Connections:  Moderately Isolated    Frequency of Communication with Friends and Family: More than three times a week    Frequency of Social Gatherings with Friends and Family: Once a week    Attends Hinduism Services: Never    Active Member of Clubs or Organizations: No    Attends Club or Organization Meetings: Never    Marital Status:    Intimate Partner Violence: Not At Risk    Fear of Current or Ex-Partner: No    Emotionally Abused: No    Physically Abused: No    Sexually Abused: No   Housing Stability: Not on file     Family History   Problem Relation Age of Onset    Cancer Mother     Breast cancer Mother 54    Colon polyps Mother     Diabetes Father         Type 2    Hypertension Father     Breast cancer Family     BRCA1 Negative Sister     BRCA2 Negative Sister     Breast cancer Maternal Aunt 64    Colon cancer Neg Hx        Review of Systems   Constitutional: Negative for chills, diaphoresis, fatigue and fever  Respiratory: Negative for apnea, cough, chest tightness, shortness of breath and wheezing  Cardiovascular: Negative for chest pain, palpitations and leg swelling  Gastrointestinal: Negative for abdominal distention, abdominal pain, anal bleeding, constipation, diarrhea, nausea, rectal pain and vomiting  Genitourinary: Negative for difficulty urinating, dyspareunia, dysuria, frequency, hematuria, menstrual problem, pelvic pain, urgency, vaginal bleeding, vaginal discharge and vaginal pain  Musculoskeletal: Negative for arthralgias, back pain and myalgias  Skin: Negative for color change and rash  Neurological: Negative for dizziness, syncope, light-headedness, numbness and headaches  Hematological: Negative for adenopathy  Does not bruise/bleed easily  Psychiatric/Behavioral: Negative for dysphoric mood and sleep disturbance  The patient is not nervous/anxious  Objective   Physical Exam  OBGyn Exam     Objective      /80 (BP Location: Left arm, Patient Position: Sitting, Cuff Size: Adult)   Ht 5' 7" (1 702 m)   Wt 82 1 kg (181 lb)   BMI 28 35 kg/m²     General:   alert and oriented, in no acute distress   Neck: normal to inspection and palpation   Breast: Left breast without any masses or lesions or discharge  Right breast with well-healed incision at the outer aspect of the breast   Small amount of scarring and possible radiation changes are noted  No dominant masses or lesions or discharge are appreciated   Heart:    Lungs:    Abdomen: soft, non-tender, without masses or organomegaly   Vulva: normal   Vagina: Mildly atrophic, without erythema or lesions or discharge  Cervix: Mildly atrophic, without lesions or discharge or cervicitis  No Cervical motion tenderness    Light bleeding with Pap test   Uterus: top normal size, anteverted, non-tender   Adnexa: no mass, fullness, tenderness   Rectum: negative    Psych:  Normal mood and affect   Skin:  Without obvious lesions   Eyes: symmetric, with normal movements and reactivity   Musculoskeletal:  Normal muscle tone and movements appreciated

## 2022-01-06 ENCOUNTER — APPOINTMENT (OUTPATIENT)
Dept: PHYSICAL THERAPY | Facility: CLINIC | Age: 52
End: 2022-01-06
Payer: COMMERCIAL

## 2022-01-07 LAB
BACTERIA UR CULT: NORMAL
HPV HR 12 DNA CVX QL NAA+PROBE: NEGATIVE
HPV16 DNA CVX QL NAA+PROBE: NEGATIVE
HPV18 DNA CVX QL NAA+PROBE: NEGATIVE

## 2022-01-11 ENCOUNTER — APPOINTMENT (OUTPATIENT)
Dept: PHYSICAL THERAPY | Facility: CLINIC | Age: 52
End: 2022-01-11
Payer: COMMERCIAL

## 2022-01-12 LAB
LAB AP GYN PRIMARY INTERPRETATION: NORMAL
Lab: NORMAL

## 2022-01-13 ENCOUNTER — APPOINTMENT (OUTPATIENT)
Dept: PHYSICAL THERAPY | Facility: CLINIC | Age: 52
End: 2022-01-13
Payer: COMMERCIAL

## 2022-01-18 ENCOUNTER — APPOINTMENT (OUTPATIENT)
Dept: PHYSICAL THERAPY | Facility: CLINIC | Age: 52
End: 2022-01-18
Payer: COMMERCIAL

## 2022-01-20 ENCOUNTER — APPOINTMENT (OUTPATIENT)
Dept: PHYSICAL THERAPY | Facility: CLINIC | Age: 52
End: 2022-01-20
Payer: COMMERCIAL

## 2022-01-25 ENCOUNTER — APPOINTMENT (OUTPATIENT)
Dept: PHYSICAL THERAPY | Facility: CLINIC | Age: 52
End: 2022-01-25
Payer: COMMERCIAL

## 2022-01-27 ENCOUNTER — APPOINTMENT (OUTPATIENT)
Dept: PHYSICAL THERAPY | Facility: CLINIC | Age: 52
End: 2022-01-27
Payer: COMMERCIAL

## 2022-01-31 DIAGNOSIS — R13.19 ESOPHAGEAL DYSPHAGIA: ICD-10-CM

## 2022-01-31 DIAGNOSIS — R12 HEARTBURN: ICD-10-CM

## 2022-02-01 RX ORDER — FAMOTIDINE 40 MG/1
TABLET, FILM COATED ORAL
Qty: 90 TABLET | Refills: 3 | Status: SHIPPED | OUTPATIENT
Start: 2022-02-01

## 2022-02-15 ENCOUNTER — HOSPITAL ENCOUNTER (OUTPATIENT)
Dept: MAMMOGRAPHY | Facility: CLINIC | Age: 52
Discharge: HOME | End: 2022-02-15
Payer: COMMERCIAL

## 2022-02-15 VITALS — BODY MASS INDEX: 28.41 KG/M2 | HEIGHT: 67 IN | WEIGHT: 181 LBS

## 2022-02-15 DIAGNOSIS — C50.111 MALIGNANT NEOPLASM OF CENTRAL PORTION OF RIGHT BREAST IN FEMALE, ESTROGEN RECEPTOR POSITIVE (HCC): ICD-10-CM

## 2022-02-15 DIAGNOSIS — Z17.0 MALIGNANT NEOPLASM OF CENTRAL PORTION OF RIGHT BREAST IN FEMALE, ESTROGEN RECEPTOR POSITIVE (HCC): ICD-10-CM

## 2022-02-15 PROCEDURE — 77066 DX MAMMO INCL CAD BI: CPT

## 2022-02-15 PROCEDURE — G0279 TOMOSYNTHESIS, MAMMO: HCPCS

## 2022-02-27 NOTE — PROGRESS NOTES
Assessment/Plan:    Diagnoses and all orders for this visit:    Family history of breast cancer in first degree relative  -     Ambulatory Referral to Breast Surgery; Future    Breast cyst, right  -     cephalexin (KEFLEX) 500 mg capsule; Take 1 capsule (500 mg total) by mouth every 12 (twelve) hours for 5 days  -     Ambulatory Referral to Breast Surgery; Future    Seizure disorder ([address] )    Other orders  -     lamoTRIgine (LaMICtal) 150 MG tablet; Take 150 mg by mouth 2 (two) times a day        Subjective:  Right breast cyst     Patient ID: Ara Stern is a 52 y o  female  HPI   61-year-old female  2 para 2 recently here for annual exam in October this past year  She does have positive family history of breast cancer in her mother and her maternal grandmother  Her exam in October was normal, she was scheduled to have follow-up mammogram with 3D and CAD as well as bilateral breast ultrasound  This is scheduled for   On today's visit she is concerned of a red blister type cyst adjacent to her right nipple at 09:00 o'clock  Patient states she has not changed her laundry detergents nor has she changed to type bra that she wears  She does not sleep of the broad on at night  Review of Systems   Respiratory: Negative  Cardiovascular: Negative  Gastrointestinal: Negative  Genitourinary: Negative  Skin:        Red blister on her right breast adjacent to her right nipple   Neurological: Negative  Psychiatric/Behavioral: Negative  Objective:  No acute distress  /82 (BP Location: Right arm, Patient Position: Sitting, Cuff Size: Standard)   Ht 5' 7" (1 702 m)   Wt 80 7 kg (178 lb)   LMP 2019 (Exact Date)   BMI 27 88 kg/m²      Physical Exam   Constitutional: She is oriented to person, place, and time  She appears well-developed and well-nourished  HENT:   Head: Normocephalic  Eyes: Pupils are equal, round, and reactive to light   EOM are normal  [facility] MEDICINE PROGRESS NOTE    Patient: Umair Vera Jr Today's Date: 2/27/2022   YOB: 1964 Admission Date: 1/26/2022 12:02 PM   MRN: [id number] Inpatient LOS: 32 day(s)   [place]  [facility] [place] [facility] [place]:  Hospital Day: 33       History and Subjective complaints     Chief Complaint: -SOB    Interval history and Overnight events:    Intermittently agitated and frustrated with staff/medical cares.  Today he insists he \"feels fine\" and not SOB, and states he feels better than when he got here, and just wants to go home.  He is clearly dyspneic in mild resp distress, somnolent, weak, and has extremely poor insight into his medical situation.  He denies CP, abd pain.  O2 needs increased overnight.      Hospital Course  Patients interval history reviewed/EHR notes reviewed.   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 He was recently admitted 1/6/22 - 1/14/22 for decompensated chronic CHF after not taking his medications for 2 weeks. He was admitted through the ED on this occasion on 1/26/22 after he presented with generalized weakness, myalgias, AMS, uncontrolled tremors/seizure-like activity in his RLE/RUE. He was treated with Keppra and Ativan. He was noted to be hypoxemic and a CXR showed new and worsening pulmonary opacities suggestive of CHF and/or atypical infection. Covid-19 pcr was positive (negative on 1/6) with a Ct of 27.2.  He failed BiPAP and was intubated in the ED. CToH showed a moderate-sized area of chronic encephalomalacia within the posterior  Neck: Normal range of motion  Neck supple  Pulmonary/Chest: Effort normal  Right breast exhibits skin change and tenderness  Right breast exhibits no nipple discharge  Left breast exhibits no nipple discharge, no skin change and no tenderness  There is breast swelling and tenderness  No breast discharge  Abdominal: Soft  Genitourinary:   Genitourinary Comments: Pelvic exam revealed deferred today recent pelvic exam in October of this year  Musculoskeletal: Normal range of motion  Neurological: She is alert and oriented to person, place, and time  Skin: Skin is warm and dry  Psychiatric: She has a normal mood and affect  Her behavior is normal    Vitals reviewed  Breast exam  Bilateral breast exam completed in the sitting and supine position with chaperone present  There is a red raised circumferential cystic type lesion adjacent to her right areola  No palpable mass beneath this lesion  Bilateral breast exam was otherwise normal   No supraclavicular axillary lymphadenopathy noted as well  Disposition  Recommend warm compresses 2-3 times daily  Will try p o  Antibiotics cephalexin 500 mg twice daily for 5 days  She only has scheduled appointment for screening mammography and ultrasound of both breasts scheduled for January 10th    She will return on January 16 for follow-up exam   Referral generated to Dr oJhny Rivas with regards to positive family history of breast cancer as well as previously identified 34% lifetime risk of breast cancer Tyrjimmy zek risk assessment scale left cerebral hemisphere, similar compared to prior examinations, and no acute intracranial abnormality. He has been treated with tocilizumab, dexamethasone and remdesivir. He has been seen by the ID, AHFT, Neurology/Epilepsy, Pulmonary, and Palliative Care teams. He has been diuresed. He was extubated 1/29 initially to bipap and subsequently to NC O2. His AEDs have been re-started and adjusted by the Epilepsy team. On 2/1/22 RN had noted some episodes of brief arterial desaturations. No clear cut seizure activity, he would follow commands consistently with all 4 extremities but his speech was unintelligible and he had disconjugate gaze at times. Later, he was noted to have overt seizures as noted by the neurology team and his vEEG has captured intermittent stereotypical right arm events. The daytime RN has noted focal seizure-like activity of his RUE with movement of the limb.  His AED regimen has been intensified by the neurology/epilepsy team. Dexamethasone was stopped 1/31 and his usual low-dose prednisone was resumed. Seen by ID and due to immune compromised state, cont to require isolation.  Course complicated by recurrent fluid overload and decompensated CHF. IV diuresis resumed and titrated.  Milrinone added by HF team. Palliative following for ongoing GOC discussions. Will not escalate support to ICU.  As pt decompensates, will transition to comfort cares.        Reviewed Pertinent Histories: Medical History, Surgical History, Social History, Family History,     ROS: 12-point review of systems negative except as above.    Medications: Reviewed     Scheduled Medications:    sodium chloride (PF), 10 mL, 2 times per day  sodium chloride (PF), 10 mL, 2 times per day  furosemide, 60 mg, BID  cefepime (MAXIPIME) IVPB, 1,000 mg, 2 times per day  sodium chloride (PF), 10 mL, 2 times per day  valGANciclovir, 450 mg, Daily with breakfast  TACROlimus, 2.5 mg, Daily Tx  TACROlimus, 2 mg, Nightly Tx  insulin glargine,  20 Units, Nightly  [Held by provider] insulin lispro, , TID WC  valproic acid, 500 mg, 3 times per day  predniSONE, 10 mg, Daily with breakfast  OXcarbazepine, 300 mg, 2 times per day  polyethylene glycol, 17 g, Daily  [Held by provider] levETIRAcetam, 2,000 mg, 2 times per day  gabapentin, 600 mg, QHS  gabapentin, 300 mg, QAM  famotidine, 20 mg, 2 times per day  clopidogrel, 75 mg, Daily  mycophenolate, 250 mg, BIDTX  [Held by provider] metoPROLOL succinate, 25 mg, Daily  insulin lispro, , TID WC  sulfamethoxazole-trimethoprim, 1 tablet, Once per day on   enoxaparin, 40 mg, Q24H  sodium chloride (PF), 10 mL, 2 times per day  docusate sodium-sennosides, 1 tablet, Nightly  sodium chloride (PF), 2 mL, 2 times per day  Potassium Standard Replacement Protocol, , See Admin Instructions  Magnesium Standard Replacement Protocol, , See Admin Instructions      Continuous Infusions:    • milrinone (PRIMACOR) 20 mg/100 mL in dextrose 5 % infusion premix 0.25 mcg/kg/min (22 0111)   • sodium chloride 0.9% infusion Stopped (22 0650)   • sodium chloride 0.9% infusion Stopped (22 1502)     PRN Medications:  sodium chloride (PF), sodium chloride (PF), sodium chloride (PF), fentaNYL, LORazepam, sodium chloride (PF), sodium chloride (PF), dextrose, dextrose, dextrose, sodium chloride (PF), sodium chloride (PF), glucagon, sodium chloride, sodium chloride, sodium chloride, ondansetron **OR** ondansetron, sodium chloride      Physical Examination       Vital 24 Hour Range Most Recent Value   Temperature Temp  Min: 97.7 °F (36.5 °C)  Max: 97.9 °F (36.6 °C) 97.9 °F (36.6 °C)   Pulse Pulse  Min: 93  Max: 135 (!) 135   Respiratory Resp  Min: 18  Max: 24 18   Blood Pressure BP  Min: 115/75  Max: 121/88 121/88   Pulse Oximetry SpO2  Min: 84 %  Max: 95 % 95 %   Arterial BP No data recorded     O2 O2 Flow Rate (L/min)  Av.8 L/min  Min: 4 L/min   Min taken time: 22 1735  Max: 5 L/min   Max taken time:  02/26/22 2118       Intake and Output:      Intake/Output Summary (Last 24 hours) at 2/27/2022 1431  Last data filed at 2/27/2022 1300  Gross per 24 hour   Intake --   Output 1100 ml   Net -1100 ml       Last Stool Occurrence:  1 (02/26/22 1250)    Vital Most Recent Value First Value   Weight 91.8 kg (202 lb 6.1 oz) Weight: 92.4 kg (203 lb 11.3 oz)   Height 5' 10\" (177.8 cm) Height: 5' 10\" (177.8 cm)   BMI 29.04 N/A     General:  Somnolent, arousable.  Mild resp distress, looks uncomfortable.  Tired.  HEENT:  Normocephalic, pupils reactive, external exam of nose and ears normal, oral mucosa moist  Neck:  Supple. Nontender. Normal range of motion.  Respiratory:  Tachypneic, bibasilar crackles, poor air movement.  Symmetrical chest expansion.    Cardiac:  Tachy, reg rhythm.  No murmurs appreciated.  Vascular:  Pedal pulses equal bilaterally. BLE pitting peripheral edema.    Gastrointestinal:  Soft. Nontender. Nondistended. Hypoactive bowel sounds.    Musculoskeletal:  Full range of motion in all 4 extremities.  No joint swelling or tenderness.   Neurologic:  Alert and oriented x 2, poor insight, forgetful. Motor and sensation intact in all extremities.  Integumentary:  Warm. No rashes   Psychiatric: Cooperative. Appropriate mood and affect.      Test Results     Labs: The Laboratory values listed below have been reviewed and pertinent findings discussed in the Assessment and Plan.    Laboratory values:   Recent Labs   Lab 02/27/22 0315 02/26/22  0435 02/25/22  1024   WBC 6.5 6.0 7.1   HGB 10.6* 11.3* 11.0*   HCT 31.9* 35.2* 34.2*    178 166       Recent Labs   Lab 02/27/22 0315 02/26/22  0435 02/25/22  1024   SODIUM 139 139 139   POTASSIUM 3.6 3.7 3.9   CHLORIDE 108* 107 108*   CO2 24 23 24   CALCIUM 8.2* 8.4 8.4   GLUCOSE 92 158* 146*   BUN 28* 27* 26*   CREATININE 1.86* 2.01* 1.93*        Recent Labs   Lab 02/24/22  0812 02/24/22  0811 02/23/22  0610   ALBUMIN 2.9*  --  2.9*   AST 24  --  15   GPT 13  --   16   LDH  --  478*  --    BILIRUBIN 0.6  --  0.5   DBIL 0.2  --   --      Recent Labs   Lab 02/26/22  0435 02/25/22  1024 02/24/22  0812 02/24/22  0811 02/24/22  0524 02/23/22  0610   PCT 0.75*  --  0.71*  --   --  0.22*   LACTA 3.9* 2.3*  --   --  2.7* 2.2*   CRP  --   --   --  9.4*  --   --        Recent Labs   Lab 02/22/22  0447   NTPROB 7,137*       Lab Results   Component Value Date    VB12 533 11/09/2017    CECILIO 12.4 09/30/2015    VITD25 28.5 (L) 11/08/2021    TSH 2.123 11/08/2021    HGBA1C 8.3 (H) 11/08/2021        Lab Results   Component Value Date    CHOLESTEROL 146 11/08/2021    HDL 66 11/08/2021    CALCLDL 60 11/08/2021        Lab Results   Component Value Date    UOSM 470 01/10/2022    ALFREDO <5 01/10/2022    USPG 1.008 02/24/2022    UPROT Negative 02/24/2022    UWBC Negative 02/24/2022    URBC Negative 02/24/2022    UPH 7.0 02/24/2022    UBACTRA None Seen 02/24/2022             Radiology: Imaging studies have been reviewed and pertinent findings discussed in the Assessment and Plan.    Results for orders placed or performed during the hospital encounter of 01/26/22 (from the past 48 hour(s))   CT Chest ILD High Resolution WO Contrast    Impression    IMPRESSION:    1.  Diffuse patchy groundglass opacities throughout the bilateral lungs  with mild relative sparing of the lung bases and apices and small pleural  effusions. Findings can be seen in COVID pneumonia, particularly given the  positive history. Pulmonary edema may have a similar appearance.   2.  Dilation of the main pulmonary artery measuring 3.8 centimeters which  can be seen with pulmonary hypertension.  3.  Patchy bibasilar air trapping which can be seen with small airways  disease.  4.  Moderate coronary artery calcifications and mild four-chamber  cardiomegaly.    I, Attending Radiologist Anuja Roberts MD, have reviewed the images and  report and concur with these findings interpreted by Resident Radiologist,  Jerrod Davis MD.         Tubes, Devices, Monitoring     Telemetry: On      Limon: No    Assessment and Plan     Recurrent acute on chronic biventricular systolic CHF, refractory HF/cardiogenic shock  Acute kidney injury, multifactorial  Acute hypoxemic respiratory failure, due to #1, recent covid recovery  S/p orthotopic heart transplant, allograft vasculopathy, and severe cardiomyopathy (LVEF 15%) (with chronic rejection and h/o medication noncompliance)  Chronic immunosuppressive treatment   Recent Covid-19 pneumonia with extensive infiltrates. Unvaccinated. S/p tocilizumab, remdesivir, dexamethasone.   Suspect possible post covid pneumonitis  Acute hypoxemic respiratory failure, likely decompensated HF, possible inflammatory vs less likely infectious reaction in lugs.  This admit was intubated in ED and extubated 1/29  Remote left parieto-temporal stroke with residual right sided weakness and dysphasia  Seizure disorder  DM II  Hypertensive heart disease with CHF  Dyslipidemia  Past pulmonary blastomycosis treated for apparent cure.  History of polysubstance abuse.  History of medical non-compliance  Cognitive impairment, activated POA     --Cont milrinone, palliative. Cont IV diuresis.  Bbl on hold per HF team.  Cont plavix  Immune suppressants per HF team.  Cont ppx bactrim, valgancyclovir.   --D/w pulmonology, low suspicion for post-covid pneumonitis, do not recommend increased steroids.   --Cbc stable, procal elevated, blood and urine cx pending, ngtd.  Lactate remains slightly elevated - suspect due to poor cardiac output rather than sepsis. Cont empiric abx due to immune compromised state.  Dc'd vanco due to neg mrsa pcr  --Pt s/p covid, neg test 2/21, isolation discontinued.  ID signed off.  --Valproic acid level therapeutic.  Keppra levels are high - will hold today and resume at reduced dose tmrw.  Repeat levels in 1-2 days pending course.   --Monitor BG, cont lantus/lispro correction  --Appreciate palliative care  involvement as pt is likely nearing the end of his life and is hospice appropriate. GOC discussion with family yesterday, and pt will not transfer to ICU when he dcompensates. At that time keep pt comfortable. Will add low dose prn fentanyl for respiratory distress and/or pain; ativan for agitation      Consults:    IP CONSULT TO CARDIOLOGY  IP CONSULT TO INFECTIOUS DISEASES  IP CONSULT TO NUTRITIONAL SERVICES - TUBE FEEDING  IP CONSULT TO PALLIATIVE CARE  IP CONSULT TO NEUROLOGY  IP CONSULT TO PULMONOLOGY  IP CONSULT TO SOCIAL WORK  IP CONSULT TO SOCIAL WORK  PHARMACY TO DOSE AND MONITOR VANCOMYCIN    Diet:  One Time Diet Cardiac; One Time Tray For Speech Therapy To Feed At 1200 Today. Please Send Orange Soda If You Have It.  Consistent Carb Moderate (45-75 Gm/meal) Diet  Therapy Orders:    PT and OT Orders Placed this Encounter   Procedures   • Occupational Therapy Sedation Consult   • Physical Therapy Sedation Consult       Smoking status- {  Tobacco Use: High Risk   • Smoking Tobacco Use: Current Every Day Smoker   • Smokeless Tobacco Use: Never Used     Nutrition status- appropriate  Body mass index is 29.04 kg/m². - Overweight BMI 25-29  DVT Prophylaxis - Lovenox therapeutic dosing   Limited English proficiency with :  No         Advanced Directives     Code Status: Do Not Resuscitate      Discharge Plan     The patients treatment plans were discussed with patient, RN and consultant(s).     Recommendations for Discharge   SW     PT Home   OT Home   SLP SNF     Anticipated discharge destination: Likely hospice  Expected Discharge Date:  3/5/2022  Barriers to Discharge: Patient is not medically ready and needs to remain in the hospital today due to HF exac, declining resp status        Jodie COLLAZO Hospitalist    Please contact the above hospitalist from 7am until 7pm via Epic Secure Chat.    From 7pm to 7am please contact the hospitalist on call at [phone].

## 2022-03-02 ENCOUNTER — TELEPHONE (OUTPATIENT)
Dept: HEMATOLOGY ONCOLOGY | Facility: HOSPITAL | Age: 52
End: 2022-03-02

## 2022-03-02 NOTE — TELEPHONE ENCOUNTER
Called patient and spoke with patient  Rescheduled f/u appt with different provider due to provider not being in  Patient confirmed this appt

## 2022-03-29 ENCOUNTER — TELEPHONE (OUTPATIENT)
Dept: HEMATOLOGY ONCOLOGY | Facility: CLINIC | Age: 52
End: 2022-03-29

## 2022-03-29 DIAGNOSIS — C86.6 LYMPHOMATOID PAPULOSIS (HCC): Primary | ICD-10-CM

## 2022-03-29 DIAGNOSIS — C50.111 MALIGNANT NEOPLASM OF CENTRAL PORTION OF RIGHT BREAST IN FEMALE, ESTROGEN RECEPTOR POSITIVE (HCC): ICD-10-CM

## 2022-03-29 DIAGNOSIS — Z17.0 MALIGNANT NEOPLASM OF CENTRAL PORTION OF RIGHT BREAST IN FEMALE, ESTROGEN RECEPTOR POSITIVE (HCC): ICD-10-CM

## 2022-03-29 NOTE — TELEPHONE ENCOUNTER
3/29/22 spoke to pt  Advised her to get labs done before appt  she said she will go to [facility]

## 2022-03-30 ENCOUNTER — APPOINTMENT (OUTPATIENT)
Dept: LAB | Facility: CLINIC | Age: 52
End: 2022-03-30
Payer: COMMERCIAL

## 2022-03-30 DIAGNOSIS — C50.111 MALIGNANT NEOPLASM OF CENTRAL PORTION OF RIGHT BREAST IN FEMALE, ESTROGEN RECEPTOR POSITIVE (HCC): ICD-10-CM

## 2022-03-30 DIAGNOSIS — Z17.0 MALIGNANT NEOPLASM OF CENTRAL PORTION OF RIGHT BREAST IN FEMALE, ESTROGEN RECEPTOR POSITIVE (HCC): ICD-10-CM

## 2022-03-30 DIAGNOSIS — C86.6 LYMPHOMATOID PAPULOSIS (HCC): ICD-10-CM

## 2022-03-30 LAB
ALBUMIN SERPL BCP-MCNC: 3.4 G/DL (ref 3.5–5)
ALP SERPL-CCNC: 69 U/L (ref 46–116)
ALT SERPL W P-5'-P-CCNC: 28 U/L (ref 12–78)
ANION GAP SERPL CALCULATED.3IONS-SCNC: 6 MMOL/L (ref 4–13)
AST SERPL W P-5'-P-CCNC: 20 U/L (ref 5–45)
BASOPHILS # BLD AUTO: 0.03 THOUSANDS/ΜL (ref 0–0.1)
BASOPHILS NFR BLD AUTO: 1 % (ref 0–1)
BILIRUB SERPL-MCNC: 0.27 MG/DL (ref 0.2–1)
BUN SERPL-MCNC: 23 MG/DL (ref 5–25)
CALCIUM ALBUM COR SERPL-MCNC: 9.8 MG/DL (ref 8.3–10.1)
CALCIUM SERPL-MCNC: 9.3 MG/DL (ref 8.3–10.1)
CANCER AG27-29 SERPL-ACNC: 16 U/ML (ref 0–42.3)
CHLORIDE SERPL-SCNC: 108 MMOL/L (ref 100–108)
CO2 SERPL-SCNC: 27 MMOL/L (ref 21–32)
CREAT SERPL-MCNC: 0.97 MG/DL (ref 0.6–1.3)
EOSINOPHIL # BLD AUTO: 0.05 THOUSAND/ΜL (ref 0–0.61)
EOSINOPHIL NFR BLD AUTO: 1 % (ref 0–6)
ERYTHROCYTE [DISTWIDTH] IN BLOOD BY AUTOMATED COUNT: 12.8 % (ref 11.6–15.1)
GFR SERPL CREATININE-BSD FRML MDRD: 67 ML/MIN/1.73SQ M
GLUCOSE SERPL-MCNC: 107 MG/DL (ref 65–140)
HCT VFR BLD AUTO: 38.5 % (ref 34.8–46.1)
HGB BLD-MCNC: 12.7 G/DL (ref 11.5–15.4)
IMM GRANULOCYTES # BLD AUTO: 0.01 THOUSAND/UL (ref 0–0.2)
IMM GRANULOCYTES NFR BLD AUTO: 0 % (ref 0–2)
LYMPHOCYTES # BLD AUTO: 1.26 THOUSANDS/ΜL (ref 0.6–4.47)
LYMPHOCYTES NFR BLD AUTO: 25 % (ref 14–44)
MCH RBC QN AUTO: 32.1 PG (ref 26.8–34.3)
MCHC RBC AUTO-ENTMCNC: 33 G/DL (ref 31.4–37.4)
MCV RBC AUTO: 97 FL (ref 82–98)
MONOCYTES # BLD AUTO: 0.39 THOUSAND/ΜL (ref 0.17–1.22)
MONOCYTES NFR BLD AUTO: 8 % (ref 4–12)
NEUTROPHILS # BLD AUTO: 3.29 THOUSANDS/ΜL (ref 1.85–7.62)
NEUTS SEG NFR BLD AUTO: 65 % (ref 43–75)
NRBC BLD AUTO-RTO: 0 /100 WBCS
PLATELET # BLD AUTO: 212 THOUSANDS/UL (ref 149–390)
PMV BLD AUTO: 10.6 FL (ref 8.9–12.7)
POTASSIUM SERPL-SCNC: 4.2 MMOL/L (ref 3.5–5.3)
PROT SERPL-MCNC: 6.4 G/DL (ref 6.4–8.2)
RBC # BLD AUTO: 3.96 MILLION/UL (ref 3.81–5.12)
SODIUM SERPL-SCNC: 141 MMOL/L (ref 136–145)
WBC # BLD AUTO: 5.03 THOUSAND/UL (ref 4.31–10.16)

## 2022-03-30 PROCEDURE — 85025 COMPLETE CBC W/AUTO DIFF WBC: CPT

## 2022-03-30 PROCEDURE — 80053 COMPREHEN METABOLIC PANEL: CPT

## 2022-03-30 PROCEDURE — 86300 IMMUNOASSAY TUMOR CA 15-3: CPT

## 2022-03-30 PROCEDURE — 36415 COLL VENOUS BLD VENIPUNCTURE: CPT

## 2022-03-31 NOTE — PROGRESS NOTES
Hematology/Oncology Outpatient Follow- up Note  Britt Hager, 1970, [id number]  2022        Chief Complaint   Patient presents with    Follow-up       HPI:  Britt Hager is a 48year old female with a history of invasive mammary carcinoma of the right breast s/p lumpectomy on  The patient had 2 small foci of disease, one measured 5 mm and another measured 14 mm  Tumor was ER/GA + HER2 negative  DCIS was present  No lymphovascular invasion was seen  Margins were negative  Oncotype recurrence score was  Twelve I e  low risk  She completed a course of adjuvant radiation therapy at [facility] and since she is still Perimenopausal has been started on tamoxifen    Previous Hematologic/ Oncologic History:    Oncology History   Malignant neoplasm of central portion of right breast in female, estrogen receptor positive ([facility] [address] )   3/31/2021 Initial Diagnosis    Malignant neoplasm of central portion of right breast in female, estrogen receptor positive ([facility] [address] )     3/31/2021 -  Cancer Staged    Staging form: Breast, AJCC 8th Edition  - Clinical: Stage IA (cT1, cN0, cM0, G2, ER+, GA+, HER2-) - Signed by Sharee Lam MD on 3/31/2021  Stage prefix: Initial diagnosis  Method of lymph node assessment: Clinical  Histologic grading system: 3 grade system       2021 -  Cancer Staged    Staging form: Breast, AJCC 8th Edition  - Pathologic: Stage IA (pT1c, pN0(sn), cM0, G2, ER+, GA+, HER2-) - Signed by Sharee Lam MD on 2021  Stage prefix: Initial diagnosis  Method of lymph node assessment: [place] lymph node biopsy  Histologic grading system: 3 grade system           Current Hematologic/ Oncologic Treatment:    Tamoxifen    ECO - Asymptomatic    Interval History:   The patient presents for routine follow up  She was last seen by Dr George Black on 2021  She was last seen by her breast surgeon, Dr Saida Sales on 2021 and was noted to have some radiation fibrosis in her upper right chest wall  She was referred to PT and this has greatly improved her ROM    Most recent mammogram completed on 02/15/2022 was benign  Most recent blood work completed on 3/30 was reviewed  CA 27-29 is normal, 16  CBC is normal   CMP unremarkable  Calcium 9 3    Patient reports she is feeling well  Hand hand surgery a month ago  Denies any significant side effects from Tamoxifen  Take low dose Venlafaxine to help with hot flashes  Denies any arthralgias  No breast pain  Cancer Staging:  Cancer Staging  Malignant neoplasm of central portion of right breast in female, estrogen receptor positive ([facility] [address] )  Staging form: Breast, AJCC 8th Edition  - Clinical: Stage IA (cT1, cN0, cM0, G2, ER+, RI+, HER2-) - Signed by Alie Villaseñor MD on 3/31/2021  Stage prefix: Initial diagnosis  Method of lymph node assessment: Clinical  Histologic grading system: 3 grade system  - Pathologic: Stage IA (pT1c, pN0(sn), cM0, G2, ER+, RI+, HER2-) - Signed by Alie Villaseñor MD on 5/20/2021  Stage prefix: Initial diagnosis  Method of lymph node assessment: [place] lymph node biopsy  Histologic grading system: 3 grade system      Molecular Testing:             Test Results:    Imaging: No results found  Labs:   Lab Results   Component Value Date    WBC 5 03 03/30/2022    HGB 12 7 03/30/2022    HCT 38 5 03/30/2022    MCV 97 03/30/2022     03/30/2022     Lab Results   Component Value Date     07/22/2015    K 4 2 03/30/2022     03/30/2022    CO2 27 03/30/2022    ANIONGAP 10 07/22/2015    BUN 23 03/30/2022    CREATININE 0 97 03/30/2022    GLUCOSE 78 07/22/2015    CALCIUM 9 3 03/30/2022    CORRECTEDCA 9 8 03/30/2022    AST 20 03/30/2022    ALT 28 03/30/2022    ALKPHOS 69 03/30/2022    PROT 6 7 07/22/2015    BILITOT 0 43 07/22/2015    EGFR 67 03/30/2022           Review of Systems   All other systems reviewed and are negative          Active Problems:   Patient Active Problem List   Diagnosis    Irregular menses    Encounter for surveillance of vaginal ring hormonal contraceptive device    Dense breast tissue    History of seizure    Allergic dermatitis    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 ([facility] [address] )    Family history of breast cancer    Skin lesion of breast    SANDRA (juvenile myoclonic epilepsy) ([facility] [address] )    Terminal ileitis ([facility] [address] )    Malignant neoplasm of central portion of right breast in female, estrogen receptor positive ([facility] [address] )    BRCA negative    History of anesthesia complications    Lymphomatoid papulosis ([facility] [address] )    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    History of radiation therapy    Use of tamoxifen (Nolvadex)    Vaginal atrophy       Past Medical History:   Past Medical History:   Diagnosis Date    Abdominal pain     Anesthesia     "after tape removed from both eyes/developed swelling"    Anxiety     has autistic/special needs son    Breast cancer ([facility] [address] ) 04/30/2021    RIGHT    Chronic UTI     2x/year or so /no symptoms today    Dysuria     resolved 10/05/16/occas/not lately    Encounter for screening colonoscopy     Environmental and seasonal allergies     Exercise involving cycling     2-3x/week spin classes    GERD (gastroesophageal reflux disease)     Heavy menses     Hiatal hernia     History of kidney stones     History of radiation therapy 2021    Right    Intestinal ulcer     Irritable bowel syndrome     Nausea     Seizures (HCC)     last seizure approx 3 yrs ago    Shortness of breath     "sometimes just sitting in chair or activity and having Echo 4/29"    Submucous leiomyoma of uterus     no recent problem    Terminal ileitis ([facility] [address] )     unclear etiology, work up w EGD/COLON/TI bx/SBC/CT done      Wears glasses        Surgical History:   Past Surgical History:   Procedure Laterality Date    BREAST BIOPSY Right 03/01/2021    stereo    BREAST LUMPECTOMY Right 4/30/2021    Procedure: RIGHT BREAST BRACKETED DINA LOCALIZATION LUMPECTOMY;;  Surgeon: Charles Roberson, MD;  Location: [facility];  Service: Surgical Oncology    COLONOSCOPY  03/01/2017    5 yr recall    DILATION AND CURETTAGE OF UTERUS      EGD      HYSTEROSCOPY      INCONTINENCE SURGERY      bladder sling    LYMPH NODE BIOPSY Right 4/30/2021    Procedure: Bx LYMPH NODE SENTINEL;  Surgeon: Grace Valladares MD;  Location: [facility];  Service: Surgical Oncology    MAMMO NEEDLE LOCALIZATION LEFT (ALL INC) Right 4/19/2021    MAMMO NEEDLE LOCALIZATION LEFT (ALL INC) EACH ADD Right 4/19/2021    MAMMO STEREOTACTIC BREAST BIOPSY RIGHT (ALL INC) Right 3/1/2021    MO COLONOSCOPY FLX DX W/COLLJ SPEC WHEN PFRMD N/A 11/14/2016    Procedure: EGD AND COLONOSCOPY;  Surgeon: Noreen Santos MD;  Location: [facility]; Service: Gastroenterology    WISDOM TOOTH EXTRACTION         Family History:    Family History   Problem Relation Age of Onset    Cancer Mother     Breast cancer Mother 54    Colon polyps Mother     Diabetes Father         Type 2    Hypertension Father     Breast cancer Family     BRCA1 Negative Sister     BRCA2 Negative Sister     Breast cancer Maternal Aunt 64    Colon cancer Neg Hx        Cancer-related family history includes Breast cancer in her family; Breast cancer (age of onset: 54) in her mother; Breast cancer (age of onset: 64) in her maternal aunt; Cancer in her mother  There is no history of Colon cancer  Social History:   Social History     Socioeconomic History    Marital status: /Civil Union     Spouse name: Not on file    Number of children: 2    Years of education: Not on file    Highest education level: Not on file   Occupational History    Not on file   Tobacco Use    Smoking status: Never Smoker    Smokeless tobacco: Never Used   Vaping Use    Vaping Use: Never used   Substance and Sexual Activity    Alcohol use:  Yes     Alcohol/week: 1 0 standard drink     Types: 1 Cans of beer per week    Drug use: No    Sexual activity: Yes     Partners: Male     Birth control/protection: Male Sterilization     Comment: nuva ring   Other Topics Concern    Not on file   Social History Narrative    Caffeine use    [address] [facility]    Uses safety equipment Seatbelts     Social Determinants of Health     Financial Resource Strain: Low Risk     Difficulty of Paying Living Expenses: Not hard at all   Food Insecurity: No Food Insecurity    Worried About Running Out of Food in the Last Year: Never true    Rodolfo of Food in the Last Year: Never true   Transportation Needs: No Transportation Needs    Lack of Transportation (Medical): No    Lack of Transportation (Non-Medical): No   Physical Activity: Not on file   Stress: Not on file   Social Connections: Not on file   Intimate Partner Violence: Not on file   Housing Stability: Not on file       Current Medications:   Current Outpatient Medications   Medication Sig Dispense Refill    cholecalciferol (VITAMIN D3) 1,000 units tablet Take 1,000 Units by mouth daily      famotidine (PEPCID) 40 MG tablet TAKE 1 TABLET BY MOUTH DAILY AT BEDTIME 90 tablet 3    lamoTRIgine (LaMICtal) 150 MG tablet Take 150 mg by mouth 2 (two) times a day      magnesium oxide (MAG-OX) 400 mg Take 400 mg by mouth every evening       nystatin-triamcinolone (MYCOLOG-II) cream Apply topically 2 (two) times a day as needed (Itching/irritation) 30 g 0    tamoxifen (NOLVADEX) 20 mg tablet Take 1 tablet (20 mg total) by mouth daily 90 tablet 3    venlafaxine (EFFEXOR-XR) 37 5 mg 24 hr capsule Take 1 capsule (37 5 mg total) by mouth daily 30 capsule 11    fexofenadine (ALLEGRA) 180 MG tablet Take 180 mg by mouth every evening  (Patient not taking: Reported on 4/1/2022 )      Probiotic Product (PROBIOTIC PO) Take by mouth as needed  (Patient not taking: Reported on 4/1/2022 )       No current facility-administered medications for this visit  Allergies:    Allergies   Allergen Reactions    Medical Tape Swelling and Rash     When tape from eyes removed after surgery developed high swelling of eyes/watery    Wound Dressings Swelling and Rash     After a surgery when tape removed from eyes high swelling noted/tears    Other Sneezing     seasonal       Physical Exam:  /78 (BP Location: Left arm, Patient Position: Sitting, Cuff Size: Adult)   Pulse 88   Temp 98 4 °F (36 9 °C) (Tympanic)   Resp 16   Ht 5' 7" (1 702 m)   Wt 86 2 kg (190 lb)   SpO2 94%   BMI 29 76 kg/m²   Body surface area is 1 98 meters squared  Wt Readings from Last 3 Encounters:   04/01/22 86 2 kg (190 lb)   02/15/22 82 1 kg (181 lb)   01/05/22 82 1 kg (181 lb)           Physical Exam  Constitutional:       General: She is not in acute distress  Appearance: Normal appearance  HENT:      Head: Normocephalic and atraumatic  Eyes:      General: No scleral icterus  Right eye: No discharge  Left eye: No discharge  Conjunctiva/sclera: Conjunctivae normal    Cardiovascular:      Rate and Rhythm: Normal rate and regular rhythm  Pulmonary:      Effort: Pulmonary effort is normal  No respiratory distress  Breath sounds: Normal breath sounds  Chest:   Breasts:      Right: No axillary adenopathy or supraclavicular adenopathy  Left: No axillary adenopathy or supraclavicular adenopathy  Abdominal:      General: Bowel sounds are normal  There is no distension  Palpations: Abdomen is soft  There is no mass  Tenderness: There is no abdominal tenderness  Musculoskeletal:         General: Normal range of motion  Lymphadenopathy:      Cervical: No cervical adenopathy  Upper Body:      Right upper body: No supraclavicular, axillary or pectoral adenopathy  Left upper body: No supraclavicular, axillary or pectoral adenopathy  Skin:     General: Skin is warm and dry  Neurological:      General: No focal deficit present  Mental Status: She is alert and oriented to person, place, and time     Psychiatric: Mood and Affect: Mood normal          Behavior: Behavior normal          Assessment / Plan:    1  Malignant neoplasm of central portion of right breast in female, estrogen receptor positive ([address] )    2  Use of tamoxifen (Nolvadex)      The patient is a very pleasant 46year old female with a history of invasive mammary carcinoma of the right breast s/p lumpectomy on  The patient had 2 small foci of disease, one measured 5 mm and another measured 14 mm  Tumor was ER/KS + HER2 negative  DCIS was present  No lymphovascular invasion was seen  Margins were negative  Oncotype recurrence score was  Twelve I e  low risk  She completed a course of adjuvant radiation therapy at [facility] and since she is still Perimenopausal has been started on tamoxifen  Per Dr Terese Escobedo note "the plan is for her to be switched to an aromatase inhibitor in 2 years I e  when she has completed 2 years of tamoxifen  Her last menstrual period was in April of 2021"    Her most recent mammogram was benign  She is performing monthly self breast exams  She detected her lump on self breast exam  She does follow closely with Dr Bud Davidson for clinical breast exam    She does have a + family history of breast cancer  Her genetic testing was negative  Most recent blood work is normal      We will continue her on her current regimen with no changes  She will continue on the Effexor for the hot flashes and symptoms  She will return for a follow up visit in 6 months with repeat blood work  She will be due for repeat mammogram in 2/2023  I will discuss with Dr Jessica Valdovinos if he would like any to check [facility] levels prior to her next visit  Patient was in agreement with plan of care  If she has any questions she will call our office          Goals and Barriers:  Current Goal:  Prolong Survival from breast cancer  Barriers: None  Patient's Capacity to Self Care:  Patient  able to self care      Portions of the record may have been created with voice recognition software  Occasional wrong word or "sound a like" substitutions may have occurred due to the inherent limitations of voice recognition software  Read the chart carefully and recognize, using context, where substitutions have occurred

## 2022-04-01 ENCOUNTER — OFFICE VISIT (OUTPATIENT)
Dept: HEMATOLOGY ONCOLOGY | Facility: HOSPITAL | Age: 52
End: 2022-04-01
Payer: COMMERCIAL

## 2022-04-01 VITALS
OXYGEN SATURATION: 94 % | SYSTOLIC BLOOD PRESSURE: 114 MMHG | TEMPERATURE: 98.4 F | HEART RATE: 88 BPM | BODY MASS INDEX: 29.82 KG/M2 | RESPIRATION RATE: 16 BRPM | WEIGHT: 190 LBS | DIASTOLIC BLOOD PRESSURE: 78 MMHG | HEIGHT: 67 IN

## 2022-04-01 DIAGNOSIS — C86.6 LYMPHOMATOID PAPULOSIS (HCC): ICD-10-CM

## 2022-04-01 DIAGNOSIS — C50.111 MALIGNANT NEOPLASM OF CENTRAL PORTION OF RIGHT BREAST IN FEMALE, ESTROGEN RECEPTOR POSITIVE (HCC): ICD-10-CM

## 2022-04-01 DIAGNOSIS — Z17.0 MALIGNANT NEOPLASM OF CENTRAL PORTION OF RIGHT BREAST IN FEMALE, ESTROGEN RECEPTOR POSITIVE (HCC): ICD-10-CM

## 2022-04-01 DIAGNOSIS — R23.2 HOT FLASHES DUE TO TAMOXIFEN: Primary | ICD-10-CM

## 2022-04-01 DIAGNOSIS — Z79.810 USE OF TAMOXIFEN (NOLVADEX): ICD-10-CM

## 2022-04-01 DIAGNOSIS — T45.1X5A HOT FLASHES DUE TO TAMOXIFEN: Primary | ICD-10-CM

## 2022-04-01 PROCEDURE — 99214 OFFICE O/P EST MOD 30 MIN: CPT | Performed by: NURSE PRACTITIONER

## 2022-04-01 RX ORDER — TAMOXIFEN CITRATE 20 MG/1
20 TABLET ORAL DAILY
Qty: 90 TABLET | Refills: 3 | Status: SHIPPED | OUTPATIENT
Start: 2022-04-01

## 2022-04-01 RX ORDER — VENLAFAXINE HYDROCHLORIDE 37.5 MG/1
37.5 CAPSULE, EXTENDED RELEASE ORAL DAILY
Qty: 30 CAPSULE | Refills: 11 | Status: SHIPPED | OUTPATIENT
Start: 2022-04-01

## 2022-04-06 ENCOUNTER — OFFICE VISIT (OUTPATIENT)
Dept: FAMILY MEDICINE CLINIC | Facility: CLINIC | Age: 52
End: 2022-04-06
Payer: COMMERCIAL

## 2022-04-06 VITALS
SYSTOLIC BLOOD PRESSURE: 110 MMHG | BODY MASS INDEX: 28.73 KG/M2 | OXYGEN SATURATION: 97 % | RESPIRATION RATE: 16 BRPM | TEMPERATURE: 98 F | DIASTOLIC BLOOD PRESSURE: 78 MMHG | WEIGHT: 189.6 LBS | HEART RATE: 102 BPM | HEIGHT: 68 IN

## 2022-04-06 DIAGNOSIS — Z13.6 SCREENING FOR CARDIOVASCULAR CONDITION: ICD-10-CM

## 2022-04-06 DIAGNOSIS — Z00.00 ANNUAL PHYSICAL EXAM: Primary | ICD-10-CM

## 2022-04-06 DIAGNOSIS — Z23 NEED FOR VACCINATION: ICD-10-CM

## 2022-04-06 DIAGNOSIS — Z13.1 SCREENING FOR DIABETES MELLITUS: ICD-10-CM

## 2022-04-06 DIAGNOSIS — Z11.59 ENCOUNTER FOR HEPATITIS C SCREENING TEST FOR LOW RISK PATIENT: ICD-10-CM

## 2022-04-06 DIAGNOSIS — Z12.11 SCREENING FOR MALIGNANT NEOPLASM OF COLON: ICD-10-CM

## 2022-04-06 DIAGNOSIS — Z11.4 SCREENING FOR HIV (HUMAN IMMUNODEFICIENCY VIRUS): ICD-10-CM

## 2022-04-06 PROBLEM — S62.326D: Status: ACTIVE | Noted: 2022-03-01

## 2022-04-06 PROCEDURE — 90715 TDAP VACCINE 7 YRS/> IM: CPT | Performed by: FAMILY MEDICINE

## 2022-04-06 PROCEDURE — 99396 PREV VISIT EST AGE 40-64: CPT | Performed by: FAMILY MEDICINE

## 2022-04-06 PROCEDURE — 3725F SCREEN DEPRESSION PERFORMED: CPT | Performed by: FAMILY MEDICINE

## 2022-04-06 PROCEDURE — 90471 IMMUNIZATION ADMIN: CPT | Performed by: FAMILY MEDICINE

## 2022-04-06 PROCEDURE — 1036F TOBACCO NON-USER: CPT | Performed by: FAMILY MEDICINE

## 2022-04-06 PROCEDURE — 3008F BODY MASS INDEX DOCD: CPT | Performed by: FAMILY MEDICINE

## 2022-04-06 RX ORDER — LEVOCETIRIZINE DIHYDROCHLORIDE 5 MG/1
5 TABLET, FILM COATED ORAL
COMMUNITY

## 2022-04-06 NOTE — PROGRESS NOTES
[facility]    NAME: Fabiano Porter  AGE: 46 y o  SEX: female  : 1970     DATE: 2022     Assessment and Plan:     Problem List Items Addressed This Visit     None      Visit Diagnoses     Screening for malignant neoplasm of colon    -  Primary    Relevant Orders    Ambulatory referral for colonoscopy    Annual physical exam        Screening for HIV (human immunodeficiency virus)        Relevant Orders    HIV 1/2 Antigen/Antibody (4th Generation) w Reflex [facility]    Encounter for hepatitis C screening test for low risk patient        Relevant Orders    Hepatitis C antibody    Screening for cardiovascular condition        Relevant Orders    TSH, 3rd generation with Free T4 reflex    Lipid Panel with Direct LDL reflex    Screening for diabetes mellitus        Relevant Orders    TSH, 3rd generation with Free T4 reflex    Lipid Panel with Direct LDL reflex    Need for vaccination        Relevant Orders    TDAP VACCINE GREATER THAN OR EQUAL TO 8YO IM (Completed)      The patient had a normal exam in the office today  She will go for the testing is indicated and will follow up with the results  We will see her back in the office as scheduled  She will continue to follow-up with Neurology for management of her seizures and with her orthopedic specialist   We will see her back in the office as scheduled  Immunizations and preventive care screenings were discussed with patient today  Appropriate education was printed on patient's after visit summary  Counseling:  Dental Health: discussed importance of regular tooth brushing, flossing, and dental visits  Injury prevention: discussed safety/seat belts, safety helmets, smoke detectors, carbon dioxide detectors, and smoking near bedding or upholstery  · Exercise: the importance of regular exercise/physical activity was discussed   Recommend exercise 3-5 times per week for at least 30 minutes  BMI Counseling: Body mass index is 29 26 kg/m²  The BMI is above normal  Nutrition recommendations include decreasing portion sizes, encouraging healthy choices of fruits and vegetables, decreasing fast food intake, consuming healthier snacks, limiting drinks that contain sugar, moderation in carbohydrate intake, increasing intake of lean protein, reducing intake of saturated and trans fat and reducing intake of cholesterol  Exercise recommendations include exercising 3-5 times per week and strength training exercises  No pharmacotherapy was ordered  Patient referred to PCP  Rationale for BMI follow-up plan is due to patient being overweight or obese  Depression Screening and Follow-up Plan: Patient was screened for depression during today's encounter  They screened negative with a PHQ-2 score of 0  Return in about 1 year (around 4/6/2023) for Annual physical      Chief Complaint:     Chief Complaint   Patient presents with    Annual Exam     Complete Physical--Needs form signed for Employment      History of Present Illness:     Adult Annual Physical   Patient here for a comprehensive physical exam  The patient reports no problems  Felicitas Wild is a 46 y o  female who presents today for complete physical  she   has been feeling well and has no complaints today  The patient denies any chest pain, shortness of breath, or palpitations  There is no edema  There are no headaches or visual changes  There is no lightheadedness, dizziness, or fainting spells  There are no GI symptoms  The patient goes for dental exams every 6 months and sees her eye doctor  The patient is watching her diet and follows a regular exercise program    The patient is currently seen Oncology for management invasive mammary carcinoma of the right breast status post lumpectomy  She is currently on tamoxifen and recently completed a course of radiation    She has not a seizure in 4 years- she has been stable on the Lamotrigine  She is going to OT to recover from a right hand fracture  She is feeling well  There is occasional acid refux,  but is not something happens on a daily basis  She really has to take anything for it  She monitors her diet  Diet and Physical Activity  · Diet/Nutrition: well balanced diet, limited junk food, low carb diet and consuming 3-5 servings of fruits/vegetables daily  · Exercise: moderate cardiovascular exercise and 3-4 times a week on average  Depression Screening  PHQ-2/9 Depression Screening    Little interest or pleasure in doing things: 0 - not at all  Feeling down, depressed, or hopeless: 0 - not at all  PHQ-2 Score: 0  PHQ-2 Interpretation: Negative depression screen       General Health  · Sleep: sleeps well  · Hearing: normal - bilateral   · Vision: no vision problems, most recent eye exam <1 year ago and wears glasses  · Dental: regular dental visits and brushes teeth twice daily  /GYN Health  · Patient is: postmenopausal  · Sees Dr Africa Pedro for her GYN exams  Review of Systems:     Review of Systems   Constitutional: Negative  HENT: Negative  Eyes: Negative  Respiratory: Negative  Cardiovascular: Negative  Gastrointestinal: Negative  Endocrine: Negative  Genitourinary: Negative  Musculoskeletal: Negative  Skin: Negative  Allergic/Immunologic: Negative  Neurological: Negative  Hematological: Negative  Psychiatric/Behavioral: Negative         Past Medical History:     Past Medical History:   Diagnosis Date    Abdominal pain     Anesthesia     "after tape removed from both eyes/developed swelling"    Anxiety     has autistic/special needs son    Breast cancer [facility] [place] [facility] ) 04/30/2021    RIGHT    Chronic UTI     2x/year or so /no symptoms today    Dysuria     resolved 10/05/16/occas/not lately    Encounter for screening colonoscopy     Environmental and seasonal allergies     Exercise involving cycling     2-3x/week spin classes    GERD (gastroesophageal reflux disease)     Heavy menses     Hiatal hernia     History of kidney stones     History of radiation therapy 2021    Right    Intestinal ulcer     Irritable bowel syndrome     Nausea     Seizures (HCC)     last seizure approx 3 yrs ago    Shortness of breath     "sometimes just sitting in chair or activity and having Echo 4/29"    Submucous leiomyoma of uterus     no recent problem    Terminal ileitis ([address] )     unclear etiology, work up w EGD/COLON/TI bx/SBC/CT done   Wears glasses       Past Surgical History:     Past Surgical History:   Procedure Laterality Date    BREAST BIOPSY Right 03/01/2021    stereo    BREAST LUMPECTOMY Right 4/30/2021    Procedure: RIGHT BREAST BRACKETED DINA LOCALIZATION LUMPECTOMY;;  Surgeon: David Dumont MD;  Location[facility];  Service: Surgical Oncology    COLONOSCOPY  03/01/2017    5 yr recall    DILATION AND CURETTAGE OF UTERUS      EGD      HYSTEROSCOPY      INCONTINENCE SURGERY      bladder sling    LYMPH NODE BIOPSY Right 4/30/2021    Procedure: Bx LYMPH NODE SENTINEL;  Surgeon: David Dumont MD;  Location: [facility];  Service: Surgical Oncology    MAMMO NEEDLE LOCALIZATION LEFT (ALL INC) Right 4/19/2021    MAMMO NEEDLE LOCALIZATION LEFT (ALL INC) EACH ADD Right 4/19/2021    MAMMO STEREOTACTIC BREAST BIOPSY RIGHT (ALL INC) Right 3/1/2021    GA COLONOSCOPY FLX DX W/COLLJ SPEC WHEN PFRMD N/A 11/14/2016    Procedure: EGD AND COLONOSCOPY;  Surgeon: Julia Brian MD;  [facility];   Service: Gastroenterology    WISDOM TOOTH EXTRACTION        Social History:     Social History     Socioeconomic History    Marital status: /Civil Union     Spouse name: None    Number of children: 2    Years of education: None    Highest education level: None   Occupational History    None   Tobacco Use    Smoking status: Never Smoker    Smokeless tobacco: Never Used Vaping Use    Vaping Use: Never used   Substance and Sexual Activity    Alcohol use: Yes     Alcohol/week: 1 0 standard drink     Types: 1 Cans of beer per week    Drug use: No    Sexual activity: Yes     Partners: Male     Birth control/protection: Male Sterilization     Comment: nuva ring   Other Topics Concern    None   Social History Narrative    Caffeine use    [address] [facility]    Uses safety equipment Seatbelts     Social Determinants of Health     Financial Resource Strain: Low Risk     Difficulty of Paying Living Expenses: Not hard at all   Food Insecurity: No Food Insecurity    Worried About [address] [place] [address] in the Last Year: Never true    Rodolfo of Food in the Last Year: Never true   Transportation Needs: No Transportation Needs    Lack of Transportation (Medical): No    Lack of Transportation (Non-Medical):  No   Physical Activity: Not on file   Stress: Not on file   Social Connections: Not on file   Intimate Partner Violence: Not At Risk    Fear of Current or Ex-Partner: No    Emotionally Abused: No    Physically Abused: No    Sexually Abused: No   Housing Stability: Not on file      Family History:     Family History   Problem Relation Age of Onset    Cancer Mother     Breast cancer Mother 54    Colon polyps Mother     Diabetes Father         Type 2    Hypertension Father     Breast cancer Family     BRCA1 Negative Sister     BRCA2 Negative Sister     Breast cancer Maternal Aunt 64    Colon cancer Neg Hx       Current Medications:     Current Outpatient Medications   Medication Sig Dispense Refill    cholecalciferol (VITAMIN D3) 1,000 units tablet Take 1,000 Units by mouth daily      famotidine (PEPCID) 40 MG tablet TAKE 1 TABLET BY MOUTH DAILY AT BEDTIME 90 tablet 3    lamoTRIgine (LaMICtal) 150 MG tablet Take 150 mg by mouth 2 (two) times a day      magnesium oxide (MAG-OX) 400 mg Take 400 mg by mouth every evening       nystatin-triamcinolone (MYCOLOG-II) cream Apply topically 2 (two) times a day as needed (Itching/irritation) 30 g 0    tamoxifen (NOLVADEX) 20 mg tablet Take 1 tablet (20 mg total) by mouth daily 90 tablet 3    venlafaxine (EFFEXOR-XR) 37 5 mg 24 hr capsule Take 1 capsule (37 5 mg total) by mouth daily 30 capsule 11    levocetirizine (XYZAL) 5 MG tablet Take 5 mg by mouth       No current facility-administered medications for this visit  Allergies: Allergies   Allergen Reactions    Medical Tape Swelling and Rash     When tape from eyes removed after surgery developed high swelling of eyes/watery    Wound Dressings Swelling and Rash     After a surgery when tape removed from eyes high swelling noted/tears    Other Sneezing     seasonal      Physical Exam:     /78 (BP Location: Right arm, Patient Position: Sitting, Cuff Size: Large)   Pulse 102   Temp 98 °F (36 7 °C) (Tympanic)   Resp 16   Ht 5' 7 5" (1 715 m)   Wt 86 kg (189 lb 9 6 oz)   LMP 03/28/2021 (Approximate)   SpO2 97%   BMI 29 26 kg/m²     Physical Exam  Constitutional:       General: She is not in acute distress  Appearance: She is well-developed  She is not diaphoretic  HENT:      Head: Normocephalic and atraumatic  Right Ear: External ear normal       Left Ear: External ear normal       Nose: Nose normal       Mouth/Throat:      Pharynx: No oropharyngeal exudate  Eyes:      General: No scleral icterus  Right eye: No discharge  Left eye: No discharge  Pupils: Pupils are equal, round, and reactive to light  Neck:      Thyroid: No thyromegaly  Vascular: No JVD  Trachea: No tracheal deviation  Cardiovascular:      Rate and Rhythm: Normal rate and regular rhythm  Heart sounds: Normal heart sounds  No murmur heard  No friction rub  No gallop  Pulmonary:      Effort: Pulmonary effort is normal  No respiratory distress  Breath sounds: Normal breath sounds  No wheezing or rales     Chest:      Chest wall: No tenderness  Abdominal:      General: Bowel sounds are normal  There is no distension  Palpations: Abdomen is soft  There is no mass  Tenderness: There is no abdominal tenderness  There is no guarding or rebound  Hernia: No hernia is present  Musculoskeletal:         General: No tenderness or deformity  Normal range of motion  Cervical back: Normal range of motion and neck supple  Lymphadenopathy:      Cervical: No cervical adenopathy  Skin:     General: Skin is warm and dry  Coloration: Skin is not pale  Findings: No erythema or rash  Neurological:      Mental Status: She is alert and oriented to person, place, and time  Cranial Nerves: No cranial nerve deficit  Sensory: No sensory deficit  Motor: No abnormal muscle tone  Coordination: Coordination normal       Deep Tendon Reflexes: Reflexes normal    Psychiatric:         Behavior: Behavior normal          Thought Content:  Thought content normal           Aston Lu, DO  [address]

## 2022-07-12 ENCOUNTER — TELEPHONE (OUTPATIENT)
Dept: UROLOGY | Facility: MEDICAL CENTER | Age: 52
End: 2022-07-12

## 2022-07-12 DIAGNOSIS — N20.0 CALCULUS OF KIDNEY: Primary | ICD-10-CM

## 2022-07-12 NOTE — TELEPHONE ENCOUNTER
Patient had a virtual visit with Venita Pineda 05/2020    Patient is having some discomfort on the left side on her back  Patient has a history of kidney stones and she wants to know if she should have any imaging prior to setting up an appointment      Patient can come to the [facility] office     Patient can be reached at [phone] (Z)

## 2022-07-12 NOTE — TELEPHONE ENCOUNTER
Spoke to patient and advised KUB has been ordered to determine if she is developing renal stones again  Appointment has been scheduled for the [facility] office to follow up and review KUB  Location provided and confirmed  Advised to call the office in the meantime with any questions or concerns

## 2022-07-13 ENCOUNTER — APPOINTMENT (OUTPATIENT)
Dept: RADIOLOGY | Facility: CLINIC | Age: 52
End: 2022-07-13
Payer: COMMERCIAL

## 2022-07-13 DIAGNOSIS — N20.0 CALCULUS OF KIDNEY: ICD-10-CM

## 2022-07-13 PROCEDURE — 74018 RADEX ABDOMEN 1 VIEW: CPT

## 2022-07-19 ENCOUNTER — TELEPHONE (OUTPATIENT)
Dept: UROLOGY | Facility: AMBULATORY SURGERY CENTER | Age: 52
End: 2022-07-19

## 2022-07-19 NOTE — TELEPHONE ENCOUNTER
Was looking at the patient insurance and the, and the [employer] is a insurance that the urology office does not participate with  Please inform the patient that they'll be a self pay patient if they are seen on 7/26, or she can cancel the appointment

## 2022-07-19 NOTE — TELEPHONE ENCOUNTER
Patient informed of information in encounter from Дмитрий Flowers about non-par insurance  Patient will cancel visit and call her Insurance and find office that is par

## 2022-07-27 ENCOUNTER — OFFICE VISIT (OUTPATIENT)
Dept: SURGICAL ONCOLOGY | Facility: CLINIC | Age: 52
End: 2022-07-27
Payer: COMMERCIAL

## 2022-07-27 VITALS
DIASTOLIC BLOOD PRESSURE: 70 MMHG | RESPIRATION RATE: 18 BRPM | SYSTOLIC BLOOD PRESSURE: 111 MMHG | OXYGEN SATURATION: 98 % | HEIGHT: 68 IN | WEIGHT: 185.4 LBS | BODY MASS INDEX: 28.1 KG/M2 | TEMPERATURE: 98.5 F | HEART RATE: 94 BPM

## 2022-07-27 DIAGNOSIS — Z17.0 MALIGNANT NEOPLASM OF CENTRAL PORTION OF RIGHT BREAST IN FEMALE, ESTROGEN RECEPTOR POSITIVE (HCC): Primary | ICD-10-CM

## 2022-07-27 DIAGNOSIS — R92.2 DENSE BREAST TISSUE: ICD-10-CM

## 2022-07-27 DIAGNOSIS — Z79.810 USE OF TAMOXIFEN (NOLVADEX): ICD-10-CM

## 2022-07-27 DIAGNOSIS — C50.111 MALIGNANT NEOPLASM OF CENTRAL PORTION OF RIGHT BREAST IN FEMALE, ESTROGEN RECEPTOR POSITIVE (HCC): Primary | ICD-10-CM

## 2022-07-27 PROCEDURE — 99214 OFFICE O/P EST MOD 30 MIN: CPT | Performed by: SURGERY

## 2022-07-27 RX ORDER — LAMOTRIGINE 100 MG/1
TABLET ORAL
COMMUNITY
Start: 2022-05-01

## 2022-07-27 NOTE — PROGRESS NOTES
Surgical Oncology Follow Up       [facility] SURGICAL ONCOLOGY [facility] [address]    Daniella LUGO [facility] [place]  1970  [id number]  [facility] SURGICAL ONCOLOGY [facility] [address]    Chief Complaint   Patient presents with    Follow-up       Assessment/Plan   Diagnoses and all orders for this visit:    Malignant neoplasm of central portion of right breast in female, estrogen receptor positive ([facility] )  -     Mammo diagnostic bilateral w 3d & cad; Future    Dense breast tissue    Use of tamoxifen (Nolvadex)    Other orders  -     lamoTRIgine (LaMICtal) 100 mg tablet; TAKE 1 5 TABLETS BY MOUTH 2 TIMES A DAY  Advance Care Planning/Advance Directives:  Discussed disease status, cancer treatment plans and/or cancer treatment goals with the patient       Oncology History:    Oncology History   Malignant neoplasm of central portion of right breast in female, estrogen receptor positive ([facility] )   3/31/2021 Initial Diagnosis    Malignant neoplasm of central portion of right breast in female, estrogen receptor positive ([facility] )     3/31/2021 -  Cancer Staged    Staging form: Breast, AJCC 8th Edition  - Clinical: Stage IA (cT1, cN0, cM0, G2, ER+, AR+, HER2-) - Signed by Karli Aguila MD on 3/31/2021  Stage prefix: Initial diagnosis  Method of lymph node assessment: Clinical  Histologic grading system: 3 grade system       5/20/2021 -  Cancer Staged    Staging form: Breast, AJCC 8th Edition  - Pathologic: Stage IA (pT1c, pN0(sn), cM0, G2, ER+, AR+, HER2-) - Signed by Karli Aguila MD on 5/20/2021  Stage prefix: Initial diagnosis  Method of lymph node assessment: Rupert lymph node biopsy  Histologic grading system: 3 grade system           History of Present Illness:  Breast cancer follow-up, reports some mild asymmetry of the breasts, reports some stiffness in the arm from her sleeping position last night, completed physical therapy, continues on tamoxifen with hot flashes as noted, we will discuss this further with Dr Poncho Munroe  -Interval History:  As noted    Review of Systems:  Review of Systems   Constitutional: Negative for appetite change and fever  Hot flashes from the tamoxifen   Eyes: Negative  Respiratory: Negative for shortness of breath  Cardiovascular: Negative  Gastrointestinal: Negative  Endocrine: Negative  Genitourinary: Negative  Musculoskeletal: Negative  Negative for arthralgias and myalgias  Skin: Negative  Allergic/Immunologic: Negative  Neurological: Negative  Hematological: Negative  Negative for adenopathy  Does not bruise/bleed easily  Psychiatric/Behavioral: Negative          Patient Active Problem List   Diagnosis    Irregular menses    Encounter for surveillance of vaginal ring hormonal contraceptive device    Dense breast tissue    History of seizure    Allergic dermatitis    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 ([facility] )    Family history of breast cancer    SANDRA (juvenile myoclonic epilepsy) ([facility] )    Terminal ileitis ([facility] )    Malignant neoplasm of central portion of right breast in female, estrogen receptor positive ([facility] )    BRCA negative    History of anesthesia complications    Lymphomatoid papulosis ([facility] )    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    History of radiation therapy    Use of tamoxifen (Nolvadex)    Vaginal atrophy    Displaced fracture of shaft of fifth metacarpal bone, right hand, subsequent encounter for fracture with routine healing     Past Medical History:   Diagnosis Date    Abdominal pain     Anesthesia     "after tape removed from both eyes/developed swelling"    Anxiety     has autistic/special needs son    Breast cancer ([facility] ) 04/30/2021    RIGHT    Chronic UTI     2x/year or so /no symptoms today    Dysuria     resolved 10/05/16/occas/not lately    Encounter for screening colonoscopy     Environmental and seasonal allergies     Exercise involving cycling     2-3x/week spin classes    GERD (gastroesophageal reflux disease)     Heavy menses     Hiatal hernia     History of kidney stones     History of radiation therapy 2021    Right    Intestinal ulcer     Irritable bowel syndrome     Nausea     Seizures (HCC)     last seizure approx 3 yrs ago    Shortness of breath     "sometimes just sitting in chair or activity and having Echo 4/29"    Submucous leiomyoma of uterus     no recent problem    Terminal ileitis ([address] )     unclear etiology, work up w EGD/COLON/TI bx/SBC/CT done   Wears glasses      Past Surgical History:   Procedure Laterality Date    BREAST BIOPSY Right 03/01/2021    stereo    BREAST LUMPECTOMY Right 4/30/2021    Procedure: RIGHT BREAST BRACKETED DINA LOCALIZATION LUMPECTOMY;;  Surgeon: Alma Lambert MD;  [facility];  Service: Surgical Oncology    COLONOSCOPY  03/01/2017    5 yr recall    DILATION AND CURETTAGE OF UTERUS      EGD      HYSTEROSCOPY      INCONTINENCE SURGERY      bladder sling    LYMPH NODE BIOPSY Right 4/30/2021    Procedure: Bx LYMPH NODE SENTINEL;  Surgeon: Alma Lambert MD;  [facility];  Service: Surgical Oncology    MAMMO NEEDLE LOCALIZATION LEFT (ALL INC) Right 4/19/2021    MAMMO NEEDLE LOCALIZATION LEFT (ALL INC) EACH ADD Right 4/19/2021    MAMMO STEREOTACTIC BREAST BIOPSY RIGHT (ALL INC) Right 3/1/2021    CT COLONOSCOPY FLX DX W/COLLJ SPEC WHEN PFRMD N/A 11/14/2016    Procedure: EGD AND COLONOSCOPY;  Surgeon: Piper Cerrato MD;  Location: [facility];   Service: Gastroenterology    WISDOM TOOTH EXTRACTION       Family History   Problem Relation Age of Onset   [facility] Cancer Mother     Breast cancer Mother [address]    Colon polyps Mother     Diabetes Father         Type 2    Hypertension Father     Breast cancer Family     BRCA1 Negative Sister     BRCA2 Negative Sister     Breast cancer Maternal Aunt 64  Colon cancer Neg Hx      Social History     Socioeconomic History    Marital status: /Civil Union     Spouse name: Not on file    Number of children: 2    Years of education: Not on file    Highest education level: Not on file   Occupational History    Not on file   Tobacco Use    Smoking status: Never Smoker    Smokeless tobacco: Never Used   Vaping Use    Vaping Use: Never used   Substance and Sexual Activity    Alcohol use:  Yes     Alcohol/week: 1 0 standard drink     Types: 1 Cans of beer per week    Drug use: No    Sexual activity: Yes     Partners: Male     Birth control/protection: Male Sterilization     Comment: nuva ring   Other Topics Concern    Not on file   Social History Narrative    Caffeine use    [facility]    Uses safety equipment Seatbelts     Social Determinants of Health     Financial Resource Strain: Not on file   Food Insecurity: Not on file   Transportation Needs: Not on file   Physical Activity: Not on file   Stress: Not on file   Social Connections: Not on file   Intimate Partner Violence: Not At Risk    Fear of Current or Ex-Partner: No    Emotionally Abused: No    Physically Abused: No    Sexually Abused: No   Housing Stability: Not on file       Current Outpatient Medications:     cholecalciferol (VITAMIN D3) 1,000 units tablet, Take 1,000 Units by mouth daily, Disp: , Rfl:     famotidine (PEPCID) 40 MG tablet, TAKE 1 TABLET BY MOUTH DAILY AT BEDTIME, Disp: 90 tablet, Rfl: 3    lamoTRIgine (LaMICtal) 100 mg tablet, TAKE 1 5 TABLETS BY MOUTH 2 TIMES A DAY , Disp: , Rfl:     lamoTRIgine (LaMICtal) 150 MG tablet, Take 150 mg by mouth 2 (two) times a day, Disp: , Rfl:     levocetirizine (XYZAL) 5 MG tablet, Take 5 mg by mouth, Disp: , Rfl:     magnesium oxide (MAG-OX) 400 mg, Take 400 mg by mouth every evening , Disp: , Rfl:     nystatin-triamcinolone (MYCOLOG-II) cream, Apply topically 2 (two) times a day as needed (Itching/irritation), Disp: 30 g, Rfl: 0    tamoxifen (NOLVADEX) 20 mg tablet, Take 1 tablet (20 mg total) by mouth daily, Disp: 90 tablet, Rfl: 3    venlafaxine (EFFEXOR-XR) 37 5 mg 24 hr capsule, Take 1 capsule (37 5 mg total) by mouth daily, Disp: 30 capsule, Rfl: 11  Allergies   Allergen Reactions    Latex Rash and Swelling     When tape from eyes removed after surgery developed high swelling of eyes/watery    Medical Tape Swelling and Rash     When tape from eyes removed after surgery developed high swelling of eyes/watery    Silver Rash and Swelling     After a surgery when tape removed from eyes high swelling noted/tears    Wound Dressings Swelling and Rash     After a surgery when tape removed from eyes high swelling noted/tears    Other Sneezing and Rash     seasonal       The following portions of the patient's history were reviewed and updated as appropriate: allergies, current medications, past family history, past medical history, past social history, past surgical history and problem list         Vitals:    07/27/22 1353   BP: 111/70   Pulse: 94   Resp: 18   Temp: 98 5 °F (36 9 °C)   SpO2: 98%       Physical Exam  Constitutional:       General: She is not in acute distress  Appearance: Normal appearance  She is well-developed  HENT:      Head: Normocephalic and atraumatic  Cardiovascular:      Heart sounds: Normal heart sounds  Pulmonary:      Breath sounds: Normal breath sounds  Chest:   Breasts:      Right: Skin change (Lumpectomy scar) present  No inverted nipple, mass, nipple discharge, tenderness, axillary adenopathy or supraclavicular adenopathy  Left: No inverted nipple, mass, nipple discharge, skin change, tenderness, axillary adenopathy or supraclavicular adenopathy  Abdominal:      Palpations: Abdomen is soft  Lymphadenopathy:      Upper Body:      Right upper body: No supraclavicular, axillary or pectoral adenopathy        Left upper body: No supraclavicular, axillary or pectoral adenopathy  Neurological:      Mental Status: She is alert and oriented to person, place, and time  Psychiatric:         Mood and Affect: Mood normal            Results:  Labs:      Imaging  02/15/2022 bilateral 3D diagnostic mammogram was benign BI-RADS two with a density of four    I reviewed the above imaging data  Discussion/Summary:  40-year-old female status post right breast conservation for a stage IA invasive duct carcinoma  She had adjuvant radiation  She continues on tamoxifen with hot flashes  She will discuss this further with her medical oncologist   There is no evidence of disease based on exam today  I will make arrangements for her mammogram due in February  She will then be seen next in our survivorship Clinic with my nurse practitioner    I will therefore see her again in one year or sooner should the need arise

## 2022-09-16 ENCOUNTER — TELEPHONE (OUTPATIENT)
Dept: HEMATOLOGY ONCOLOGY | Facility: CLINIC | Age: 52
End: 2022-09-16

## 2022-09-16 NOTE — TELEPHONE ENCOUNTER
Emma Jaramillo calling from radiation oncology [place],  Last note was from 5/20/21  She would like Dr Bianca Schuster to send any recent patient notes  Please fax to [phone]  Patient is being seen Monday 9/19/22  Any questions please call Emma Jaramillo [phone]

## 2022-09-19 ENCOUNTER — TELEPHONE (OUTPATIENT)
Dept: HEMATOLOGY ONCOLOGY | Facility: CLINIC | Age: 52
End: 2022-09-19

## 2022-09-19 NOTE — TELEPHONE ENCOUNTER
Appointment Cancellation Or Reschedule     Person calling in Patient    Provider [facility]   Office Visit Date and Time 10/07 at 9:30am   Office Visit Location [facility]   Did patient want to reschedule their office appointment? If so, when was it scheduled to? Yes, 10/17 at 9:00am with Dr Zeinab Flood    Did you have STAR scheduled for this appointment? no   Do you need STAR set up for your new appointment? If yes, please send to "PATIENT RIDESHARE" pool for STAR rescheduling no   If you are cancelling appointment, can we notify STAR to cancel ride? If yes, please send to "PATIENT RIDESHARE" pool for STAR to cancel service no   Is this patient calling to reschedule an infusion appointment? no   When is their next infusion appointment? n/a   Is this patient a Chemo patient? no   Reason for Cancellation or Reschedule Patient will only want to be seen by Dr Zeinab Flood      If the patient is a treatment patient, please route this to the office nurse  If the patient is not on treatment, please route to the office MA  If the patient is a surgical oncology patient, please route to surg/onc clinical pool

## 2022-09-30 ENCOUNTER — OFFICE VISIT (OUTPATIENT)
Dept: GASTROENTEROLOGY | Facility: CLINIC | Age: 52
End: 2022-09-30
Payer: COMMERCIAL

## 2022-09-30 VITALS
HEIGHT: 68 IN | BODY MASS INDEX: 28.89 KG/M2 | DIASTOLIC BLOOD PRESSURE: 78 MMHG | WEIGHT: 190.6 LBS | SYSTOLIC BLOOD PRESSURE: 108 MMHG

## 2022-09-30 DIAGNOSIS — Z83.71 FAMILY HISTORY OF POLYPS IN THE COLON: ICD-10-CM

## 2022-09-30 DIAGNOSIS — K21.9 GASTROESOPHAGEAL REFLUX DISEASE, UNSPECIFIED WHETHER ESOPHAGITIS PRESENT: Primary | ICD-10-CM

## 2022-09-30 DIAGNOSIS — K92.1 BLOOD IN STOOL: ICD-10-CM

## 2022-09-30 DIAGNOSIS — K50.00 TERMINAL ILEITIS WITHOUT COMPLICATION (HCC): ICD-10-CM

## 2022-09-30 PROCEDURE — 99213 OFFICE O/P EST LOW 20 MIN: CPT | Performed by: NURSE PRACTITIONER

## 2022-09-30 RX ORDER — FAMOTIDINE 40 MG/1
40 TABLET, FILM COATED ORAL 2 TIMES DAILY
Qty: 60 TABLET | Refills: 6 | Status: SHIPPED | OUTPATIENT
Start: 2022-09-30 | End: 2022-10-25

## 2022-09-30 RX ORDER — POLYETHYLENE GLYCOL 3350, SODIUM SULFATE ANHYDROUS, SODIUM BICARBONATE, SODIUM CHLORIDE, POTASSIUM CHLORIDE 236; 22.74; 6.74; 5.86; 2.97 G/4L; G/4L; G/4L; G/4L; G/4L
4000 POWDER, FOR SOLUTION ORAL ONCE
Qty: 4000 ML | Refills: 0 | Status: SHIPPED | OUTPATIENT
Start: 2022-09-30 | End: 2022-10-25

## 2022-09-30 NOTE — PROGRESS NOTES
[address] [facility] - Outpatient Follow-up Note  Jacki Carrasquillo 46 y o  female MRN: [id number]  Encounter: [id number]    ASSESSMENT AND PLAN:      1  Terminal ileitis without complication ([address] )  History of terminal ileitis while on NSAIDs  Workup in 2017 was negative  Continue to monitor for evidence of inflammatory bowel disease  2  Blood in stool  Patient states her blood in stool is most likely due to inflamed hemorrhoids  Patient states recently her hemorrhoids had been slightly inflamed  She has been having occasional hematochezia to the toilet tissue  Bleeding is likely due to hemorrhoids  Patient is due for surveillance colonoscopy  - preparation H as directed  If continue or worsen, consider Anusol cream or suppositories    3  Gastroesophageal reflux disease, unspecified whether esophagitis present  Patient states she is having a little acid reflux breakthrough during the day  She is currently taking famotidine 40 mg HS  Discussed increase the famotidine to 40 mg twice daily  Trial famotidine twice daily and possibly be able to wean down to once daily  Patient does state increased acid reflux symptoms likely due to poor dietary discretion  Trial famotidine 40 mg twice daily, patient may wean back down to once daily with improved dietary discretion and decreased or absent acid reflux symptoms or breakthrough symptoms  Last EGD 11/2020, dilatation  Patient states she does not have any difficulty swallowing at this time  - famotidine (PEPCID) 40 MG tablet; Take 1 tablet (40 mg total) by mouth 2 (two) times a day  Dispense: 60 tablet; Refill: 6    4  Family history of polyps in the colon  Colonoscopy 2017; 5 year recall    - Colonoscopy scheduled at [facility])  - polyethylene glycol (Golytely) 4000 mL solution; Take 4,000 mL by mouth once for 1 dose Take 4000 mL by mouth once for 1 dose   Use as directed  Dispense: 4000 mL; Refill: 0  - Ambulatory referral for colonoscopy      Followup Appointment:  3-4 weeks after procedure  ______________________________________________________________________    Chief Complaint   Patient presents with    Intermittent rectal bleeding     HPI:  80-year-old female with history of esophageal dysphagia, terminal ileitis without complication and heartburn presents with blood in stool  Patient states she is due for colonoscopy as of 03/2022  Patient states she does not have any swallowing difficulty, last EGD was 11/2020 with dilatation 48-52fr, small hiatal hernia  On exam she denies nausea, vomiting  States he is having increased and cage in all acid reflux  She is currently taking famotidine 40 mg HS  Discussed with patient increasing her to famotidine 40 mg b i d   Patient does complain of abdominal discomfort that comes and goes infrequently  She does note left lower quadrant discomfort increased with palpation  Patient states her weight is stable  States she is having daily normal bowel movements  States she does have periods of hematochezia however notes her hemorrhoids  And they have been flaring  Denies melena  Nonsmoker, occasional ETOH use  Patient's last colonoscopy 2017 with 5 year recall      Historical Information   Past Medical History:   Diagnosis Date    Abdominal pain     Anesthesia     "after tape removed from both eyes/developed swelling"    Anxiety     has autistic/special needs son    [facility] ) 04/30/2021    RIGHT    Chronic UTI     2x/year or so /no symptoms today    Dysuria     resolved 10/05/16/occas/not lately    Encounter for screening colonoscopy     Environmental and seasonal allergies     Exercise involving cycling     2-3x/week spin classes    GERD (gastroesophageal reflux disease)     Heavy menses     Hiatal hernia     History of kidney stones     History of radiation therapy 2021    Right    Intestinal ulcer     Irritable bowel syndrome     Nausea     Seizures ([facility] ) last seizure approx 3 yrs ago    Shortness of breath     "sometimes just sitting in chair or activity and having Echo 4/29"    Submucous leiomyoma of uterus     no recent problem    Terminal ileitis ([address] )     unclear etiology, work up w EGD/COLON/TI bx/SBC/CT done   Wears glasses      Past Surgical History:   Procedure Laterality Date    BREAST BIOPSY Right 03/01/2021    stereo    BREAST LUMPECTOMY Right 4/30/2021    Procedure: RIGHT BREAST BRACKETED DINA LOCALIZATION LUMPECTOMY;;  Surgeon: Veto Eisenmenger, MD;  [facility];  Service: Surgical Oncology    COLONOSCOPY  03/01/2017    5 yr recall    DILATION AND CURETTAGE OF UTERUS      EGD      HYSTEROSCOPY      INCONTINENCE SURGERY      bladder sling    LYMPH NODE BIOPSY Right 4/30/2021    Procedure: Bx LYMPH NODE SENTINEL;  Surgeon: Veto Eisenmenger, MD;  [facility];  Service: Surgical Oncology    MAMMO NEEDLE LOCALIZATION LEFT (ALL INC) Right 4/19/2021    MAMMO NEEDLE LOCALIZATION LEFT (ALL INC) EACH ADD Right 4/19/2021    MAMMO STEREOTACTIC BREAST BIOPSY RIGHT (ALL INC) Right 3/1/2021    LA COLONOSCOPY FLX DX W/COLLJ SPEC WHEN PFRMD N/A 11/14/2016    Procedure: EGD AND COLONOSCOPY;  Surgeon: Pennie Castellanos MD;  Location[facility];   Service: Gastroenterology    WISDOM TOOTH EXTRACTION       Social History     Substance and Sexual Activity   Alcohol Use Yes    Alcohol/week: 1 0 standard drink    Types: 1 Cans of beer per week     Social History     Substance and Sexual Activity   Drug Use No     Social History     Tobacco Use   Smoking Status Never Smoker   Smokeless Tobacco Never Used     Family History   Problem Relation Age of Onset    Cancer Mother     Breast cancer Mother 54    Colon polyps Mother     Diabetes Father         Type 2    Hypertension Father     Breast cancer Family     BRCA1 Negative Sister     BRCA2 Negative Sister     Breast cancer Maternal Aunt 64    Colon cancer Neg Hx          Current Outpatient Medications:     cholecalciferol (VITAMIN D3) 1,000 units tablet    famotidine (PEPCID) 40 MG tablet    lamoTRIgine (LaMICtal) 150 MG tablet    levocetirizine (XYZAL) 5 MG tablet    magnesium oxide (MAG-OX) 400 mg    nystatin-triamcinolone (MYCOLOG-II) cream    polyethylene glycol (Golytely) 4000 mL solution    tamoxifen (NOLVADEX) 20 mg tablet    venlafaxine (EFFEXOR-XR) 37 5 mg 24 hr capsule    lamoTRIgine (LaMICtal) 100 mg tablet  Allergies   Allergen Reactions    Latex Rash and Swelling     When tape from eyes removed after surgery developed high swelling of eyes/watery    Medical Tape Swelling and Rash     When tape from eyes removed after surgery developed high swelling of eyes/watery    Silver Rash and Swelling     After a surgery when tape removed from eyes high swelling noted/tears    Wound Dressings Swelling and Rash     After a surgery when tape removed from eyes high swelling noted/tears    Other Sneezing and Rash     seasonal     Reviewed medications and allergies and updated as indicated    PHYSICAL EXAM:    Blood pressure 108/78, height 5' 7 5" (1 715 m), weight 86 5 kg (190 lb 9 6 oz), last menstrual period 03/28/2021, not currently breastfeeding  Body mass index is 29 41 kg/m²  General Appearance: NAD, cooperative, alert  Eyes: Anicteric, PERRLA, EOMI  ENT:  Normocephalic, atraumatic, normal mucosa  Neck:  Supple, symmetrical, trachea midline  Resp:  Clear to auscultation bilaterally; no rales, rhonchi or wheezing; respirations unlabored   CV:  S1 S2, Regular rate and rhythm; no murmur, rub, or gallop  GI:  Soft, left lower quadrant discomfort with palpation, non-distended; normal bowel sounds; no masses, no organomegaly   Rectal: Deferred  Musculoskeletal: No cyanosis, clubbing or edema  Normal ROM    Skin:  No jaundice, rashes, or lesions   Heme/Lymph: No palpable cervical lymphadenopathy  Psych: Normal affect, good eye contact  Neuro: No gross deficits, AAOx3    Lab Results:   Lab Results   Component Value Date    WBC 5 03 03/30/2022    HGB 12 7 03/30/2022    HCT 38 5 03/30/2022    MCV 97 03/30/2022     03/30/2022     Lab Results   Component Value Date     07/22/2015    K 4 2 03/30/2022     03/30/2022    CO2 27 03/30/2022    ANIONGAP 10 07/22/2015    BUN 23 03/30/2022    CREATININE 0 97 03/30/2022    GLUCOSE 78 07/22/2015    CALCIUM 9 3 03/30/2022    CORRECTEDCA 9 8 03/30/2022    AST 20 03/30/2022    ALT 28 03/30/2022    ALKPHOS 69 03/30/2022    PROT 6 7 07/22/2015    BILITOT 0 43 07/22/2015    EGFR 67 03/30/2022     No results found for: IRON, TIBC, FERRITIN  Lab Results   Component Value Date    LIPASE 157 04/08/2020       Radiology Results:   No results found

## 2022-10-05 ENCOUNTER — TELEPHONE (OUTPATIENT)
Dept: GYNECOLOGY | Facility: CLINIC | Age: 52
End: 2022-10-05

## 2022-10-05 ENCOUNTER — TELEPHONE (OUTPATIENT)
Dept: HEMATOLOGY ONCOLOGY | Facility: CLINIC | Age: 52
End: 2022-10-05

## 2022-10-05 DIAGNOSIS — N95.0 POST-MENOPAUSAL BLEEDING: Primary | ICD-10-CM

## 2022-10-05 NOTE — TELEPHONE ENCOUNTER
Patient called c/o heavy bleeding since 1:00 AM, gushing out, soaking 1 pad per hour, maybe more  Also c/o cramping and back pain  No menses x 2 years  Had breast cancer last year and has been taking Tamoxifen  Called GYN ONC and was told to stop Tamoxifen and call her GYN  Her last Yearly was in January 2022

## 2022-10-05 NOTE — TELEPHONE ENCOUNTER
Voicemail received:    Hi, my name is Herson El  I'm a patient of doctor Nicola Castillo I'm currently taking tamoxifen and I woke up early this morning with heavy bleeding and cramping  I'm not sure what you want me to do  My number is [phone]  Thank you  If you'll call me back soon, I would appreciate it  Thanks  farrukh Page     Returned call to patient, vaginal bleeding started last night  Pt is bleeding through thin pads  Has switched to a heavier pad  She believes there may be some clots in the toilet, but she is unsure  She has not had her menses in 2 years  She has a call in to her GYN and is waiting for a call back  If she cannot get into see them today, she will be taking herself to the ED  Pt will call us back with an update  I advised her to hold her Tamoxifen until further notice  She was agreeable

## 2022-10-05 NOTE — TELEPHONE ENCOUNTER
Per Dr Miguel Pedraza called patient back about her bleeding  Rx will be given for Aygestin  Patient given appointment for SIS tomorrow morning, all instructions given  For today advised take Rx as directed but take 2 today, rest, elevate legs, increase fluids

## 2022-10-05 NOTE — TELEPHONE ENCOUNTER
Agree  We will treat with Aygestin today given the significance of the bleeding  Recommend ibuprofen prior to the sonohysterogram/endometrial biopsy tomorrow

## 2022-10-05 NOTE — TELEPHONE ENCOUNTER
Agree as we discussed  Recommend ibuprofen and rest   Patient has appointment tomorrow morning for evaluation

## 2022-10-06 ENCOUNTER — PROCEDURE VISIT (OUTPATIENT)
Dept: GYNECOLOGY | Facility: CLINIC | Age: 52
End: 2022-10-06
Payer: COMMERCIAL

## 2022-10-06 ENCOUNTER — TELEPHONE (OUTPATIENT)
Dept: GYNECOLOGY | Facility: CLINIC | Age: 52
End: 2022-10-06

## 2022-10-06 ENCOUNTER — ULTRASOUND (OUTPATIENT)
Dept: GYNECOLOGY | Facility: CLINIC | Age: 52
End: 2022-10-06
Payer: COMMERCIAL

## 2022-10-06 VITALS
BODY MASS INDEX: 28.79 KG/M2 | DIASTOLIC BLOOD PRESSURE: 84 MMHG | SYSTOLIC BLOOD PRESSURE: 138 MMHG | WEIGHT: 190 LBS | HEIGHT: 68 IN

## 2022-10-06 DIAGNOSIS — N95.0 PMB (POSTMENOPAUSAL BLEEDING): Primary | ICD-10-CM

## 2022-10-06 DIAGNOSIS — Z17.0 MALIGNANT NEOPLASM OF CENTRAL PORTION OF RIGHT BREAST IN FEMALE, ESTROGEN RECEPTOR POSITIVE (HCC): ICD-10-CM

## 2022-10-06 DIAGNOSIS — C50.111 MALIGNANT NEOPLASM OF CENTRAL PORTION OF RIGHT BREAST IN FEMALE, ESTROGEN RECEPTOR POSITIVE (HCC): ICD-10-CM

## 2022-10-06 DIAGNOSIS — N83.202 LEFT OVARIAN CYST: ICD-10-CM

## 2022-10-06 DIAGNOSIS — Z79.810 USE OF TAMOXIFEN (NOLVADEX): Primary | ICD-10-CM

## 2022-10-06 DIAGNOSIS — N92.1 MENORRHAGIA WITH IRREGULAR CYCLE: ICD-10-CM

## 2022-10-06 DIAGNOSIS — N95.0 POSTMENOPAUSAL BLEEDING: ICD-10-CM

## 2022-10-06 PROBLEM — N92.0 HEAVY MENSTRUAL BLEEDING: Status: ACTIVE | Noted: 2022-10-06

## 2022-10-06 PROCEDURE — 76830 TRANSVAGINAL US NON-OB: CPT | Performed by: OBSTETRICS & GYNECOLOGY

## 2022-10-06 PROCEDURE — 58340 CATHETER FOR HYSTEROGRAPHY: CPT | Performed by: OBSTETRICS & GYNECOLOGY

## 2022-10-06 PROCEDURE — 76831 ECHO EXAM UTERUS: CPT | Performed by: OBSTETRICS & GYNECOLOGY

## 2022-10-06 PROCEDURE — 99215 OFFICE O/P EST HI 40 MIN: CPT | Performed by: OBSTETRICS & GYNECOLOGY

## 2022-10-06 PROCEDURE — 88305 TISSUE EXAM BY PATHOLOGIST: CPT | Performed by: PATHOLOGY

## 2022-10-06 PROCEDURE — 58100 BIOPSY OF UTERUS LINING: CPT | Performed by: OBSTETRICS & GYNECOLOGY

## 2022-10-06 NOTE — PROGRESS NOTES
Assessment/Plan   Diagnoses and all orders for this visit:    Use of tamoxifen (Nolvadex)    Postmenopausal bleeding    Malignant neoplasm of central portion of right breast in female, estrogen receptor positive ([address] )    Menorrhagia with irregular cycle    Left ovarian cyst    1   Episode of heavy postmenopausal bleeding-patient on tamoxifen, diagnosed with breast cancer since last year  Reviewing the chart, it appears she has been on it since June 2021  She had episode of heavy menstrual bleeding yesterday  She was changing pad every hour throughout the day  She was alarmed by this  She called her medical oncologist and they recommended stopping the tamoxifen  She did stop this along with the Effexor that she uses for side effects  She was prescribed Aygestin 5 mg tablets for the heavy bleeding  She took 2 of the tablets yesterday  Her bleeding is substantially lighter today, used only 1 pad overnight 1  Pad this morning  We discussed options  She will continue Aygestin 5 mg daily at this time  Should she have any heavy bleeding, severe fatigue, all orthostatic symptoms, she will call for further evaluation  Sonohysterogram demonstrated markedly thickened endometrium almost 25 mm  Sonohysterogram was limited due to this thickening, possible polyp tissue was noted  Endometrial biopsy was done and we will inform the patient of the findings  Given all this, would recommend hysteroscopy with D&C with endometrial polypectomy if found  Patient is in agreement  Risks and benefits of the procedure were discussed in detail and all questions were answered  Consent was signed, the patient will be scheduled for the surgery in the near future  She was counseled that tamoxifen can stimulate the endometrium, possibly increasing risk for endometrial polyps, endometrial hyperplasia and even endometrial cancer  She will follow-up with Dr Naveed Li regarding management of this treatment    2  History right breast cancer-status post lumpectomy, radiation therapy, and tamoxifen as noted above  It is documented ER/SD positive  She is aware that Aygestin is progesterone  Given the marked volume of bleeding yesterday, would continue Aygestin at this time  Hopefully, we can limit use of progesterone to this episode  We will proceed accordingly going forward  3  Left ovarian cyst -2 7 cm cyst found left ovary  It appears benign on ultrasound today  Mild left adnexal tenderness noted on exam   We discussed this at some length  We will follow expectantly, plan repeat ultrasound in about 8 weeks time  4  Menopausal-last menses April 2020 prior to this episode  5  Vaginal atrophy  6  Prior yeast infection  7  History of seizure disorder/perimenopause/urinary frequency-no current concerns  Follow-up 2 weeks postprocedure  Subjective   Patient ID: Shavonne Hutson is a 46 y o  female  Vitals:    10/06/22 0747   BP: 138/84     Patient was seen today for sonohysterogram with endometrial biopsy with me  Please see assessment plan procedure note for details        The following portions of the patient's history were reviewed and updated as appropriate: allergies, current medications, past family history, past medical history, past social history, past surgical history and problem list   Past Medical History:   Diagnosis Date    Abdominal pain     Anesthesia     "after tape removed from both eyes/developed swelling"    Anxiety     has autistic/special needs son    Breast cancer ([facility] [address] ) 04/30/2021    RIGHT    Chronic UTI     2x/year or so /no symptoms today    Dysuria     resolved 10/05/16/occas/not lately    Encounter for screening colonoscopy     Environmental and seasonal allergies     Exercise involving cycling     2-3x/week spin classes    GERD (gastroesophageal reflux disease)     Heavy menses     Hiatal hernia     History of kidney stones     History of radiation therapy 2021    Right    Intestinal ulcer     Irritable bowel syndrome     Nausea     Seizures (HCC)     last seizure approx 3 yrs ago    Shortness of breath     "sometimes just sitting in chair or activity and having Echo "    Submucous leiomyoma of uterus     no recent problem    Terminal ileitis ([address] )     unclear etiology, work up w EGD/COLON/TI bx/SBC/CT done   Wears glasses      Past Surgical History:   Procedure Laterality Date    BREAST BIOPSY Right 2021    stereo    BREAST LUMPECTOMY Right 2021    Procedure: RIGHT BREAST BRACKETED DINA LOCALIZATION LUMPECTOMY;;  Surgeon: Blake Quintanilla MD;  [facility];  Service: Surgical Oncology    COLONOSCOPY  2017    5 yr recall    DILATION AND CURETTAGE OF UTERUS      EGD      HYSTEROSCOPY      INCONTINENCE SURGERY      bladder sling    LYMPH NODE BIOPSY Right 2021    Procedure: Bx LYMPH NODE SENTINEL;  Surgeon: Blake Quintanilla MD;  [facility];  Service: Surgical Oncology    MAMMO NEEDLE LOCALIZATION LEFT (ALL INC) Right 2021    MAMMO NEEDLE LOCALIZATION LEFT (ALL INC) EACH ADD Right 2021    MAMMO STEREOTACTIC BREAST BIOPSY RIGHT (ALL INC) Right 3/1/2021    [place] COLONOSCOPY FLX DX W/COLLJ SPEC WHEN PFRMD N/A 2016    Procedure: EGD AND COLONOSCOPY;  Surgeon: Tayla Frye MD;  Location: [facility];   Service: Gastroenterology    WISDOM TOOTH EXTRACTION       OB History    Para Term  AB Living   2 2 2         SAB IAB Ectopic Multiple Live Births                  # Outcome Date GA Lbr Noam/2nd Weight Sex Delivery Anes PTL Lv   2 Term            1 Term               Obstetric Comments   Menarche-13   Age at first pregnancy-26   nuvaring-about 8 yrs, currently using       Current Outpatient Medications:     cholecalciferol (VITAMIN D3) 1,000 units tablet, Take 1,000 Units by mouth daily, Disp: , Rfl:     famotidine (PEPCID) 40 MG tablet, Take 1 tablet (40 mg total) by mouth 2 (two) times a day, Disp: 60 tablet, Rfl: 6    lamoTRIgine (LaMICtal) 100 mg tablet, TAKE 1 5 TABLETS BY MOUTH 2 TIMES A DAY  (Patient not taking: Reported on 9/30/2022), Disp: , Rfl:     lamoTRIgine (LaMICtal) 150 MG tablet, Take 150 mg by mouth 2 (two) times a day, Disp: , Rfl:     levocetirizine (XYZAL) 5 MG tablet, Take 5 mg by mouth, Disp: , Rfl:     magnesium oxide (MAG-OX) 400 mg, Take 400 mg by mouth every evening , Disp: , Rfl:     norethindrone (Aygestin) 5 mg tablet, Take 2 tablets po today  Then 1 daily  , Disp: 30 tablet, Rfl: 1    nystatin-triamcinolone (MYCOLOG-II) cream, Apply topically 2 (two) times a day as needed (Itching/irritation), Disp: 30 g, Rfl: 0    polyethylene glycol (Golytely) 4000 mL solution, Take 4,000 mL by mouth once for 1 dose Take 4000 mL by mouth once for 1 dose   Use as directed, Disp: 4000 mL, Rfl: 0    tamoxifen (NOLVADEX) 20 mg tablet, Take 1 tablet (20 mg total) by mouth daily, Disp: 90 tablet, Rfl: 3    venlafaxine (EFFEXOR-XR) 37 5 mg 24 hr capsule, Take 1 capsule (37 5 mg total) by mouth daily, Disp: 30 capsule, Rfl: 11  Allergies   Allergen Reactions    Latex Rash and Swelling     When tape from eyes removed after surgery developed high swelling of eyes/watery    Medical Tape Swelling and Rash     When tape from eyes removed after surgery developed high swelling of eyes/watery    Silver Rash and Swelling     After a surgery when tape removed from eyes high swelling noted/tears    Wound Dressings Swelling and Rash     After a surgery when tape removed from eyes high swelling noted/tears    Other Sneezing and Rash     seasonal     Social History     Socioeconomic History    Marital status: /Civil Union     Spouse name: Not on file    Number of children: 2    Years of education: Not on file    Highest education level: Not on file   Occupational History    Not on file   Tobacco Use    Smoking status: Never Smoker    Smokeless tobacco: Never Used   Vaping Use    Vaping Use: Never used Substance and Sexual Activity    Alcohol use: Yes     Alcohol/week: 1 0 standard drink     Types: 1 Cans of beer per week    Drug use: No    Sexual activity: Yes     Partners: Male     Birth control/protection: Male Sterilization     Comment: nuva ring   Other Topics Concern    Not on file   Social History Narrative    Caffeine use    [address] [facility]    Uses safety equipment Seatbelts     Social Determinants of Health     Financial Resource Strain: Not on file   Food Insecurity: Not on file   Transportation Needs: Not on file   Physical Activity: Not on file   Stress: Not on file   Social Connections: Not on file   Intimate Partner Violence: Not At Risk    Fear of Current or Ex-Partner: No    Emotionally Abused: No    Physically Abused: No    Sexually Abused: No   Housing Stability: Not on file     Family History   Problem Relation Age of Onset    Cancer Mother     Breast cancer Mother 54    Colon polyps Mother     Diabetes Father         Type 2    Hypertension Father     Breast cancer Family     BRCA1 Negative Sister     BRCA2 Negative Sister     Breast cancer Maternal Aunt 64    Colon cancer Neg Hx        Review of Systems   Constitutional: Negative for chills, diaphoresis, fatigue and fever  Respiratory: Negative for apnea, cough, chest tightness, shortness of breath and wheezing  Cardiovascular: Negative for chest pain, palpitations and leg swelling  Gastrointestinal: Negative for abdominal distention, abdominal pain, anal bleeding, constipation, diarrhea, nausea, rectal pain and vomiting  Genitourinary: Negative for difficulty urinating, dyspareunia, dysuria, frequency, hematuria, menstrual problem, pelvic pain, urgency, vaginal bleeding, vaginal discharge and vaginal pain  Musculoskeletal: Negative for arthralgias, back pain and myalgias  Skin: Negative for color change and rash     Neurological: Negative for dizziness, syncope, light-headedness, numbness and headaches  Hematological: Negative for adenopathy  Does not bruise/bleed easily  Psychiatric/Behavioral: Negative for dysphoric mood and sleep disturbance  The patient is not nervous/anxious  Objective   Physical Exam  OBGyn Exam     Objective      /84 (BP Location: Left arm, Patient Position: Sitting)   Ht 5' 7 5" (1 715 m)   Wt 86 2 kg (190 lb)   LMP 03/28/2021 (Approximate)   BMI 29 32 kg/m²     General:   alert and oriented, in no acute distress   Neck:    Breast:    Heart: regular rate and rhythm, S1, S2 normal, no murmur, click, rub or gallop   Lungs: clear to auscultation bilaterally   Abdomen: soft, non-tender, without masses or organomegaly   Vulva: normal   Vagina: Moderate amount of menstrual blood, without erythema or lesions noted  Cervix: Moderate amount of menstrual blood at the os, without lesions or cervicitis  No Cervical motion tenderness   Uterus: top normal size, mid-position, non-tender   Adnexa: Mildly tender to the left, no mass appreciated  Right was negative     Rectum: deferred    Psych:  Normal mood and affect   Skin:  Without obvious lesions   Eyes: symmetric, with normal movements and reactivity   Musculoskeletal:  Normal muscle tone and movements appreciated

## 2022-10-06 NOTE — TELEPHONE ENCOUNTER
Agree as we discussed  If any heavy bleeding, severe pain, fevers, or orthostatic symptoms, would recommend she seek emergency care

## 2022-10-06 NOTE — Clinical Note
Valdo Amaya, Patient with episode of heavy postmenopausal bleeding yesterday  Has history of breast cancer, on tamoxifen  Needs to have D&C in the near future, ideally in the next few weeks  Please review with me   Thanks, Vazquez Quinteros MD

## 2022-10-06 NOTE — H&P (VIEW-ONLY)
Assessment/Plan   Diagnoses and all orders for this visit:    Use of tamoxifen (Nolvadex)    Postmenopausal bleeding    Malignant neoplasm of central portion of right breast in female, estrogen receptor positive ([address] )    Menorrhagia with irregular cycle    Left ovarian cyst    1   Episode of heavy postmenopausal bleeding-patient on tamoxifen, diagnosed with breast cancer since last year  Reviewing the chart, it appears she has been on it since June 2021  She had episode of heavy menstrual bleeding yesterday  She was changing pad every hour throughout the day  She was alarmed by this  She called her medical oncologist and they recommended stopping the tamoxifen  She did stop this along with the Effexor that she uses for side effects  She was prescribed Aygestin 5 mg tablets for the heavy bleeding  She took 2 of the tablets yesterday  Her bleeding is substantially lighter today, used only 1 pad overnight 1  Pad this morning  We discussed options  She will continue Aygestin 5 mg daily at this time  Should she have any heavy bleeding, severe fatigue, all orthostatic symptoms, she will call for further evaluation  Sonohysterogram demonstrated markedly thickened endometrium almost 25 mm  Sonohysterogram was limited due to this thickening, possible polyp tissue was noted  Endometrial biopsy was done and we will inform the patient of the findings  Given all this, would recommend hysteroscopy with D&C with endometrial polypectomy if found  Patient is in agreement  Risks and benefits of the procedure were discussed in detail and all questions were answered  Consent was signed, the patient will be scheduled for the surgery in the near future  She was counseled that tamoxifen can stimulate the endometrium, possibly increasing risk for endometrial polyps, endometrial hyperplasia and even endometrial cancer  She will follow-up with Dr Alfonso Givens regarding management of this treatment    2  History right breast cancer-status post lumpectomy, radiation therapy, and tamoxifen as noted above  It is documented ER/TN positive  She is aware that Aygestin is progesterone  Given the marked volume of bleeding yesterday, would continue Aygestin at this time  Hopefully, we can limit use of progesterone to this episode  We will proceed accordingly going forward  3  Left ovarian cyst -2 7 cm cyst found left ovary  It appears benign on ultrasound today  Mild left adnexal tenderness noted on exam   We discussed this at some length  We will follow expectantly, plan repeat ultrasound in about 8 weeks time  4  Menopausal-last menses April 2020 prior to this episode  5  Vaginal atrophy  6  Prior yeast infection  7  History of seizure disorder/perimenopause/urinary frequency-no current concerns  Follow-up 2 weeks postprocedure  Subjective   Patient ID: Jeff Curry is a 46 y o  female  Vitals:    10/06/22 0747   BP: 138/84     Patient was seen today for sonohysterogram with endometrial biopsy with me  Please see assessment plan procedure note for details        The following portions of the patient's history were reviewed and updated as appropriate: allergies, current medications, past family history, past medical history, past social history, past surgical history and problem list   Past Medical History:   Diagnosis Date   • Abdominal pain    • Anesthesia     "after tape removed from both eyes/developed swelling"   • Anxiety     has autistic/special needs son   • Breast cancer ([facility] [address] ) 04/30/2021    RIGHT   • Chronic UTI     2x/year or so /no symptoms today   • Dysuria     resolved 10/05/16/occas/not lately   • Encounter for screening colonoscopy    • Environmental and seasonal allergies    • Exercise involving cycling     2-3x/week spin classes   • GERD (gastroesophageal reflux disease)    • Heavy menses    • Hiatal hernia    • History of kidney stones    • History of radiation therapy 2021    Right   • Intestinal ulcer    • Irritable bowel syndrome    • Nausea    • Seizures (HCC)     last seizure approx 3 yrs ago   • Shortness of breath     "sometimes just sitting in chair or activity and having Echo "   • Submucous leiomyoma of uterus     no recent problem   • Terminal ileitis ([address] )     unclear etiology, work up w EGD/COLON/TI bx/SBC/CT done  • Wears glasses      Past Surgical History:   Procedure Laterality Date   • BREAST BIOPSY Right 2021    stereo   • BREAST LUMPECTOMY Right 2021    Procedure: RIGHT BREAST BRACKETED DINA LOCALIZATION LUMPECTOMY;;  Surgeon: Jian Mcarthur MD;  [facility];  Service: Surgical Oncology   • COLONOSCOPY  2017    5 yr recall   • DILATION AND CURETTAGE OF UTERUS     • EGD     • HYSTEROSCOPY     • INCONTINENCE SURGERY      bladder sling   • LYMPH NODE BIOPSY Right 2021    Procedure: Bx LYMPH NODE SENTINEL;  Surgeon: Jian Mcarthur MD;  [facility];  Service: Surgical Oncology   • MAMMO NEEDLE LOCALIZATION LEFT (ALL INC) Right 2021   • MAMMO NEEDLE LOCALIZATION LEFT (ALL INC) EACH ADD Right 2021   • MAMMO STEREOTACTIC BREAST BIOPSY RIGHT (ALL INC) Right 3/1/2021   • [place] COLONOSCOPY FLX DX W/COLLJ SPEC WHEN PFRMD N/A 2016    Procedure: EGD AND COLONOSCOPY;  Surgeon: Roman Pollard MD;  Location: [facility];   Service: Gastroenterology   • WISDOM TOOTH EXTRACTION       OB History    Para Term  AB Living   2 2 2         SAB IAB Ectopic Multiple Live Births                  # Outcome Date GA Lbr Noam/2nd Weight Sex Delivery Anes PTL Lv   2 Term            1 Term               Obstetric Comments   Menarche-13   Age at first pregnancy-26   nuvaring-about 8 yrs, currently using       Current Outpatient Medications:   •  cholecalciferol (VITAMIN D3) 1,000 units tablet, Take 1,000 Units by mouth daily, Disp: , Rfl:   •  famotidine (PEPCID) 40 MG tablet, Take 1 tablet (40 mg total) by mouth 2 (two) times a day, Disp: 60 tablet, Rfl: 6  •  lamoTRIgine (LaMICtal) 100 mg tablet, TAKE 1 5 TABLETS BY MOUTH 2 TIMES A DAY  (Patient not taking: Reported on 9/30/2022), Disp: , Rfl:   •  lamoTRIgine (LaMICtal) 150 MG tablet, Take 150 mg by mouth 2 (two) times a day, Disp: , Rfl:   •  levocetirizine (XYZAL) 5 MG tablet, Take 5 mg by mouth, Disp: , Rfl:   •  magnesium oxide (MAG-OX) 400 mg, Take 400 mg by mouth every evening , Disp: , Rfl:   •  norethindrone (Aygestin) 5 mg tablet, Take 2 tablets po today  Then 1 daily  , Disp: 30 tablet, Rfl: 1  •  nystatin-triamcinolone (MYCOLOG-II) cream, Apply topically 2 (two) times a day as needed (Itching/irritation), Disp: 30 g, Rfl: 0  •  polyethylene glycol (Golytely) 4000 mL solution, Take 4,000 mL by mouth once for 1 dose Take 4000 mL by mouth once for 1 dose   Use as directed, Disp: 4000 mL, Rfl: 0  •  tamoxifen (NOLVADEX) 20 mg tablet, Take 1 tablet (20 mg total) by mouth daily, Disp: 90 tablet, Rfl: 3  •  venlafaxine (EFFEXOR-XR) 37 5 mg 24 hr capsule, Take 1 capsule (37 5 mg total) by mouth daily, Disp: 30 capsule, Rfl: 11  Allergies   Allergen Reactions   • Latex Rash and Swelling     When tape from eyes removed after surgery developed high swelling of eyes/watery   • Medical Tape Swelling and Rash     When tape from eyes removed after surgery developed high swelling of eyes/watery   • Silver Rash and Swelling     After a surgery when tape removed from eyes high swelling noted/tears   • Wound Dressings Swelling and Rash     After a surgery when tape removed from eyes high swelling noted/tears   • Other Sneezing and Rash     seasonal     Social History     Socioeconomic History   • Marital status: /Civil Union     Spouse name: Not on file   • Number of children: 2   • Years of education: Not on file   • Highest education level: Not on file   Occupational History   • Not on file   Tobacco Use   • Smoking status: Never Smoker   • Smokeless tobacco: Never Used   Vaping Use   • Vaping Use: Never used Substance and Sexual Activity   • Alcohol use: Yes     Alcohol/week: 1 0 standard drink     Types: 1 Cans of beer per week   • Drug use: No   • Sexual activity: Yes     Partners: Male     Birth control/protection: Male Sterilization     Comment: nuva ring   Other Topics Concern   • Not on file   Social History Narrative    Caffeine use    [address] [facility]    Uses safety equipment Seatbelts     Social Determinants of Health     Financial Resource Strain: Not on file   Food Insecurity: Not on file   Transportation Needs: Not on file   Physical Activity: Not on file   Stress: Not on file   Social Connections: Not on file   Intimate Partner Violence: Not At Risk   • Fear of Current or Ex-Partner: No   • Emotionally Abused: No   • Physically Abused: No   • Sexually Abused: No   Housing Stability: Not on file     Family History   Problem Relation Age of Onset   • Cancer Mother    • Breast cancer Mother 54   • Colon polyps Mother    • Diabetes Father         Type 2   • Hypertension Father    • Breast cancer Family    • BRCA1 Negative Sister    • BRCA2 Negative Sister    • Breast cancer Maternal Aunt 56   • Colon cancer Neg Hx        Review of Systems   Constitutional: Negative for chills, diaphoresis, fatigue and fever  Respiratory: Negative for apnea, cough, chest tightness, shortness of breath and wheezing  Cardiovascular: Negative for chest pain, palpitations and leg swelling  Gastrointestinal: Negative for abdominal distention, abdominal pain, anal bleeding, constipation, diarrhea, nausea, rectal pain and vomiting  Genitourinary: Negative for difficulty urinating, dyspareunia, dysuria, frequency, hematuria, menstrual problem, pelvic pain, urgency, vaginal bleeding, vaginal discharge and vaginal pain  Musculoskeletal: Negative for arthralgias, back pain and myalgias  Skin: Negative for color change and rash     Neurological: Negative for dizziness, syncope, light-headedness, numbness and headaches  Hematological: Negative for adenopathy  Does not bruise/bleed easily  Psychiatric/Behavioral: Negative for dysphoric mood and sleep disturbance  The patient is not nervous/anxious  Objective   Physical Exam  OBGyn Exam     Objective      /84 (BP Location: Left arm, Patient Position: Sitting)   Ht 5' 7 5" (1 715 m)   Wt 86 2 kg (190 lb)   LMP 03/28/2021 (Approximate)   BMI 29 32 kg/m²     General:   alert and oriented, in no acute distress   Neck:    Breast:    Heart: regular rate and rhythm, S1, S2 normal, no murmur, click, rub or gallop   Lungs: clear to auscultation bilaterally   Abdomen: soft, non-tender, without masses or organomegaly   Vulva: normal   Vagina: Moderate amount of menstrual blood, without erythema or lesions noted  Cervix: Moderate amount of menstrual blood at the os, without lesions or cervicitis  No Cervical motion tenderness   Uterus: top normal size, mid-position, non-tender   Adnexa: Mildly tender to the left, no mass appreciated  Right was negative     Rectum: deferred    Psych:  Normal mood and affect   Skin:  Without obvious lesions   Eyes: symmetric, with normal movements and reactivity   Musculoskeletal:  Normal muscle tone and movements appreciated

## 2022-10-06 NOTE — TELEPHONE ENCOUNTER
Patient called c/o feels weak after SIS this morning  She is home now  Advised continue to rest, elevate legs, push fluids, eat regularly, take iron, continue Aygestin as directed, observe for improvement  Call back if problems

## 2022-10-06 NOTE — PROGRESS NOTES
AMB US Pelvic Non OB    Date/Time: 10/6/2022 7:09 AM  Performed by: Silvestre Gracia  Authorized by: Yolie Leonard MD   Universal Protocol:  Patient identity confirmed: verbally with patient      Procedure details:     Technique:  Transvaginal US, Non-OB    Position: lithotomy exam    Uterine findings:     Length (cm): 11 87    Height (cm):  6 59    Width (cm):  8 58    Endometrial stripe: identified      Endometrium thickness (mm):  24 6  Left ovary findings:     Left ovary:  Visualized    Length (cm): 3 97    Height (cm): 3 31    Width (cm): 3 98  Right ovary findings:     Right ovary:  Visualized    Length (cm): 2 96    Height (cm): 1 98    Width (cm): 2 02  Other findings:     Free pelvic fluid: not identified      Free peritoneal fluid: not identified    Post-Procedure Details:     Impression:  Anteverted uterus demonstrates a thickened endometrium, otherwise the uterus and right ovary appear within normal limits  The left ovary demonstrates a 2 7cm cyst  No free fluid  Tolerance: Tolerated well, no immediate complications    Complications: no complications    Additional Procedure Comments:      Kindful F8 E8C-RS transvaginal transducer Serial # [id number] was used to perform the examination today and subsequently followed with high level disinfection utilizing Trophon EPR procedure  Ultrasound performed at:     [address] [address]  Phone:  [phone]  Fax:  [phone]  Sonohysterogram    Date/Time: 10/6/2022 7:17 AM  Performed by: Yolie Leonard MD  Authorized by: Yolie Leonard MD   Universal Protocol:  Patient identity confirmed: verbally with patient      Pre-procedure:     Prepped with: Hibiclens    Procedure:     Cervix cleaned and prepped: yes      Uterus sounded: yes      Catheter inserted: yes      Uterine cavity distended with saline: yes    Post-procedure:     Patient observed: yes      Post procedure instructions given to patient: yes      Patient tolerated procedure well with no complications: yes    Comments:      Sonohysterogram demonstrates a very thickened endometrium measuring 2 11cm double layer thickness with saline enhancement     Endometrial biopsy    Date/Time: 10/6/2022 7:18 AM  Performed by: Josefina Pappas MD  Authorized by: Josefina Pappas MD   Universal Protocol:  Patient identity confirmed: verbally with patient      Procedure:     Procedure: endometrial biopsy with Pipelle      A bivalve speculum was placed in the vagina: yes      Cervix cleaned and prepped: yes      Specimen collected: specimen collected and sent to pathology      Patient tolerated procedure well with no complications: yes

## 2022-10-12 ENCOUNTER — TELEPHONE (OUTPATIENT)
Dept: HEMATOLOGY ONCOLOGY | Facility: CLINIC | Age: 52
End: 2022-10-12

## 2022-10-12 NOTE — TELEPHONE ENCOUNTER
10/12/22    LVM reminding pt for updated labs prior to the next appt  Labs: CBC/ CMP/ CA27 29    Advising pt to get blood work done in any [facility] labs  [facility] number also provided  --------------------------------------    Pt's upcoming Ob/Gyn Sx appt noted  Pt can be r/s after her procedure if she prefers

## 2022-10-14 ENCOUNTER — APPOINTMENT (OUTPATIENT)
Dept: LAB | Facility: CLINIC | Age: 52
End: 2022-10-14
Payer: COMMERCIAL

## 2022-10-14 ENCOUNTER — TELEPHONE (OUTPATIENT)
Dept: GYNECOLOGY | Facility: CLINIC | Age: 52
End: 2022-10-14

## 2022-10-14 DIAGNOSIS — Z01.818 PRE-OP TESTING: ICD-10-CM

## 2022-10-14 LAB
ALBUMIN SERPL BCP-MCNC: 3.4 G/DL (ref 3.5–5)
ALP SERPL-CCNC: 64 U/L (ref 46–116)
ALT SERPL W P-5'-P-CCNC: 22 U/L (ref 12–78)
ANION GAP SERPL CALCULATED.3IONS-SCNC: 3 MMOL/L (ref 4–13)
AST SERPL W P-5'-P-CCNC: 12 U/L (ref 5–45)
BILIRUB SERPL-MCNC: 0.18 MG/DL (ref 0.2–1)
BUN SERPL-MCNC: 14 MG/DL (ref 5–25)
CALCIUM ALBUM COR SERPL-MCNC: 9.8 MG/DL (ref 8.3–10.1)
CALCIUM SERPL-MCNC: 9.3 MG/DL (ref 8.3–10.1)
CHLORIDE SERPL-SCNC: 111 MMOL/L (ref 96–108)
CO2 SERPL-SCNC: 25 MMOL/L (ref 21–32)
CREAT SERPL-MCNC: 0.99 MG/DL (ref 0.6–1.3)
ERYTHROCYTE [DISTWIDTH] IN BLOOD BY AUTOMATED COUNT: 12.2 % (ref 11.6–15.1)
GFR SERPL CREATININE-BSD FRML MDRD: 65 ML/MIN/1.73SQ M
GLUCOSE SERPL-MCNC: 109 MG/DL (ref 65–140)
HCT VFR BLD AUTO: 39.3 % (ref 34.8–46.1)
HGB BLD-MCNC: 13 G/DL (ref 11.5–15.4)
MCH RBC QN AUTO: 31.3 PG (ref 26.8–34.3)
MCHC RBC AUTO-ENTMCNC: 33.1 G/DL (ref 31.4–37.4)
MCV RBC AUTO: 95 FL (ref 82–98)
PLATELET # BLD AUTO: 269 THOUSANDS/UL (ref 149–390)
PMV BLD AUTO: 10 FL (ref 8.9–12.7)
POTASSIUM SERPL-SCNC: 3.9 MMOL/L (ref 3.5–5.3)
PROT SERPL-MCNC: 6.8 G/DL (ref 6.4–8.4)
RBC # BLD AUTO: 4.15 MILLION/UL (ref 3.81–5.12)
SODIUM SERPL-SCNC: 139 MMOL/L (ref 135–147)
WBC # BLD AUTO: 5.32 THOUSAND/UL (ref 4.31–10.16)

## 2022-10-14 PROCEDURE — 87086 URINE CULTURE/COLONY COUNT: CPT

## 2022-10-14 PROCEDURE — 36415 COLL VENOUS BLD VENIPUNCTURE: CPT

## 2022-10-14 PROCEDURE — 87147 CULTURE TYPE IMMUNOLOGIC: CPT

## 2022-10-14 PROCEDURE — 85027 COMPLETE CBC AUTOMATED: CPT

## 2022-10-14 PROCEDURE — 80053 COMPREHEN METABOLIC PANEL: CPT

## 2022-10-14 NOTE — TELEPHONE ENCOUNTER
Patient called for EMB result  Informed not back yet  Will appear in MyChart when completed  Will research

## 2022-10-15 LAB — BACTERIA UR CULT: ABNORMAL

## 2022-10-17 ENCOUNTER — OFFICE VISIT (OUTPATIENT)
Dept: HEMATOLOGY ONCOLOGY | Facility: HOSPITAL | Age: 52
End: 2022-10-17
Payer: COMMERCIAL

## 2022-10-17 VITALS
WEIGHT: 196 LBS | DIASTOLIC BLOOD PRESSURE: 78 MMHG | OXYGEN SATURATION: 98 % | HEART RATE: 74 BPM | SYSTOLIC BLOOD PRESSURE: 118 MMHG | BODY MASS INDEX: 29.7 KG/M2 | HEIGHT: 68 IN | TEMPERATURE: 97.6 F | RESPIRATION RATE: 14 BRPM

## 2022-10-17 DIAGNOSIS — C50.111 MALIGNANT NEOPLASM OF CENTRAL PORTION OF RIGHT BREAST IN FEMALE, ESTROGEN RECEPTOR POSITIVE (HCC): Primary | ICD-10-CM

## 2022-10-17 DIAGNOSIS — Z17.0 MALIGNANT NEOPLASM OF CENTRAL PORTION OF RIGHT BREAST IN FEMALE, ESTROGEN RECEPTOR POSITIVE (HCC): Primary | ICD-10-CM

## 2022-10-17 DIAGNOSIS — E28.39 MENOPAUSE OVARIAN FAILURE: ICD-10-CM

## 2022-10-17 PROCEDURE — 99214 OFFICE O/P EST MOD 30 MIN: CPT | Performed by: INTERNAL MEDICINE

## 2022-10-17 NOTE — PROGRESS NOTES
Hematology/Oncology Outpatient Follow- up Note  Azucena Fabian 46 y o  female MRN: @ Encounter: [id number]        Date:  10/17/2022    Presenting Complaint/Diagnosis :  Stage IA ER positive WA positive HER2 negative breast cancer    HPI:      Azucena Fabian is a 48year old female with a history of invasive mammary carcinoma of the right breast s/p lumpectomy on  The patient had 2 small foci of disease, one measured 5 mm and another measured 14 mm   Tumor was ER/WA + HER2 negative  DCIS was present   No lymphovascular invasion was seen   Margins were negative  Oncotype recurrence score was  Twelve I e  low risk  She completed a course of adjuvant radiation therapy at [address] [place] [address] and since she is still Perimenopausal has been started on tamoxifen    Previous Hematologic/ Oncologic History:    Oncology History   Malignant neoplasm of central portion of right breast in female, estrogen receptor positive ([facility] [address] )   3/31/2021 Initial Diagnosis    Malignant neoplasm of central portion of right breast in female, estrogen receptor positive ([facility] [address] )     3/31/2021 -  Cancer Staged    Staging form: Breast, AJCC 8th Edition  - Clinical: Stage IA (cT1, cN0, cM0, G2, ER+, WA+, HER2-) - Signed by Alex Casey MD on 3/31/2021  Stage prefix: Initial diagnosis  Method of lymph node assessment: Clinical  Histologic grading system: 3 grade system       5/20/2021 -  Cancer Staged    Staging form: Breast, AJCC 8th Edition  - Pathologic: Stage IA (pT1c, pN0(sn), cM0, G2, ER+, WA+, HER2-) - Signed by Alex Casey MD on 5/20/2021  Stage prefix: Initial diagnosis  Method of lymph node assessment: [place] lymph node biopsy  Histologic grading system: 3 grade system           Current Hematologic/ Oncologic Treatment:      Tamoxifen    Interval History:      The patient returns for follow-up visit  Blood work is within acceptable limits  She has had dysfunctional uterine bleeding while on tamoxifen  Tamoxifen has been held    She is getting a D& C  She is talking about a hysterectomy and possibly oophorectomy and we went over what this would mean in terms of her breast cancer  She is not sure if she wishes to continue with antiestrogen therapy  For now she wishes to hold her treatment and then see me back in 3 months with hormone levels and if she is truly menopausal she may consider aromatase inhibitor  Apart from the bleeding she is nervous because she was started on progesterone to stop the bleeding  I advised her she should stay on this for a short a time as possible as her tumor was progesterone receptor positive  She does understand this  She will discuss this with her gynecologist today or tomorrow as her bleeding has stopped and she wishes to stop the progesterone which I think is reasonable  She understands the risks of staying on it long-term  Denies any nausea or vomiting  Denies any diarrhea  Was having hot flashes on the tamoxifen which have improved  The rest of her 14 point review of systems today was negative  Cancer Staging:  Cancer Staging  Malignant neoplasm of central portion of right breast in female, estrogen receptor positive ([facility] [address] )  Staging form: Breast, AJCC 8th Edition  - Clinical: Stage IA (cT1, cN0, cM0, G2, ER+, AL+, HER2-) - Signed by Katty Nayak MD on 3/31/2021  Stage prefix: Initial diagnosis  Method of lymph node assessment: Clinical  Histologic grading system: 3 grade system  - Pathologic: Stage IA (pT1c, pN0(sn), cM0, G2, ER+, AL+, HER2-) - Signed by Katty Nayak MD on 5/20/2021  Stage prefix: Initial diagnosis  Method of lymph node assessment: [place] lymph node biopsy  Histologic grading system: 3 grade system      Test Results:    Imaging: No results found      Labs:   Lab Results   Component Value Date    WBC 5 32 10/14/2022    HGB 13 0 10/14/2022    HCT 39 3 10/14/2022    MCV 95 10/14/2022     10/14/2022     Lab Results   Component Value Date     07/22/2015    K 3 9 10/14/2022  (H) 10/14/2022    CO2 25 10/14/2022    ANIONGAP 10 07/22/2015    BUN 14 10/14/2022    CREATININE 0 99 10/14/2022    GLUCOSE 78 07/22/2015    CALCIUM 9 3 10/14/2022    CORRECTEDCA 9 8 10/14/2022    AST 12 10/14/2022    ALT 22 10/14/2022    ALKPHOS 64 10/14/2022    PROT 6 7 07/22/2015    BILITOT 0 43 07/22/2015    EGFR 65 10/14/2022       ROS: As stated in the history of present illness otherwise his 14 point review of systems today was negative        Active Problems:   Patient Active Problem List   Diagnosis   • Irregular menses   • Encounter for surveillance of vaginal ring hormonal contraceptive device   • Dense breast tissue   • History of seizure   • Allergic dermatitis   • 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 ([facility] [address] )   • Family history of breast cancer   • SANDRA (juvenile myoclonic epilepsy) ([facility] [address] )   • Terminal ileitis ([facility] [address] )   • Malignant neoplasm of central portion of right breast in female, estrogen receptor positive ([facility] [address] )   • BRCA negative   • History of anesthesia complications   • Lymphomatoid papulosis ([facility] [address] )   • 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   • History of radiation therapy   • Use of tamoxifen (Nolvadex)   • Vaginal atrophy   • Displaced fracture of shaft of fifth metacarpal bone, right hand, subsequent encounter for fracture with routine healing   • Postmenopausal bleeding   • Heavy menstrual bleeding   • Left ovarian cyst       Past Medical History:   Past Medical History:   Diagnosis Date   • Abdominal pain    • Anesthesia     "after tape removed from both eyes/developed swelling"   • Anxiety     has autistic/special needs son   • Breast cancer ([facility] [address] ) 04/30/2021    RIGHT   • Chronic UTI     2x/year or so /no symptoms today   • Dysuria     resolved 10/05/16/occas/not lately   • Encounter for screening colonoscopy    • Environmental and seasonal allergies    • Exercise involving cycling     2-3x/week spin classes   • GERD (gastroesophageal reflux disease) • Heavy menses    • Hiatal hernia    • History of kidney stones    • History of radiation therapy 2021    Right   • Intestinal ulcer    • Irritable bowel syndrome    • Nausea    • Seizures (HCC)     last seizure approx 3 yrs ago   • Shortness of breath     "sometimes just sitting in chair or activity and having Echo 4/29"   • Submucous leiomyoma of uterus     no recent problem   • Terminal ileitis ([address] )     unclear etiology, work up w EGD/COLON/TI bx/SBC/CT done  • Wears glasses        Surgical History:   Past Surgical History:   Procedure Laterality Date   • BREAST BIOPSY Right 03/01/2021    stereo   • BREAST LUMPECTOMY Right 4/30/2021    Procedure: RIGHT BREAST BRACKETED DINA LOCALIZATION LUMPECTOMY;;  Surgeon: Juan Manuel Hough MD;  Location: [facility];  Service: Surgical Oncology   • COLONOSCOPY  03/01/2017    5 yr recall   • DILATION AND CURETTAGE OF UTERUS     • EGD     • HYSTEROSCOPY     • INCONTINENCE SURGERY      bladder sling   • LYMPH NODE BIOPSY Right 4/30/2021    Procedure: Bx LYMPH NODE SENTINEL;  Surgeon: Juan Manuel Hough MD;  Location: [facility];  Service: Surgical Oncology   • MAMMO NEEDLE LOCALIZATION LEFT (ALL INC) Right 4/19/2021   • MAMMO NEEDLE LOCALIZATION LEFT (ALL INC) EACH ADD Right 4/19/2021   • MAMMO STEREOTACTIC BREAST BIOPSY RIGHT (ALL INC) Right 3/1/2021   • GA COLONOSCOPY FLX DX W/COLLJ SPEC WHEN PFRMD N/A 11/14/2016    Procedure: EGD AND COLONOSCOPY;  Surgeon: Marelyn Hammans, MD;  Location: [facility];   Service: Gastroenterology   • WISDOM TOOTH EXTRACTION         Family History:    Family History   Problem Relation Age of Onset   • Cancer Mother    • Breast cancer Mother 54   • Colon polyps Mother    • Diabetes Father         Type 2   • Hypertension Father    • Breast cancer Family    • BRCA1 Negative Sister    • BRCA2 Negative Sister    • Breast cancer Maternal Aunt 64   • Colon cancer Neg Hx        Cancer-related family history includes Breast cancer in her family; Breast cancer (age of onset: 54) in her mother; Breast cancer (age of onset: 64) in her maternal aunt; Cancer in her mother  There is no history of Colon cancer  Social History:   Social History     Socioeconomic History   • Marital status: /Civil Union     Spouse name: Not on file   • Number of children: 2   • Years of education: Not on file   • Highest education level: Not on file   Occupational History   • Not on file   Tobacco Use   • Smoking status: Never Smoker   • Smokeless tobacco: Never Used   Vaping Use   • Vaping Use: Never used   Substance and Sexual Activity   • Alcohol use: Yes     Alcohol/week: 1 0 standard drink     Types: 1 Cans of beer per week   • Drug use: No   • Sexual activity: Yes     Partners: Male     Birth control/protection: Male Sterilization     Comment: nuva ring   Other Topics Concern   • Not on file   Social History Narrative    Caffeine use    [facility]    Uses safety equipment Seatbelts     Social Determinants of Health     Financial Resource Strain: Not on file   Food Insecurity: Not on file   Transportation Needs: Not on file   Physical Activity: Not on file   Stress: Not on file   Social Connections: Not on file   Intimate Partner Violence: Not At Risk   • Fear of Current or Ex-Partner: No   • Emotionally Abused: No   • Physically Abused: No   • Sexually Abused: No   Housing Stability: Not on file       Current Medications:   Current Outpatient Medications   Medication Sig Dispense Refill   • cholecalciferol (VITAMIN D3) 1,000 units tablet Take 1,000 Units by mouth daily     • famotidine (PEPCID) 40 MG tablet Take 1 tablet (40 mg total) by mouth 2 (two) times a day 60 tablet 6   • lamoTRIgine (LaMICtal) 100 mg tablet TAKE 1 5 TABLETS BY MOUTH 2 TIMES A DAY   (Patient not taking: Reported on 9/30/2022)     • lamoTRIgine (LaMICtal) 150 MG tablet Take 150 mg by mouth 2 (two) times a day     • levocetirizine (XYZAL) 5 MG tablet Take 5 mg by mouth     • magnesium oxide (MAG-OX) 400 mg Take 400 mg by mouth every evening      • norethindrone (Aygestin) 5 mg tablet Take 2 tablets po today  Then 1 daily  30 tablet 1   • nystatin-triamcinolone (MYCOLOG-II) cream Apply topically 2 (two) times a day as needed (Itching/irritation) 30 g 0   • polyethylene glycol (Golytely) 4000 mL solution Take 4,000 mL by mouth once for 1 dose Take 4000 mL by mouth once for 1 dose  Use as directed 4000 mL 0   • tamoxifen (NOLVADEX) 20 mg tablet Take 1 tablet (20 mg total) by mouth daily 90 tablet 3   • venlafaxine (EFFEXOR-XR) 37 5 mg 24 hr capsule Take 1 capsule (37 5 mg total) by mouth daily 30 capsule 11     No current facility-administered medications for this visit  Allergies: Allergies   Allergen Reactions   • Latex Rash and Swelling     When tape from eyes removed after surgery developed high swelling of eyes/watery   • Medical Tape Swelling and Rash     When tape from eyes removed after surgery developed high swelling of eyes/watery   • Silver Rash and Swelling     After a surgery when tape removed from eyes high swelling noted/tears   • Wound Dressings Swelling and Rash     After a surgery when tape removed from eyes high swelling noted/tears   • Other Sneezing and Rash     seasonal       Physical Exam:    There is no height or weight on file to calculate BSA  Wt Readings from Last 3 Encounters:   10/06/22 86 2 kg (190 lb)   09/30/22 86 5 kg (190 lb 9 6 oz)   07/27/22 84 1 kg (185 lb 6 4 oz)        Temp Readings from Last 3 Encounters:   07/27/22 98 5 °F (36 9 °C) (Tympanic)   04/06/22 98 °F (36 7 °C) (Tympanic)   04/01/22 98 4 °F (36 9 °C) (Tympanic)        BP Readings from Last 3 Encounters:   10/06/22 138/84   09/30/22 108/78   07/27/22 111/70         Pulse Readings from Last 3 Encounters:   07/27/22 94   04/06/22 102   04/01/22 88       Physical Exam     Constitutional   General appearance: No acute distress, well appearing and well nourished      Eyes   Conjunctiva and lids: No swelling, erythema or discharge  Pupils and irises: Equal, round and reactive to light  Ears, Nose, Mouth, and Throat   External inspection of ears and nose: Normal     Nasal mucosa, septum, and turbinates: Normal without edema or erythema  Oropharynx: Normal with no erythema, edema, exudate or lesions  Pulmonary   Respiratory effort: No increased work of breathing or signs of respiratory distress  Auscultation of lungs: Clear to auscultation  Cardiovascular   Palpation of heart: Normal PMI, no thrills  Auscultation of heart: Normal rate and rhythm, normal S1 and S2, without murmurs  Examination of extremities for edema and/or varicosities: Normal     Carotid pulses: Normal     Abdomen   Abdomen: Non-tender, no masses  Liver and spleen: No hepatomegaly or splenomegaly  Lymphatic   Palpation of lymph nodes in neck: No lymphadenopathy  Musculoskeletal   Gait and station: Normal     Digits and nails: Normal without clubbing or cyanosis  Inspection/palpation of joints, bones, and muscles: Normal     Skin   Skin and subcutaneous tissue: Normal without rashes or lesions  Neurologic   Cranial nerves: Cranial nerves 2-12 intact  Sensation: No sensory loss  Psychiatric   Orientation to person, place, and time: Normal     Mood and affect: Normal         Assessment / Plan:      The patient is a very pleasant 46year old female with a history of invasive mammary carcinoma of the right breast s/p lumpectomy on  The patient had 2 small foci of disease, one measured 5 mm and another measured 14 mm   Tumor was ER/AL + HER2 negative  DCIS was present   No lymphovascular invasion was seen   Margins were negative    Oncotype recurrence score was  Twelve I e  low risk  She completed a course of adjuvant radiation therapy at [facility] and since she is still Perimenopausal was started on tamoxifen  The  plan was for her to be switched to an aromatase inhibitor in 2 years I e  when she has completed 2 years of tamoxifen  [address] last menstrual period was in April of 2021" but she was having heavy uterine bleeding over the last few weeks on tamoxifen so this was held  She is getting a D& C  She is obviously not going to get tamoxifen any longer  She may consider a hysterectomy and oophorectomy  If she does do this we can reconsider her options  For now she wishes to stay off of her antiestrogen therapy based on her symptoms and then see me back in 3 months  She understands there is a slight risk by holding antiestrogen therapy but she is willing to except this based on her symptoms  She will however try to get off of the progesterone as soon as possible or is allowed by her gynecologist   I will see her back in 3 months  She will stay off of tamoxifen in the interim  I will check her hormone levels i e  LH, FSH, estradiol and Tory own to see if she is truly menopausal   If she is aromatase inhibitors are an option if she does not get a oophorectomy  We will discuss this at her next visit  If she has any questions she will call our office  Goals and Barriers:  Current Goal:  Prolong Survival from breast cancer  Barriers: None  Patient's Capacity to Self Care:  Patient able to self care  Portions of the record may have been created with voice recognition software  Occasional wrong word or "sound a like" substitutions may have occurred due to the inherent limitations of voice recognition software  Read the chart carefully and recognize, using context, where substitutions have occurred

## 2022-10-18 ENCOUNTER — TELEPHONE (OUTPATIENT)
Dept: GYNECOLOGY | Facility: CLINIC | Age: 52
End: 2022-10-18

## 2022-10-18 NOTE — TELEPHONE ENCOUNTER
PRECIOUS for patient stating that [facility] in [facility] is out of network with her insurance and that she should call her insurance and see what her out of pocket expenses would be for the procedure and to see if she wanted to continue the procedure  Patient was asked to call the off back

## 2022-10-20 PROCEDURE — 88305 TISSUE EXAM BY PATHOLOGIST: CPT | Performed by: PATHOLOGY

## 2022-10-20 NOTE — TELEPHONE ENCOUNTER
I spoke to Mitch [address] at [facility] on 10/20/22 at 11:05 am no prior authorization is needed for CPT code 17635,15373 being performed on 10/25/2022     Per Danyel once the out of pocket deductible is meet for the individual member the insurance company pay's 80% and when the family deductible is meet the insurance company will pay 100%  Patient is aware of this and wants to continue her procedure on 10/25/2022

## 2022-10-20 NOTE — PRE-PROCEDURE INSTRUCTIONS
Pre-Surgery Instructions:   Medication Instructions   • famotidine (PEPCID) 40 MG tablet Take night before surgery    • lamoTRIgine (LaMICtal) 150 MG tablet Take day of surgery  • levocetirizine (XYZAL) 5 MG tablet Take night before surgery   • norethindrone (Aygestin) 5 mg tablet Uses PRN- DO NOT take day of surgery     Reviewed all medications and instructions for DOS  Reviewed all showering instructions and COVID visitation policy  Pt aware that [address] is location for DOS, instructed that pt pre op nurse will call on 10/24/22  to give specific instructions for DOS  Pt instructed to bring photo ID and insurance card for DOS, remove all jewelry and  NO valuables for DOS  Pt instructed to use only Tylenol between now 10/20/22  and DOS, NO NSAID products  Pt informed transport is needed for DOS due to receiving anesthesia  Instructed patient to minimize alcohol use prior to surgery  No alcohol 24 hours prior to surgery to reduce risk of dehydration  Pt verbalized understanding of all instructions given and reviewed for DOS  Pt is fully vaccinated for COVID  Pt has cleanser and instructions from office for DOS-reviewed with pt     My Surgical Experience    The following information was developed to assist you to prepare for your operation  What do I need to do before coming to the hospital?  • Arrange for a responsible person to drive you to and from the hospital   • Arrange care for your children at home  Children are not allowed in the recovery areas of the hospital  • Plan to wear clothing that is easy to put on and take off  If you are having shoulder surgery, wear a shirt that buttons or zippers in the front  Bathing  o Shower the evening before and the morning of your surgery with an antibacterial soap   Please refer to the Pre Op Showering Instructions for Surgery Patients Sheet   o Remove nail polish and all body piercing jewelry  o Do not shave any body part for at least 24 hours before surgery-this includes face, arms, legs and upper body  Food  o Nothing to eat or drink after midnight the night before your surgery  This includes candy and chewing gum  o Exception: If your surgery is after 12:00pm (noon), you may have clear liquids such as 7-Up®, ginger ale, apple or cranberry juice, Jell-O®, water, or clear broth until 8:00 am  o Do not drink milk or juice with pulp on the morning before surgery  o Do not drink alcohol 24 hours before surgery  Medicine  o Follow instructions you received from your surgeon about which medicines you may take on the day of surgery  o If instructed to take medicine on the morning of surgery, take pills with just a small sip of water  Call your prescribing doctor for specific infroamtion on what to do if you take insulin    What should I bring to the hospital?    Bring:  • Crutches or a walker, if you have them, for foot or knee surgery  • A list of the daily medicines, vitamins, minerals, herbals and nutritional supplements you take  Include the dosages of medicines and the time you take them each day  • Glasses, dentures or hearing aids  • Minimal clothing; you will be wearing hospital sleepwear  • Photo ID; required to verify your identity  • If you have a Living Will or Power of , bring a copy of the documents  • If you have an ostomy, bring an extra pouch and any supplies you use    Do not bring  • Medicines or inhalers  • Money, valuables or jewelry    What other information should I know about the day of surgery? • Notify your surgeons if you develop a cold, sore throat, cough, fever, rash or any other illness  • Report to the Ambulatory Surgical/Same Day Surgery Unit  • You will be instructed to stop at Registration only if you have not been pre-registered  • Inform your  fi they do not stay that they will be asked by the staff to leave a phone number where they can be reached  • Be available to be reached before surgery   In the event the operating room schedule changes, you may be asked to come in earlier or later than expected    *It is important to tell your doctor and others involved in your health care if you are taking or have been taking any non-prescription drugs, vitamins, minerals, herbals or other nutritional supplements   Any of these may interact with some food or medicines and cause a reaction

## 2022-10-24 ENCOUNTER — ANESTHESIA EVENT (OUTPATIENT)
Dept: PERIOP | Facility: HOSPITAL | Age: 52
End: 2022-10-24
Payer: COMMERCIAL

## 2022-10-25 ENCOUNTER — HOSPITAL ENCOUNTER (OUTPATIENT)
Facility: HOSPITAL | Age: 52
Setting detail: OUTPATIENT SURGERY
Discharge: HOME | End: 2022-10-25
Attending: OBSTETRICS & GYNECOLOGY | Admitting: OBSTETRICS & GYNECOLOGY
Payer: COMMERCIAL

## 2022-10-25 ENCOUNTER — ANESTHESIA (OUTPATIENT)
Dept: PERIOP | Facility: HOSPITAL | Age: 52
End: 2022-10-25
Payer: COMMERCIAL

## 2022-10-25 ENCOUNTER — TELEPHONE (OUTPATIENT)
Dept: GYNECOLOGY | Facility: CLINIC | Age: 52
End: 2022-10-25

## 2022-10-25 VITALS
BODY MASS INDEX: 29.87 KG/M2 | OXYGEN SATURATION: 99 % | SYSTOLIC BLOOD PRESSURE: 115 MMHG | WEIGHT: 193.56 LBS | TEMPERATURE: 98.3 F | RESPIRATION RATE: 16 BRPM | DIASTOLIC BLOOD PRESSURE: 68 MMHG | HEART RATE: 85 BPM

## 2022-10-25 DIAGNOSIS — N95.0 POSTMENOPAUSAL BLEEDING: ICD-10-CM

## 2022-10-25 DIAGNOSIS — N92.6 IRREGULAR MENSTRUAL CYCLE: ICD-10-CM

## 2022-10-25 PROBLEM — Z98.890 S/P DILATATION AND CURETTAGE: Status: ACTIVE | Noted: 2022-10-25

## 2022-10-25 LAB
EXT PREGNANCY TEST URINE: NEGATIVE
EXT. CONTROL: NORMAL

## 2022-10-25 PROCEDURE — 81025 URINE PREGNANCY TEST: CPT | Performed by: OBSTETRICS & GYNECOLOGY

## 2022-10-25 RX ORDER — MIDAZOLAM HYDROCHLORIDE 2 MG/2ML
INJECTION, SOLUTION INTRAMUSCULAR; INTRAVENOUS AS NEEDED
Status: DISCONTINUED | OUTPATIENT
Start: 2022-10-25 | End: 2022-10-25

## 2022-10-25 RX ORDER — KETOROLAC TROMETHAMINE 30 MG/ML
INJECTION, SOLUTION INTRAMUSCULAR; INTRAVENOUS AS NEEDED
Status: DISCONTINUED | OUTPATIENT
Start: 2022-10-25 | End: 2022-10-25

## 2022-10-25 RX ORDER — SODIUM CHLORIDE 9 MG/ML
125 INJECTION, SOLUTION INTRAVENOUS CONTINUOUS
Status: DISCONTINUED | OUTPATIENT
Start: 2022-10-25 | End: 2022-10-25 | Stop reason: HOSPADM

## 2022-10-25 RX ORDER — LIDOCAINE HYDROCHLORIDE 20 MG/ML
INJECTION, SOLUTION EPIDURAL; INFILTRATION; INTRACAUDAL; PERINEURAL AS NEEDED
Status: DISCONTINUED | OUTPATIENT
Start: 2022-10-25 | End: 2022-10-25

## 2022-10-25 RX ORDER — ONDANSETRON 2 MG/ML
INJECTION INTRAMUSCULAR; INTRAVENOUS AS NEEDED
Status: DISCONTINUED | OUTPATIENT
Start: 2022-10-25 | End: 2022-10-25

## 2022-10-25 RX ORDER — ONDANSETRON 2 MG/ML
4 INJECTION INTRAMUSCULAR; INTRAVENOUS ONCE AS NEEDED
Status: DISCONTINUED | OUTPATIENT
Start: 2022-10-25 | End: 2022-10-25 | Stop reason: HOSPADM

## 2022-10-25 RX ORDER — FENTANYL CITRATE 50 UG/ML
INJECTION, SOLUTION INTRAMUSCULAR; INTRAVENOUS AS NEEDED
Status: DISCONTINUED | OUTPATIENT
Start: 2022-10-25 | End: 2022-10-25

## 2022-10-25 RX ORDER — FENTANYL CITRATE/PF 50 MCG/ML
25 SYRINGE (ML) INJECTION
Status: DISCONTINUED | OUTPATIENT
Start: 2022-10-25 | End: 2022-10-25 | Stop reason: HOSPADM

## 2022-10-25 RX ORDER — DEXAMETHASONE SODIUM PHOSPHATE 10 MG/ML
INJECTION, SOLUTION INTRAMUSCULAR; INTRAVENOUS AS NEEDED
Status: DISCONTINUED | OUTPATIENT
Start: 2022-10-25 | End: 2022-10-25

## 2022-10-25 RX ORDER — PROPOFOL 10 MG/ML
INJECTION, EMULSION INTRAVENOUS AS NEEDED
Status: DISCONTINUED | OUTPATIENT
Start: 2022-10-25 | End: 2022-10-25

## 2022-10-25 RX ORDER — ACETAMINOPHEN 325 MG/1
975 TABLET ORAL EVERY 6 HOURS PRN
Status: DISCONTINUED | OUTPATIENT
Start: 2022-10-25 | End: 2022-10-25 | Stop reason: HOSPADM

## 2022-10-25 RX ORDER — SODIUM CHLORIDE 9 MG/ML
INJECTION, SOLUTION INTRAVENOUS AS NEEDED
Status: DISCONTINUED | OUTPATIENT
Start: 2022-10-25 | End: 2022-10-25 | Stop reason: HOSPADM

## 2022-10-25 RX ORDER — IBUPROFEN 600 MG/1
600 TABLET ORAL EVERY 6 HOURS PRN
Status: DISCONTINUED | OUTPATIENT
Start: 2022-10-25 | End: 2022-10-25 | Stop reason: HOSPADM

## 2022-10-25 RX ADMIN — MIDAZOLAM 2 MG: 1 INJECTION INTRAMUSCULAR; INTRAVENOUS at 07:29

## 2022-10-25 RX ADMIN — FENTANYL CITRATE 25 MCG: 50 INJECTION INTRAMUSCULAR; INTRAVENOUS at 07:44

## 2022-10-25 RX ADMIN — SODIUM CHLORIDE 125 ML/HR: 0.9 INJECTION, SOLUTION INTRAVENOUS at 06:42

## 2022-10-25 RX ADMIN — FENTANYL CITRATE 25 MCG: 50 INJECTION INTRAMUSCULAR; INTRAVENOUS at 07:54

## 2022-10-25 RX ADMIN — PROPOFOL 200 MG: 10 INJECTION, EMULSION INTRAVENOUS at 07:38

## 2022-10-25 RX ADMIN — SODIUM CHLORIDE: 0.9 INJECTION, SOLUTION INTRAVENOUS at 08:11

## 2022-10-25 RX ADMIN — FENTANYL CITRATE 25 MCG: 50 INJECTION INTRAMUSCULAR; INTRAVENOUS at 07:50

## 2022-10-25 RX ADMIN — DEXAMETHASONE SODIUM PHOSPHATE 4 MG: 10 INJECTION, SOLUTION INTRAMUSCULAR; INTRAVENOUS at 07:38

## 2022-10-25 RX ADMIN — KETOROLAC TROMETHAMINE 30 MG: 30 INJECTION, SOLUTION INTRAMUSCULAR; INTRAVENOUS at 08:06

## 2022-10-25 RX ADMIN — ACETAMINOPHEN 975 MG: 325 TABLET, FILM COATED ORAL at 10:04

## 2022-10-25 RX ADMIN — LIDOCAINE HYDROCHLORIDE 100 MG: 20 INJECTION, SOLUTION EPIDURAL; INFILTRATION; INTRACAUDAL; PERINEURAL at 07:38

## 2022-10-25 RX ADMIN — FENTANYL CITRATE 25 MCG: 50 INJECTION INTRAMUSCULAR; INTRAVENOUS at 08:01

## 2022-10-25 RX ADMIN — ONDANSETRON 4 MG: 2 INJECTION INTRAMUSCULAR; INTRAVENOUS at 08:02

## 2022-10-25 NOTE — INTERVAL H&P NOTE
H&P reviewed  After examining the patient I find no changes in the patients condition since the H&P had been written      Vitals:    10/25/22 0637   BP: 141/86   Pulse: 88   Resp: 18   Temp: 97 9 °F (36 6 °C)   SpO2: 97%

## 2022-10-25 NOTE — ANESTHESIA PREPROCEDURE EVALUATION
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    (+) 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 (HCC)   (+) History of seizure   (+) SANDRA (juvenile myoclonic epilepsy) ([facility] )        Physical Exam    Airway    Mallampati score: II  TM Distance: >3 FB  Neck ROM: full     Dental   No notable dental hx     Cardiovascular  Cardiovascular exam normal    Pulmonary  Pulmonary exam normal     Other Findings        Anesthesia Plan  ASA Score- 3     Anesthesia Type- general with ASA Monitors  Additional Monitors:   Airway Plan: LMA  Plan Factors-Exercise tolerance (METS): >4 METS  Chart reviewed  Patient is not a current smoker  Patient instructed to abstain from smoking on day of procedure  Patient did not smoke on day of surgery  Obstructive sleep apnea risk education given perioperatively  Induction- intravenous  Postoperative Plan- Plan for postoperative opioid use  Planned trial extubation    Informed Consent- Anesthetic plan and risks discussed with patient and spouse

## 2022-10-25 NOTE — TELEPHONE ENCOUNTER
Yes, that would be great  Spoke with Danii Stauffer, scheduled with ultrasound and follow-up visit 11/8/22

## 2022-10-25 NOTE — TELEPHONE ENCOUNTER
Patient called  D&C today  She states that Dr Gordo Obrien said he wants her to have an ultrasound to check a left ovarian cyst   Patient wants it done before her post op on 11/9/22  Okay to schedule US?

## 2022-10-25 NOTE — ANESTHESIA POSTPROCEDURE EVALUATION
Post-Op Assessment Note    CV Status:  Stable  Pain Score: 2    Pain management: adequate     Mental Status:  Alert and awake   Hydration Status:  Euvolemic   PONV Controlled:  Controlled   Airway Patency:  Patent      Post Op Vitals Reviewed: Yes      Staff: Anesthesiologist         No complications documented      BP      Temp      Pulse     Resp      SpO2      /68   Pulse 85   Temp 98 3 °F (36 8 °C) (Temporal)   Resp 16   Wt 87 8 kg (193 lb 9 oz)   LMP 03/28/2020   SpO2 99%   Breastfeeding No   BMI 29 87 kg/m²

## 2022-10-25 NOTE — OP NOTE
OPERATIVE REPORT  PATIENT NAME: Radha Mccollum    :  1970  MRN: [id number]  Pt Location: [facility] OR ROOM 04    SURGERY DATE: 10/25/2022    Surgeon(s) and Role:     * Leonel Mccracken MD - Primary     * Hu Mcqueen MD - Assisting    Preop Diagnosis:  Postmenopausal bleeding [N95 0]  Irregular menstrual cycle [N92 6]    Post-Op Diagnosis Codes:     * Postmenopausal bleeding [N95 0]     * Irregular menstrual cycle [N92 6]    Procedure(s) (LRB):  D&C W/HYSTEROSCOPY; PSB POLYPECTOMY (N/A)  (EUA) (N/A)    Specimen(s):  ID Type Source Tests Collected by Time Destination   1 : Endometrial curretings Tissue Endometrium TISSUE EXAM Leonel Mccracken MD 10/25/2022 0801        Estimated Blood Loss:   50 mL    Drains:  None    Anesthesia Type:   General    Operative Indications:  Postmenopausal bleeding [N95 0]  Irregular menstrual cycle [N92 6]      Operative Findings:  Operative Findings:   1  External genitalia grossly normal in appearance  No ulcerations, no lacerations, no lesions  Bimanual exam revealed a slightly anteverted uterus with normal contours and freely mobile  No adnexal masses palpated bilaterally  2   Vagina and cervix were grossly normal in appearance without any lacerations or lesions  3  Uterus sounded to 11 cm   4  Hysteroscopic examination revealed scattered areas of proliferative endometrial lining  There was noted to me a moderate amount of uterie bleeding and areas of clot were also visualized  Polyp/Fibroid  could not be identified in the uterine cavity   Bilateral ostia were  visualized  Complications:   None apparent    Procedure and Technique:  The patient was taken to the operating room where a time out was performed to confirm correct patient and correct procedure  General LMA anesthesia (LMA) was administered and the patient was positioned on the OR table in the dorsal lithotomy position   All pressure points were padded and a jessica hugger was placed to maintain control of core body temperature  The patient was prepped and draped in the usual sterile fashion  A straight catheter was introduced into the bladder, which was drained of 200cc of clear yellow urine  A weighted speculum was inserted into the vagina and a Lul retractor was used to visualize the anterior lip of the cervix, which was then grasped with a single toothed tenaculum  The uterus was sounded to 11cm  The cervix was serially dilated to 17 [place] using Ambrosio dilators for introduction of the hysteroscope  Hysteroscope was introduced under direct visualization using normal saline solution as the distention media  Hysteroscope was advanced to the uterine fundus and the entire uterine cavity was inspected in a systematic manner  Hysteroscope was withdrawn and sharp curetting was performed, starting at the 12'oclock position and rotating a total of 360 degrees to cover all surfaces  Endometrial tissue was obtained and sent for pathology  The hysteroscope was then re-introduced under direct visualization, again using normal saline solution as the distention media  Entire uterine cavity was inspected in a systematic manner  Adequate curetting was confirmed and hysteroscopy was withdrawn  The single toothed tenaculum was removed from the anterior lip of the cervix  Good hemostasis was confirmed at the tenaculum puncture sites  Weighted speculum was then removed from the vagina  At the conclusion of the procedure, all needle, sponge, and instrument counts were noted to be correct x2  Fluid deficit was noted to be 150 cc at the conclusion of the case  Dr Zahraa Camargo was present and participated in all key portions of the case       I was present for the entire procedure    Patient Disposition:  PACU         SIGNATURE: Hu Mcqueen MD  DATE: October 25, 2022  TIME: 8:29 AM

## 2022-11-08 ENCOUNTER — ULTRASOUND (OUTPATIENT)
Dept: GYNECOLOGY | Facility: CLINIC | Age: 52
End: 2022-11-08

## 2022-11-08 ENCOUNTER — OFFICE VISIT (OUTPATIENT)
Dept: GYNECOLOGY | Facility: CLINIC | Age: 52
End: 2022-11-08

## 2022-11-08 VITALS
SYSTOLIC BLOOD PRESSURE: 118 MMHG | DIASTOLIC BLOOD PRESSURE: 74 MMHG | WEIGHT: 196 LBS | HEIGHT: 68 IN | BODY MASS INDEX: 29.7 KG/M2

## 2022-11-08 DIAGNOSIS — Z17.0 MALIGNANT NEOPLASM OF CENTRAL PORTION OF RIGHT BREAST IN FEMALE, ESTROGEN RECEPTOR POSITIVE (HCC): ICD-10-CM

## 2022-11-08 DIAGNOSIS — N83.202 LEFT OVARIAN CYST: Primary | ICD-10-CM

## 2022-11-08 DIAGNOSIS — Z98.890 S/P DILATATION AND CURETTAGE: ICD-10-CM

## 2022-11-08 DIAGNOSIS — N83.202 LEFT OVARIAN CYST: ICD-10-CM

## 2022-11-08 DIAGNOSIS — R92.2 DENSE BREAST TISSUE: ICD-10-CM

## 2022-11-08 DIAGNOSIS — N95.0 POSTMENOPAUSAL BLEEDING: Primary | ICD-10-CM

## 2022-11-08 DIAGNOSIS — N92.1 MENORRHAGIA WITH IRREGULAR CYCLE: ICD-10-CM

## 2022-11-08 DIAGNOSIS — C50.111 MALIGNANT NEOPLASM OF CENTRAL PORTION OF RIGHT BREAST IN FEMALE, ESTROGEN RECEPTOR POSITIVE (HCC): ICD-10-CM

## 2022-11-08 NOTE — PROGRESS NOTES
AMB US Pelvic Non OB    Date/Time: 11/8/2022 6:55 AM  Performed by: Alan Martinez  Authorized by: Bruce Charles MD   Universal Protocol:  Patient identity confirmed: verbally with patient      Procedure details:     Technique:  Transvaginal US, Non-OB    Position: lithotomy exam    Uterine findings:     Length (cm): 9 95    Height (cm):  5 61    Width (cm):  7 25    Endometrial stripe: identified      Endometrium thickness (mm):  12  Left ovary findings:     Left ovary:  Visualized    Length (cm): 3 22    Height (cm): 1 9    Width (cm): 2 4  Right ovary findings:     Right ovary:  Visualized    Length (cm): 3 35    Height (cm): 2 22    Width (cm): 2 29  Post-Procedure Details:     Impression:  Anteverted uterus is inhomogeneous throughout without fibroids  There are low level echogenicities throughout the myometrium suggestive of adenomyosis  The bilateral ovaries appear within normal limits  No free fluid  Tolerance: Tolerated well, no immediate complications    Complications: no complications    Additional Procedure Comments:      Limitlesslane F8 E8C-RS transvaginal transducer Serial # [id number] was used to perform the examination today and subsequently followed with high level disinfection utilizing Trophon EPR procedure  Ultrasound performed at:     [id number] [facility]  [address] [place] [address]  Phone:  [phone]  Fax:  [phone]

## 2022-11-08 NOTE — PROGRESS NOTES
Assessment/Plan   Diagnoses and all orders for this visit:    Postmenopausal bleeding    Malignant neoplasm of central portion of right breast in female, estrogen receptor positive ([address] )    Dense breast tissue    Menorrhagia with irregular cycle    S/P dilatation and curettage    Left ovarian cyst    1  Heavy postmenopausal bleeding-was on tamoxifen  She stopped tamoxifen, was prescribed Aygestin 5 mg tablets with improvement of bleeding  Endometrial biopsy demonstrated possible endometrial polyp with benign inactive endometrium  Underwent hysteroscopy with D&C on 10/25/22 with no focal findings appreciated  No polyp was seen  Bleeding was noted from the anterior uterine wall  Pathology demonstrated inactive appearing endometrial glands with stromal decidual like change suggestive of exogenous hormone effect, likely related to either tamoxifen or progesterone  She stop bleeding 1-2 days after the surgery and has had no problems  Patient will continue off of tamoxifen  She is off of Aygestin as well  She will call or return with any further bleeding  2  Thickened endometrium-was 25 mm at last visit  Today, 12 mm with findings suggestive of possible adenomyosis  She denies any complaints  No intervention is recommended  3  Resolved Left ovarian cyst-2 7 cm cyst noted left ovary at last ultrasound  Resolved on ultrasound today  Patient was relieved to hear this  No intervention is required  4  History of right breast cancer-status post lumpectomy, radiation therapy, and tamoxifen as noted  He is now off of tamoxifen  She continues to follow-up with Dr Basim Cummins, management as per him  He may consider aromatase inhibitor going forward per his last note  5  Menopausal-last menses April 2020 prior to this episode  6  Vaginal atrophy-mild changes noted on previous exam  7  Previous yeast infection-treated with resolution of symptoms    8  Prior history seizure disorder/perimenopause/urinary frequency-no current concerns  Follow-up January 2023 for yearly exam as needed  Subjective   Patient ID: Sabrina Nobles is a 46 y o  female  Vitals:    11/08/22 1424   BP: 118/74     Patient was seen today for office visit with me on ultrasound  Please see assessment plan and ultrasound note for details  The following portions of the patient's history were reviewed and updated as appropriate: allergies, current medications, past family history, past medical history, past social history, past surgical history and problem list   Past Medical History:   Diagnosis Date   • Abdominal pain    • Anesthesia     "after tape removed from both eyes/developed swelling"   • Anxiety     has autistic/special needs son   • Breast cancer ([facility] ) 04/30/2021    RIGHT   • Cancer ([facility] )     Breast cancer - right breast 2021   • Chronic UTI     2x/year or so /no symptoms today   • Dysuria     resolved 10/05/16/occas/not lately   • Encounter for screening colonoscopy    • Environmental and seasonal allergies    • Exercise involving cycling     2-3x/week spin classes   • GERD (gastroesophageal reflux disease)     occasional due to diet   • Heavy menses    • Hiatal hernia    • History of kidney stones    • History of radiation therapy 2021    Right   • Intestinal ulcer    • Irritable bowel syndrome    • Kidney stone     10/20/22 Hx of kidney stone -passed on own- no surgery needed   • Nausea    • Seizures ([facility] )     last seizure approx 5 yrs ago   • Shortness of breath     "sometimes just sitting in chair or activity and having Echo 4/29"-10/20/22- Pt reports resolved no further SOB   • Submucous leiomyoma of uterus     no recent problem   • Terminal ileitis ([facility] )     unclear etiology, work up w EGD/COLON/TI bx/SBC/CT done     • Wears glasses      Past Surgical History:   Procedure Laterality Date   • BREAST BIOPSY Right 03/01/2021    stereo   • BREAST LUMPECTOMY Right 4/30/2021    Procedure: RIGHT BREAST BRACKETED DINA LOCALIZATION LUMPECTOMY;;  Surgeon: Heather Drew MD;  Location: [facility];  Service: Surgical Oncology   • COLONOSCOPY  2017    5 yr recall   • DILATION AND CURETTAGE OF UTERUS     • EGD     • EXAMINATION UNDER ANESTHESIA N/A 10/25/2022    Procedure: (EUA); Surgeon: Nohemi Davis MD;  Location: [facility];  Service: Gynecology   • HYSTEROSCOPY     • INCONTINENCE SURGERY      bladder sling   • LYMPH NODE BIOPSY Right 2021    Procedure: Bx LYMPH NODE SENTINEL;  Surgeon: Heather Drew MD;  [facility]: [facility];  Service: Surgical Oncology   • MAMMO NEEDLE LOCALIZATION LEFT (ALL INC) Right 2021   • MAMMO NEEDLE LOCALIZATION LEFT (ALL INC) EACH ADD Right 2021   • MAMMO STEREOTACTIC BREAST BIOPSY RIGHT (ALL INC) Right 3/1/2021   • CA COLONOSCOPY FLX DX W/COLLJ SPEC WHEN PFRMD N/A 2016    Procedure: EGD AND COLONOSCOPY;  Surgeon: Arlette Santana MD;  Location: [facility];   Service: Gastroenterology   • CA HYSTEROSCOPY,W/ENDO BX N/A 10/25/2022    Procedure: D&C W/HYSTEROSCOPY; PSB POLYPECTOMY;  Surgeon: Nohemi Davis MD;  Location: [facility];  Service: Gynecology   • WISDOM TOOTH EXTRACTION       OB History    Para Term  AB Living   2 2 2     2   SAB IAB Ectopic Multiple Live Births           2      # Outcome Date GA Lbr Noam/2nd Weight Sex Delivery Anes PTL Lv   2 Term     M Vag-Spont   KEREN   1 Term     M Vag-Spont   KEREN      Obstetric Comments   Menarche-13   Age at first pregnancy-26   nuvaring-about 8 yrs, currently using       Current Outpatient Medications:   •  lamoTRIgine (LaMICtal) 100 mg tablet, TAKE 1 5 TABLETS BY MOUTH 2 TIMES A DAY , Disp: , Rfl:   •  lamoTRIgine (LaMICtal) 150 MG tablet, Take 150 mg by mouth 2 (two) times a day, Disp: , Rfl:   •  levocetirizine (XYZAL) 5 MG tablet, Take 5 mg by mouth in the morning Takes in the evening, Disp: , Rfl:   •  venlafaxine (EFFEXOR-XR) 37 5 mg 24 hr capsule, Take 1 capsule (37 5 mg total) by mouth daily (Patient not taking: Reported on 10/17/2022), Disp: 30 capsule, Rfl: 11  Allergies   Allergen Reactions   • Latex Rash and Swelling     When tape from eyes removed after surgery developed high swelling of eyes/watery   • Medical Tape Swelling and Rash     When tape from eyes removed after surgery developed high swelling of eyes/watery   • Wound Dressings Swelling and Rash     After a surgery when tape removed from eyes high swelling noted/tears   • Other Sneezing and Rash     seasonal     Social History     Socioeconomic History   • Marital status: /Civil Union     Spouse name: None   • Number of children: 2   • Years of education: None   • Highest education level: None   Occupational History   • None   Tobacco Use   • Smoking status: Never Smoker   • Smokeless tobacco: Never Used   • Tobacco comment: Never a smoker or any tobacco products use   Vaping Use   • Vaping Use: Never used   Substance and Sexual Activity   • Alcohol use:  Yes     Alcohol/week: 1 0 standard drink     Types: 1 Cans of beer per week     Comment: 2 drinks on a weekend - beer use   • Drug use: No     Comment: Denies any drug use   • Sexual activity: Yes     Partners: Male     Birth control/protection: Male Sterilization     Comment: NO nuva ring - discontinued at time of dx with breast cancer - denies any chest pain or shortness of breath with activity   Other Topics Concern   • None   Social History Narrative    Caffeine use    [address] [facility]    Uses safety equipment Seatbelts     Social Determinants of Health     Financial Resource Strain: Not on file   Food Insecurity: Not on file   Transportation Needs: Not on file   Physical Activity: Not on file   Stress: Not on file   Social Connections: Not on file   Intimate Partner Violence: Not At Risk   • Fear of Current or Ex-Partner: No   • Emotionally Abused: No   • Physically Abused: No   • Sexually Abused: No   Housing Stability: Not on file     Family History   Problem Relation Age of Onset   • Cancer Mother    • Breast cancer Mother 54   • Colon polyps Mother    • Diabetes Father         Type 2   • Hypertension Father    • BRCA1 Negative Sister    • BRCA2 Negative Sister    • Breast cancer Maternal Aunt 64   • Breast cancer Family    • Colon cancer Neg Hx    • Anesthesia problems Neg Hx        Review of Systems   Constitutional: Negative for chills, diaphoresis, fatigue and fever  Respiratory: Negative for apnea, cough, chest tightness, shortness of breath and wheezing  Cardiovascular: Negative for chest pain, palpitations and leg swelling  Gastrointestinal: Negative for abdominal distention, abdominal pain, anal bleeding, constipation, diarrhea, nausea, rectal pain and vomiting  Genitourinary: Positive for vaginal bleeding  Negative for difficulty urinating, dyspareunia, dysuria, frequency, hematuria, menstrual problem, pelvic pain, urgency, vaginal discharge and vaginal pain  Musculoskeletal: Negative for arthralgias, back pain and myalgias  Skin: Negative for color change and rash  Neurological: Negative for dizziness, syncope, light-headedness, numbness and headaches  Hematological: Negative for adenopathy  Does not bruise/bleed easily  Psychiatric/Behavioral: Negative for dysphoric mood and sleep disturbance  The patient is not nervous/anxious          Objective   Physical Exam  OBGyn Exam     Objective      /74 (BP Location: Left arm, Patient Position: Sitting)   Ht 5' 7 5" (1 715 m)   Wt 88 9 kg (196 lb)   LMP 03/28/2021 (Approximate)   BMI 30 24 kg/m²     General:   alert and oriented, in no acute distress   Neck:    Breast:    Heart:    Lungs:    Abdomen: Nontender   Vulva:    Vagina:    Cervix:    Uterus:    Adnexa:    Rectum:     Psych:  Normal mood and affect   Skin:  Without obvious lesions   Eyes: symmetric, with normal movements and reactivity   Musculoskeletal:  Normal muscle tone and movements appreciated

## 2022-11-08 NOTE — Clinical Note
Valdo Ren is status post D&C with no focal pathology found  Preoperative endometrial biopsy with possible polyp  Left ovarian cyst has resolved  She is doing well, stopped bleeding 1-2 days after the [facility]  with no recurrence of symptoms  She has appoint with me in January 2023 for yearly exam   She is also seeing you in January 2023 for follow-up  She is off of tamoxifen, also off of Aygestin   Thanks, Judge Soctt ALMANZA

## 2022-11-09 ENCOUNTER — TELEPHONE (OUTPATIENT)
Dept: GASTROENTEROLOGY | Facility: CLINIC | Age: 52
End: 2022-11-09

## 2022-11-09 NOTE — TELEPHONE ENCOUNTER
Patients GI provider:  Dr Letty Tesfaye     Number to return call: [phone]    Reason for call: Pt returning call regarding procedure  Pt will be unavail today after 1:30pm please leave a detailed message      Scheduled procedure/appointment date if applicable:procedure 35/40/75

## 2022-11-10 ENCOUNTER — TELEPHONE (OUTPATIENT)
Dept: GASTROENTEROLOGY | Facility: CLINIC | Age: 52
End: 2022-11-10

## 2022-11-10 DIAGNOSIS — K92.1 BLOOD IN STOOL: Primary | ICD-10-CM

## 2022-11-10 NOTE — TELEPHONE ENCOUNTER
Scheduled date of colonoscopy (as of today):11/23/22  Physician performing colonoscopy:Dr Smith Height  Location of colonoscopy:Endo  Bowel prep reviewed with patient:Colyte  Instructions reviewed with patient by: Jolie Topete  Clearances: No

## 2022-11-22 ENCOUNTER — ANESTHESIA (OUTPATIENT)
Dept: ANESTHESIOLOGY | Facility: AMBULATORY SURGERY CENTER | Age: 52
End: 2022-11-22

## 2022-11-22 ENCOUNTER — ANESTHESIA EVENT (OUTPATIENT)
Dept: ANESTHESIOLOGY | Facility: AMBULATORY SURGERY CENTER | Age: 52
End: 2022-11-22

## 2022-11-22 NOTE — ANESTHESIA PREPROCEDURE EVALUATION
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

## 2022-11-23 ENCOUNTER — ANESTHESIA EVENT (OUTPATIENT)
Dept: GASTROENTEROLOGY | Facility: AMBULATORY SURGERY CENTER | Age: 52
End: 2022-11-23

## 2022-11-23 ENCOUNTER — ANESTHESIA (OUTPATIENT)
Dept: GASTROENTEROLOGY | Facility: AMBULATORY SURGERY CENTER | Age: 52
End: 2022-11-23

## 2022-11-23 ENCOUNTER — HOSPITAL ENCOUNTER (OUTPATIENT)
Dept: GASTROENTEROLOGY | Facility: AMBULATORY SURGERY CENTER | Age: 52
Discharge: HOME | End: 2022-11-23

## 2022-11-23 VITALS
HEIGHT: 68 IN | RESPIRATION RATE: 14 BRPM | TEMPERATURE: 98.6 F | DIASTOLIC BLOOD PRESSURE: 76 MMHG | BODY MASS INDEX: 29.7 KG/M2 | SYSTOLIC BLOOD PRESSURE: 137 MMHG | HEART RATE: 82 BPM | OXYGEN SATURATION: 98 % | WEIGHT: 196 LBS

## 2022-11-23 DIAGNOSIS — K50.00 TERMINAL ILEITIS WITHOUT COMPLICATION (HCC): ICD-10-CM

## 2022-11-23 PROBLEM — Z87.898 HISTORY OF ANESTHESIA COMPLICATIONS: Status: RESOLVED | Noted: 2021-04-30 | Resolved: 2022-11-23

## 2022-11-23 RX ORDER — PROPOFOL 10 MG/ML
INJECTION, EMULSION INTRAVENOUS AS NEEDED
Status: DISCONTINUED | OUTPATIENT
Start: 2022-11-23 | End: 2022-11-23

## 2022-11-23 RX ORDER — LIDOCAINE HYDROCHLORIDE 10 MG/ML
INJECTION, SOLUTION EPIDURAL; INFILTRATION; INTRACAUDAL; PERINEURAL AS NEEDED
Status: DISCONTINUED | OUTPATIENT
Start: 2022-11-23 | End: 2022-11-23

## 2022-11-23 RX ORDER — FAMOTIDINE 40 MG/1
40 TABLET, FILM COATED ORAL DAILY
COMMUNITY

## 2022-11-23 RX ORDER — SODIUM CHLORIDE, SODIUM LACTATE, POTASSIUM CHLORIDE, CALCIUM CHLORIDE 600; 310; 30; 20 MG/100ML; MG/100ML; MG/100ML; MG/100ML
50 INJECTION, SOLUTION INTRAVENOUS CONTINUOUS
Status: DISCONTINUED | OUTPATIENT
Start: 2022-11-23 | End: 2022-11-27 | Stop reason: HOSPADM

## 2022-11-23 RX ADMIN — PROPOFOL 100 MG: 10 INJECTION, EMULSION INTRAVENOUS at 09:50

## 2022-11-23 RX ADMIN — PROPOFOL 50 MG: 10 INJECTION, EMULSION INTRAVENOUS at 09:57

## 2022-11-23 RX ADMIN — PROPOFOL 50 MG: 10 INJECTION, EMULSION INTRAVENOUS at 09:59

## 2022-11-23 RX ADMIN — SODIUM CHLORIDE, SODIUM LACTATE, POTASSIUM CHLORIDE, CALCIUM CHLORIDE 50 ML/HR: 600; 310; 30; 20 INJECTION, SOLUTION INTRAVENOUS at 09:45

## 2022-11-23 RX ADMIN — LIDOCAINE HYDROCHLORIDE 50 MG: 10 INJECTION, SOLUTION EPIDURAL; INFILTRATION; INTRACAUDAL; PERINEURAL at 09:49

## 2022-11-23 RX ADMIN — PROPOFOL 50 MG: 10 INJECTION, EMULSION INTRAVENOUS at 09:52

## 2022-11-23 RX ADMIN — PROPOFOL 50 MG: 10 INJECTION, EMULSION INTRAVENOUS at 09:54

## 2022-11-23 NOTE — ANESTHESIA POSTPROCEDURE EVALUATION
Post-Op Assessment Note    CV Status:  Stable  Pain Score: 0    Pain management: adequate     Mental Status:  Alert and awake   Hydration Status:  Stable and euvolemic   PONV Controlled:  Controlled   Airway Patency:  Patent      Post Op Vitals Reviewed: Yes      Staff: CRNA         No notable events documented      BP   116/84   Temp      Pulse  86   Resp   15   SpO2   96

## 2022-11-23 NOTE — ANESTHESIA PREPROCEDURE EVALUATION
Procedure:  COLONOSCOPY    Relevant Problems   ANESTHESIA   (+) History of anesthesia complications (Resolved)      GYN   (+) Malignant neoplasm of central portion of right breast in female, estrogen receptor positive ([facility] )      HEMATOLOGY   (+) Lymphomatoid papulosis (HCC)      NEURO/PSYCH   (+) 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 ([facility] )   (+) History of anesthesia complications (Resolved)   (+) History of seizure   (+) SANDRA (juvenile myoclonic epilepsy) ([facility] )      Recent labs personally reviewed:  Lab Results   Component Value Date    WBC 5 32 10/14/2022    HGB 13 0 10/14/2022     10/14/2022     Lab Results   Component Value Date     07/22/2015    K 3 9 10/14/2022    BUN 14 10/14/2022    CREATININE 0 99 10/14/2022    GLUCOSE 78 07/22/2015     Lab Results   Component Value Date    PTT 24 04/08/2020      Lab Results   Component Value Date    INR 0 99 04/08/2020       No results found for: HGBA1C        Physical Exam    Airway    Mallampati score: II  TM Distance: >3 FB  Neck ROM: full     Dental   No notable dental hx     Cardiovascular  Cardiovascular exam normal    Pulmonary  Pulmonary exam normal     Other Findings        Anesthesia Plan  ASA Score- 2     Anesthesia Type- IV sedation with anesthesia with ASA Monitors  Additional Monitors:   Airway Plan:     Comment: Supplemental O2, etco2 monitoring    Plan Factors-Exercise tolerance (METS): >4 METS  Chart reviewed  Patient summary reviewed  Patient is not a current smoker  Induction- intravenous  Postoperative Plan-     Informed Consent- Anesthetic plan and risks discussed with patient  I personally reviewed this patient with the CRNA  Discussed and agreed on the Anesthesia Plan with the CRNA  Celeste Varela

## 2023-01-14 ENCOUNTER — APPOINTMENT (OUTPATIENT)
Dept: LAB | Facility: CLINIC | Age: 53
End: 2023-01-14

## 2023-01-14 DIAGNOSIS — Z17.0 MALIGNANT NEOPLASM OF CENTRAL PORTION OF RIGHT BREAST IN FEMALE, ESTROGEN RECEPTOR POSITIVE (HCC): ICD-10-CM

## 2023-01-14 DIAGNOSIS — E28.39 MENOPAUSE OVARIAN FAILURE: ICD-10-CM

## 2023-01-14 DIAGNOSIS — C50.111 MALIGNANT NEOPLASM OF CENTRAL PORTION OF RIGHT BREAST IN FEMALE, ESTROGEN RECEPTOR POSITIVE (HCC): ICD-10-CM

## 2023-01-14 LAB
ALBUMIN SERPL BCP-MCNC: 3.9 G/DL (ref 3.5–5)
ALP SERPL-CCNC: 87 U/L (ref 46–116)
ALT SERPL W P-5'-P-CCNC: 35 U/L (ref 12–78)
ANION GAP SERPL CALCULATED.3IONS-SCNC: 5 MMOL/L (ref 4–13)
AST SERPL W P-5'-P-CCNC: 15 U/L (ref 5–45)
BASOPHILS # BLD AUTO: 0.04 THOUSANDS/ÂΜL (ref 0–0.1)
BASOPHILS NFR BLD AUTO: 1 % (ref 0–1)
BILIRUB SERPL-MCNC: 0.37 MG/DL (ref 0.2–1)
BUN SERPL-MCNC: 24 MG/DL (ref 5–25)
CALCIUM SERPL-MCNC: 9.3 MG/DL (ref 8.3–10.1)
CANCER AG27-29 SERPL-ACNC: 19.4 U/ML (ref 0–42.3)
CHLORIDE SERPL-SCNC: 106 MMOL/L (ref 96–108)
CO2 SERPL-SCNC: 28 MMOL/L (ref 21–32)
CREAT SERPL-MCNC: 0.94 MG/DL (ref 0.6–1.3)
EOSINOPHIL # BLD AUTO: 0.14 THOUSAND/ÂΜL (ref 0–0.61)
EOSINOPHIL NFR BLD AUTO: 2 % (ref 0–6)
ERYTHROCYTE [DISTWIDTH] IN BLOOD BY AUTOMATED COUNT: 12.3 % (ref 11.6–15.1)
ESTRADIOL SERPL-MCNC: <11 PG/ML
FSH SERPL-ACNC: 85.7 MIU/ML
GFR SERPL CREATININE-BSD FRML MDRD: 69 ML/MIN/1.73SQ M
GLUCOSE P FAST SERPL-MCNC: 80 MG/DL (ref 65–99)
HCT VFR BLD AUTO: 42.3 % (ref 34.8–46.1)
HGB BLD-MCNC: 14.2 G/DL (ref 11.5–15.4)
IMM GRANULOCYTES # BLD AUTO: 0.03 THOUSAND/UL (ref 0–0.2)
IMM GRANULOCYTES NFR BLD AUTO: 1 % (ref 0–2)
LH SERPL-ACNC: 44.6 MIU/ML
LYMPHOCYTES # BLD AUTO: 2.1 THOUSANDS/ÂΜL (ref 0.6–4.47)
LYMPHOCYTES NFR BLD AUTO: 34 % (ref 14–44)
MCH RBC QN AUTO: 31.1 PG (ref 26.8–34.3)
MCHC RBC AUTO-ENTMCNC: 33.6 G/DL (ref 31.4–37.4)
MCV RBC AUTO: 93 FL (ref 82–98)
MONOCYTES # BLD AUTO: 0.44 THOUSAND/ÂΜL (ref 0.17–1.22)
MONOCYTES NFR BLD AUTO: 7 % (ref 4–12)
NEUTROPHILS # BLD AUTO: 3.47 THOUSANDS/ÂΜL (ref 1.85–7.62)
NEUTS SEG NFR BLD AUTO: 55 % (ref 43–75)
NRBC BLD AUTO-RTO: 0 /100 WBCS
PLATELET # BLD AUTO: 286 THOUSANDS/UL (ref 149–390)
PMV BLD AUTO: 10.4 FL (ref 8.9–12.7)
POTASSIUM SERPL-SCNC: 4.4 MMOL/L (ref 3.5–5.3)
PROT SERPL-MCNC: 7.2 G/DL (ref 6.4–8.4)
RBC # BLD AUTO: 4.56 MILLION/UL (ref 3.81–5.12)
SODIUM SERPL-SCNC: 139 MMOL/L (ref 135–147)
WBC # BLD AUTO: 6.22 THOUSAND/UL (ref 4.31–10.16)

## 2023-01-16 LAB — ESTRONE SERPL-MCNC: <6 PG/ML

## 2023-01-18 ENCOUNTER — ANNUAL EXAM (OUTPATIENT)
Dept: GYNECOLOGY | Facility: CLINIC | Age: 53
End: 2023-01-18

## 2023-01-18 VITALS
WEIGHT: 191 LBS | SYSTOLIC BLOOD PRESSURE: 114 MMHG | HEIGHT: 68 IN | BODY MASS INDEX: 28.95 KG/M2 | DIASTOLIC BLOOD PRESSURE: 72 MMHG

## 2023-01-18 DIAGNOSIS — Z01.419 WOMEN'S ANNUAL ROUTINE GYNECOLOGICAL EXAMINATION: Primary | ICD-10-CM

## 2023-01-18 DIAGNOSIS — Z12.31 ENCOUNTER FOR SCREENING MAMMOGRAM FOR BREAST CANCER: ICD-10-CM

## 2023-01-18 DIAGNOSIS — N95.0 POSTMENOPAUSAL BLEEDING: ICD-10-CM

## 2023-01-18 DIAGNOSIS — Z17.0 MALIGNANT NEOPLASM OF CENTRAL PORTION OF RIGHT BREAST IN FEMALE, ESTROGEN RECEPTOR POSITIVE (HCC): ICD-10-CM

## 2023-01-18 DIAGNOSIS — N92.6 IRREGULAR MENSES: ICD-10-CM

## 2023-01-18 DIAGNOSIS — C50.111 MALIGNANT NEOPLASM OF CENTRAL PORTION OF RIGHT BREAST IN FEMALE, ESTROGEN RECEPTOR POSITIVE (HCC): ICD-10-CM

## 2023-01-18 DIAGNOSIS — N95.2 VAGINAL ATROPHY: ICD-10-CM

## 2023-01-18 PROBLEM — Z30.44 ENCOUNTER FOR SURVEILLANCE OF VAGINAL RING HORMONAL CONTRACEPTIVE DEVICE: Status: RESOLVED | Noted: 2018-03-16 | Resolved: 2023-01-18

## 2023-01-18 NOTE — PROGRESS NOTES
Assessment/Plan   Diagnoses and all orders for this visit:    Women's annual routine gynecological examination    Encounter for screening mammogram for breast cancer  -     Mammo screening bilateral w 3d & cad; Future    Vaginal atrophy    Malignant neoplasm of central portion of right breast in female, estrogen receptor positive ([facility] [address] )    Irregular menses    Postmenopausal bleeding    1  yearly exam- Pap smear deferred, self breast awareness reviewed, calcium/vitamin D recommendations discussed, mammogram previously given by Dr Viviana Holt, colonoscopy up-to-date follow-up as per specialist   2  history of irregular menses/postmenopausal bleeding- was on tamoxifen from June 2021 through October 2022  Had episode of heavy bleeding, status post D&C 10/25/2022 with benign findings  Endometrial polyp was found on office endometrial biopsy/sonohysterogram   Fortunately, the patient has had no bleeding since the D&C  She has been off of Noxivent since that time and was on Aygestin for short course to help control heavy bleeding at the time  Call return with any further bleeding  3  thickened endometrium-was 25 mm previously, 12 mm at most recent ultrasound 11/8/2022 with possible adenomyosis changes  No intervention is recommended  4  resolved left ovarian cyst-resolved on most recent ultrasound 11/8/2022   5  right breast cancer- status postlumpectomy, radiation therapy, and tamoxifen as noted above  Has appointment with Dr Margo Mccann next week to discuss treatment options  She does not plan to go back on tamoxifen given the bleeding issue  She will speak with him about other options as needed  6  menopausal-last menses April 2020 prior to this episode  Had lab testing 1/14/2023 with estradiol less than 11 and FSH of 86  This is consistent with menopause  7  vaginal atrophy-mild changes noted on exam   Vaginal lubrication/moisturizer sheet given, to use accordingly    8  previous yeast/seizure disorder/perimenopause/urinary frequency-no current issues  Follow-up 1 year for yearly exam or as needed  Subjective   Patient ID: Aubrey Riojas is a 46 y o  female  Vitals:    01/18/23 1122   BP: 114/72     HPI    The following portions of the patient's history were reviewed and updated as appropriate: allergies, current medications, past family history, past medical history, past social history, past surgical history and problem list   Past Medical History:   Diagnosis Date   • Abdominal pain    • Anesthesia     "after tape removed from both eyes/developed swelling"   • Anxiety     has autistic/special needs son   • Breast cancer ([address] ) 04/30/2021    RIGHT   • Cancer ([place] [id number] )     Breast cancer - right breast 2021   • Chronic UTI     2x/year or so /no symptoms today   • Dysuria     resolved 10/05/16/occas/not lately   • Encounter for screening colonoscopy    • Environmental and seasonal allergies    • Exercise involving cycling     2-3x/week spin classes   • GERD (gastroesophageal reflux disease)     occasional due to diet   • Heavy menses    • Hiatal hernia    • History of kidney stones    • History of radiation therapy 2021    Right   • Intestinal ulcer    • Irritable bowel syndrome    • Kidney stone     10/20/22 Hx of kidney stone -passed on own- no surgery needed   • Nausea    • Seizures ([address] )     last seizure approx 5 yrs ago   • Shortness of breath     "sometimes just sitting in chair or activity and having Echo 4/29"-10/20/22- Pt reports resolved no further SOB   • Submucous leiomyoma of uterus     no recent problem   • Terminal ileitis ([address] )     unclear etiology, work up w EGD/COLON/TI bx/SBC/CT done     • Wears glasses      Past Surgical History:   Procedure Laterality Date   • BREAST BIOPSY Right 03/01/2021    stereo   • BREAST LUMPECTOMY Right 4/30/2021    Procedure: RIGHT BREAST BRACKETED DINA LOCALIZATION LUMPECTOMY;;  Surgeon: Phuong Falcon MD;  Location: [facility];  Service: Surgical Oncology   • COLONOSCOPY  2017    5 yr recall   • DILATION AND CURETTAGE OF UTERUS     • EGD     • EXAMINATION UNDER ANESTHESIA N/A 10/25/2022    Procedure: (EUA); Surgeon: Hans Campoverde MD;  Location: [facility];  Service: Gynecology   • HYSTEROSCOPY     • INCONTINENCE SURGERY      bladder sling   • LYMPH NODE BIOPSY Right 2021    Procedure: Bx LYMPH NODE SENTINEL;  Surgeon: Ta Estrella MD;  Location: [facility];  Service: Surgical Oncology   • MAMMO NEEDLE LOCALIZATION LEFT (ALL INC) Right 2021   • MAMMO NEEDLE LOCALIZATION LEFT (ALL INC) EACH ADD Right 2021   • MAMMO STEREOTACTIC BREAST BIOPSY RIGHT (ALL INC) Right 3/1/2021   • MA COLONOSCOPY FLX DX W/COLLJ SPEC WHEN PFRMD N/A 2016    Procedure: EGD AND COLONOSCOPY;  Surgeon: Kleber Solis MD;  Location: [facility]; Service: Gastroenterology   • MA HYSTEROSCOPY BX ENDOMETRIUM&/POLYPC W/WO D&C N/A 10/25/2022    Procedure: D&C W/HYSTEROSCOPY; PSB POLYPECTOMY;  Surgeon: Hans Campoverde MD;  Location: [facility];  Service: Gynecology   • WISDOM TOOTH EXTRACTION       OB History    Para Term  AB Living   2 2 2     2   SAB IAB Ectopic Multiple Live Births           2      # Outcome Date GA Lbr Noam/2nd Weight Sex Delivery Anes PTL Lv   2 Term     M Vag-Spont   KEREN   1 Term     M Vag-Spont   KEREN      Obstetric Comments   Menarche-13   Age at first pregnancy-26   nuvaring-about 8 yrs, currently using       Current Outpatient Medications:   •  famotidine (PEPCID) 40 MG tablet, Take 40 mg by mouth daily, Disp: , Rfl:   •  lamoTRIgine (LaMICtal) 150 MG tablet, Take 150 mg by mouth 2 (two) times a day, Disp: , Rfl:   •  lamoTRIgine (LaMICtal) 100 mg tablet, TAKE 1 5 TABLETS BY MOUTH 2 TIMES A DAY   (Patient not taking: Reported on 2023), Disp: , Rfl:   •  levocetirizine (XYZAL) 5 MG tablet, Take 5 mg by mouth in the morning Takes in the evening (Patient not taking: Reported on 2023), Disp: , Rfl:   •  venlafaxine (EFFEXOR-XR) 37 5 mg 24 hr capsule, Take 1 capsule (37 5 mg total) by mouth daily (Patient not taking: Reported on 10/17/2022), Disp: 30 capsule, Rfl: 11  Allergies   Allergen Reactions   • Latex Rash and Swelling     When tape from eyes removed after surgery developed high swelling of eyes/watery   • Medical Tape Swelling and Rash     When tape from eyes removed after surgery developed high swelling of eyes/watery   • Wound Dressings Swelling and Rash     After a surgery when tape removed from eyes high swelling noted/tears   • Other Sneezing and Rash     seasonal     Social History     Socioeconomic History   • Marital status: /Civil Union     Spouse name: None   • Number of children: 2   • Years of education: None   • Highest education level: None   Occupational History   • None   Tobacco Use   • Smoking status: Never   • Smokeless tobacco: Never   • Tobacco comments:     Never a smoker or any tobacco products use   Vaping Use   • Vaping Use: Never used   Substance and Sexual Activity   • Alcohol use:  Yes     Alcohol/week: 1 0 standard drink     Types: 1 Cans of beer per week     Comment: 2 drinks on a weekend - beer use   • Drug use: No     Comment: Denies any drug use   • Sexual activity: Yes     Partners: Male     Birth control/protection: Male Sterilization     Comment: NO nuva ring - discontinued at time of dx with breast cancer - denies any chest pain or shortness of breath with activity   Other Topics Concern   • None   Social History Narrative    Caffeine use    [employer]    Uses safety equipment Seatbelts     Social Determinants of Health     Financial Resource Strain: Not on file   Food Insecurity: Not on file   Transportation Needs: Not on file   Physical Activity: Not on file   Stress: Not on file   Social Connections: Not on file   Intimate Partner Violence: Not At Risk   • Fear of Current or Ex-Partner: No   • Emotionally Abused: No   • Physically Abused: No   • Sexually Abused: No   Housing Stability: Not on file     Family History   Problem Relation Age of Onset   • Cancer Mother    • Breast cancer Mother 54   • Colon polyps Mother    • Diabetes Father         Type 2   • Hypertension Father    • BRCA1 Negative Sister    • BRCA2 Negative Sister    • Breast cancer Maternal Aunt 64   • Breast cancer Family    • Colon cancer Neg Hx    • Anesthesia problems Neg Hx        Review of Systems    Objective   Physical Exam  OBGyn Exam     Objective      /72 (BP Location: Left arm, Patient Position: Sitting)   Ht 5' 7 5" (1 715 m)   Wt 86 6 kg (191 lb)   LMP 03/28/2021 (Approximate)   BMI 29 47 kg/m²     General:   alert and oriented, in no acute distress   Neck: normal to inspection and palpation   Breast: normal appearance, no masses or tenderness  Well-healed incision extending from 9:00 horizontally through to the nipple area  No masses or lesions or discharge or warmth or erythema appreciated  Heart:    Lungs:    Abdomen: soft, non-tender, without masses or organomegaly   Vulva: normal   Vagina:  Mildly atrophic, without erythema or lesions or discharge  Cervix:  Mildly atrophic, without lesions or discharge or cervicitis  No CMT     Uterus: top normal size, mid-position, non-tender   Adnexa: no mass, fullness, tenderness   Rectum: negative    Psych:  Normal mood and affect   Skin:  Without obvious lesions   Eyes: symmetric, with normal movements and reactivity   Musculoskeletal:  Normal muscle tone and movements appreciated

## 2023-01-23 ENCOUNTER — OFFICE VISIT (OUTPATIENT)
Dept: HEMATOLOGY ONCOLOGY | Facility: HOSPITAL | Age: 53
End: 2023-01-23

## 2023-01-23 VITALS
BODY MASS INDEX: 29.25 KG/M2 | DIASTOLIC BLOOD PRESSURE: 80 MMHG | SYSTOLIC BLOOD PRESSURE: 124 MMHG | TEMPERATURE: 98 F | WEIGHT: 193 LBS | HEART RATE: 66 BPM | RESPIRATION RATE: 14 BRPM | OXYGEN SATURATION: 98 % | HEIGHT: 68 IN

## 2023-01-23 DIAGNOSIS — Z17.0 MALIGNANT NEOPLASM OF CENTRAL PORTION OF RIGHT BREAST IN FEMALE, ESTROGEN RECEPTOR POSITIVE (HCC): Primary | ICD-10-CM

## 2023-01-23 DIAGNOSIS — C50.111 MALIGNANT NEOPLASM OF CENTRAL PORTION OF RIGHT BREAST IN FEMALE, ESTROGEN RECEPTOR POSITIVE (HCC): Primary | ICD-10-CM

## 2023-01-23 NOTE — PROGRESS NOTES
Hematology/Oncology Outpatient Follow- up Note  Eh Samayoa 46 y o  female MRN: @ Encounter: [id number]        Date:  1/23/2023    Presenting Complaint/Diagnosis : Stage IA ER positive RI positive HER2 negative breast cancer    HPI:      Xavi Estrada a 48year old female with a history of invasive mammary carcinoma of the right breast s/p lumpectomy on  The patient had 2 small foci of disease, one measured 5 mm and another measured 14 mm   Tumor was ER/RI + HER2 negative  DCIS was present   No lymphovascular invasion was seen   Margins were negative  Oncotype recurrence score was  Twelve I e  low risk  She completed a course of adjuvant radiation therapy at [place] and since she is still Perimenopausal has been started on tamoxifen    Previous Hematologic/ Oncologic History:    Oncology History   Malignant neoplasm of central portion of right breast in female, estrogen receptor positive ([facility] [address] )   3/31/2021 Initial Diagnosis    Malignant neoplasm of central portion of right breast in female, estrogen receptor positive ([facility] [address] )     3/31/2021 -  Cancer Staged    Staging form: Breast, AJCC 8th Edition  - Clinical: Stage IA (cT1, cN0, cM0, G2, ER+, RI+, HER2-) - Signed by Bradly Menjivar MD on 3/31/2021  Stage prefix: Initial diagnosis  Method of lymph node assessment: Clinical  Histologic grading system: 3 grade system       5/20/2021 -  Cancer Staged    Staging form: Breast, AJCC 8th Edition  - Pathologic: Stage IA (pT1c, pN0(sn), cM0, G2, ER+, RI+, HER2-) - Signed by Bradly Menjivar MD on 5/20/2021  Stage prefix: Initial diagnosis  Method of lymph node assessment: [place] lymph node biopsy  Histologic grading system: 3 grade system           Current Hematologic/ Oncologic Treatment:      Tamoxifen    Interval History:      Patient returns for follow-up visit  Blood work is within acceptable limits  She had dysfunctional uterine bleeding while on tamoxifen so tamoxifen was held    She had a D&C which showed inactive appearing endometrial glands with stromal decidual-like change suggestive of exogenous hormone effect with patchy menstrual type changes and unremarkable endocervical tissue  Her blood work is quite consistent with menopause based on the estradiol estrone levels along with the [facility] and LH  Blood work is otherwise within acceptable limits including the CA 27-29  The patient does have the option to go on an aromatase inhibitor at this point  The patient states she does not wish to go on anything since she had an early stage cancer and is not interested in adjuvant treatment at this point because of the potential for side effects  She does understand the risk reduction from antiestrogen therapy but is willing to accept the risk understanding all this  I respect her decision  Blood work is within acceptable limits including her tumor marker  The rest of her 14 point review of systems today was negative  Cancer Staging:  Cancer Staging   Malignant neoplasm of central portion of right breast in female, estrogen receptor positive ([facility] [address] )  Staging form: Breast, AJCC 8th Edition  - Clinical: Stage IA (cT1, cN0, cM0, G2, ER+, HI+, HER2-) - Signed by Ze Elizabeth MD on 3/31/2021  Stage prefix: Initial diagnosis  Method of lymph node assessment: Clinical  Histologic grading system: 3 grade system  - Pathologic: Stage IA (pT1c, pN0(sn), cM0, G2, ER+, HI+, HER2-) - Signed by Ze Elizabeth MD on 5/20/2021  Stage prefix: Initial diagnosis  Method of lymph node assessment: [place] lymph node biopsy  Histologic grading system: 3 grade system      Test Results:    Imaging: No results found      Labs:   Lab Results   Component Value Date    WBC 6 22 01/14/2023    HGB 14 2 01/14/2023    HCT 42 3 01/14/2023    MCV 93 01/14/2023     01/14/2023     Lab Results   Component Value Date     07/22/2015    K 4 4 01/14/2023     01/14/2023    CO2 28 01/14/2023    ANIONGAP 10 07/22/2015    BUN 24 01/14/2023    CREATININE 0 94 01/14/2023    GLUCOSE 78 07/22/2015    GLUF 80 01/14/2023    CALCIUM 9 3 01/14/2023    CORRECTEDCA 9 8 10/14/2022    AST 15 01/14/2023    ALT 35 01/14/2023    ALKPHOS 87 01/14/2023    PROT 6 7 07/22/2015    BILITOT 0 43 07/22/2015    EGFR 69 01/14/2023       ROS: As stated in the history of present illness otherwise his 14 point review of systems today was negative        Active Problems:   Patient Active Problem List   Diagnosis   • Irregular menses   • Dense breast tissue   • History of seizure   • Allergic dermatitis   • 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 ([place] [address] )   • Family history of breast cancer   • SANDRA (juvenile myoclonic epilepsy) ([place] [address] )   • Terminal ileitis ([place] [address] )   • Malignant neoplasm of central portion of right breast in female, estrogen receptor positive ([place] [address] )   • BRCA negative   • Lymphomatoid papulosis ([place] [address] )   • 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   • History of radiation therapy   • Use of tamoxifen (Nolvadex)   • Vaginal atrophy   • Displaced fracture of shaft of fifth metacarpal bone, right hand, subsequent encounter for fracture with routine healing   • Postmenopausal bleeding   • Heavy menstrual bleeding   • S/P dilatation and curettage       Past Medical History:   Past Medical History:   Diagnosis Date   • Abdominal pain    • Anesthesia     "after tape removed from both eyes/developed swelling"   • Anxiety     has autistic/special needs son   • Breast cancer ([place] [address] ) 04/30/2021    RIGHT   • Cancer ([place] [address] )     Breast cancer - right breast 2021   • Chronic UTI     2x/year or so /no symptoms today   • Dysuria     resolved 10/05/16/occas/not lately   • Encounter for screening colonoscopy    • Environmental and seasonal allergies    • Exercise involving cycling     2-3x/week spin classes   • GERD (gastroesophageal reflux disease)     occasional due to diet   • Heavy menses    • Hiatal hernia    • History of kidney stones    • History of radiation therapy 2021    Right   • Intestinal ulcer    • Irritable bowel syndrome    • Kidney stone     10/20/22 Hx of kidney stone -passed on own- no surgery needed   • Nausea    • Seizures ([address] )     last seizure approx 5 yrs ago   • Shortness of breath     "sometimes just sitting in chair or activity and having Echo 4/29"-10/20/22- Pt reports resolved no further SOB   • Submucous leiomyoma of uterus     no recent problem   • Terminal ileitis ([address] )     unclear etiology, work up w EGD/COLON/TI bx/SBC/CT done  • Wears glasses        Surgical History:   Past Surgical History:   Procedure Laterality Date   • BREAST BIOPSY Right 03/01/2021    stereo   • BREAST LUMPECTOMY Right 4/30/2021    Procedure: RIGHT BREAST BRACKETED DINA LOCALIZATION LUMPECTOMY;;  Surgeon: Hansel Nageotte, MD;  [facility]: [facility];  Service: Surgical Oncology   • COLONOSCOPY  03/01/2017    5 yr recall   • DILATION AND CURETTAGE OF UTERUS     • EGD     • EXAMINATION UNDER ANESTHESIA N/A 10/25/2022    Procedure: (EUA); Surgeon: Chad Rea MD;  Location: [facility];  Service: Gynecology   • HYSTEROSCOPY     • INCONTINENCE SURGERY      bladder sling   • LYMPH NODE BIOPSY Right 4/30/2021    Procedure: Bx LYMPH NODE SENTINEL;  Surgeon: Hansel Nageotte, MD;  [facility];  Service: Surgical Oncology   • MAMMO NEEDLE LOCALIZATION LEFT (ALL INC) Right 4/19/2021   • MAMMO NEEDLE LOCALIZATION LEFT (ALL INC) EACH ADD Right 4/19/2021   • MAMMO STEREOTACTIC BREAST BIOPSY RIGHT (ALL INC) Right 3/1/2021   • CO COLONOSCOPY FLX DX W/COLLJ SPEC WHEN PFRMD N/A 11/14/2016    Procedure: EGD AND COLONOSCOPY;  Surgeon: Rj Euceda MD;  Location: [facility];   Service: Gastroenterology   • CO HYSTEROSCOPY BX ENDOMETRIUM&/POLYPC W/WO D&C N/A 10/25/2022    Procedure: D&C W/HYSTEROSCOPY; PSB POLYPECTOMY;  Surgeon: Chad Rea MD;  Location: [facility];  Service: Gynecology   • WISDOM TOOTH EXTRACTION         Family History:    Family History   Problem Relation Age of Onset   • Cancer Mother    • Breast cancer Mother 54   • Colon polyps Mother    • Diabetes Father         Type 2   • Hypertension Father    • BRCA1 Negative Sister    • BRCA2 Negative Sister    • Breast cancer Maternal Aunt 64   • Breast cancer Family    • Colon cancer Neg Hx    • Anesthesia problems Neg Hx        Cancer-related family history includes Breast cancer in her family; Breast cancer (age of onset: 54) in her mother; Breast cancer (age of onset: 64) in her maternal aunt; Cancer in her mother  There is no history of Colon cancer  Social History:   Social History     Socioeconomic History   • Marital status: /Civil Union     Spouse name: Not on file   • Number of children: 2   • Years of education: Not on file   • Highest education level: Not on file   Occupational History   • Not on file   Tobacco Use   • Smoking status: Never   • Smokeless tobacco: Never   • Tobacco comments:     Never a smoker or any tobacco products use   Vaping Use   • Vaping Use: Never used   Substance and Sexual Activity   • Alcohol use:  Yes     Alcohol/week: 1 0 standard drink     Types: 1 Cans of beer per week     Comment: 2 drinks on a weekend - beer use   • Drug use: No     Comment: Denies any drug use   • Sexual activity: Yes     Partners: Male     Birth control/protection: Male Sterilization     Comment: NO nuva ring - discontinued at time of dx with breast cancer - denies any chest pain or shortness of breath with activity   Other Topics Concern   • Not on file   Social History Narrative    Caffeine use    [employer]    Uses safety equipment Seatbelts     Social Determinants of Health     Financial Resource Strain: Not on file   Food Insecurity: Not on file   Transportation Needs: Not on file   Physical Activity: Not on file   Stress: Not on file   Social Connections: Not on file   Intimate Partner Violence: Not At Risk   • Fear of Current or Ex-Partner: No   • Emotionally Abused: No   • Physically Abused: No   • Sexually Abused: No   Housing Stability: Not on file       Current Medications:   Current Outpatient Medications   Medication Sig Dispense Refill   • famotidine (PEPCID) 40 MG tablet Take 40 mg by mouth daily     • lamoTRIgine (LaMICtal) 100 mg tablet TAKE 1 5 TABLETS BY MOUTH 2 TIMES A DAY  (Patient not taking: Reported on 1/18/2023)     • lamoTRIgine (LaMICtal) 150 MG tablet Take 150 mg by mouth 2 (two) times a day     • levocetirizine (XYZAL) 5 MG tablet Take 5 mg by mouth in the morning Takes in the evening (Patient not taking: Reported on 1/18/2023)     • venlafaxine (EFFEXOR-XR) 37 5 mg 24 hr capsule Take 1 capsule (37 5 mg total) by mouth daily (Patient not taking: Reported on 10/17/2022) 30 capsule 11     No current facility-administered medications for this visit  Allergies: Allergies   Allergen Reactions   • Latex Rash and Swelling     When tape from eyes removed after surgery developed high swelling of eyes/watery   • Medical Tape Swelling and Rash     When tape from eyes removed after surgery developed high swelling of eyes/watery   • Wound Dressings Swelling and Rash     After a surgery when tape removed from eyes high swelling noted/tears   • Other Sneezing and Rash     seasonal       Physical Exam:    There is no height or weight on file to calculate BSA  Wt Readings from Last 3 Encounters:   01/18/23 86 6 kg (191 lb)   11/23/22 88 9 kg (196 lb)   11/08/22 88 9 kg (196 lb)        Temp Readings from Last 3 Encounters:   11/23/22 98 6 °F (37 °C) (Temporal)   10/25/22 98 3 °F (36 8 °C) (Temporal)   10/17/22 97 6 °F (36 4 °C) (Temporal)        BP Readings from Last 3 Encounters:   01/18/23 114/72   11/23/22 137/76   11/08/22 118/74         Pulse Readings from Last 3 Encounters:   11/23/22 82   10/25/22 85   10/17/22 74       Physical Exam     Constitutional   General appearance: No acute distress, well appearing and well nourished      Eyes   Conjunctiva and lids: No swelling, erythema or discharge  Pupils and irises: Equal, round and reactive to light  Ears, Nose, Mouth, and Throat   External inspection of ears and nose: Normal     Nasal mucosa, septum, and turbinates: Normal without edema or erythema  Oropharynx: Normal with no erythema, edema, exudate or lesions  Pulmonary   Respiratory effort: No increased work of breathing or signs of respiratory distress  Auscultation of lungs: Clear to auscultation  Cardiovascular   Palpation of heart: Normal PMI, no thrills  Auscultation of heart: Normal rate and rhythm, normal S1 and S2, without murmurs  Examination of extremities for edema and/or varicosities: Normal     Carotid pulses: Normal     Abdomen   Abdomen: Non-tender, no masses  Liver and spleen: No hepatomegaly or splenomegaly  Lymphatic   Palpation of lymph nodes in neck: No lymphadenopathy  Musculoskeletal   Gait and station: Normal     Digits and nails: Normal without clubbing or cyanosis  Inspection/palpation of joints, bones, and muscles: Normal     Skin   Skin and subcutaneous tissue: Normal without rashes or lesions  Neurologic   Cranial nerves: Cranial nerves 2-12 intact  Sensation: No sensory loss  Psychiatric   Orientation to person, place, and time: Normal     Mood and affect: Normal         Assessment / Plan:      The patient is a very pleasant 46year old female with a history of invasive mammary carcinoma of the right breast s/p lumpectomy on  The patient had 2 small foci of disease, one measured 5 mm and another measured 14 mm   Tumor was ER/HI + HER2 negative  DCIS was present   No lymphovascular invasion was seen   Margins were negative  Oncotype recurrence score was  Twelve I e  low risk    She completed a course of adjuvant radiation therapy at [place] and since she is still Perimenopausal was started on tamoxifen  The  plan was for her to be switched to an aromatase inhibitor in 2 years I e  when she has completed 2 years of tamoxifen      The patient was started on tamoxifen and was on it for approximately 2 years but then had dysfunctional uterine bleeding  Work-up was negative for any evidence of malignancy  The patient does not wish to go on an antiestrogen treatment at this point  I explained the benefits of this and the relative risk reduction in cancer recurrence and overall survival benefit per guidelines but the patient is not interested at this point fully understanding these risks  Based on her preference she will stay on observation  We will see her back in 6 months with repeat blood work  Goals and Barriers:  Current Goal:  Prolong Survival from breast cancer  Barriers: None  Patient's Capacity to Self Care:  Patient able to self care  Portions of the record may have been created with voice recognition software  Occasional wrong word or "sound a like" substitutions may have occurred due to the inherent limitations of voice recognition software  Read the chart carefully and recognize, using context, where substitutions have occurred

## 2023-02-16 ENCOUNTER — HOSPITAL ENCOUNTER (OUTPATIENT)
Dept: MAMMOGRAPHY | Facility: CLINIC | Age: 53
Discharge: HOME | End: 2023-02-16

## 2023-02-16 VITALS — WEIGHT: 191.8 LBS | HEIGHT: 68 IN | BODY MASS INDEX: 29.07 KG/M2

## 2023-02-16 DIAGNOSIS — C50.111 MALIGNANT NEOPLASM OF CENTRAL PORTION OF RIGHT BREAST IN FEMALE, ESTROGEN RECEPTOR POSITIVE (HCC): ICD-10-CM

## 2023-02-16 DIAGNOSIS — Z17.0 MALIGNANT NEOPLASM OF CENTRAL PORTION OF RIGHT BREAST IN FEMALE, ESTROGEN RECEPTOR POSITIVE (HCC): ICD-10-CM

## 2023-02-21 ENCOUNTER — ONCOLOGY SURVIVORSHIP (OUTPATIENT)
Dept: SURGICAL ONCOLOGY | Facility: CLINIC | Age: 53
End: 2023-02-21

## 2023-02-21 VITALS
BODY MASS INDEX: 28.19 KG/M2 | SYSTOLIC BLOOD PRESSURE: 118 MMHG | RESPIRATION RATE: 16 BRPM | HEART RATE: 86 BPM | TEMPERATURE: 98.5 F | HEIGHT: 68 IN | OXYGEN SATURATION: 96 % | WEIGHT: 186 LBS | DIASTOLIC BLOOD PRESSURE: 74 MMHG

## 2023-02-21 DIAGNOSIS — Z79.810 USE OF TAMOXIFEN (NOLVADEX): ICD-10-CM

## 2023-02-21 DIAGNOSIS — C50.111 MALIGNANT NEOPLASM OF CENTRAL PORTION OF RIGHT BREAST IN FEMALE, ESTROGEN RECEPTOR POSITIVE (HCC): Primary | ICD-10-CM

## 2023-02-21 DIAGNOSIS — Z17.0 MALIGNANT NEOPLASM OF CENTRAL PORTION OF RIGHT BREAST IN FEMALE, ESTROGEN RECEPTOR POSITIVE (HCC): Primary | ICD-10-CM

## 2023-02-21 NOTE — PROGRESS NOTES
Assessment/Plan:    Diagnoses and all orders for this visit:    Malignant neoplasm of central portion of right breast in female, estrogen receptor positive [facility])    Patient is a 25-year-old female with right breast cancer in March 2021  Her pathology revealed invasive ductal carcinoma, ER/SD positive, HER2 negative  She underwent genetic testing which was negative  She underwent a right lumpectomy and sentinel node biopsy  She was taking tamoxifen but experienced vaginal bleeding so this was discontinued in October 2022  She is now on observation  Breast cancer survivorship visit performed today and treatment summary and care plan were reviewed with patient  No concerns on today's exam  She had a bilateral mammogram earlier this month with no worrisome findings  She is up to date on colorectal and cervical cancer screenings  She exercises regularly  We will see her back in six months for a routine follow up visit or sooner should the need arise  All of her questions were answered today  -     Ambulatory referral to oncology social worker;  Future    Use of tamoxifen (Nolvadex)        REASON FOR VISIT:   Survivorship      Previous therapy:  Oncology History   Malignant neoplasm of central portion of right breast in female, estrogen receptor positive ([facility] [address] )   3/1/2021 Biopsy    Right breast biopsy:  Invasive ductal carcinoma  Grade 2   ER 90%  SD 90%  HER2 negative     3/31/2021 -  Cancer Staged    Staging form: Breast, AJCC 8th Edition  - Clinical: Stage IA (cT1, cN0, cM0, G2, ER+, SD+, HER2-) - Signed by Eduard Bliss MD on 3/31/2021  Stage prefix: Initial diagnosis  Method of lymph node assessment: Clinical  Histologic grading system: 3 grade system       4/30/2021 Surgery    Right breast lumpectomy with sentinel lymph node biopsy:  Margins clear  0/2 lymph nodes    Dr Christine Daniel     4/2021 Genetic Testing    [facility]    Genes analyzed: APC, ALEXANDER, AXIN2, BARD1, BMPR1A, BRCA1, BRCA2, BRIP1, CDH1, CDK4, CDKN2A, CHEK2, DICER1, MLH1, MSH2, MDH3, MSH6, MUTYH, NBN, NF1, NTHL1, PALB2, PMS2, PTEN, RAD51C, RAD51D, RECQL, SMAD4, SMARCA, STK11, TP53, HOXB13, POLD1, POLE, EPCAM, GREM1     Negative     5/20/2021 -  Cancer Staged    Staging form: Breast, AJCC 8th Edition  - Pathologic: Stage IA (pT1c, pN0(sn), cM0, G2, ER+, KS+, HER2-) - Signed by Luca Melo MD on 5/20/2021  Stage prefix: Initial diagnosis  Method of lymph node assessment: [place] lymph node biopsy  Histologic grading system: 3 grade system        Genomic Testing    Oncotype score: 12     6/25/2021 -  Hormone Therapy    Tamoxifen    Dr Vera Gonzales     7/8/2021 - 8/5/2021 Radiation    Dr Jana Leon, [facility]               Patient ID: Anne Rivera is a 46 y o  female  HPI      Review of Systems   Constitutional: Negative for activity change, appetite change, chills, fatigue, fever and unexpected weight change (lost 10 lbs doing weight watchers)  Respiratory: Negative for cough and shortness of breath  Cardiovascular: Negative for chest pain  Gastrointestinal: Negative for abdominal pain, constipation, diarrhea, nausea and vomiting  Musculoskeletal: Negative for arthralgias, back pain, gait problem and myalgias  Skin: Negative for color change and rash  Neurological: Negative for dizziness and headaches  Hematological: Negative for adenopathy  Psychiatric/Behavioral: Negative for agitation and confusion  All other systems reviewed and are negative  Objective:    Blood pressure 118/74, pulse 86, temperature 98 5 °F (36 9 °C), temperature source Tympanic, resp  rate 16, height 5' 7 5" (1 715 m), weight 84 4 kg (186 lb), last menstrual period 03/28/2021, SpO2 96 %, not currently breastfeeding  Body mass index is 28 7 kg/m²  Physical Exam  Vitals reviewed  Constitutional:       General: She is not in acute distress  Appearance: Normal appearance  She is well-developed  She is not diaphoretic  HENT:      Head: Normocephalic and atraumatic  Cardiovascular:      Rate and Rhythm: Normal rate and regular rhythm  Heart sounds: Normal heart sounds  Pulmonary:      Effort: Pulmonary effort is normal       Breath sounds: Normal breath sounds  Chest:   Breasts:     Right: Skin change (surgical scar breast and axilla) present  No swelling, bleeding, inverted nipple, mass, nipple discharge or tenderness  Left: No swelling, bleeding, inverted nipple, mass, nipple discharge, skin change or tenderness  Abdominal:      Palpations: Abdomen is soft  There is no mass  Tenderness: There is no abdominal tenderness  Musculoskeletal:         General: Normal range of motion  Cervical back: Normal range of motion  Lymphadenopathy:      Upper Body:      Right upper body: No supraclavicular or axillary adenopathy  Left upper body: No supraclavicular or axillary adenopathy  Skin:     General: Skin is warm and dry  Findings: No rash  Neurological:      Mental Status: She is alert and oriented to person, place, and time  Psychiatric:         Speech: Speech normal          Discussed symptoms related to disease recurrence, Yes    Evaluated for late effects related to cancer treatment, Yes, describe: discussed effects of surgery, RT, SERM therapy    Screening current for cervical cancer, Yes, describe: pap 1/2022    Screening current for colon cancer, Yes, describe: colonoscopy 11/2022- repeat in 5 years    Cancer rehabilitation services addressed, Yes, describe: not interested in [employer] at this time, exercising on own    Screening current for osteoporosis, Yes, describe: n/a    Oncology Treatment Summary reviewed with patient and copy provided, Yes    Referral placed for psychosocial evaluation/screening to oncology social work  Yes    I have spent 45 minutes with Patient  today in which greater than 50% of this time was spent in counseling/coordination of care regarding breast cancer survivorship

## 2023-02-23 ENCOUNTER — PATIENT OUTREACH (OUTPATIENT)
Dept: HEMATOLOGY ONCOLOGY | Facility: CLINIC | Age: 53
End: 2023-02-23

## 2023-02-23 NOTE — PROGRESS NOTES
MSW received a referral for this pt from Survivorship  Outreach was made this day and the pt's voicemail was received  MSW left a detailed message, along with a contact number and the pt was encouraged to return the call

## 2023-02-27 DIAGNOSIS — K21.9 GASTROESOPHAGEAL REFLUX DISEASE, UNSPECIFIED WHETHER ESOPHAGITIS PRESENT: Primary | ICD-10-CM

## 2023-02-27 RX ORDER — FAMOTIDINE 40 MG/1
TABLET, FILM COATED ORAL
Qty: 90 TABLET | Refills: 1 | Status: SHIPPED | OUTPATIENT
Start: 2023-02-27

## 2023-03-02 ENCOUNTER — OFFICE VISIT (OUTPATIENT)
Dept: FAMILY MEDICINE CLINIC | Facility: CLINIC | Age: 53
End: 2023-03-02

## 2023-03-02 VITALS
HEART RATE: 98 BPM | DIASTOLIC BLOOD PRESSURE: 78 MMHG | TEMPERATURE: 97.7 F | WEIGHT: 190.6 LBS | BODY MASS INDEX: 28.89 KG/M2 | SYSTOLIC BLOOD PRESSURE: 112 MMHG | HEIGHT: 68 IN | RESPIRATION RATE: 18 BRPM | OXYGEN SATURATION: 97 %

## 2023-03-02 DIAGNOSIS — J20.9 ACUTE BRONCHITIS, UNSPECIFIED ORGANISM: Primary | ICD-10-CM

## 2023-03-02 RX ORDER — BENZONATATE 100 MG/1
100 CAPSULE ORAL 3 TIMES DAILY PRN
Qty: 20 CAPSULE | Refills: 0 | Status: SHIPPED | OUTPATIENT
Start: 2023-03-02

## 2023-03-02 RX ORDER — AZITHROMYCIN 250 MG/1
TABLET, FILM COATED ORAL
Qty: 6 TABLET | Refills: 0 | Status: SHIPPED | OUTPATIENT
Start: 2023-03-02 | End: 2023-03-07

## 2023-03-02 NOTE — PROGRESS NOTES
Assessment/Plan:  Problem List Items Addressed This Visit    None  Visit Diagnoses     Acute bronchitis, unspecified organism    -  Primary    Relevant Medications    azithromycin (Zithromax) 250 mg tablet    benzonatate (TESSALON PERLES) 100 mg capsule      The patient's symptoms are consistent with acute bronchitis  We will treat her with the Zithromax as indicated as well as the cough medicine to use as needed  She will increase her fluids and rest   She will call if there is no improvement within a week or if the condition worsens  Return if symptoms worsen or fail to improve  I spent 20 minutes during the visit reviewing the history from the patient, performing the examination, discussing the findings with the patient, providing counseling and education, and making a plan  I spent 10 minutes ordering referrals and testing and documenting  Subjective:   Chief Complaint   Patient presents with   • Cough     Pt reports cough started about a week ago  Reports PND, congestion, dry cough, difficulty sleeping denies fever  Tested for Covid at the beginning of symptoms and was negative  Patient ID: Mitch Lauren is a 46 y o  female presents today for increased cough and congestion for a week  Mitch Lauren is a 46 y o  female who presents with cough and chest congestion for a week  She had been sick for a week - it started with sinus pressure and then a sore throat  There is a cough now  She tested negative for Covid  There is no fever  There is sinus pressure and there is a dry cough  The cough is severe and there is chest discomfort  There is no shortness of breath  She was taking Nyquil  There is chest congestion  The patient currently denies any nausea, vomiting, or GERD symptoms  she has normal bowel movements and normal urine output  she has a normal appetite  There is relief with the Nyquil  Cough  This is a new problem  The current episode started 1 to 4 weeks ago  The problem has been gradually worsening  The problem occurs constantly  The cough is productive of sputum  Associated symptoms include myalgias, nasal congestion and postnasal drip  Pertinent negatives include no chest pain, chills, ear congestion, ear pain, fever, headaches, heartburn, hemoptysis, rash, rhinorrhea, sore throat, shortness of breath, sweats, weight loss or wheezing       The following portions of the patient's history were reviewed and updated as appropriate: allergies, current medications, past family history, past medical history, past social history, past surgical history and problem list   Patient Active Problem List   Diagnosis   • Irregular menses   • Dense breast tissue   • History of seizure   • Allergic dermatitis   • 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 ([place] [address] )   • Family history of breast cancer   • SANDRA (juvenile myoclonic epilepsy) ([facility] )   • Terminal ileitis ([facility] )   • Malignant neoplasm of central portion of right breast in female, estrogen receptor positive ([place] [address] )   • BRCA negative   • Lymphomatoid papulosis ([place] [address] )   • 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   • History of radiation therapy   • Use of tamoxifen (Nolvadex)   • Vaginal atrophy   • Displaced fracture of shaft of fifth metacarpal bone, right hand, subsequent encounter for fracture with routine healing   • Postmenopausal bleeding   • Heavy menstrual bleeding   • S/P dilatation and curettage     Past Medical History:   Diagnosis Date   • Abdominal pain    • Anesthesia     "after tape removed from both eyes/developed swelling"   • Anxiety     has autistic/special needs son   • Breast cancer ([facility] ) 04/30/2021    RIGHT   • Cancer ([place] [address] )     Breast cancer - right breast 2021   • Chronic UTI     2x/year or so /no symptoms today   • Dysuria     resolved 10/05/16/occas/not lately   • Encounter for screening colonoscopy    • Environmental and seasonal allergies    • Exercise involving cycling     2-3x/week spin classes   • GERD (gastroesophageal reflux disease)     occasional due to diet   • Heavy menses    • Hiatal hernia    • History of kidney stones    • History of radiation therapy 2021    Right   • Intestinal ulcer    • Irritable bowel syndrome    • Kidney stone     10/20/22 Hx of kidney stone -passed on own- no surgery needed   • Nausea    • Seizures ([facility] )     last seizure approx 5 yrs ago   • Shortness of breath     "sometimes just sitting in chair or activity and having Echo 4/29"-10/20/22- Pt reports resolved no further SOB   • Submucous leiomyoma of uterus     no recent problem   • Terminal ileitis ([facility] )     unclear etiology, work up w EGD/COLON/TI bx/SBC/CT done  • Wears glasses      Past Surgical History:   Procedure Laterality Date   • BREAST BIOPSY Right 03/01/2021    stereo- invasive ductal ca   • BREAST LUMPECTOMY Right 04/30/2021    Procedure: RIGHT BREAST BRACKETED DINA LOCALIZATION LUMPECTOMY;;  Surgeon: Yasemin Kruger MD;  Location: [facility];  Service: Surgical Oncology   • COLONOSCOPY  03/01/2017    5 yr recall   • DILATION AND CURETTAGE OF UTERUS     • EGD     • EXAMINATION UNDER ANESTHESIA N/A 10/25/2022    Procedure: (EUA); Surgeon: Leeroy Phan MD;  Location: [facility];  Service: Gynecology   • HYSTEROSCOPY     • INCONTINENCE SURGERY      bladder sling   • LYMPH NODE BIOPSY Right 04/30/2021    Procedure: Bx LYMPH NODE SENTINEL;  Surgeon: Yasemin Kruger MD;  [facility]: [facility];  Service: Surgical Oncology   • MAMMO NEEDLE LOCALIZATION LEFT (ALL INC) Right 04/19/2021   • MAMMO NEEDLE LOCALIZATION LEFT (ALL INC) EACH ADD Right 04/19/2021   • MAMMO STEREOTACTIC BREAST BIOPSY RIGHT (ALL INC) Right 03/01/2021   • [place] COLONOSCOPY FLX DX W/COLLJ SPEC WHEN PFRMD N/A 11/14/2016    Procedure: EGD AND COLONOSCOPY;  Surgeon: Chapis Harris MD;  Location: [facility];   Service: Gastroenterology   • [place] HYSTEROSCOPY BX ENDOMETRIUM&/POLYPC W/WO D&C N/A 10/25/2022    Procedure: D&C W/HYSTEROSCOPY; PSB POLYPECTOMY;  Surgeon: Chapincito Farley MD;  Location: [facility];  Service: Gynecology   • WISDOM TOOTH EXTRACTION       Allergies   Allergen Reactions   • Latex Rash and Swelling     When tape from eyes removed after surgery developed high swelling of eyes/watery   • Medical Tape Swelling and Rash     When tape from eyes removed after surgery developed high swelling of eyes/watery   • Wound Dressings Swelling and Rash     After a surgery when tape removed from eyes high swelling noted/tears   • Other Sneezing and Rash     seasonal     Family History   Problem Relation Age of Onset   • Cancer Mother    • Breast cancer Mother 54   • Colon polyps Mother    • Diabetes Father         Type 2   • Hypertension Father    • BRCA1 Negative Sister    • BRCA2 Negative Sister    • Breast cancer Maternal Aunt 64   • Breast cancer Family    • Colon cancer Neg Hx    • Anesthesia problems Neg Hx      Social History     Socioeconomic History   • Marital status: /Civil Union     Spouse name: Not on file   • Number of children: 2   • Years of education: Not on file   • Highest education level: Not on file   Occupational History   • Not on file   Tobacco Use   • Smoking status: Never   • Smokeless tobacco: Never   • Tobacco comments:     Never a smoker or any tobacco products use   Vaping Use   • Vaping Use: Never used   Substance and Sexual Activity   • Alcohol use:  Yes     Alcohol/week: 1 0 standard drink     Types: 1 Cans of beer per week     Comment: 2 drinks on a weekend - beer use   • Drug use: No     Comment: Denies any drug use   • Sexual activity: Yes     Partners: Male     Birth control/protection: Male Sterilization     Comment: NO nuva ring - discontinued at time of dx with breast cancer - denies any chest pain or shortness of breath with activity   Other Topics Concern   • Not on file   Social History Narrative    Caffeine use    [facility] [address] Determinants of Health     Financial Resource Strain: Not on file   Food Insecurity: Not on file   Transportation Needs: Not on file   Physical Activity: Not on file   Stress: Not on file   Social Connections: Not on file   Intimate Partner Violence: Not At Risk   • Fear of Current or Ex-Partner: No   • Emotionally Abused: No   • Physically Abused: No   • Sexually Abused: No   Housing Stability: Not on file     Current Outpatient Medications on File Prior to Visit   Medication Sig Dispense Refill   • famotidine (PEPCID) 40 MG tablet TAKE 1 TABLET BY MOUTH EVERYDAY AT BEDTIME 90 tablet 1   • lamoTRIgine (LaMICtal) 150 MG tablet Take 150 mg by mouth 2 (two) times a day     • levocetirizine (XYZAL) 5 MG tablet Take 5 mg by mouth in the morning Takes in the evening       No current facility-administered medications on file prior to visit  Review of Systems   Constitutional: Negative  Negative for chills, fever and weight loss  HENT: Positive for postnasal drip  Negative for ear pain, rhinorrhea and sore throat  Eyes: Negative  Respiratory: Positive for cough  Negative for hemoptysis, shortness of breath and wheezing  Cardiovascular: Negative  Negative for chest pain  Gastrointestinal: Negative  Negative for heartburn  Endocrine: Negative  Genitourinary: Negative  Musculoskeletal: Positive for myalgias  Skin: Negative  Negative for rash  Allergic/Immunologic: Negative  Neurological: Negative  Negative for headaches  Hematological: Negative  Psychiatric/Behavioral: Negative  Objective:  Vitals:    03/02/23 0932   BP: 112/78   BP Location: Left arm   Patient Position: Sitting   Cuff Size: Large   Pulse: 98   Resp: 18   Temp: 97 7 °F (36 5 °C)   TempSrc: Tympanic   SpO2: 97%   Weight: 86 5 kg (190 lb 9 6 oz)   Height: 5' 7 5" (1 715 m)     Body mass index is 29 41 kg/m²  Physical Exam  Constitutional:       Appearance: She is well-developed     HENT:      Head: Normocephalic and atraumatic  Nose: Mucosal edema and rhinorrhea present  Right Sinus: Maxillary sinus tenderness present  Left Sinus: Maxillary sinus tenderness present  Mouth/Throat:      Pharynx: Posterior oropharyngeal erythema present  Eyes:      Conjunctiva/sclera: Conjunctivae normal       Pupils: Pupils are equal, round, and reactive to light  Cardiovascular:      Rate and Rhythm: Normal rate and regular rhythm  Heart sounds: Normal heart sounds  Pulmonary:      Effort: Pulmonary effort is normal       Breath sounds: Normal breath sounds  Abdominal:      General: Bowel sounds are normal       Palpations: Abdomen is soft  Musculoskeletal:         General: Normal range of motion  Cervical back: Normal range of motion  Lymphadenopathy:      Cervical: Cervical adenopathy present  Skin:     General: Skin is warm and dry  Neurological:      Mental Status: She is alert and oriented to person, place, and time  Deep Tendon Reflexes: Reflexes are normal and symmetric  BMI Counseling: Body mass index is 29 41 kg/m²  The BMI is above normal  Nutrition recommendations include decreasing portion sizes, encouraging healthy choices of fruits and vegetables, decreasing fast food intake, consuming healthier snacks, limiting drinks that contain sugar, moderation in carbohydrate intake, increasing intake of lean protein, reducing intake of saturated and trans fat and reducing intake of cholesterol  Exercise recommendations include exercising 3-5 times per week  No pharmacotherapy was ordered  Patient referred to PCP  Rationale for BMI follow-up plan is due to patient being overweight or obese  Depression Screening and Follow-up Plan: Patient was screened for depression during today's encounter  They screened negative with a PHQ-2 score of 0

## 2023-03-07 ENCOUNTER — PATIENT OUTREACH (OUTPATIENT)
Dept: HEMATOLOGY ONCOLOGY | Facility: CLINIC | Age: 53
End: 2023-03-07

## 2023-03-07 NOTE — PROGRESS NOTES
MSW made 2nd attempt to reach pt following her survivorship appointment  Pt's voicemail was received  MSW left a detailed message, along with a contact number and the pt was encouraged to return the call

## 2023-03-14 ENCOUNTER — PATIENT OUTREACH (OUTPATIENT)
Dept: HEMATOLOGY ONCOLOGY | Facility: CLINIC | Age: 53
End: 2023-03-14

## 2023-03-14 NOTE — PROGRESS NOTES
MSW made 3rd attempt to reach pt following her survivorship appointment  Pt's voicemail was received  MSW left a detailed message, along with a contact number and the pt was encouraged to return the call  No further outreach will be made

## 2023-03-28 ENCOUNTER — OFFICE VISIT (OUTPATIENT)
Dept: FAMILY MEDICINE CLINIC | Facility: CLINIC | Age: 53
End: 2023-03-28

## 2023-03-28 VITALS
HEART RATE: 103 BPM | SYSTOLIC BLOOD PRESSURE: 124 MMHG | BODY MASS INDEX: 28.04 KG/M2 | WEIGHT: 185 LBS | TEMPERATURE: 97.1 F | RESPIRATION RATE: 18 BRPM | HEIGHT: 68 IN | DIASTOLIC BLOOD PRESSURE: 78 MMHG | OXYGEN SATURATION: 99 %

## 2023-03-28 DIAGNOSIS — J01.00 ACUTE NON-RECURRENT MAXILLARY SINUSITIS: Primary | ICD-10-CM

## 2023-03-28 RX ORDER — METHYLPREDNISOLONE 4 MG/1
TABLET ORAL
Qty: 21 EACH | Refills: 0 | Status: SHIPPED | OUTPATIENT
Start: 2023-03-28

## 2023-03-28 RX ORDER — AMOXICILLIN AND CLAVULANATE POTASSIUM 875; 125 MG/1; MG/1
1 TABLET, FILM COATED ORAL EVERY 12 HOURS SCHEDULED
Qty: 14 TABLET | Refills: 0 | Status: SHIPPED | OUTPATIENT
Start: 2023-03-28 | End: 2023-04-04

## 2023-03-28 NOTE — PROGRESS NOTES
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  We will treat her as ordered  She will call with any worsening symptoms or any problems  We will see her back as scheduled  Return if symptoms worsen or fail to improve  I spent 15 minutes during the visit reviewing the history from the patient, performing the examination, discussing the findings with the patient, providing counseling and education, and making a plan  I spent 15 minutes ordering referrals and testing and documenting  Subjective:   Chief Complaint   Patient presents with   • Sick visit     Patient reports that symptoms started about a week ago - pain and pressure in head and behind eyes, nasal congestion, and sneezing  Patient has tested twice since the start of her symptoms and has been negative both times  Patient is taking motrin at night to help get to sleep  Patient notes she's been having on-and-off sinus infections for about 3 months  Patient ID: Rebecca Menendez is a 48 y o  female presents today for for evaluation of cold symptoms for a week  Luca Gomes is a 46 y o  female who presents today for evaluation of cold symptoms that started about a week ago  She is complaining of pain and pressure in her sinuses  She has had sinus symptoms on and off since January 2023  There is sinus pressure and there were fevers  There is PND  There is thick nasal drainage and she is not sleeping well at night  There are no allergies  The patient currently denies any nausea, vomiting, or GERD symptoms  she has normal bowel movements and normal urine output  she has a normal appetite  There are no sick contacts  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  There is a cough  She is taking OTC cough medication  She tried Benadryl- no relief  She had 2 negative covid tests  URI   This is a recurrent problem  The current episode started more than 1 year ago  The problem has been unchanged  There has been no fever  The fever has been present for less than 1 day  Pertinent negatives include no abdominal pain, chest pain, congestion, coughing, diarrhea, dysuria, ear pain, headaches, joint pain, joint swelling, nausea, neck pain, plugged ear sensation, rash, rhinorrhea, sinus pain, sneezing, sore throat, swollen glands, vomiting or wheezing  The treatment provided significant relief       The following portions of the patient's history were reviewed and updated as appropriate: allergies, current medications, past family history, past medical history, past social history, past surgical history and problem list   Patient Active Problem List   Diagnosis   • Irregular menses   • Dense breast tissue   • History of seizure   • Allergic dermatitis   • 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 ([address] )   • Family history of breast cancer   • SANDRA (juvenile myoclonic epilepsy) ([address] )   • Terminal ileitis ([address] )   • Malignant neoplasm of central portion of right breast in female, estrogen receptor positive ([address] )   • BRCA negative   • Lymphomatoid papulosis ([address] )   • 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   • History of radiation therapy   • Use of tamoxifen (Nolvadex)   • Vaginal atrophy   • Displaced fracture of shaft of fifth metacarpal bone, right hand, subsequent encounter for fracture with routine healing   • Postmenopausal bleeding   • Heavy menstrual bleeding   • S/P dilatation "and curettage     Past Medical History:   Diagnosis Date   • Abdominal pain    • Anesthesia     \"after tape removed from both eyes/developed swelling\"   • Anxiety     has autistic/special needs son   • Breast cancer ([facility] ) 04/30/2021    RIGHT   • Cancer ([facility] )     Breast cancer - right breast 2021   • Chronic UTI     2x/year or so /no symptoms today   • Dysuria     resolved 10/05/16/occas/not lately   • Encounter for screening colonoscopy    • Environmental and seasonal allergies    • Exercise involving cycling     2-3x/week spin classes   • GERD (gastroesophageal reflux disease)     occasional due to diet   • Heavy menses    • Hiatal hernia    • History of kidney stones    • History of radiation therapy 2021    Right   • Intestinal ulcer    • Irritable bowel syndrome    • Kidney stone     10/20/22 Hx of kidney stone -passed on own- no surgery needed   • Nausea    • Seizures ([place] [address] )     last seizure approx 5 yrs ago   • Shortness of breath     \"sometimes just sitting in chair or activity and having Echo 4/29\"-10/20/22- Pt reports resolved no further SOB   • Submucous leiomyoma of uterus     no recent problem   • Terminal ileitis ([place] [address] )     unclear etiology, work up w EGD/COLON/TI bx/SBC/CT done  • Wears glasses      Past Surgical History:   Procedure Laterality Date   • BREAST BIOPSY Right 03/01/2021    stereo- invasive ductal ca   • BREAST LUMPECTOMY Right 04/30/2021    Procedure: RIGHT BREAST BRACKETED DINA LOCALIZATION LUMPECTOMY;;  Surgeon: Filomena Collazo MD;  Location: [facility];  Service: Surgical Oncology   • COLONOSCOPY  03/01/2017    5 yr recall   • DILATION AND CURETTAGE OF UTERUS     • EGD     • EXAMINATION UNDER ANESTHESIA N/A 10/25/2022    Procedure: (EUA);   Surgeon: Francisco Reno MD;  Location: [facility];  Service: Gynecology   • HYSTEROSCOPY     • INCONTINENCE SURGERY      bladder sling   • LYMPH NODE BIOPSY Right 04/30/2021    Procedure: Bx LYMPH NODE SENTINEL;  Surgeon: Filomena Collazo MD;  Location: [facility];  Service: Surgical Oncology   • MAMMO NEEDLE LOCALIZATION LEFT (ALL INC) Right 04/19/2021   • MAMMO NEEDLE LOCALIZATION LEFT (ALL INC) EACH ADD Right 04/19/2021   • MAMMO STEREOTACTIC BREAST BIOPSY RIGHT (ALL INC) Right 03/01/2021   • GA COLONOSCOPY FLX DX W/COLLJ SPEC WHEN PFRMD N/A 11/14/2016    Procedure: EGD AND COLONOSCOPY;  Surgeon: Ervin Rapp MD;  Location: [facility]; Service: Gastroenterology   • GA HYSTEROSCOPY BX ENDOMETRIUM&/POLYPC W/WO D&C N/A 10/25/2022    Procedure: D&C W/HYSTEROSCOPY; PSB POLYPECTOMY;  Surgeon: Chandrika Juarez MD;  Location: [facility];  Service: Gynecology   • WISDOM TOOTH EXTRACTION       Allergies   Allergen Reactions   • Latex Rash and Swelling     When tape from eyes removed after surgery developed high swelling of eyes/watery   • Medical Tape Swelling and Rash     When tape from eyes removed after surgery developed high swelling of eyes/watery   • Wound Dressings Swelling and Rash     After a surgery when tape removed from eyes high swelling noted/tears   • Other Sneezing and Rash     seasonal     Family History   Problem Relation Age of Onset   • Cancer Mother    • Breast cancer Mother 54   • Colon polyps Mother    • Diabetes Father         Type 2   • Hypertension Father    • BRCA1 Negative Sister    • BRCA2 Negative Sister    • Breast cancer Maternal Aunt 64   • Breast cancer Family    • Colon cancer Neg Hx    • Anesthesia problems Neg Hx      Social History     Socioeconomic History   • Marital status: /Civil Union     Spouse name: Not on file   • Number of children: 2   • Years of education: Not on file   • Highest education level: Not on file   Occupational History   • Not on file   Tobacco Use   • Smoking status: Never   • Smokeless tobacco: Never   • Tobacco comments:     Never a smoker or any tobacco products use   Vaping Use   • Vaping Use: Never used   Substance and Sexual Activity   • Alcohol use:  Yes     Alcohol/week: 1 0 standard drink Types: 1 Cans of beer per week     Comment: 2 drinks on a weekend - beer use   • Drug use: No     Comment: Denies any drug use   • Sexual activity: Yes     Partners: Male     Birth control/protection: Male Sterilization     Comment: NO nuva ring - discontinued at time of dx with breast cancer - denies any chest pain or shortness of breath with activity   Other Topics Concern   • Not on file   Social History Narrative    Caffeine use    [address] [facility]    Uses safety equipment Seatbelts     Social Determinants of Health     Financial Resource Strain: Not on file   Food Insecurity: Not on file   Transportation Needs: Not on file   Physical Activity: Not on file   Stress: Not on file   Social Connections: Not on file   Intimate Partner Violence: Not At Risk   • Fear of Current or Ex-Partner: No   • Emotionally Abused: No   • Physically Abused: No   • Sexually Abused: No   Housing Stability: Not on file     Current Outpatient Medications on File Prior to Visit   Medication Sig Dispense Refill   • famotidine (PEPCID) 40 MG tablet TAKE 1 TABLET BY MOUTH EVERYDAY AT BEDTIME 90 tablet 1   • lamoTRIgine (LaMICtal) 150 MG tablet Take 150 mg by mouth 2 (two) times a day     • levocetirizine (XYZAL) 5 MG tablet Take 5 mg by mouth in the morning Takes in the evening       No current facility-administered medications on file prior to visit  Review of Systems   Constitutional: Negative  HENT: Negative  Negative for congestion, ear pain, rhinorrhea, sinus pain, sneezing and sore throat  Eyes: Negative  Respiratory: Negative  Negative for cough and wheezing  Cardiovascular: Negative  Negative for chest pain  Gastrointestinal: Negative  Negative for abdominal pain, diarrhea, nausea and vomiting  Endocrine: Negative  Genitourinary: Negative  Negative for dysuria  Musculoskeletal: Negative  Negative for joint pain and neck pain  Skin: Negative  Negative for rash  "  Allergic/Immunologic: Negative  Neurological: Negative  Negative for headaches  Hematological: Negative  Psychiatric/Behavioral: Negative  Objective:  Vitals:    03/28/23 1021   BP: 124/78   BP Location: Left arm   Patient Position: Sitting   Cuff Size: Standard   Pulse: 103   Resp: 18   Temp: (!) 97 1 °F (36 2 °C)   TempSrc: Tympanic   SpO2: 99%   Weight: 83 9 kg (185 lb)   Height: 5' 7 5\" (1 715 m)     Body mass index is 28 55 kg/m²  Physical Exam  Constitutional:       Appearance: She is well-developed  HENT:      Head: Normocephalic and atraumatic  Nose: Mucosal edema and rhinorrhea present  Right Sinus: Maxillary sinus tenderness present  Left Sinus: Maxillary sinus tenderness present  Mouth/Throat:      Pharynx: Posterior oropharyngeal erythema present  No oropharyngeal exudate  Eyes:      Conjunctiva/sclera: Conjunctivae normal       Pupils: Pupils are equal, round, and reactive to light  Neck:      Thyroid: No thyromegaly  Vascular: No JVD  Trachea: No tracheal deviation  Cardiovascular:      Rate and Rhythm: Normal rate and regular rhythm  Heart sounds: Normal heart sounds  No murmur heard  No friction rub  No gallop  Pulmonary:      Effort: Pulmonary effort is normal  No respiratory distress  Breath sounds: Normal breath sounds  No stridor  No wheezing or rales  Chest:      Chest wall: No tenderness  Abdominal:      General: Bowel sounds are normal  There is no distension  Palpations: Abdomen is soft  There is no mass  Tenderness: There is no abdominal tenderness  There is no guarding or rebound  Musculoskeletal:         General: No tenderness or deformity  Normal range of motion  Cervical back: Normal range of motion  Lymphadenopathy:      Cervical: No cervical adenopathy  Skin:     General: Skin is warm and dry  Neurological:      Mental Status: She is alert and oriented to person, place, and time        " Cranial Nerves: No cranial nerve deficit  Motor: No abnormal muscle tone  Coordination: Coordination normal       Deep Tendon Reflexes: Reflexes are normal and symmetric  Reflexes normal              Depression Screening and Follow-up Plan: Patient was screened for depression during today's encounter  They screened negative with a PHQ-2 score of 0

## 2023-06-06 ENCOUNTER — OFFICE VISIT (OUTPATIENT)
Dept: FAMILY MEDICINE CLINIC | Facility: CLINIC | Age: 53
End: 2023-06-06
Payer: COMMERCIAL

## 2023-06-06 VITALS
BODY MASS INDEX: 28.01 KG/M2 | SYSTOLIC BLOOD PRESSURE: 120 MMHG | HEIGHT: 68 IN | DIASTOLIC BLOOD PRESSURE: 78 MMHG | TEMPERATURE: 97.5 F | WEIGHT: 184.8 LBS | OXYGEN SATURATION: 97 % | HEART RATE: 103 BPM | RESPIRATION RATE: 17 BRPM

## 2023-06-06 DIAGNOSIS — Z13.29 SCREENING FOR ENDOCRINE, METABOLIC AND IMMUNITY DISORDER: ICD-10-CM

## 2023-06-06 DIAGNOSIS — Z13.228 SCREENING FOR ENDOCRINE, METABOLIC AND IMMUNITY DISORDER: ICD-10-CM

## 2023-06-06 DIAGNOSIS — Z13.6 SCREENING FOR CARDIOVASCULAR CONDITION: ICD-10-CM

## 2023-06-06 DIAGNOSIS — Z23 NEED FOR VACCINATION: ICD-10-CM

## 2023-06-06 DIAGNOSIS — Z11.4 SCREENING FOR HIV (HUMAN IMMUNODEFICIENCY VIRUS): ICD-10-CM

## 2023-06-06 DIAGNOSIS — Z11.59 ENCOUNTER FOR HEPATITIS C SCREENING TEST FOR LOW RISK PATIENT: ICD-10-CM

## 2023-06-06 DIAGNOSIS — Z00.00 ANNUAL PHYSICAL EXAM: Primary | ICD-10-CM

## 2023-06-06 DIAGNOSIS — Z13.0 SCREENING FOR ENDOCRINE, METABOLIC AND IMMUNITY DISORDER: ICD-10-CM

## 2023-06-06 DIAGNOSIS — Z13.1 SCREENING FOR DIABETES MELLITUS: ICD-10-CM

## 2023-06-06 PROBLEM — S62.326D: Status: RESOLVED | Noted: 2022-03-01 | Resolved: 2023-06-06

## 2023-06-06 PROCEDURE — 90471 IMMUNIZATION ADMIN: CPT

## 2023-06-06 PROCEDURE — 99396 PREV VISIT EST AGE 40-64: CPT | Performed by: FAMILY MEDICINE

## 2023-06-06 PROCEDURE — 90750 HZV VACC RECOMBINANT IM: CPT

## 2023-06-06 NOTE — PROGRESS NOTES
[facility] PRACTICE    NAME: Marian Lord  AGE: 48 y o  SEX: female  : 1970     DATE: 2023     Assessment and Plan:     Problem List Items Addressed This Visit    None  Visit Diagnoses     Annual physical exam    -  Primary    Need for vaccination        Relevant Orders    Zoster Vaccine Recombinant IM (Completed)    Encounter for hepatitis C screening test for low risk patient        Relevant Orders    Hepatitis C antibody    Screening for HIV (human immunodeficiency virus)        Relevant Orders    HIV 1/2 AG/AB W REFLEX [facility] and [facility] only    Screening for cardiovascular condition        Relevant Orders    CBC and differential    Comprehensive metabolic panel    LDL cholesterol, direct    Lipid panel    TSH, 3rd generation with Free T4 reflex    UA (URINE) with reflex to Scope    Screening for diabetes mellitus        Relevant Orders    CBC and differential    Comprehensive metabolic panel    LDL cholesterol, direct    Lipid panel    TSH, 3rd generation with Free T4 reflex    UA (URINE) with reflex to Scope    Screening for endocrine, metabolic and immunity disorder        Relevant Orders    CBC and differential    Comprehensive metabolic panel    LDL cholesterol, direct    Lipid panel    TSH, 3rd generation with Free T4 reflex    UA (URINE) with reflex to Scope      The patient had a normal exam today in the office  She will continue with her healthy diet and exercise  She will go for the lab work as ordered and we will follow-up with the results when available  She will continue to follow-up with her breast surgeon with her history of breast cancer  She will continue with her healthy lifestyle  We will see her back in the office as scheduled  Immunizations and preventive care screenings were discussed with patient today   Appropriate education was printed on patient's after visit summary  Counseling:  Dental Health: discussed importance of regular tooth brushing, flossing, and dental visits  Injury prevention: discussed safety/seat belts, safety helmets, smoke detectors, carbon dioxide detectors, and smoking near bedding or upholstery  Exercise: the importance of regular exercise/physical activity was discussed  Recommend exercise 3-5 times per week for at least 30 minutes  Depression Screening and Follow-up Plan: Patient was screened for depression during today's encounter  They screened negative with a PHQ-2 score of 0  Return in about 1 year (around 6/6/2024) for Annual physical      Chief Complaint:     Chief Complaint   Patient presents with   • Annual Exam     Complete Physical      History of Present Illness:     Adult Annual Physical   Patient here for a comprehensive physical exam  The patient reports no problems  She is feeling well  She had an episode of a possible allergic reaction to food while on vacation  She had nasal congestion and rhinorrhea and watery eyes  There are no further symptoms since then  The patient denies any chest pain, shortness of breath, or palpitations  There is no edema  There are no headaches or visual changes  There is no lightheadedness, dizziness, or fainting spells  There are no breakthrough seizures and she sees Neurology  The patient currently denies any nausea, vomiting, or GERD symptoms  she has normal bowel movements and normal urine output  she has a normal appetite  There is occasional GERD  She is UTD with her mammograms and she sees the Breast Surgeon  She sees the dermatologist yearly  Diet and Physical Activity  Diet/Nutrition: well balanced diet, limited junk food, low fat diet, low carb diet and consuming 3-5 servings of fruits/vegetables daily  Exercise: moderate cardiovascular exercise and 5-7 times a week on average        Depression Screening  PHQ-2/9 Depression Screening    Little interest or "pleasure in doing things: 0 - not at all  Feeling down, depressed, or hopeless: 0 - not at all  PHQ-2 Score: 0  PHQ-2 Interpretation: Negative depression screen       General Health  Sleep: sleeps well  Hearing: normal - bilateral   Vision: goes for regular eye exams, most recent eye exam <1 year ago and wears glasses  Dental: regular dental visits and brushes teeth twice daily  /GYN Health  Patient is: postmenopausal  She sees Dr Na Arreola yearly for a GYN exam        Review of Systems:     Review of Systems   Constitutional: Negative  HENT: Negative  Eyes: Negative  Respiratory: Negative  Cardiovascular: Negative  Gastrointestinal: Negative  Endocrine: Negative  Genitourinary: Negative  Musculoskeletal: Negative  Skin: Negative  Allergic/Immunologic: Negative  Neurological: Negative  Hematological: Negative  Psychiatric/Behavioral: Negative         Past Medical History:     Past Medical History:   Diagnosis Date   • Abdominal pain    • Anesthesia     \"after tape removed from both eyes/developed swelling\"   • Anxiety     has autistic/special needs son   • [facility] 04/30/2021    RIGHT   • Cancer ([facility] )     Breast cancer - right breast 2021   • Chronic UTI     2x/year or so /no symptoms today   • Displaced fracture of shaft of fifth metacarpal bone, right hand, subsequent encounter for fracture with routine healing 3/1/2022   • Dysuria     resolved 10/05/16/occas/not lately   • Encounter for screening colonoscopy    • Environmental and seasonal allergies    • Exercise involving cycling     2-3x/week spin classes   • GERD (gastroesophageal reflux disease)     occasional due to diet   • Heavy menses    • Hiatal hernia    • History of kidney stones    • History of radiation therapy 2021    Right   • Intestinal ulcer    • Irritable bowel syndrome    • Kidney stone     10/20/22 Hx of kidney stone -passed on own- no surgery needed   • Nausea    • Seizures ([facility] )     " "last seizure approx 5 yrs ago   • Shortness of breath     \"sometimes just sitting in chair or activity and having Echo 4/29\"-10/20/22- Pt reports resolved no further SOB   • Submucous leiomyoma of uterus     no recent problem   • Terminal ileitis ([address] )     unclear etiology, work up w EGD/COLON/TI bx/SBC/CT done  • Wears glasses       Past Surgical History:     Past Surgical History:   Procedure Laterality Date   • BREAST BIOPSY Right 03/01/2021    stereo- invasive ductal ca   • BREAST LUMPECTOMY Right 04/30/2021    Procedure: RIGHT BREAST BRACKETED DINA LOCALIZATION LUMPECTOMY;;  Surgeon: Myrna Wagoner MD;  Location: [facility];  Service: Surgical Oncology   • COLONOSCOPY  03/01/2017    5 yr recall   • DILATION AND CURETTAGE OF UTERUS     • EGD     • EXAMINATION UNDER ANESTHESIA N/A 10/25/2022    Procedure: (EUA); Surgeon: Letty Vera MD;  Location: [facility];  Service: Gynecology   • HYSTEROSCOPY     • INCONTINENCE SURGERY      bladder sling   • LYMPH NODE BIOPSY Right 04/30/2021    Procedure: Bx LYMPH NODE SENTINEL;  Surgeon: Myrna Wagoner MD;  Location: [facility];  Service: Surgical Oncology   • MAMMO NEEDLE LOCALIZATION LEFT (ALL INC) Right 04/19/2021   • MAMMO NEEDLE LOCALIZATION LEFT (ALL INC) EACH ADD Right 04/19/2021   • MAMMO STEREOTACTIC BREAST BIOPSY RIGHT (ALL INC) Right 03/01/2021   • DC COLONOSCOPY FLX DX W/COLLJ SPEC WHEN PFRMD N/A 11/14/2016    Procedure: EGD AND COLONOSCOPY;  Surgeon: Leonardo Vaughn MD;  Location: [facility] GI LAB;   Service: Gastroenterology   • DC HYSTEROSCOPY BX ENDOMETRIUM&/POLYPC W/WO D&C N/A 10/25/2022    Procedure: D&C W/HYSTEROSCOPY; PSB POLYPECTOMY;  Surgeon: Letty Vera MD;  Location: [facility];  Service: Gynecology   • WISDOM TOOTH EXTRACTION        Social History:     Social History     Socioeconomic History   • Marital status: /Civil Union     Spouse name: None   • Number of children: 2   • Years of education: None   • Highest education level: None " Occupational History   • None   Tobacco Use   • Smoking status: Never   • Smokeless tobacco: Never   • Tobacco comments:     Never a smoker or any tobacco products use   Vaping Use   • Vaping Use: Never used   Substance and Sexual Activity   • Alcohol use: Yes     Alcohol/week: 1 0 standard drink of alcohol     Types: 1 Cans of beer per week     Comment: 2 drinks on a weekend - beer use   • Drug use: No     Comment: Denies any drug use   • Sexual activity: Yes     Partners: Male     Birth control/protection: Male Sterilization     Comment: NO nuva ring - discontinued at time of dx with breast cancer - denies any chest pain or shortness of breath with activity   Other Topics Concern   • None   Social History Narrative    Caffeine use    [employer]    Uses safety equipment Seatbelts     Social Determinants of Health     Financial Resource Strain: Low Risk  (4/27/2021)    Overall Financial Resource Strain (CARDIA)    • Difficulty of Paying Living Expenses: Not hard at all   Food Insecurity: No Food Insecurity (4/27/2021)    Hunger Vital Sign    • Worried About Running Out of Food in the Last Year: Never true    • Ran Out of Food in the Last Year: Never true   Transportation Needs: No Transportation Needs (4/27/2021)    PRAPARE - Transportation    • Lack of Transportation (Medical): No    • Lack of Transportation (Non-Medical): No   Physical Activity: Inactive (2/22/2021)    Exercise Vital Sign    • Days of Exercise per Week: 0 days    • Minutes of Exercise per Session: 0 min   Stress: No Stress Concern Present (2/22/2021)    [address]    • Feeling of Stress : Only a little   Social Connections:  Moderately Isolated (2/22/2021)    Social Connection and Isolation Panel [NHANES]    • Frequency of Communication with Friends and Family: More than three times a week    • Frequency of Social Gatherings with Friends and Family: Once a "week    • Attends [employer]ism Services: Never    • Active Member of Clubs or Organizations: No    • Attends Club or Organization Meetings: Never    • Marital Status:    Intimate Partner Violence: Not At Risk (6/6/2023)    Humiliation, Afraid, Rape, and Kick questionnaire    • Fear of Current or Ex-Partner: No    • Emotionally Abused: No    • Physically Abused: No    • Sexually Abused: No   Housing Stability: Not on file      Family History:     Family History   Problem Relation Age of Onset   • Cancer Mother    • Breast cancer Mother 54   • Colon polyps Mother    • Diabetes Father         Type 2   • Hypertension Father    • BRCA1 Negative Sister    • BRCA2 Negative Sister    • Breast cancer Maternal Aunt 56   • Breast cancer Family    • Colon cancer Neg Hx    • Anesthesia problems Neg Hx       Current Medications:     Current Outpatient Medications   Medication Sig Dispense Refill   • famotidine (PEPCID) 40 MG tablet TAKE 1 TABLET BY MOUTH EVERYDAY AT BEDTIME 90 tablet 1   • lamoTRIgine (LaMICtal) 150 MG tablet Take 150 mg by mouth 2 (two) times a day     • levocetirizine (XYZAL) 5 MG tablet Take 5 mg by mouth in the morning Takes in the evening       No current facility-administered medications for this visit  Allergies:      Allergies   Allergen Reactions   • Latex Rash and Swelling     When tape from eyes removed after surgery developed high swelling of eyes/watery   • Medical Tape Swelling and Rash     When tape from eyes removed after surgery developed high swelling of eyes/watery   • Wound Dressings Swelling and Rash     After a surgery when tape removed from eyes high swelling noted/tears   • Other Sneezing and Rash     seasonal      Physical Exam:     /78 (BP Location: Right arm, Patient Position: Sitting, Cuff Size: Large)   Pulse 103   Temp 97 5 °F (36 4 °C) (Tympanic)   Resp 17   Ht 5' 7 6\" (1 717 m)   Wt 83 8 kg (184 lb 12 8 oz)   LMP 03/28/2020   SpO2 97%   BMI 28 43 kg/m² " Physical Exam  Constitutional:       General: She is not in acute distress  Appearance: She is well-developed  She is not diaphoretic  HENT:      Head: Normocephalic and atraumatic  Right Ear: External ear normal       Left Ear: External ear normal       Nose: Nose normal       Mouth/Throat:      Pharynx: No oropharyngeal exudate  Eyes:      General: No scleral icterus  Right eye: No discharge  Left eye: No discharge  Pupils: Pupils are equal, round, and reactive to light  Neck:      Thyroid: No thyromegaly  Vascular: No JVD  Trachea: No tracheal deviation  Cardiovascular:      Rate and Rhythm: Normal rate and regular rhythm  Heart sounds: Normal heart sounds  No murmur heard  No friction rub  No gallop  Pulmonary:      Effort: Pulmonary effort is normal  No respiratory distress  Breath sounds: Normal breath sounds  No wheezing or rales  Chest:      Chest wall: No tenderness  Abdominal:      General: Bowel sounds are normal  There is no distension  Palpations: Abdomen is soft  There is no mass  Tenderness: There is no abdominal tenderness  There is no guarding or rebound  Hernia: No hernia is present  Musculoskeletal:         General: No tenderness or deformity  Normal range of motion  Cervical back: Normal range of motion and neck supple  Lymphadenopathy:      Cervical: No cervical adenopathy  Skin:     General: Skin is warm and dry  Coloration: Skin is not pale  Findings: No erythema or rash  Neurological:      Mental Status: She is alert and oriented to person, place, and time  Cranial Nerves: No cranial nerve deficit  Sensory: No sensory deficit  Motor: No abnormal muscle tone  Coordination: Coordination normal       Deep Tendon Reflexes: Reflexes normal    Psychiatric:         Behavior: Behavior normal          Thought Content:  Thought content normal           DO MARIANNA Osman [address] [facility]

## 2023-06-21 ENCOUNTER — APPOINTMENT (OUTPATIENT)
Dept: LAB | Facility: CLINIC | Age: 53
End: 2023-06-21
Payer: COMMERCIAL

## 2023-06-21 DIAGNOSIS — Z11.4 SCREENING FOR HIV (HUMAN IMMUNODEFICIENCY VIRUS): ICD-10-CM

## 2023-06-21 DIAGNOSIS — Z13.6 SCREENING FOR CARDIOVASCULAR CONDITION: ICD-10-CM

## 2023-06-21 DIAGNOSIS — Z11.59 ENCOUNTER FOR HEPATITIS C SCREENING TEST FOR LOW RISK PATIENT: ICD-10-CM

## 2023-06-21 DIAGNOSIS — Z13.29 SCREENING FOR ENDOCRINE, METABOLIC AND IMMUNITY DISORDER: ICD-10-CM

## 2023-06-21 DIAGNOSIS — Z13.228 SCREENING FOR ENDOCRINE, METABOLIC AND IMMUNITY DISORDER: ICD-10-CM

## 2023-06-21 DIAGNOSIS — Z13.1 SCREENING FOR DIABETES MELLITUS: ICD-10-CM

## 2023-06-21 DIAGNOSIS — Z13.0 SCREENING FOR ENDOCRINE, METABOLIC AND IMMUNITY DISORDER: ICD-10-CM

## 2023-06-21 LAB
ALBUMIN SERPL BCP-MCNC: 3.9 G/DL (ref 3.5–5)
ALP SERPL-CCNC: 104 U/L (ref 46–116)
ALT SERPL W P-5'-P-CCNC: 33 U/L (ref 12–78)
ANION GAP SERPL CALCULATED.3IONS-SCNC: 3 MMOL/L
AST SERPL W P-5'-P-CCNC: 24 U/L (ref 5–45)
BASOPHILS # BLD AUTO: 0.05 THOUSANDS/ÂΜL (ref 0–0.1)
BASOPHILS NFR BLD AUTO: 1 % (ref 0–1)
BILIRUB SERPL-MCNC: 0.45 MG/DL (ref 0.2–1)
BILIRUB UR QL STRIP: NEGATIVE
BUN SERPL-MCNC: 15 MG/DL (ref 5–25)
CALCIUM SERPL-MCNC: 9.3 MG/DL (ref 8.3–10.1)
CHLORIDE SERPL-SCNC: 109 MMOL/L (ref 96–108)
CHOLEST SERPL-MCNC: 246 MG/DL
CLARITY UR: CLEAR
CO2 SERPL-SCNC: 26 MMOL/L (ref 21–32)
COLOR UR: COLORLESS
CREAT SERPL-MCNC: 0.86 MG/DL (ref 0.6–1.3)
EOSINOPHIL # BLD AUTO: 0.02 THOUSAND/ÂΜL (ref 0–0.61)
EOSINOPHIL NFR BLD AUTO: 0 % (ref 0–6)
ERYTHROCYTE [DISTWIDTH] IN BLOOD BY AUTOMATED COUNT: 12.7 % (ref 11.6–15.1)
GFR SERPL CREATININE-BSD FRML MDRD: 77 ML/MIN/1.73SQ M
GLUCOSE P FAST SERPL-MCNC: 84 MG/DL (ref 65–99)
GLUCOSE UR STRIP-MCNC: NEGATIVE MG/DL
HCT VFR BLD AUTO: 43.4 % (ref 34.8–46.1)
HCV AB SER QL: NORMAL
HDLC SERPL-MCNC: 93 MG/DL
HGB BLD-MCNC: 14.4 G/DL (ref 11.5–15.4)
HGB UR QL STRIP.AUTO: NEGATIVE
IMM GRANULOCYTES # BLD AUTO: 0.01 THOUSAND/UL (ref 0–0.2)
IMM GRANULOCYTES NFR BLD AUTO: 0 % (ref 0–2)
KETONES UR STRIP-MCNC: NEGATIVE MG/DL
LDLC SERPL CALC-MCNC: 143 MG/DL (ref 0–100)
LDLC SERPL DIRECT ASSAY-MCNC: 120 MG/DL (ref 0–100)
LEUKOCYTE ESTERASE UR QL STRIP: NEGATIVE
LYMPHOCYTES # BLD AUTO: 1.29 THOUSANDS/ÂΜL (ref 0.6–4.47)
LYMPHOCYTES NFR BLD AUTO: 26 % (ref 14–44)
MCH RBC QN AUTO: 31 PG (ref 26.8–34.3)
MCHC RBC AUTO-ENTMCNC: 33.2 G/DL (ref 31.4–37.4)
MCV RBC AUTO: 94 FL (ref 82–98)
MONOCYTES # BLD AUTO: 0.38 THOUSAND/ÂΜL (ref 0.17–1.22)
MONOCYTES NFR BLD AUTO: 8 % (ref 4–12)
NEUTROPHILS # BLD AUTO: 3.16 THOUSANDS/ÂΜL (ref 1.85–7.62)
NEUTS SEG NFR BLD AUTO: 65 % (ref 43–75)
NITRITE UR QL STRIP: NEGATIVE
NONHDLC SERPL-MCNC: 153 MG/DL
NRBC BLD AUTO-RTO: 0 /100 WBCS
PH UR STRIP.AUTO: 7 [PH]
PLATELET # BLD AUTO: 250 THOUSANDS/UL (ref 149–390)
PMV BLD AUTO: 10.6 FL (ref 8.9–12.7)
POTASSIUM SERPL-SCNC: 4.3 MMOL/L (ref 3.5–5.3)
PROT SERPL-MCNC: 7 G/DL (ref 6.4–8.4)
PROT UR STRIP-MCNC: NEGATIVE MG/DL
RBC # BLD AUTO: 4.64 MILLION/UL (ref 3.81–5.12)
SODIUM SERPL-SCNC: 138 MMOL/L (ref 135–147)
SP GR UR STRIP.AUTO: 1.01 (ref 1–1.03)
TRIGL SERPL-MCNC: 51 MG/DL
TSH SERPL DL<=0.05 MIU/L-ACNC: 1.25 UIU/ML (ref 0.45–4.5)
UROBILINOGEN UR STRIP-ACNC: <2 MG/DL
WBC # BLD AUTO: 4.91 THOUSAND/UL (ref 4.31–10.16)

## 2023-06-21 PROCEDURE — 85025 COMPLETE CBC W/AUTO DIFF WBC: CPT

## 2023-06-21 PROCEDURE — 83721 ASSAY OF BLOOD LIPOPROTEIN: CPT

## 2023-06-21 PROCEDURE — 80061 LIPID PANEL: CPT

## 2023-06-21 PROCEDURE — 36415 COLL VENOUS BLD VENIPUNCTURE: CPT

## 2023-06-21 PROCEDURE — 84443 ASSAY THYROID STIM HORMONE: CPT

## 2023-06-21 PROCEDURE — 80053 COMPREHEN METABOLIC PANEL: CPT

## 2023-06-21 PROCEDURE — 86803 HEPATITIS C AB TEST: CPT

## 2023-06-21 PROCEDURE — 81003 URINALYSIS AUTO W/O SCOPE: CPT

## 2023-06-21 PROCEDURE — 87389 HIV-1 AG W/HIV-1&-2 AB AG IA: CPT

## 2023-06-22 LAB
HIV 1+2 AB+HIV1 P24 AG SERPL QL IA: NORMAL
HIV 2 AB SERPL QL IA: NORMAL
HIV1 AB SERPL QL IA: NORMAL
HIV1 P24 AG SERPL QL IA: NORMAL

## 2023-07-10 ENCOUNTER — TELEPHONE (OUTPATIENT)
Dept: GYNECOLOGY | Facility: CLINIC | Age: 53
End: 2023-07-10

## 2023-07-10 NOTE — TELEPHONE ENCOUNTER
Patient called with spotting. She recently had a D&C for endometrial thickening in Nov 2022. SIS/EMB scheduled.

## 2023-07-20 ENCOUNTER — TELEPHONE (OUTPATIENT)
Dept: HEMATOLOGY ONCOLOGY | Facility: CLINIC | Age: 53
End: 2023-07-20

## 2023-07-20 ENCOUNTER — PROCEDURE VISIT (OUTPATIENT)
Dept: GYNECOLOGY | Facility: CLINIC | Age: 53
End: 2023-07-20
Payer: COMMERCIAL

## 2023-07-20 ENCOUNTER — ULTRASOUND (OUTPATIENT)
Dept: GYNECOLOGY | Facility: CLINIC | Age: 53
End: 2023-07-20
Payer: COMMERCIAL

## 2023-07-20 ENCOUNTER — APPOINTMENT (OUTPATIENT)
Dept: LAB | Facility: CLINIC | Age: 53
End: 2023-07-20
Payer: COMMERCIAL

## 2023-07-20 DIAGNOSIS — N95.2 VAGINAL ATROPHY: ICD-10-CM

## 2023-07-20 DIAGNOSIS — N83.202 BILATERAL OVARIAN CYSTS: ICD-10-CM

## 2023-07-20 DIAGNOSIS — Z17.0 MALIGNANT NEOPLASM OF CENTRAL PORTION OF RIGHT BREAST IN FEMALE, ESTROGEN RECEPTOR POSITIVE (HCC): Primary | ICD-10-CM

## 2023-07-20 DIAGNOSIS — N92.6 IRREGULAR MENSES: ICD-10-CM

## 2023-07-20 DIAGNOSIS — C50.111 MALIGNANT NEOPLASM OF CENTRAL PORTION OF RIGHT BREAST IN FEMALE, ESTROGEN RECEPTOR POSITIVE (HCC): ICD-10-CM

## 2023-07-20 DIAGNOSIS — C50.111 MALIGNANT NEOPLASM OF CENTRAL PORTION OF RIGHT BREAST IN FEMALE, ESTROGEN RECEPTOR POSITIVE (HCC): Primary | ICD-10-CM

## 2023-07-20 DIAGNOSIS — N95.0 POSTMENOPAUSAL BLEEDING: Primary | ICD-10-CM

## 2023-07-20 DIAGNOSIS — N83.201 BILATERAL OVARIAN CYSTS: ICD-10-CM

## 2023-07-20 DIAGNOSIS — Z17.0 MALIGNANT NEOPLASM OF CENTRAL PORTION OF RIGHT BREAST IN FEMALE, ESTROGEN RECEPTOR POSITIVE (HCC): ICD-10-CM

## 2023-07-20 DIAGNOSIS — N95.0 PMB (POSTMENOPAUSAL BLEEDING): Primary | ICD-10-CM

## 2023-07-20 LAB
ALBUMIN SERPL BCP-MCNC: 4 G/DL (ref 3.5–5)
ALP SERPL-CCNC: 115 U/L (ref 46–116)
ALT SERPL W P-5'-P-CCNC: 24 U/L (ref 12–78)
ANION GAP SERPL CALCULATED.3IONS-SCNC: 5 MMOL/L
AST SERPL W P-5'-P-CCNC: 19 U/L (ref 5–45)
BASOPHILS # BLD AUTO: 0.04 THOUSANDS/ÂΜL (ref 0–0.1)
BASOPHILS NFR BLD AUTO: 1 % (ref 0–1)
BILIRUB SERPL-MCNC: 0.26 MG/DL (ref 0.2–1)
BUN SERPL-MCNC: 25 MG/DL (ref 5–25)
CALCIUM SERPL-MCNC: 9.9 MG/DL (ref 8.3–10.1)
CHLORIDE SERPL-SCNC: 107 MMOL/L (ref 96–108)
CO2 SERPL-SCNC: 28 MMOL/L (ref 21–32)
CREAT SERPL-MCNC: 0.93 MG/DL (ref 0.6–1.3)
EOSINOPHIL # BLD AUTO: 0.04 THOUSAND/ÂΜL (ref 0–0.61)
EOSINOPHIL NFR BLD AUTO: 1 % (ref 0–6)
ERYTHROCYTE [DISTWIDTH] IN BLOOD BY AUTOMATED COUNT: 12.4 % (ref 11.6–15.1)
GFR SERPL CREATININE-BSD FRML MDRD: 70 ML/MIN/1.73SQ M
GLUCOSE P FAST SERPL-MCNC: 90 MG/DL (ref 65–99)
HCT VFR BLD AUTO: 41.6 % (ref 34.8–46.1)
HGB BLD-MCNC: 14.1 G/DL (ref 11.5–15.4)
IMM GRANULOCYTES # BLD AUTO: 0.02 THOUSAND/UL (ref 0–0.2)
IMM GRANULOCYTES NFR BLD AUTO: 0 % (ref 0–2)
LYMPHOCYTES # BLD AUTO: 1.68 THOUSANDS/ÂΜL (ref 0.6–4.47)
LYMPHOCYTES NFR BLD AUTO: 22 % (ref 14–44)
MCH RBC QN AUTO: 31.7 PG (ref 26.8–34.3)
MCHC RBC AUTO-ENTMCNC: 33.9 G/DL (ref 31.4–37.4)
MCV RBC AUTO: 94 FL (ref 82–98)
MONOCYTES # BLD AUTO: 0.51 THOUSAND/ÂΜL (ref 0.17–1.22)
MONOCYTES NFR BLD AUTO: 7 % (ref 4–12)
NEUTROPHILS # BLD AUTO: 5.5 THOUSANDS/ÂΜL (ref 1.85–7.62)
NEUTS SEG NFR BLD AUTO: 69 % (ref 43–75)
NRBC BLD AUTO-RTO: 0 /100 WBCS
PLATELET # BLD AUTO: 288 THOUSANDS/UL (ref 149–390)
PMV BLD AUTO: 10.5 FL (ref 8.9–12.7)
POTASSIUM SERPL-SCNC: 4.3 MMOL/L (ref 3.5–5.3)
PROT SERPL-MCNC: 7 G/DL (ref 6.4–8.4)
RBC # BLD AUTO: 4.45 MILLION/UL (ref 3.81–5.12)
SODIUM SERPL-SCNC: 140 MMOL/L (ref 135–147)
WBC # BLD AUTO: 7.79 THOUSAND/UL (ref 4.31–10.16)

## 2023-07-20 PROCEDURE — 88305 TISSUE EXAM BY PATHOLOGIST: CPT | Performed by: PATHOLOGY

## 2023-07-20 PROCEDURE — 85025 COMPLETE CBC W/AUTO DIFF WBC: CPT

## 2023-07-20 PROCEDURE — 80053 COMPREHEN METABOLIC PANEL: CPT

## 2023-07-20 PROCEDURE — 36415 COLL VENOUS BLD VENIPUNCTURE: CPT

## 2023-07-20 PROCEDURE — 86300 IMMUNOASSAY TUMOR CA 15-3: CPT

## 2023-07-20 PROCEDURE — 76831 ECHO EXAM UTERUS: CPT | Performed by: OBSTETRICS & GYNECOLOGY

## 2023-07-20 PROCEDURE — 58100 BIOPSY OF UTERUS LINING: CPT | Performed by: OBSTETRICS & GYNECOLOGY

## 2023-07-20 PROCEDURE — 58340 CATHETER FOR HYSTEROGRAPHY: CPT | Performed by: OBSTETRICS & GYNECOLOGY

## 2023-07-20 PROCEDURE — 76830 TRANSVAGINAL US NON-OB: CPT | Performed by: OBSTETRICS & GYNECOLOGY

## 2023-07-20 PROCEDURE — 99213 OFFICE O/P EST LOW 20 MIN: CPT | Performed by: OBSTETRICS & GYNECOLOGY

## 2023-07-20 NOTE — PROGRESS NOTES
Assessment/Plan   Diagnoses and all orders for this visit:    Postmenopausal bleeding    Irregular menses    Bilateral ovarian cysts    Vaginal atrophy    1. postmenopausal bleeding- had previous menses April 2020. Had episode of heavy bleeding October 2022. Was on tamoxifen from June 2021 through October 2022. Endometrial biopsy demonstrated scant benign inactive endometrium with polyp. D&C was with inactive endometrial glands with no polyp seen. Patient was doing well until she began having bleeding again on 7/10/2023. Had bleeding for 10 days which has since resolved. Sonohysterogram today with smooth appearing endometrium. Endometrial biopsy was done and we will inform the patient of the findings. She will call or return as needed. 2. menopausal status- blood work 1/14/2023, [address] of [address], estradiol less than 11  3. Bilateral ovarian cyst-appear to be simple, left 4.1 cm, right was  2.4 cm. Patient counseled by the findings. Recommend follow-up ultrasound in 6 to 8 weeks time with visit with me. 4. vaginal atrophy-denies any complaints. 5.  Thickened endometrium-was 25 mm previously, 12 mm had ultrasound today and at previous visit. Pamela Cousin, this could be a sequence of previous tamoxifen use. 6. history of right breast cancer-status postlumpectomy, radiation therapy, and tamoxifen until stopping October 2022. 7.  Prior history yeast/seizure disorder/perimenopause/urinary frequency-no current concerns  Follow-up 6 to 8 weeks time for ultrasound and office visit with me or as needed. Subjective   Patient ID: Viry Alberto is a 48 y.o. female. There were no vitals filed for this visit. Patient was seen today for sonohysterogram and endometrial biopsy. Please see assessment plan and procedure note for details.       The following portions of the patient's history were reviewed and updated as appropriate: allergies, current medications, past family history, past medical history, past social history, past surgical history and problem list.  Past Medical History:   Diagnosis Date   • Abdominal pain    • Anesthesia     "after tape removed from both eyes/developed swelling"   • Anxiety     has autistic/special needs son   • Breast cancer ([address]) 04/30/2021    RIGHT   • Cancer ([address])     Breast cancer - right breast 2021   • Chronic UTI     2x/year or so /no symptoms today   • Displaced fracture of shaft of fifth metacarpal bone, right hand, subsequent encounter for fracture with routine healing 3/1/2022   • Dysuria     resolved 10/05/16/occas/not lately   • Encounter for screening colonoscopy    • Environmental and seasonal allergies    • Exercise involving cycling     2-3x/week spin classes   • GERD (gastroesophageal reflux disease)     occasional due to diet   • Heavy menses    • Hiatal hernia    • History of kidney stones    • History of radiation therapy 2021    Right   • Intestinal ulcer    • Irritable bowel syndrome    • Kidney stone     10/20/22 Hx of kidney stone -passed on own- no surgery needed   • Nausea    • Seizures ([address])     last seizure approx 5 yrs ago   • Shortness of breath     "sometimes just sitting in chair or activity and having Echo 4/29"-10/20/22- Pt reports resolved no further SOB   • Submucous leiomyoma of uterus     no recent problem   • Terminal ileitis ([address])     unclear etiology, work up w EGD/COLON/TI bx/SBC/CT done. • Wears glasses      Past Surgical History:   Procedure Laterality Date   • BREAST BIOPSY Right 03/01/2021    stereo- invasive ductal ca   • BREAST LUMPECTOMY Right 04/30/2021    Procedure: RIGHT BREAST BRACKETED DINA LOCALIZATION LUMPECTOMY;;  Surgeon: Iman Burgess MD;  Location[facility];  Service: Surgical Oncology   • COLONOSCOPY  03/01/2017    5 yr recall   • DILATION AND CURETTAGE OF UTERUS     • EGD     • EXAMINATION UNDER ANESTHESIA N/A 10/25/2022    Procedure: (EUA);   Surgeon: Karey Lin MD;  Location: [facility];  Service: Gynecology   • HYSTEROSCOPY • INCONTINENCE SURGERY      bladder sling   • LYMPH NODE BIOPSY Right 2021    Procedure: Bx LYMPH NODE SENTINEL;  Surgeon: Chapis Parham MD;  Location[facility];  Service: Surgical Oncology   • MAMMO NEEDLE LOCALIZATION LEFT (ALL INC) Right 2021   • MAMMO NEEDLE LOCALIZATION LEFT (ALL INC) EACH ADD Right 2021   • MAMMO STEREOTACTIC BREAST BIOPSY RIGHT (ALL INC) Right 2021   • SD COLONOSCOPY FLX DX W/COLLJ SPEC WHEN PFRMD N/A 2016    Procedure: EGD AND COLONOSCOPY;  Surgeon: Meer Núñez MD;  Location: [facility];   Service: Gastroenterology   • SD HYSTEROSCOPY BX ENDOMETRIUM&/POLYPC W/WO D&C N/A 10/25/2022    Procedure: D&C W/HYSTEROSCOPY; PSB POLYPECTOMY;  Surgeon: Citlali Waters MD;  Location: [facility] OR;  Service: Gynecology   • WISDOM TOOTH EXTRACTION       OB History    Para Term  AB Living   2 2 2     2   SAB IAB Ectopic Multiple Live Births           2      # Outcome Date GA Lbr Noam/2nd Weight Sex Delivery Anes PTL Lv   2 Term     M Vag-Spont   KEREN   1 Term     M Vag-Spont   KEREN      Obstetric Comments   Menarche-13   Age at first pregnancy-26   nuvaring-about 8 yrs, currently using       Current Outpatient Medications:   •  famotidine (PEPCID) 40 MG tablet, TAKE 1 TABLET BY MOUTH EVERYDAY AT BEDTIME, Disp: 90 tablet, Rfl: 1  •  lamoTRIgine (LaMICtal) 150 MG tablet, Take 150 mg by mouth 2 (two) times a day, Disp: , Rfl:   •  levocetirizine (XYZAL) 5 MG tablet, Take 5 mg by mouth in the morning Takes in the evening, Disp: , Rfl:   Allergies   Allergen Reactions   • Latex Rash and Swelling     When tape from eyes removed after surgery developed high swelling of eyes/watery   • Medical Tape Swelling and Rash     When tape from eyes removed after surgery developed high swelling of eyes/watery   • Wound Dressings Swelling and Rash     After a surgery when tape removed from eyes high swelling noted/tears   • Other Sneezing and Rash     seasonal     Social History     Socioeconomic History   • Marital status: /Civil Union     Spouse name: Not on file   • Number of children: 2   • Years of education: Not on file   • Highest education level: Not on file   Occupational History   • Not on file   Tobacco Use   • Smoking status: Never   • Smokeless tobacco: Never   • Tobacco comments:     Never a smoker or any tobacco products use   Vaping Use   • Vaping Use: Never used   Substance and Sexual Activity   • Alcohol use: Yes     Alcohol/week: 1.0 standard drink of alcohol     Types: 1 Cans of beer per week     Comment: 2 drinks on a weekend - beer use   • Drug use: No     Comment: Denies any drug use   • Sexual activity: Yes     Partners: Male     Birth control/protection: Male Sterilization     Comment: NO nuva ring - discontinued at time of dx with breast cancer - denies any chest pain or shortness of breath with activity   Other Topics Concern   • Not on file   Social History Narrative    Caffeine use    [address]    Uses safety equipment Seatbelts     Social Determinants of Health     Financial Resource Strain: Low Risk  (4/27/2021)    Overall Financial Resource Strain (CARDIA)    • Difficulty of Paying Living Expenses: Not hard at all   Food Insecurity: No Food Insecurity (4/27/2021)    Hunger Vital Sign    • Worried About Running Out of Food in the Last Year: Never true    • Ran Out of Food in the Last Year: Never true   Transportation Needs: No Transportation Needs (4/27/2021)    PRAPARE - Transportation    • Lack of Transportation (Medical): No    • Lack of Transportation (Non-Medical): No   Physical Activity: Inactive (2/22/2021)    Exercise Vital Sign    • Days of Exercise per Week: 0 days    • Minutes of Exercise per Session: 0 min   Stress: No Stress Concern Present (2/22/2021)    [address] [place] [address]    • Feeling of Stress : Only a little   Social Connections:  Moderately Isolated (2/22/2021)    Social Connection and Isolation Panel [NHANES]    • Frequency of Communication with Friends and Family: More than three times a week    • Frequency of Social Gatherings with Friends and Family: Once a week    • Attends Yazidism Services: Never    • Active Member of Clubs or Organizations: No    • Attends Club or Organization Meetings: Never    • Marital Status:    Intimate Partner Violence: Not At Risk (6/6/2023)    Humiliation, Afraid, Rape, and Kick questionnaire    • Fear of Current or Ex-Partner: No    • Emotionally Abused: No    • Physically Abused: No    • Sexually Abused: No   Housing Stability: Not on file     Family History   Problem Relation Age of Onset   • Cancer Mother    • Breast cancer Mother 54   • Colon polyps Mother    • Diabetes Father         Type 2   • Hypertension Father    • BRCA1 Negative Sister    • BRCA2 Negative Sister    • Breast cancer Maternal Aunt 56   • Breast cancer Family    • Colon cancer Neg Hx    • Anesthesia problems Neg Hx        Review of Systems   Constitutional: Negative for chills, diaphoresis, fatigue and fever. Respiratory: Negative for apnea, cough, chest tightness, shortness of breath and wheezing. Cardiovascular: Negative for chest pain, palpitations and leg swelling. Gastrointestinal: Negative for abdominal distention, abdominal pain, anal bleeding, constipation, diarrhea, nausea, rectal pain and vomiting. Genitourinary: Negative for difficulty urinating, dyspareunia, dysuria, frequency, hematuria, menstrual problem, pelvic pain, urgency, vaginal bleeding, vaginal discharge and vaginal pain. Musculoskeletal: Negative for arthralgias, back pain and myalgias. Skin: Negative for color change and rash. Neurological: Negative for dizziness, syncope, light-headedness, numbness and headaches. Hematological: Negative for adenopathy. Does not bruise/bleed easily.    Psychiatric/Behavioral: Negative for dysphoric mood and sleep disturbance. The patient is not nervous/anxious. Objective   Physical Exam  OBGyn Exam     Objective      LMP 03/28/2020     General:   alert and oriented, in no acute distress   Neck:    Breast:    Heart:    Lungs:    Abdomen: soft, non-tender, without masses or organomegaly   Vulva: Within normal limits   Vagina:  Mildly atrophic, without erythema or lesions or discharge. Cervix:  Mildly atrophic, cervix slightly flush to the vagina, without erythema or lesions or cervicitis.   No CMT   Uterus: top normal size, anteverted, non-tender   Adnexa: no mass, fullness, tenderness   Rectum: deferred    Psych:  Normal mood and affect   Skin:  Without obvious lesions   Eyes: symmetric, with normal movements and reactivity   Musculoskeletal:  Normal muscle tone and movements appreciated

## 2023-07-20 NOTE — PROGRESS NOTES
AMB US Pelvic Non OB    Date/Time: 7/20/2023 1:20 PM    Performed by: Kemal Aranda  Authorized by: Ryanne Rubin MD  Universal Protocol:  Consent given by: patient  Patient identity confirmed: verbally with patient      Procedure details:     Indications: non-obstetric vaginal bleeding      Technique:  Transvaginal US, Non-OB    Position: lithotomy exam    Uterine findings:     Endometrial stripe: identified    Left ovary findings:     Left ovary:  Visualized  Right ovary findings:     Right ovary:  Visualized  Other findings:     Free pelvic fluid: not identified      Free peritoneal fluid: not identified    Post-Procedure Details:     Impression:  Anteverted uterus is inhomogeneous throughout without fibroids. There are low level echogenicities throughout the myometrium suggestive of adenomyosis. The bilateral ovaries each contain simple cysts; RT 2.4cm and LT 4.1cm. No free fluid. Tolerance: Tolerated well, no immediate complications    Complications: no complications    Additional Procedure Comments:      YUPPTV F8 E8C-RS transvaginal transducer Serial # [id number] was used to perform the examination today and subsequently followed with high level disinfection utilizing Trophon EPR procedure. Ultrasound performed at:     [address], [address] [place] [address]  Phone:  [phone]  Fax:  [phone]  Sonohysterogram    Date/Time: 7/20/2023 1:20 PM    Performed by: Ryanne Rubin MD  Authorized by: Ryanne Rubin MD  Universal Protocol:  Consent given by: patient  Patient identity confirmed: verbally with patient      Pre-procedure:     Prepped with: Hibiclens    Procedure:     Cervix cleaned and prepped: yes      Uterus sounded: yes      Catheter inserted: yes      Uterine cavity distended with saline: yes    Post-procedure:     Patient observed: yes      Post procedure instructions given to patient: yes      Patient tolerated procedure well with no complications: yes Comments:      Sonohysterorgram demonstrates a smooth appearing endometrium.    Endometrial biopsy    Date/Time: 7/20/2023 1:20 PM    Performed by: Wero Jarrett MD  Authorized by: Wero Jarrett MD  Universal Protocol:  Consent given by: patient  Patient identity confirmed: verbally with patient      Procedure:     Procedure: endometrial biopsy with Pipelle      A bivalve speculum was placed in the vagina: yes      Cervix cleaned and prepped: yes      Specimen collected: specimen collected and sent to pathology      Patient tolerated procedure well with no complications: yes

## 2023-07-22 LAB — CANCER AG27-29 SERPL-ACNC: 15 U/ML (ref 0–38.6)

## 2023-07-24 ENCOUNTER — OFFICE VISIT (OUTPATIENT)
Age: 53
End: 2023-07-24
Payer: COMMERCIAL

## 2023-07-24 DIAGNOSIS — C50.111 MALIGNANT NEOPLASM OF CENTRAL PORTION OF RIGHT BREAST IN FEMALE, ESTROGEN RECEPTOR POSITIVE (HCC): Primary | ICD-10-CM

## 2023-07-24 DIAGNOSIS — Z17.0 MALIGNANT NEOPLASM OF CENTRAL PORTION OF RIGHT BREAST IN FEMALE, ESTROGEN RECEPTOR POSITIVE (HCC): Primary | ICD-10-CM

## 2023-07-24 PROCEDURE — 99213 OFFICE O/P EST LOW 20 MIN: CPT | Performed by: NURSE PRACTITIONER

## 2023-07-24 NOTE — PROGRESS NOTES
HEMATOLOGY / [facility] FOLLOW UP NOTE    Primary Care Provider: Roby Sparks DO  Referring Provider:    MRN: [id number]  : 1970    Reason for Encounter: Follow-up for history of breast cancer       Oncology History   Malignant neoplasm of central portion of right breast in female, estrogen receptor positive ([address])   3/1/2021 Biopsy    Right breast biopsy:  Invasive ductal carcinoma  Grade 2   ER 90%  SC 90%  HER2 negative     3/31/2021 -  Cancer Staged    Staging form: Breast, AJCC 8th Edition  - Clinical: Stage IA (cT1, cN0, cM0, G2, ER+, SC+, HER2-) - Signed by Sharlene Moritz, MD on 3/31/2021  Stage prefix: Initial diagnosis  Method of lymph node assessment: Clinical  Histologic grading system: 3 grade system       2021 Surgery    Right breast lumpectomy with sentinel lymph node biopsy:  Margins clear  0/2 lymph nodes    Dr. Joseph Rutledge     2021 Genetic Testing    [facility]    Genes analyzed: APC, ALEXANDER, AXIN2, BARD1, BMPR1A, BRCA1, BRCA2, BRIP1, CDH1, CDK4, CDKN2A, CHEK2, DICER1, MLH1, MSH2, MDH3, MSH6, MUTYH, NBN, NF1, NTHL1, PALB2, PMS2, PTEN, RAD51C, RAD51D, RECQL, SMAD4, SMARCA, STK11, TP53, HOXB13, POLD1, POLE, EPCAM, GREM1     Negative     2021 -  Cancer Staged    Staging form: Breast, AJCC 8th Edition  - Pathologic: Stage IA (pT1c, pN0(sn), cM0, G2, ER+, SC+, HER2-) - Signed by Sharlene Moritz, MD on 2021  Stage prefix: Initial diagnosis  Method of lymph node assessment: [place] lymph node biopsy  Histologic grading system: 3 grade system        Genomic Testing    Oncotype score: 12     2021 - 10/2022 Hormone Therapy    Tamoxifen discontinued secondary to AUB/thickened endometrium         2021 - 2021 Radiation    Pratik Antonio             Interval History: Patient returns for follow-up visit. She was last seen by Dr. Tammy Engle on 2023. Mammogram completed on 2023 was benign. She was seen in breast survivorship clinic on 2023.   Most recent blood work is all normal.  Patient is no longer on tamoxifen and declined any hormonal therapy and lieu of close observation. Overall, she states she feels well and denies any concerning symptoms today. REVIEW OF SYSTEMS:  Please note that a 14-point review of systems was performed to include Constitutional, HEENT, Respiratory, CVS, GI, , Musculoskeletal, Integumentary, Neurologic, Rheumatologic, Endocrinologic, Psychiatric, Lymphatic, and Hematologic/Oncologic systems were reviewed and are negative unless otherwise stated in HPI. Positive and negative findings pertinent to this evaluation are incorporated into the history of present illness. 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 Malignant neoplasm of central portion of right breast in female, estrogen receptor positive ([address])      Patient is a pleasant 12-year-old female with a history of stage Ia ER/AR positive, HER2 negative right breast cancer status post right lumpectomy 4/3/2021 followed by adjuvant radiation therapy at [facility]. Oncotype recurrence score was 12, low risk. She was started on tamoxifen due to her perimenopausal state, however this was discontinued secondary to abnormal uterine bleeding.   She underwent an extensive gynecologic work-up which was negative and declined any additional adjuvant hormonal therapy. Due to significant family history of breast cancer she underwent genetic testing which resulted with negative findings. Most recent mammogram was benign. Clinically, no concerns on exam today. She is up-to-date on colorectal and cervical cancer screenings. Patient continues to follow closely with breast oncology survivorship clinic every 6 months. She is encouraged to do monthly self breast exams. She will return for medical oncology follow-up in 1 year with repeat blood work. Orders placed for diagnostic mammogram to be done in February 2024. Patient is instructed to call anytime with questions or concerns. I spent 25 minutes on chart review, face to face counseling time, coordination of care and documentation. Past Medical History:   has a past medical history of Abdominal pain, Anesthesia, Anxiety, Breast cancer ([address]) (04/30/2021), Cancer ([address]), Chronic UTI, Displaced fracture of shaft of fifth metacarpal bone, right hand, subsequent encounter for fracture with routine healing (3/1/2022), Dysuria, Encounter for screening colonoscopy, Environmental and seasonal allergies, Exercise involving cycling, GERD (gastroesophageal reflux disease), Heavy menses, Hiatal hernia, History of kidney stones, History of radiation therapy (2021), Intestinal ulcer, Irritable bowel syndrome, Kidney stone, Nausea, Seizures ([address]), Shortness of breath, Submucous leiomyoma of uterus, Terminal ileitis ([address]), and Wears glasses. PAST SURGICAL HISTORY:   has a past surgical history that includes Incontinence surgery; Dilation and curettage of uterus; pr colonoscopy flx dx w/collj spec when pfrmd (N/A, 11/14/2016); Hysteroscopy; Colonoscopy (03/01/2017); Mammo stereotactic breast biopsy right (all inc) (Right, 03/01/2021); Breast biopsy (Right, 03/01/2021); Mammo needle localization left (all inc) each add (Right, 04/19/2021);  Mammo needle localization left (all inc) (Right, 04/19/2021); EGD; Merkel tooth extraction; Breast lumpectomy (Right, 04/30/2021); Lymph node biopsy (Right, 04/30/2021); pr hysteroscopy bx endometrium&/polypc w/wo d&c (N/A, 10/25/2022); and Examination under anesthesia (N/A, 10/25/2022). CURRENT MEDICATIONS  Current Outpatient Medications   Medication Sig Dispense Refill   • famotidine (PEPCID) 40 MG tablet TAKE 1 TABLET BY MOUTH EVERYDAY AT BEDTIME 90 tablet 1   • lamoTRIgine (LaMICtal) 150 MG tablet Take 150 mg by mouth 2 (two) times a day     • levocetirizine (XYZAL) 5 MG tablet Take 5 mg by mouth in the morning Takes in the evening       No current facility-administered medications for this visit. [unfilled]    SOCIAL HISTORY:   reports that she has never smoked. She has never used smokeless tobacco. She reports current alcohol use of about 1.0 standard drink of alcohol per week. She reports that she does not use drugs. FAMILY HISTORY:  family history includes BRCA1 Negative in her sister; BRCA2 Negative in her sister; Breast cancer in her family; Breast cancer (age of onset: 54) in her mother; Breast cancer (age of onset: 64) in her maternal aunt; Cancer in her mother; Colon polyps in her mother; Diabetes in her father; Hypertension in her father. ALLERGIES:  is allergic to latex, medical tape, wound dressings, and other. Physical Exam:  Vital Signs:   Visit Vitals  LMP 03/28/2020   OB Status Postmenopausal   Smoking Status Never     There is no height or weight on file to calculate BMI. There is no height or weight on file to calculate BSA. Physical Exam  Constitutional:       General: She is not in acute distress. Appearance: Normal appearance. HENT:      Head: Normocephalic and atraumatic. Eyes:      General: No scleral icterus. Right eye: No discharge. Left eye: No discharge.       Conjunctiva/sclera: Conjunctivae normal.   Cardiovascular:      Rate and Rhythm: Normal rate and regular rhythm. Pulmonary:      Effort: Pulmonary effort is normal. No respiratory distress. Breath sounds: Normal breath sounds. Abdominal:      General: Bowel sounds are normal. There is no distension. Palpations: Abdomen is soft. There is no mass. Tenderness: There is no abdominal tenderness. Musculoskeletal:         General: Normal range of motion. Lymphadenopathy:      Cervical: No cervical adenopathy. Upper Body:      Right upper body: No supraclavicular, axillary or pectoral adenopathy. Left upper body: No supraclavicular, axillary or pectoral adenopathy. Skin:     General: Skin is warm and dry. Neurological:      General: No focal deficit present. Mental Status: She is alert and oriented to person, place, and time.    Psychiatric:         Mood and Affect: Mood normal.         Behavior: Behavior normal.         Labs:  Lab Results   Component Value Date    WBC 7.79 07/20/2023    HGB 14.1 07/20/2023    HCT 41.6 07/20/2023    MCV 94 07/20/2023     07/20/2023     Lab Results   Component Value Date     07/22/2015    SODIUM 140 07/20/2023    K 4.3 07/20/2023     07/20/2023    CO2 28 07/20/2023    ANIONGAP 10 07/22/2015    AGAP 5 07/20/2023    BUN 25 07/20/2023    CREATININE 0.93 07/20/2023    GLUC 109 10/14/2022    GLUF 90 07/20/2023    CALCIUM 9.9 07/20/2023    AST 19 07/20/2023    ALT 24 07/20/2023    ALKPHOS 115 07/20/2023    PROT 6.7 07/22/2015    TP 7.0 07/20/2023    BILITOT 0.43 07/22/2015    TBILI 0.26 07/20/2023    EGFR 70 07/20/2023

## 2023-07-25 PROCEDURE — 88305 TISSUE EXAM BY PATHOLOGIST: CPT | Performed by: PATHOLOGY

## 2023-08-30 DIAGNOSIS — K21.9 GASTROESOPHAGEAL REFLUX DISEASE, UNSPECIFIED WHETHER ESOPHAGITIS PRESENT: ICD-10-CM

## 2023-08-30 RX ORDER — FAMOTIDINE 40 MG/1
TABLET, FILM COATED ORAL
Qty: 90 TABLET | Refills: 1 | Status: SHIPPED | OUTPATIENT
Start: 2023-08-30

## 2023-09-11 NOTE — PROGRESS NOTES
AMB US Pelvic Non OB    Date/Time: 9/12/2023 2:20 PM    Performed by: Kristal Barrett  Authorized by: Glenroy Palma MD  Universal Protocol:  Consent given by: patient  Patient identity confirmed: verbally with patient      Procedure details:     Technique:  Transvaginal US, Non-OB    Position: lithotomy exam    Uterine findings:     Length (cm): 8.84    Height (cm):  4.78    Width (cm):  6.56    Endometrial stripe: identified      Endometrium thickness (mm):  4.8  Left ovary findings:     Left ovary:  Visualized    Length (cm): 3.69    Height (cm): 2.38    Width (cm): 2.49  Right ovary findings:     Right ovary:  Visualized    Length (cm): 1.88    Height (cm): 1.23    Width (cm): 1.52  Other findings:     Free pelvic fluid: not identified      Free peritoneal fluid: not identified    Post-Procedure Details:     Impression:   Anteverted uterus is inhomogeneous throughout without fibroids. There are low level echogenicities throughout the myometrium suggestive of adenomyosis. The right ovary appears within normal limits. The left ovary demonstrates a resolving cyst with some debris 2.2cm (previously 4.1cm). No free fluid. Tolerance: Tolerated well, no immediate complications    Complications: no complications    Additional Procedure Comments:      The [employer] F8 E8C-RS transvaginal transducer Serial # [id number] was used to perform the examination today and subsequently followed with high level disinfection utilizing Trophon EPR procedure. Ultrasound performed at:     [facility]  [address], [facility]  Phone:  [phone]  Fax:  [phone]

## 2023-09-12 ENCOUNTER — OFFICE VISIT (OUTPATIENT)
Dept: GYNECOLOGY | Facility: CLINIC | Age: 53
End: 2023-09-12
Payer: COMMERCIAL

## 2023-09-12 ENCOUNTER — ULTRASOUND (OUTPATIENT)
Dept: GYNECOLOGY | Facility: CLINIC | Age: 53
End: 2023-09-12
Payer: COMMERCIAL

## 2023-09-12 VITALS — WEIGHT: 174 LBS | SYSTOLIC BLOOD PRESSURE: 108 MMHG | DIASTOLIC BLOOD PRESSURE: 66 MMHG | BODY MASS INDEX: 27.25 KG/M2

## 2023-09-12 DIAGNOSIS — N83.202 BILATERAL OVARIAN CYSTS: ICD-10-CM

## 2023-09-12 DIAGNOSIS — N95.2 VAGINAL ATROPHY: ICD-10-CM

## 2023-09-12 DIAGNOSIS — N95.0 POSTMENOPAUSAL BLEEDING: ICD-10-CM

## 2023-09-12 DIAGNOSIS — N30.00 ACUTE CYSTITIS WITHOUT HEMATURIA: ICD-10-CM

## 2023-09-12 DIAGNOSIS — R30.0 DYSURIA: Primary | ICD-10-CM

## 2023-09-12 DIAGNOSIS — N83.202 LEFT OVARIAN CYST: ICD-10-CM

## 2023-09-12 DIAGNOSIS — N83.202 BILATERAL OVARIAN CYSTS: Primary | ICD-10-CM

## 2023-09-12 DIAGNOSIS — N83.201 BILATERAL OVARIAN CYSTS: ICD-10-CM

## 2023-09-12 DIAGNOSIS — N83.201 BILATERAL OVARIAN CYSTS: Primary | ICD-10-CM

## 2023-09-12 PROBLEM — N92.6 IRREGULAR MENSES: Status: RESOLVED | Noted: 2018-03-16 | Resolved: 2023-09-12

## 2023-09-12 PROBLEM — N92.0 HEAVY MENSTRUAL BLEEDING: Status: RESOLVED | Noted: 2022-10-06 | Resolved: 2023-09-12

## 2023-09-12 PROCEDURE — 99214 OFFICE O/P EST MOD 30 MIN: CPT | Performed by: OBSTETRICS & GYNECOLOGY

## 2023-09-12 PROCEDURE — 87086 URINE CULTURE/COLONY COUNT: CPT | Performed by: OBSTETRICS & GYNECOLOGY

## 2023-09-12 PROCEDURE — 87147 CULTURE TYPE IMMUNOLOGIC: CPT | Performed by: OBSTETRICS & GYNECOLOGY

## 2023-09-12 PROCEDURE — 76830 TRANSVAGINAL US NON-OB: CPT | Performed by: OBSTETRICS & GYNECOLOGY

## 2023-09-12 RX ORDER — SULFAMETHOXAZOLE AND TRIMETHOPRIM 800; 160 MG/1; MG/1
1 TABLET ORAL EVERY 12 HOURS SCHEDULED
Qty: 6 TABLET | Refills: 0 | Status: SHIPPED | OUTPATIENT
Start: 2023-09-12 | End: 2023-09-15

## 2023-09-12 RX ORDER — LAMOTRIGINE 100 MG/1
TABLET ORAL
COMMUNITY
Start: 2023-08-31

## 2023-09-12 NOTE — PROGRESS NOTES
Assessment/Plan   Diagnoses and all orders for this visit:    Bilateral ovarian cysts    Left ovarian cyst    Vaginal atrophy    Dysuria    Other orders  -     lamoTRIgine (LaMICtal) 100 mg tablet; TAKE 1 AND 1/2 TABLETS BY MOUTH 2 TIMES A DAY (Patient not taking: Reported on 9/12/2023)    1. left ovarian cyst/previous bilateral ovarian cyst- previous ultrasound was with 4.1 cm left ovarian cyst, 2.4 cm right ovarian cyst.  The right ovarian cyst has resolved, left ovarian cyst is now 2.2 cm with debris, most consistent with resolving cyst.  Patient was counseled about the findings. She denies any complaints. She was counseled about options. She will return in 3 months time for ultrasound and office visit with me. 2.  UTI-does note significant dysuria with pelvic pain with frequency and urgency. She did start Azo about a day ago with some slight improvement, but persistent symptoms. She has had a number of UTIs in the past.  I did treat her with Bactrim in 2020 with good results. She states a few months ago she went to urgent care and was treated with antibiotic for 5 days. Given her symptoms, we will treat empirically with Bactrim DS twice daily for 3 days time. Urine culture was sent and we will react accordingly. Note, previous urinalyses x3 have been negative since January 2022, last June 2023. 3. postmenopausal bleeding-previous menses April 2020, had heavy bleeding October 2023. Was on tamoxifen June 2021 through October 2022. Endometrial biopsy with inactive endometrium with polyp. D&C with inactive glands with no polyp found. Had episode of bleeding again on 7/10/2023 for 10 days which resolved. Sonohysterogram was with smooth appearing endometrium. Endometrial biopsy with fragmented weakly proliferative endometrium with partial collapse without suspicious findings. He has had no bleeding since then.   Ultrasound today demonstrates endometrium down to 4.8 mm, down from 12 mm and 25 mm previously likely tamoxifen effect. She will call with any further bleeding. 4. menopausal status-blood work 1/14/2023 with [address] of 86, estradiol less than 11.  5. thickened endometrium- down to 4.8 mm as noted below. ,  Down from 22 and 12 mm previously   6. history right breast cancer-status postlumpectomy, radiation therapy, tamoxifen until stopping October 2022. Have recent follow-up with breast cancer specialist, follow-up as per them. 7.  Prior history yeast/seizure disorder/perimenopause  8. Vaginal atrophy- now with new partner. Initially, had some dryness and discomfort and use K-Y jelly, but this was messy. Now she is not using anything. Given vaginal furcation/moisturizer sheet, to use accordingly. Follow-up 3 months time for ultrasound and office visit with me or as needed. Subjective   Patient ID: Shon Miranda is a 48 y.o. female. Vitals:    09/12/23 1436   BP: 108/66     Patient was seen today for ultrasound and follow-up visit. Please see assessment plan for details.       The following portions of the patient's history were reviewed and updated as appropriate: allergies, current medications, past family history, past medical history, past social history, past surgical history and problem list.  Past Medical History:   Diagnosis Date   • Abdominal pain    • Anesthesia     "after tape removed from both eyes/developed swelling"   • Anxiety     has autistic/special needs son   • Breast cancer ([address]) 04/30/2021    RIGHT   • Cancer ([address])     Breast cancer - right breast 2021   • Chronic UTI     2x/year or so /no symptoms today   • Displaced fracture of shaft of fifth metacarpal bone, right hand, subsequent encounter for fracture with routine healing 3/1/2022   • Dysuria     resolved 10/05/16/occas/not lately   • Encounter for screening colonoscopy    • Environmental and seasonal allergies    • Exercise involving cycling     2-3x/week spin classes   • GERD (gastroesophageal reflux disease)     occasional due to diet   • Heavy menses    • Hiatal hernia    • History of kidney stones    • History of radiation therapy 2021    Right   • Intestinal ulcer    • Irritable bowel syndrome    • Kidney stone     10/20/22 Hx of kidney stone -passed on own- no surgery needed   • Nausea    • Seizures ([address])     last seizure approx 5 yrs ago   • Shortness of breath     "sometimes just sitting in chair or activity and having Echo 4/29"-10/20/22- Pt reports resolved no further SOB   • Submucous leiomyoma of uterus     no recent problem   • Terminal ileitis ([address])     unclear etiology, work up w EGD/COLON/TI bx/SBC/CT done. • Wears glasses      Past Surgical History:   Procedure Laterality Date   • BREAST BIOPSY Right 03/01/2021    stereo- invasive ductal ca   • BREAST LUMPECTOMY Right 04/30/2021    Procedure: RIGHT BREAST BRACKETED DINA LOCALIZATION LUMPECTOMY;;  Surgeon: Sheliah Primrose, MD;  Location[facility];  Service: Surgical Oncology   • COLONOSCOPY  03/01/2017    5 yr recall   • DILATION AND CURETTAGE OF UTERUS     • EGD     • EXAMINATION UNDER ANESTHESIA N/A 10/25/2022    Procedure: (EUA); Surgeon: Beto Mendoza MD;  Location: [facility];  Service: Gynecology   • HYSTEROSCOPY     • INCONTINENCE SURGERY      bladder sling   • LYMPH NODE BIOPSY Right 04/30/2021    Procedure: Bx LYMPH NODE SENTINEL;  Surgeon: Sheliah Primrose, MD;  [facility];  Service: Surgical Oncology   • MAMMO NEEDLE LOCALIZATION LEFT (ALL INC) Right 04/19/2021   • MAMMO NEEDLE LOCALIZATION LEFT (ALL INC) EACH ADD Right 04/19/2021   • MAMMO STEREOTACTIC BREAST BIOPSY RIGHT (ALL INC) Right 03/01/2021   • TX COLONOSCOPY FLX DX W/COLLJ SPEC WHEN PFRMD N/A 11/14/2016    Procedure: EGD AND COLONOSCOPY;  Surgeon: Giuliana Longoria MD;  Location: [facility];   Service: Gastroenterology   • TX HYSTEROSCOPY BX ENDOMETRIUM&/POLYPC W/WO D&C N/A 10/25/2022    Procedure: D&C W/HYSTEROSCOPY; PSB POLYPECTOMY;  Surgeon: Beto Mendoza MD; Location: [facility];  Service: Gynecology   • WISDOM TOOTH EXTRACTION       OB History    Para Term  AB Living   2 2 2     2   SAB IAB Ectopic Multiple Live Births           2      # Outcome Date GA Lbr Noam/2nd Weight Sex Delivery Anes PTL Lv   2 Term     M Vag-Spont   KEREN   1 Term     M Vag-Spont   KEREN      Obstetric Comments   Menarche-13   Age at first pregnancy-26   nuvaring-about 8 yrs, currently using       Current Outpatient Medications:   •  famotidine (PEPCID) 40 MG tablet, TAKE 1 TABLET BY MOUTH EVERYDAY AT BEDTIME, Disp: 90 tablet, Rfl: 1  •  lamoTRIgine (LaMICtal) 150 MG tablet, Take 150 mg by mouth 2 (two) times a day, Disp: , Rfl:   •  levocetirizine (XYZAL) 5 MG tablet, Take 5 mg by mouth in the morning Takes in the evening, Disp: , Rfl:   •  lamoTRIgine (LaMICtal) 100 mg tablet, TAKE 1 AND 1/2 TABLETS BY MOUTH 2 TIMES A DAY (Patient not taking: Reported on 2023), Disp: , Rfl:   Allergies   Allergen Reactions   • Latex Rash and Swelling     When tape from eyes removed after surgery developed high swelling of eyes/watery   • Medical Tape Swelling and Rash     When tape from eyes removed after surgery developed high swelling of eyes/watery   • Wound Dressings Swelling and Rash     After a surgery when tape removed from eyes high swelling noted/tears   • Other Sneezing and Rash     seasonal     Social History     Socioeconomic History   • Marital status: /Civil Union     Spouse name: None   • Number of children: 2   • Years of education: None   • Highest education level: None   Occupational History   • None   Tobacco Use   • Smoking status: Never   • Smokeless tobacco: Never   • Tobacco comments:     Never a smoker or any tobacco products use   Vaping Use   • Vaping Use: Never used   Substance and Sexual Activity   • Alcohol use:  Yes     Alcohol/week: 1.0 standard drink of alcohol     Types: 1 Cans of beer per week     Comment: 2 drinks on a weekend - beer use   • Drug use: No     Comment: Denies any drug use   • Sexual activity: Yes     Partners: Male     Birth control/protection: Male Sterilization     Comment: NO nuva ring - discontinued at time of dx with breast cancer - denies any chest pain or shortness of breath with activity   Other Topics Concern   • None   Social History Narrative    Caffeine use    [address] [facility] [place]    Uses safety equipment Seatbelts     Social Determinants of Health     Financial Resource Strain: Low Risk  (4/27/2021)    Overall Financial Resource Strain (CARDIA)    • Difficulty of Paying Living Expenses: Not hard at all   Food Insecurity: No Food Insecurity (4/27/2021)    Hunger Vital Sign    • Worried About Running Out of Food in the Last Year: Never true    • Ran Out of Food in the Last Year: Never true   Transportation Needs: No Transportation Needs (4/27/2021)    PRAPARE - Transportation    • Lack of Transportation (Medical): No    • Lack of Transportation (Non-Medical): No   Physical Activity: Inactive (2/22/2021)    Exercise Vital Sign    • Days of Exercise per Week: 0 days    • Minutes of Exercise per Session: 0 min   Stress: No Stress Concern Present (2/22/2021)    [facility]    • Feeling of Stress : Only a little   Social Connections: Moderately Isolated (2/22/2021)    Social Connection and Isolation Panel [NHANES]    • Frequency of Communication with Friends and Family: More than three times a week    • Frequency of Social Gatherings with Friends and Family: Once a week    • Attends Gnosticist Services: Never    • Active Member of Clubs or Organizations: No    • Attends Club or Organization Meetings: Never    • Marital Status:    Intimate Partner Violence: Not At Risk (6/6/2023)    Humiliation, Afraid, Rape, and Kick questionnaire    • Fear of Current or Ex-Partner: No    • Emotionally Abused: No    • Physically Abused: No    • Sexually Abused:  No Housing Stability: Not on file     Family History   Problem Relation Age of Onset   • Cancer Mother    • Breast cancer Mother 54   • Colon polyps Mother    • Diabetes Father         Type 2   • Hypertension Father    • BRCA1 Negative Sister    • BRCA2 Negative Sister    • Breast cancer Maternal Aunt 64   • Breast cancer Family    • Colon cancer Neg Hx    • Anesthesia problems Neg Hx        Review of Systems   Constitutional: Negative for chills, diaphoresis, fatigue and fever. Respiratory: Negative for apnea, cough, chest tightness, shortness of breath and wheezing. Cardiovascular: Negative for chest pain, palpitations and leg swelling. Gastrointestinal: Negative for abdominal distention, abdominal pain, anal bleeding, constipation, diarrhea, nausea, rectal pain and vomiting. Genitourinary: Negative for difficulty urinating, dyspareunia, dysuria, frequency, hematuria, menstrual problem, pelvic pain, urgency, vaginal bleeding, vaginal discharge and vaginal pain. Musculoskeletal: Negative for arthralgias, back pain and myalgias. Skin: Negative for color change and rash. Neurological: Negative for dizziness, syncope, light-headedness, numbness and headaches. Hematological: Negative for adenopathy. Does not bruise/bleed easily. Psychiatric/Behavioral: Negative for dysphoric mood and sleep disturbance. The patient is not nervous/anxious.         Objective   Physical Exam  OBGyn Exam     Objective      /66 (BP Location: Left arm, Patient Position: Sitting)   Wt 78.9 kg (174 lb)   LMP 03/28/2020   BMI 27.25 kg/m²     General:   alert and oriented, in no acute distress   Neck:    Breast:    Heart:    Lungs:    Abdomen:  Nontender   Vulva:    Vagina:    Cervix:    Uterus:    Adnexa:    Rectum:     Psych:  Normal mood and affect   Skin:  Without obvious lesions   Eyes: symmetric, with normal movements and reactivity   Musculoskeletal:  Normal muscle tone and movements appreciated

## 2023-09-13 ENCOUNTER — CLINICAL SUPPORT (OUTPATIENT)
Dept: FAMILY MEDICINE CLINIC | Facility: CLINIC | Age: 53
End: 2023-09-13
Payer: COMMERCIAL

## 2023-09-13 DIAGNOSIS — Z23 IMMUNIZATION DUE: Primary | ICD-10-CM

## 2023-09-13 PROCEDURE — 90471 IMMUNIZATION ADMIN: CPT

## 2023-09-13 PROCEDURE — 90750 HZV VACC RECOMBINANT IM: CPT

## 2023-09-14 LAB — BACTERIA UR CULT: ABNORMAL

## 2023-10-10 ENCOUNTER — TELEPHONE (OUTPATIENT)
Dept: HEMATOLOGY ONCOLOGY | Facility: CLINIC | Age: 53
End: 2023-10-10

## 2023-10-10 NOTE — TELEPHONE ENCOUNTER
I called [facility] regarding an appointment that they have scheduled with Dr. Jeffery Lin scheduled on 11/07/23     I left a voicemail explaining to patient that this appointment will need to be rescheduled due to a change in the providers schedule. Patient was advised to call [facility] to reschedule.   Another attempt will be made to reschedule

## 2023-10-11 ENCOUNTER — TELEPHONE (OUTPATIENT)
Dept: HEMATOLOGY ONCOLOGY | Facility: CLINIC | Age: 53
End: 2023-10-11

## 2023-10-11 NOTE — TELEPHONE ENCOUNTER
Appointment Change  Cancel, Reschedule, Change to Virtual      Who are you speaking with? Patient   If it is not the patient, is the caller listed on the communication consent form? N/A   Which provider is the appointment scheduled with? Dr. Radha Voss and YOLANDA Webster   When was the original appointment scheduled? Please list date and time 11/07/23 10:15AM   At which location is the appointment scheduled to take place? [place]   Was the appointment rescheduled? Was the appointment changed from an in person visit to a virtual visit? If so, please list the details of the change. 12/15/23 9:30AM   What is the reason for the appointment change?  Provider

## 2023-11-08 ENCOUNTER — RA CDI HCC (OUTPATIENT)
Dept: OTHER | Facility: HOSPITAL | Age: 53
End: 2023-11-08

## 2023-11-08 NOTE — PROGRESS NOTES
[facility] audit - Oncology    C50.111 on problem list - billed by surg onc 4/1/22 and multi times in 2023. C86.6 billed 4/1/22 but not in 2023 and not on problem list. No documentation found.

## 2023-12-08 ENCOUNTER — ULTRASOUND (OUTPATIENT)
Dept: GYNECOLOGY | Facility: CLINIC | Age: 53
End: 2023-12-08
Payer: COMMERCIAL

## 2023-12-08 ENCOUNTER — OFFICE VISIT (OUTPATIENT)
Dept: GYNECOLOGY | Facility: CLINIC | Age: 53
End: 2023-12-08
Payer: COMMERCIAL

## 2023-12-08 VITALS
WEIGHT: 165.8 LBS | DIASTOLIC BLOOD PRESSURE: 72 MMHG | SYSTOLIC BLOOD PRESSURE: 108 MMHG | HEIGHT: 67 IN | BODY MASS INDEX: 26.02 KG/M2

## 2023-12-08 DIAGNOSIS — R93.89 THICKENED ENDOMETRIUM: ICD-10-CM

## 2023-12-08 DIAGNOSIS — N83.202 LEFT OVARIAN CYST: Primary | ICD-10-CM

## 2023-12-08 DIAGNOSIS — N95.2 VAGINAL ATROPHY: ICD-10-CM

## 2023-12-08 DIAGNOSIS — N80.03 ADENOMYOSIS: ICD-10-CM

## 2023-12-08 DIAGNOSIS — R92.30 DENSE BREAST TISSUE: ICD-10-CM

## 2023-12-08 PROCEDURE — 76830 TRANSVAGINAL US NON-OB: CPT | Performed by: OBSTETRICS & GYNECOLOGY

## 2023-12-08 PROCEDURE — 99213 OFFICE O/P EST LOW 20 MIN: CPT | Performed by: OBSTETRICS & GYNECOLOGY

## 2023-12-08 NOTE — PROGRESS NOTES
AMB US Pelvic Non OB    Date/Time: 12/8/2023 9:00 AM    Performed by: Souleymane Núñez  Authorized by: Kesha Nobles MD  Universal Protocol:  Consent given by: patient  Patient understanding: patient states understanding of the procedure being performed  Patient identity confirmed: verbally with patient    Procedure details:     Technique:  Transvaginal US, Non-OB    Position: lithotomy exam    Uterine findings:     Length (cm): 8.99    Height (cm):  4.29    Width (cm):  6.41    Endometrial stripe: identified      Endometrium thickness (mm):  4.6  Left ovary findings:     Left ovary:  Visualized    Length (cm): 2.6    Height (cm): 1.42    Width (cm): 1.79  Right ovary findings:     Right ovary:  Visualized    Length (cm): 2.33    Height (cm): 1.28    Width (cm): 1.29  Other findings:     Free pelvic fluid: not identified      Free peritoneal fluid: not identified    Post-Procedure Details:     Impression:  Anteverted uterus is inhomogeneous throughout without fibroids. There are low level echogenicities throughout the myometrium suggestive of adenomyosis. The bilateral ovaries appear within normal limits. No free fluid. Tolerance: Tolerated well, no immediate complications    Complications: no complications    Additional Procedure Comments:      [employer] F8 E8C-RS transvaginal transducer Serial # [id number] was used to perform the examination today and subsequently followed with high level disinfection utilizing Trophon EPR procedure. Ultrasound performed at:     [address], [address] [facility]  Phone:  [phone]  Fax:  [phone]

## 2023-12-08 NOTE — PROGRESS NOTES
Assessment/Plan   Diagnoses and all orders for this visit:    Left ovarian cyst    Vaginal atrophy    Thickened endometrium    Adenomyosis    Dense breast tissue    1. previous left ovarian cyst/previous bilateral ovarian cyst-at last ultrasound, the right ovarian cyst had resolved. Today, the left ovarian cyst is resolved with no residual adnexal findings. Patient was relieved to hear this. She will call return with any pelvic pain or discomfort. 2.  Previous UTI-resolved with treatment at previous visit  3. Previous episode of postmenopausal bleeding-last menses April 2020, heavy bleeding episode October 2022. Was on tamoxifen from June 2021 through October 2022. Endometrial biopsy with inactive endometrium with polyp. D&C with inactive glands with no polyp found. Had bleeding again on 7/10/2023 for 10 days which resolved with sonohysterogram at that time with smooth appearing endometrium and endometrial biopsy with weakly proliferative endometrium with partial collapse, without suspicious findings. She has had no bleeding since then, is off tamoxifen. Endometrium today is down to 4.6 mm, was previously 4.8 mm, 12 mm, 25 mm. To call return with any further bleeding. 4. menopausal-blood work 1/14/2023 with [address], estradiol less than 11  5. thickened endometrium-as above, now 4.6 mm  6. History of right breast cancer-status postlumpectomy, radiation therapy, tamoxifen until stopping October 2022. Continues to follow-up with Dr. Jayna Gao group. 7.  Vaginal atrophy-doing well on lubrication, started using coconut oil which her partner stated was "a miracle". She will continue with this. 8. previous history seizure disorder/yeast//perimenopause-no current issues. 9. adenomyosis-again suggested on ultrasound. This could have contributed to her bleeding episode as noted above. This was written down for her. She did have history of heavy menses with severe cramps while she was menstruating.   No intervention is recommended at this time. Follow-up 1 to 2 months time as scheduled for yearly exam or as needed. Subjective   Patient ID: Wisam Pemberton is a 48 y.o. female. Vitals:    12/08/23 0856   BP: 108/72     Patient was seen today for ultrasound and follow-up office visit with me. Please see assessment plan for details. The following portions of the patient's history were reviewed and updated as appropriate: allergies, current medications, past family history, past medical history, past social history, past surgical history, and problem list.  Past Medical History:   Diagnosis Date    Abdominal pain     Anesthesia     "after tape removed from both eyes/developed swelling"    Anxiety     has autistic/special needs son    Breast cancer ([address]) 04/30/2021    RIGHT    Cancer ([address])     Breast cancer - right breast 2021    Chronic UTI     2x/year or so /no symptoms today    Displaced fracture of shaft of fifth metacarpal bone, right hand, subsequent encounter for fracture with routine healing 3/1/2022    Dysuria     resolved 10/05/16/occas/not lately    Encounter for screening colonoscopy     Environmental and seasonal allergies     Exercise involving cycling     2-3x/week spin classes    GERD (gastroesophageal reflux disease)     occasional due to diet    Heavy menses     Hiatal hernia     History of kidney stones     History of radiation therapy 2021    Right    Intestinal ulcer     Irritable bowel syndrome     Kidney stone     10/20/22 Hx of kidney stone -passed on own- no surgery needed    Nausea     Seizures ([address])     last seizure approx 5 yrs ago    Shortness of breath     "sometimes just sitting in chair or activity and having Echo 4/29"-10/20/22- Pt reports resolved no further SOB    Submucous leiomyoma of uterus     no recent problem    Terminal ileitis ([address])     unclear etiology, work up w EGD/COLON/TI bx/SBC/CT done.     Wears glasses      Past Surgical History:   Procedure Laterality Date    BREAST BIOPSY Right 2021    stereo- invasive ductal ca    BREAST LUMPECTOMY Right 2021    Procedure: RIGHT BREAST BRACKETED DINA LOCALIZATION LUMPECTOMY;;  Surgeon: Alecia Sanchez MD;  Location: [facility];  Service: Surgical Oncology    COLONOSCOPY  2017    5 yr recall    DILATION AND CURETTAGE OF UTERUS      EGD      EXAMINATION UNDER ANESTHESIA N/A 10/25/2022    Procedure: (EUA); Surgeon: Gil Rogers MD;  Location: [facility];  Service: Gynecology    HYSTEROSCOPY      INCONTINENCE SURGERY      bladder sling    LYMPH NODE BIOPSY Right 2021    Procedure: Bx LYMPH NODE SENTINEL;  Surgeon: Alecia Sanchez MD;  Location: [facility];  Service: Surgical Oncology    MAMMO NEEDLE LOCALIZATION LEFT (ALL INC) Right 2021    MAMMO NEEDLE LOCALIZATION LEFT (ALL INC) EACH ADD Right 2021    MAMMO STEREOTACTIC BREAST BIOPSY RIGHT (ALL INC) Right 2021    DE COLONOSCOPY FLX DX W/COLLJ SPEC WHEN PFRMD N/A 2016    Procedure: EGD AND COLONOSCOPY;  Surgeon: Calin Barrett MD;  Location: [facility];   Service: Gastroenterology    DE HYSTEROSCOPY BX ENDOMETRIUM&/POLYPC W/WO D&C N/A 10/25/2022    Procedure: D&C W/HYSTEROSCOPY; PSB POLYPECTOMY;  Surgeon: Gil Rogers MD;  Location: [facility];  Service: Gynecology    WISDOM TOOTH EXTRACTION       OB History    Para Term  AB Living   2 2 2     2   SAB IAB Ectopic Multiple Live Births           2      # Outcome Date GA Lbr Noam/2nd Weight Sex Delivery Anes PTL Lv   2 Term     M Vag-Spont   KEREN   1 Term     M Vag-Spont   KEREN      Obstetric Comments   Menarche-13   Age at first pregnancy-26   nuvaring-about 8 yrs, currently using       Current Outpatient Medications:     famotidine (PEPCID) 40 MG tablet, TAKE 1 TABLET BY MOUTH EVERYDAY AT BEDTIME, Disp: 90 tablet, Rfl: 1    lamoTRIgine (LaMICtal) 100 mg tablet, TAKE 1 AND 1/2 TABLETS BY MOUTH 2 TIMES A DAY (Patient not taking: Reported on 2023), Disp: , Rfl: lamoTRIgine (LaMICtal) 150 MG tablet, Take 150 mg by mouth 2 (two) times a day, Disp: , Rfl:     levocetirizine (XYZAL) 5 MG tablet, Take 5 mg by mouth in the morning Takes in the evening, Disp: , Rfl:   Allergies   Allergen Reactions    Latex Rash and Swelling     When tape from eyes removed after surgery developed high swelling of eyes/watery    Medical Tape Swelling and Rash     When tape from eyes removed after surgery developed high swelling of eyes/watery    Wound Dressings Swelling and Rash     After a surgery when tape removed from eyes high swelling noted/tears    Other Sneezing and Rash     seasonal     Social History     Socioeconomic History    Marital status: /Civil Union     Spouse name: None    Number of children: 2    Years of education: None    Highest education level: None   Occupational History    None   Tobacco Use    Smoking status: Never    Smokeless tobacco: Never    Tobacco comments:     Never a smoker or any tobacco products use   Vaping Use    Vaping Use: Never used   Substance and Sexual Activity    Alcohol use:  Yes     Alcohol/week: 1.0 standard drink of alcohol     Types: 1 Cans of beer per week     Comment: 2 drinks on a weekend - beer use    Drug use: No     Comment: Denies any drug use    Sexual activity: Yes     Partners: Male     Birth control/protection: Male Sterilization     Comment: NO nuva ring - discontinued at time of dx with breast cancer - denies any chest pain or shortness of breath with activity   Other Topics Concern    None   Social History Narrative    Caffeine use    [employer]    Uses safety equipment Seatbelts     Social Determinants of Health     Financial Resource Strain: Low Risk  (4/27/2021)    Overall Financial Resource Strain (CARDIA)     Difficulty of Paying Living Expenses: Not hard at all   Food Insecurity: No Food Insecurity (4/27/2021)    Hunger Vital Sign     Worried About Running Out of Food in the Last Year: Never true Ran Out of Food in the Last Year: Never true   Transportation Needs: No Transportation Needs (4/27/2021)    PRAPARE - Transportation     Lack of Transportation (Medical): No     Lack of Transportation (Non-Medical): No   Physical Activity: Inactive (2/22/2021)    Exercise Vital Sign     Days of Exercise per Week: 0 days     Minutes of Exercise per Session: 0 min   Stress: No Stress Concern Present (2/22/2021)    [address] [facility]     Feeling of Stress : Only a little   Social Connections: Moderately Isolated (2/22/2021)    Social Connection and Isolation Panel [NHANES]     Frequency of Communication with Friends and Family: More than three times a week     Frequency of Social Gatherings with Friends and Family: Once a week     Attends Taoist Services: Never     Active Member of Clubs or Organizations: No     Attends Club or Organization Meetings: Never     Marital Status:    Intimate Partner Violence: Not At Risk (6/6/2023)    Humiliation, Afraid, Rape, and Kick questionnaire     Fear of Current or Ex-Partner: No     Emotionally Abused: No     Physically Abused: No     Sexually Abused: No   Housing Stability: Not on file     Family History   Problem Relation Age of Onset    Cancer Mother     Breast cancer Mother 54    Colon polyps Mother     Diabetes Father         Type 2    Hypertension Father     BRCA1 Negative Sister     BRCA2 Negative Sister     Breast cancer Maternal Aunt 64    Breast cancer Family     Colon cancer Neg Hx     Anesthesia problems Neg Hx        Review of Systems   Constitutional:  Negative for chills, diaphoresis, fatigue and fever. Respiratory:  Negative for apnea, cough, chest tightness, shortness of breath and wheezing. Cardiovascular:  Negative for chest pain, palpitations and leg swelling.    Gastrointestinal:  Negative for abdominal distention, abdominal pain, anal bleeding, constipation, diarrhea, nausea, rectal pain and vomiting. Genitourinary:  Negative for difficulty urinating, dyspareunia, dysuria, frequency, hematuria, menstrual problem, pelvic pain, urgency, vaginal bleeding, vaginal discharge and vaginal pain. Musculoskeletal:  Negative for arthralgias, back pain and myalgias. Skin:  Negative for color change and rash. Neurological:  Negative for dizziness, syncope, light-headedness, numbness and headaches. Hematological:  Negative for adenopathy. Does not bruise/bleed easily. Psychiatric/Behavioral:  Negative for dysphoric mood and sleep disturbance. The patient is not nervous/anxious.         Objective   Physical Exam  OBGyn Exam     Objective      /72 (BP Location: Left arm, Patient Position: Sitting, Cuff Size: Standard)   Ht 5' 7" (1.702 m)   Wt 75.2 kg (165 lb 12.8 oz)   LMP 03/28/2020   BMI 25.97 kg/m²     General:   alert and oriented, in no acute distress   Neck:    Breast:    Heart:    Lungs:    Abdomen: Nontender   Vulva:    Vagina:    Cervix:    Uterus:    Adnexa:    Rectum:     Psych:  Normal mood and affect   Skin:  Without obvious lesions   Eyes: symmetric, with normal movements and reactivity   Musculoskeletal:  Normal muscle tone and movements appreciated

## 2023-12-14 ENCOUNTER — TELEPHONE (OUTPATIENT)
Dept: HEMATOLOGY ONCOLOGY | Facility: CLINIC | Age: 53
End: 2023-12-14

## 2023-12-14 NOTE — TELEPHONE ENCOUNTER
Appointment Change  Cancel, Reschedule, Change to Virtual      Who are you speaking with? Patient   If it is not the patient, is the caller listed on the communication consent form? N/A   Which provider is the appointment scheduled with? YOLANDA Webster   When was the original appointment scheduled? Please list date and time 12/15/23 9:30am   At which location is the appointment scheduled to take place? [facility]   Was the appointment rescheduled? Was the appointment changed from an in person visit to a virtual visit? If so, please list the details of the change. 1/19/24 11:30am   What is the reason for the appointment change? Patient schedule conflict       Was STAR transport scheduled? No   Does STAR transport need to be scheduled for the new visit (if applicable) No   Does the patient need an infusion appointment rescheduled? No   Does the patient have an upcoming infusion appointment scheduled? If so, when? No   Is the patient undergoing chemotherapy? No   For appointments cancelled with less than 24 hours:  Was the no-show policy reviewed?  Yes

## 2024-01-19 ENCOUNTER — TELEPHONE (OUTPATIENT)
Dept: HEMATOLOGY ONCOLOGY | Facility: CLINIC | Age: 54
End: 2024-01-19

## 2024-01-19 NOTE — TELEPHONE ENCOUNTER
Appointment Change  Cancel, Reschedule, Change to Virtual      Who are you speaking with? Patient   If it is not the patient, is the caller listed on the communication consent form? N/A   Which provider is the appointment scheduled with? YOLANDA Christina   When was the original appointment scheduled?    Please list date and time 1/19/24 @ 1130   At which location is the appointment scheduled to take place? [place]   Was the appointment rescheduled?     Was the appointment changed from an in person visit to a virtual visit?    If so, please list the details of the change. 2/22/24 @ 2   What is the reason for the appointment change? Due to weather conditions       Was STAR transport scheduled? No   Does STAR transport need to be scheduled for the new visit (if applicable) No   Does the patient need an infusion appointment rescheduled? No   Does the patient have an upcoming infusion appointment scheduled? If so, when? No   Is the patient undergoing chemotherapy? No   For appointments cancelled with less than 24 hours:  Was the no-show policy reviewed? Yes

## 2024-01-24 ENCOUNTER — ANNUAL EXAM (OUTPATIENT)
Dept: GYNECOLOGY | Facility: CLINIC | Age: 54
End: 2024-01-24
Payer: COMMERCIAL

## 2024-01-24 VITALS — BODY MASS INDEX: 24.9 KG/M2 | DIASTOLIC BLOOD PRESSURE: 78 MMHG | SYSTOLIC BLOOD PRESSURE: 130 MMHG | WEIGHT: 159 LBS

## 2024-01-24 DIAGNOSIS — C50.111 MALIGNANT NEOPLASM OF CENTRAL PORTION OF RIGHT BREAST IN FEMALE, ESTROGEN RECEPTOR POSITIVE: ICD-10-CM

## 2024-01-24 DIAGNOSIS — Z12.31 ENCOUNTER FOR SCREENING MAMMOGRAM FOR BREAST CANCER: ICD-10-CM

## 2024-01-24 DIAGNOSIS — N95.2 VAGINAL ATROPHY: ICD-10-CM

## 2024-01-24 DIAGNOSIS — R92.30 DENSE BREAST TISSUE: ICD-10-CM

## 2024-01-24 DIAGNOSIS — Z01.419 WOMEN'S ANNUAL ROUTINE GYNECOLOGICAL EXAMINATION: Primary | ICD-10-CM

## 2024-01-24 DIAGNOSIS — Z17.0 MALIGNANT NEOPLASM OF CENTRAL PORTION OF RIGHT BREAST IN FEMALE, ESTROGEN RECEPTOR POSITIVE: ICD-10-CM

## 2024-01-24 PROCEDURE — G0145 SCR C/V CYTO,THINLAYER,RESCR: HCPCS | Performed by: OBSTETRICS & GYNECOLOGY

## 2024-01-24 PROCEDURE — S0612 ANNUAL GYNECOLOGICAL EXAMINA: HCPCS | Performed by: OBSTETRICS & GYNECOLOGY

## 2024-01-24 RX ORDER — FEXOFENADINE HCL 60 MG/1
60 TABLET, FILM COATED ORAL DAILY
COMMUNITY

## 2024-01-24 NOTE — PROGRESS NOTES
Assessment/Plan   Diagnoses and all orders for this visit:    Women's annual routine gynecological examination    Encounter for screening mammogram for breast cancer    Malignant neoplasm of central portion of right breast in female, estrogen receptor positive   -     Mammo diagnostic bilateral w cad; Future  -     Mammo diagnostic bilateral w 3d & cad; Future    Dense breast tissue  -     Mammo diagnostic bilateral w cad; Future  -     Mammo diagnostic bilateral w 3d & cad; Future    Vaginal atrophy    Other orders  -     fexofenadine (ALLEGRA ODT) 30 MG disintegrating tablet; Take 30 mg by mouth daily  -     fexofenadine (ALLEGRA) 60 MG tablet; Take 60 mg by mouth daily    1. yearly exam-Pap smear done with reflex HPV at patient request, self breast awareness reviewed, calcium/vitamin D recommendations discussed, mammogram request given, colonoscopy up-to-date follow-up as per specialist.  2. previous bilateral ovarian cyst-resolved.  Exam today is normal.  3. previous UTI-resolved with treatment at prior visit  4. prior history of postmenopausal bleeding-last menses April 2020, had heavy bleeding episode October 2022.  Endometrial biopsy was with inactive endometrium with polyp.  D&C 10/25/2022 was with inactive glands with no polyp found.  Had bleeding again 7/10/2023 for 10 days which resolved with sonohysterogram and endometrial biopsy demonstrating weakly proliferative endometrium with partial collapse without suspicious findings.  She has had no bleeding since then.  To call return with any issues.  5. menopausal-blood work 1/14/2023 with FSH 86, estradiol less than 11 consistent with menopause  6.  History of thickened endometrium-likely was related to use of tamoxifen.  Most recent ultrasound with endometrium 4.6 mm.  7. vaginal atrophy-mild changes noted on exam.  She does use coconut oil with good results, vaginal lubrication/moisturizer sheet given to use accordingly.  8.  History of seizure  "disorder/yeast/perimenopause-no current concerns  9. adenomyosis-noted on previous ultrasounds.  She denies any complaints, to call or return with any issues  10. skin cyst-noted overlying left breast, noted as of yesterday.  It is slightly red today, but the patient has been picking at it.  No mass or warmth or discharge appreciated.  Highly likely, this represents a superficial process.  Should it not resolve, she will return as needed.  11.  History of breast cancer-status post lobectomy, radiation therapy, and tamoxifen until stopping October 2022.  Has bilateral diagnostic mammogram scheduled next month as recommended by radiology.  Should the skin cyst in the left breast not resolved, would consider adding left breast ultrasound to imaging as needed.  Otherwise, follow-up 1 year for yearly exam or as needed.    Subjective   Patient ID: Sugar Jara is a 53 y.o. female.    Vitals:    01/24/24 1028   BP: 130/78     Patient was seen today for yearly exam.  Please see assessment plan for details.        The following portions of the patient's history were reviewed and updated as appropriate: allergies, current medications, past family history, past medical history, past social history, past surgical history, and problem list.  Past Medical History:   Diagnosis Date    Abdominal pain     Anesthesia     \"after tape removed from both eyes/developed swelling\"    Anxiety     has autistic/special needs son    Breast cancer (HCC) 04/30/2021    RIGHT    Cancer (HCC)     Breast cancer - right breast 2021    Chronic UTI     2x/year or so /no symptoms today    Displaced fracture of shaft of fifth metacarpal bone, right hand, subsequent encounter for fracture with routine healing 3/1/2022    Dysuria     resolved 10/05/16/occas/not lately    Encounter for screening colonoscopy     Environmental and seasonal allergies     Exercise involving cycling     2-3x/week spin classes    GERD (gastroesophageal reflux disease)     " "occasional due to diet    Heavy menses     Hiatal hernia     History of kidney stones     History of radiation therapy 2021    Right    Intestinal ulcer     Irritable bowel syndrome     Kidney stone     10/20/22 Hx of kidney stone -passed on own- no surgery needed    Nausea     Seizures (HCC)     last seizure approx 5 yrs ago    Shortness of breath     \"sometimes just sitting in chair or activity and having Echo 4/29\"-10/20/22- Pt reports resolved no further SOB    Submucous leiomyoma of uterus     no recent problem    Terminal ileitis (HCC)     unclear etiology, work up w EGD/COLON/TI bx/SBC/CT done.    Wears glasses      Past Surgical History:   Procedure Laterality Date    BREAST BIOPSY Right 03/01/2021    stereo- invasive ductal ca    BREAST LUMPECTOMY Right 04/30/2021    Procedure: RIGHT BREAST BRACKETED DINA LOCALIZATION LUMPECTOMY;;  Surgeon: Melody Brewer MD;  Location: [facility];  Service: Surgical Oncology    COLONOSCOPY  03/01/2017    5 yr recall    DILATION AND CURETTAGE OF UTERUS      EGD      EXAMINATION UNDER ANESTHESIA N/A 10/25/2022    Procedure: (EUA);  Surgeon: Karthik Henriquez MD;  Location: [facility];  Service: Gynecology    HYSTEROSCOPY      INCONTINENCE SURGERY      bladder sling    LYMPH NODE BIOPSY Right 04/30/2021    Procedure: Bx LYMPH NODE SENTINEL;  Surgeon: Melody Brewer MD;  Location: [facility];  Service: Surgical Oncology    MAMMO NEEDLE LOCALIZATION LEFT (ALL INC) Right 04/19/2021    MAMMO NEEDLE LOCALIZATION LEFT (ALL INC) EACH ADD Right 04/19/2021    MAMMO STEREOTACTIC BREAST BIOPSY RIGHT (ALL INC) Right 03/01/2021    LA COLONOSCOPY FLX DX W/COLLJ SPEC WHEN PFRMD N/A 11/14/2016    Procedure: EGD AND COLONOSCOPY;  Surgeon: Brandt Garcia MD;  Location: [facility];  Service: Gastroenterology    LA HYSTEROSCOPY BX ENDOMETRIUM&/POLYPC W/WO D&C N/A 10/25/2022    Procedure: D&C W/HYSTEROSCOPY; PSB POLYPECTOMY;  Surgeon: Karthik Henriquez MD;  Location: [facility];  Service: Gynecology    " WISDOM TOOTH EXTRACTION       OB History    Para Term  AB Living   2 2 2     2   SAB IAB Ectopic Multiple Live Births           2      # Outcome Date GA Lbr Noam/2nd Weight Sex Delivery Anes PTL Lv   2 Term     M Vag-Spont   KEREN   1 Term     M Vag-Spont   KEREN      Obstetric Comments   Menarche-13   Age at first pregnancy-26   nuvaring-about 10 yrs, currently using       Current Outpatient Medications:     famotidine (PEPCID) 40 MG tablet, TAKE 1 TABLET BY MOUTH EVERYDAY AT BEDTIME, Disp: 90 tablet, Rfl: 1    fexofenadine (ALLEGRA ODT) 30 MG disintegrating tablet, Take 30 mg by mouth daily, Disp: , Rfl:     fexofenadine (ALLEGRA) 60 MG tablet, Take 60 mg by mouth daily, Disp: , Rfl:     lamoTRIgine (LaMICtal) 150 MG tablet, Take 150 mg by mouth 2 (two) times a day, Disp: , Rfl:     lamoTRIgine (LaMICtal) 100 mg tablet, TAKE 1 AND 1/2 TABLETS BY MOUTH 2 TIMES A DAY (Patient not taking: Reported on 2023), Disp: , Rfl:     levocetirizine (XYZAL) 5 MG tablet, Take 5 mg by mouth in the morning Takes in the evening (Patient not taking: Reported on 2024), Disp: , Rfl:   Allergies   Allergen Reactions    Latex Rash and Swelling     When tape from eyes removed after surgery developed high swelling of eyes/watery    Medical Tape Swelling and Rash     When tape from eyes removed after surgery developed high swelling of eyes/watery    Wound Dressings Swelling and Rash     After a surgery when tape removed from eyes high swelling noted/tears    Other Sneezing and Rash     seasonal     Social History     Socioeconomic History    Marital status: /Civil Union     Spouse name: None    Number of children: 2    Years of education: None    Highest education level: None   Occupational History    None   Tobacco Use    Smoking status: Never    Smokeless tobacco: Never    Tobacco comments:     Never a smoker or any tobacco products use   Vaping Use    Vaping status: Never Used   Substance and Sexual  Activity    Alcohol use: Yes     Alcohol/week: 1.0 standard drink of alcohol     Types: 1 Cans of beer per week     Comment: 2 drinks on a weekend - beer use    Drug use: No     Comment: Denies any drug use    Sexual activity: Yes     Partners: Male     Birth control/protection: Male Sterilization     Comment: NO nuva ring - discontinued at time of dx with breast cancer - denies any chest pain or shortness of breath with activity   Other Topics Concern    None   Social History Narrative    Caffeine use    [employer]    Uses safety equipment Seatbelts     Social Determinants of Health     Financial Resource Strain: Low Risk  (4/27/2021)    Overall Financial Resource Strain (CARDIA)     Difficulty of Paying Living Expenses: Not hard at all   Food Insecurity: No Food Insecurity (4/27/2021)    Hunger Vital Sign     Worried About Running Out of Food in the Last Year: Never true     Ran Out of Food in the Last Year: Never true   Transportation Needs: No Transportation Needs (4/27/2021)    PRAPARE - Transportation     Lack of Transportation (Medical): No     Lack of Transportation (Non-Medical): No   Physical Activity: Inactive (2/22/2021)    Exercise Vital Sign     Days of Exercise per Week: 0 days     Minutes of Exercise per Session: 0 min   Stress: No Stress Concern Present (2/22/2021)    [employer] [place] [employer] - Occupational Stress Questionnaire     Feeling of Stress : Only a little   Social Connections: Moderately Isolated (2/22/2021)    Social Connection and Isolation Panel [NHANES]     Frequency of Communication with Friends and Family: More than three times a week     Frequency of Social Gatherings with Friends and Family: Once a week     Attends Holiness Services: Never     Active Member of Clubs or Organizations: No     Attends Club or Organization Meetings: Never     Marital Status:    Intimate Partner Violence: Not At Risk (6/6/2023)    Humiliation, Afraid,  Rape, and Kick questionnaire     Fear of Current or Ex-Partner: No     Emotionally Abused: No     Physically Abused: No     Sexually Abused: No   Housing Stability: Not on file     Family History   Problem Relation Age of Onset    Cancer Mother     Breast cancer Mother 55    Colon polyps Mother     Diabetes Father         Type 2    Hypertension Father     BRCA1 Negative Sister     BRCA2 Negative Sister     Breast cancer Maternal Aunt 56    Breast cancer Family     Colon cancer Neg Hx     Anesthesia problems Neg Hx        Review of Systems   Constitutional:  Negative for chills, diaphoresis, fatigue and fever.   Respiratory:  Negative for apnea, cough, chest tightness, shortness of breath and wheezing.    Cardiovascular:  Negative for chest pain, palpitations and leg swelling.   Gastrointestinal:  Negative for abdominal distention, abdominal pain, anal bleeding, constipation, diarrhea, nausea, rectal pain and vomiting.   Genitourinary:  Negative for difficulty urinating, dyspareunia, dysuria, frequency, hematuria, menstrual problem, pelvic pain, urgency, vaginal bleeding, vaginal discharge and vaginal pain.   Musculoskeletal:  Negative for arthralgias, back pain and myalgias.   Skin:  Negative for color change and rash.   Neurological:  Negative for dizziness, syncope, light-headedness, numbness and headaches.   Hematological:  Negative for adenopathy. Does not bruise/bleed easily.   Psychiatric/Behavioral:  Negative for dysphoric mood and sleep disturbance. The patient is not nervous/anxious.        Objective   Physical Exam  OBGyn Exam     Objective      /78 (BP Location: Left arm, Patient Position: Sitting)   Wt 72.1 kg (159 lb) Comment: verbally  LMP 03/28/2020   BMI 24.90 kg/m²     General:   alert and oriented, in no acute distress   Neck: normal to inspection and palpation   Breast: normal appearance, no masses or tenderness.  Well-healed incision noted at the right breast at the outer midline  quadrant.  0.5 cm slightly erythematous raised skin cyst noted at 7:00 on left breast approximately 5 cm from the nipple.  No warmth or discharge or mass appreciated.  Slight erythema noted   Heart:    Lungs:    Abdomen: soft, non-tender, without masses or organomegaly   Vulva: normal   Vagina: Without erythema or lesions or discharge.  Mildly atrophic   Cervix: Mildly atrophic, without lesions or discharge or cervicitis.   Uterus: top normal size, anteverted, non-tender   Adnexa: no mass, fullness, tenderness   Rectum: negative    Psych:  Normal mood and affect   Skin:  Without obvious lesions   Eyes: symmetric, with normal movements and reactivity   Musculoskeletal:  Normal muscle tone and movements appreciated

## 2024-01-29 LAB
LAB AP GYN PRIMARY INTERPRETATION: NORMAL
Lab: NORMAL

## 2024-02-19 ENCOUNTER — APPOINTMENT (EMERGENCY)
Dept: CT IMAGING | Facility: HOSPITAL | Age: 54
End: 2024-02-19
Payer: COMMERCIAL

## 2024-02-19 ENCOUNTER — HOSPITAL ENCOUNTER (EMERGENCY)
Facility: HOSPITAL | Age: 54
Discharge: HOME | End: 2024-02-19
Attending: EMERGENCY MEDICINE
Payer: COMMERCIAL

## 2024-02-19 VITALS
TEMPERATURE: 97.7 F | RESPIRATION RATE: 16 BRPM | OXYGEN SATURATION: 98 % | HEART RATE: 67 BPM | SYSTOLIC BLOOD PRESSURE: 115 MMHG | DIASTOLIC BLOOD PRESSURE: 73 MMHG

## 2024-02-19 DIAGNOSIS — R10.9 ABDOMINAL PAIN: ICD-10-CM

## 2024-02-19 DIAGNOSIS — R11.0 NAUSEA: Primary | ICD-10-CM

## 2024-02-19 LAB
ALBUMIN SERPL BCP-MCNC: 4.4 G/DL (ref 3.5–5)
ALP SERPL-CCNC: 80 U/L (ref 34–104)
ALT SERPL W P-5'-P-CCNC: 13 U/L (ref 7–52)
ANION GAP SERPL CALCULATED.3IONS-SCNC: 6 MMOL/L
AST SERPL W P-5'-P-CCNC: 16 U/L (ref 13–39)
BACTERIA UR QL AUTO: NORMAL /HPF
BASOPHILS # BLD AUTO: 0.03 THOUSANDS/ÂΜL (ref 0–0.1)
BASOPHILS NFR BLD AUTO: 1 % (ref 0–1)
BILIRUB SERPL-MCNC: 0.44 MG/DL (ref 0.2–1)
BILIRUB UR QL STRIP: NEGATIVE
BUN SERPL-MCNC: 22 MG/DL (ref 5–25)
CALCIUM SERPL-MCNC: 9.7 MG/DL (ref 8.4–10.2)
CARDIAC TROPONIN I PNL SERPL HS: 3 NG/L
CHLORIDE SERPL-SCNC: 107 MMOL/L (ref 96–108)
CLARITY UR: CLEAR
CO2 SERPL-SCNC: 27 MMOL/L (ref 21–32)
COLOR UR: ABNORMAL
CREAT SERPL-MCNC: 0.85 MG/DL (ref 0.6–1.3)
EOSINOPHIL # BLD AUTO: 0.05 THOUSAND/ÂΜL (ref 0–0.61)
EOSINOPHIL NFR BLD AUTO: 1 % (ref 0–6)
ERYTHROCYTE [DISTWIDTH] IN BLOOD BY AUTOMATED COUNT: 12 % (ref 11.6–15.1)
GFR SERPL CREATININE-BSD FRML MDRD: 78 ML/MIN/1.73SQ M
GLUCOSE SERPL-MCNC: 84 MG/DL (ref 65–140)
GLUCOSE UR STRIP-MCNC: NEGATIVE MG/DL
HCT VFR BLD AUTO: 40.9 % (ref 34.8–46.1)
HGB BLD-MCNC: 13.7 G/DL (ref 11.5–15.4)
HGB UR QL STRIP.AUTO: NEGATIVE
IMM GRANULOCYTES # BLD AUTO: 0.02 THOUSAND/UL (ref 0–0.2)
IMM GRANULOCYTES NFR BLD AUTO: 0 % (ref 0–2)
KETONES UR STRIP-MCNC: NEGATIVE MG/DL
LEUKOCYTE ESTERASE UR QL STRIP: ABNORMAL
LYMPHOCYTES # BLD AUTO: 1.68 THOUSANDS/ÂΜL (ref 0.6–4.47)
LYMPHOCYTES NFR BLD AUTO: 33 % (ref 14–44)
MCH RBC QN AUTO: 31.2 PG (ref 26.8–34.3)
MCHC RBC AUTO-ENTMCNC: 33.5 G/DL (ref 31.4–37.4)
MCV RBC AUTO: 93 FL (ref 82–98)
MONOCYTES # BLD AUTO: 0.31 THOUSAND/ÂΜL (ref 0.17–1.22)
MONOCYTES NFR BLD AUTO: 6 % (ref 4–12)
NEUTROPHILS # BLD AUTO: 2.97 THOUSANDS/ÂΜL (ref 1.85–7.62)
NEUTS SEG NFR BLD AUTO: 59 % (ref 43–75)
NITRITE UR QL STRIP: NEGATIVE
NON-SQ EPI CELLS URNS QL MICRO: NORMAL /HPF
NRBC BLD AUTO-RTO: 0 /100 WBCS
PH UR STRIP.AUTO: 5 [PH]
PLATELET # BLD AUTO: 225 THOUSANDS/UL (ref 149–390)
PMV BLD AUTO: 10.1 FL (ref 8.9–12.7)
POTASSIUM SERPL-SCNC: 4.2 MMOL/L (ref 3.5–5.3)
PROT SERPL-MCNC: 6.8 G/DL (ref 6.4–8.4)
PROT UR STRIP-MCNC: NEGATIVE MG/DL
RBC # BLD AUTO: 4.39 MILLION/UL (ref 3.81–5.12)
RBC #/AREA URNS AUTO: NORMAL /HPF
SODIUM SERPL-SCNC: 140 MMOL/L (ref 135–147)
SP GR UR STRIP.AUTO: <1.005 (ref 1–1.03)
UROBILINOGEN UR STRIP-ACNC: <2 MG/DL
WBC # BLD AUTO: 5.06 THOUSAND/UL (ref 4.31–10.16)
WBC #/AREA URNS AUTO: NORMAL /HPF

## 2024-02-19 PROCEDURE — 36415 COLL VENOUS BLD VENIPUNCTURE: CPT | Performed by: PHYSICIAN ASSISTANT

## 2024-02-19 PROCEDURE — 80053 COMPREHEN METABOLIC PANEL: CPT | Performed by: PHYSICIAN ASSISTANT

## 2024-02-19 PROCEDURE — 84484 ASSAY OF TROPONIN QUANT: CPT | Performed by: PHYSICIAN ASSISTANT

## 2024-02-19 PROCEDURE — 81001 URINALYSIS AUTO W/SCOPE: CPT | Performed by: PHYSICIAN ASSISTANT

## 2024-02-19 PROCEDURE — 85025 COMPLETE CBC W/AUTO DIFF WBC: CPT | Performed by: PHYSICIAN ASSISTANT

## 2024-02-19 PROCEDURE — G1004 CDSM NDSC: HCPCS

## 2024-02-19 PROCEDURE — 99284 EMERGENCY DEPT VISIT MOD MDM: CPT | Performed by: PHYSICIAN ASSISTANT

## 2024-02-19 PROCEDURE — 74176 CT ABD & PELVIS W/O CONTRAST: CPT

## 2024-02-19 PROCEDURE — 96361 HYDRATE IV INFUSION ADD-ON: CPT

## 2024-02-19 PROCEDURE — 96374 THER/PROPH/DIAG INJ IV PUSH: CPT

## 2024-02-19 PROCEDURE — 93005 ELECTROCARDIOGRAM TRACING: CPT

## 2024-02-19 PROCEDURE — 99284 EMERGENCY DEPT VISIT MOD MDM: CPT

## 2024-02-19 PROCEDURE — 96375 TX/PRO/DX INJ NEW DRUG ADDON: CPT

## 2024-02-19 RX ORDER — DICYCLOMINE HCL 20 MG
20 TABLET ORAL 2 TIMES DAILY PRN
Qty: 4 TABLET | Refills: 0 | Status: SHIPPED | OUTPATIENT
Start: 2024-02-19

## 2024-02-19 RX ORDER — KETOROLAC TROMETHAMINE 30 MG/ML
15 INJECTION, SOLUTION INTRAMUSCULAR; INTRAVENOUS ONCE
Status: COMPLETED | OUTPATIENT
Start: 2024-02-19 | End: 2024-02-19

## 2024-02-19 RX ORDER — ONDANSETRON 4 MG/1
4 TABLET, ORALLY DISINTEGRATING ORAL EVERY 6 HOURS PRN
Qty: 10 TABLET | Refills: 0 | Status: SHIPPED | OUTPATIENT
Start: 2024-02-19

## 2024-02-19 RX ORDER — ONDANSETRON 2 MG/ML
4 INJECTION INTRAMUSCULAR; INTRAVENOUS ONCE
Status: COMPLETED | OUTPATIENT
Start: 2024-02-19 | End: 2024-02-19

## 2024-02-19 RX ADMIN — ONDANSETRON 4 MG: 2 INJECTION INTRAMUSCULAR; INTRAVENOUS at 12:01

## 2024-02-19 RX ADMIN — KETOROLAC TROMETHAMINE 15 MG: 30 INJECTION, SOLUTION INTRAMUSCULAR; INTRAVENOUS at 12:01

## 2024-02-19 RX ADMIN — SODIUM CHLORIDE 1000 ML: 0.9 INJECTION, SOLUTION INTRAVENOUS at 12:04

## 2024-02-19 NOTE — DISCHARGE INSTRUCTIONS
You can stop your antibiotics after today I believe they are upsetting her stomach    Return with any worsening symptoms questions comments or concerns    Follow-up with your family doctor for ongoing care on re-evaluation

## 2024-02-19 NOTE — Clinical Note
Sugar Estefani was seen and treated in our emergency department on 2/19/2024.                Diagnosis: seen in ED    Sugar  .    She may return on this date: 02/21/2024         If you have any questions or concerns, please don't hesitate to call.      Don Clifford PA-C    ______________________________           _______________          _______________  Hospital Representative                              Date                                Time

## 2024-02-19 NOTE — ED PROVIDER NOTES
"History  Chief Complaint   Patient presents with    Possible UTI     Pt with recent dx of UTI. Pt reports on abx for x4 days. Pt reports no relief. Pt reports N and chills at home. Pt reports lower left back pain radiating to left pelvic     This is a 53-year-old female patient who was diagnosed with a UTI proxy 4 days ago at urgent care treated with Keflex.  States that some of the dysuria improved however is now having some pressure and referred pain into her left flank and had some chills with nausea.  No vomiting no urgency or frequency no vaginal discharge or bleeding.  No headache blurred vision no vision no cough congestion sore throat no chest pain or shortness of breath.  Differential diagnose includes not limited to ongoing UTI, pyelonephritis, kidney stone, diverticulitis less likely, STD less likely        Prior to Admission Medications   Prescriptions Last Dose Informant Patient Reported? Taking?   famotidine (PEPCID) 40 MG tablet   No No   Sig: TAKE 1 TABLET BY MOUTH EVERYDAY AT BEDTIME   fexofenadine (ALLEGRA ODT) 30 MG disintegrating tablet   Yes No   Sig: Take 30 mg by mouth daily   fexofenadine (ALLEGRA) 60 MG tablet   Yes No   Sig: Take 60 mg by mouth daily   lamoTRIgine (LaMICtal) 100 mg tablet   Yes No   Sig: TAKE 1 AND 1/2 TABLETS BY MOUTH 2 TIMES A DAY   Patient not taking: Reported on 9/12/2023   lamoTRIgine (LaMICtal) 150 MG tablet  Self Yes No   Sig: Take 150 mg by mouth 2 (two) times a day   levocetirizine (XYZAL) 5 MG tablet  Self Yes No   Sig: Take 5 mg by mouth in the morning Takes in the evening   Patient not taking: Reported on 1/24/2024      Facility-Administered Medications: None       Past Medical History:   Diagnosis Date    Abdominal pain     Anesthesia     \"after tape removed from both eyes/developed swelling\"    Anxiety     has autistic/special needs son    Breast cancer (HCC) 04/30/2021    RIGHT    Cancer (HCC)     Breast cancer - right breast 2021    Chronic UTI     2x/year " "or so /no symptoms today    Displaced fracture of shaft of fifth metacarpal bone, right hand, subsequent encounter for fracture with routine healing 3/1/2022    Dysuria     resolved 10/05/16/occas/not lately    Encounter for screening colonoscopy     Environmental and seasonal allergies     Exercise involving cycling     2-3x/week spin classes    GERD (gastroesophageal reflux disease)     occasional due to diet    Heavy menses     Hiatal hernia     History of kidney stones     History of radiation therapy 2021    Right    Intestinal ulcer     Irritable bowel syndrome     Kidney stone     10/20/22 Hx of kidney stone -passed on own- no surgery needed    Nausea     Seizures (HCC)     last seizure approx 5 yrs ago    Shortness of breath     \"sometimes just sitting in chair or activity and having Echo 4/29\"-10/20/22- Pt reports resolved no further SOB    Submucous leiomyoma of uterus     no recent problem    Terminal ileitis (HCC)     unclear etiology, work up w EGD/COLON/TI bx/SBC/CT done.    Wears glasses        Past Surgical History:   Procedure Laterality Date    BREAST BIOPSY Right 03/01/2021    stereo- invasive ductal ca    BREAST LUMPECTOMY Right 04/30/2021    Procedure: RIGHT BREAST BRACKETED DINA LOCALIZATION LUMPECTOMY;;  Surgeon: Melody Brewer MD;  Location: [facility];  Service: Surgical Oncology    COLONOSCOPY  03/01/2017    5 yr recall    DILATION AND CURETTAGE OF UTERUS      EGD      EXAMINATION UNDER ANESTHESIA N/A 10/25/2022    Procedure: (EUA);  Surgeon: Karthik Henriquez MD;  Location: [facility];  Service: Gynecology    HYSTEROSCOPY      INCONTINENCE SURGERY      bladder sling    LYMPH NODE BIOPSY Right 04/30/2021    Procedure: Bx LYMPH NODE SENTINEL;  Surgeon: Melody Brewer MD;  Location: [facility];  Service: Surgical Oncology    MAMMO NEEDLE LOCALIZATION LEFT (ALL INC) Right 04/19/2021    MAMMO NEEDLE LOCALIZATION LEFT (ALL INC) EACH ADD Right 04/19/2021    MAMMO STEREOTACTIC BREAST BIOPSY RIGHT (ALL " INC) Right 03/01/2021    WI COLONOSCOPY FLX DX W/COLLJ SPEC WHEN PFRMD N/A 11/14/2016    Procedure: EGD AND COLONOSCOPY;  Surgeon: Brandt Garcia MD;  Location: [facility];  Service: Gastroenterology    WI HYSTEROSCOPY BX ENDOMETRIUM&/POLYPC W/WO D&C N/A 10/25/2022    Procedure: D&C W/HYSTEROSCOPY; PSB POLYPECTOMY;  Surgeon: Karthik Henriquez MD;  Location: [facility] OR;  Service: Gynecology    WISDOM TOOTH EXTRACTION         Family History   Problem Relation Age of Onset    Cancer Mother     Breast cancer Mother 55    Colon polyps Mother     Diabetes Father         Type 2    Hypertension Father     BRCA1 Negative Sister     BRCA2 Negative Sister     Breast cancer Maternal Aunt 56    Breast cancer Family     Colon cancer Neg Hx     Anesthesia problems Neg Hx      I have reviewed and agree with the history as documented.    E-Cigarette/Vaping    E-Cigarette Use Never User     Comments Denies any use      E-Cigarette/Vaping Substances     Social History     Tobacco Use    Smoking status: Never    Smokeless tobacco: Never    Tobacco comments:     Never a smoker or any tobacco products use   Vaping Use    Vaping status: Never Used   Substance Use Topics    Alcohol use: Yes     Alcohol/week: 1.0 standard drink of alcohol     Types: 1 Cans of beer per week     Comment: 2 drinks on a weekend - beer use    Drug use: No     Comment: Denies any drug use       Review of Systems   Constitutional:  Negative for diaphoresis, fatigue and fever.   HENT:  Negative for congestion, ear pain, nosebleeds and sore throat.    Eyes:  Negative for photophobia, pain, discharge and visual disturbance.   Respiratory:  Negative for cough, choking, chest tightness, shortness of breath and wheezing.    Cardiovascular:  Negative for chest pain and palpitations.   Gastrointestinal:  Negative for abdominal distention, abdominal pain, diarrhea and vomiting.   Genitourinary:  Positive for dysuria and flank pain. Negative for decreased urine volume,  difficulty urinating, dyspareunia, frequency, genital sores, hematuria, menstrual problem, pelvic pain, urgency, vaginal bleeding, vaginal discharge and vaginal pain.   Musculoskeletal:  Negative for back pain, gait problem and joint swelling.   Skin:  Negative for color change and rash.   Neurological:  Negative for dizziness, syncope and headaches.   Psychiatric/Behavioral:  Negative for behavioral problems and confusion. The patient is not nervous/anxious.    All other systems reviewed and are negative.      Physical Exam  Physical Exam  Vitals and nursing note reviewed.   Constitutional:       General: She is not in acute distress.     Appearance: Normal appearance. She is not ill-appearing, toxic-appearing or diaphoretic.   HENT:      Head: Normocephalic and atraumatic.      Right Ear: Tympanic membrane, ear canal and external ear normal.      Left Ear: Tympanic membrane, ear canal and external ear normal.      Nose: Nose normal. No congestion or rhinorrhea.      Mouth/Throat:      Mouth: Mucous membranes are dry.      Pharynx: Oropharynx is clear. No oropharyngeal exudate or posterior oropharyngeal erythema.   Eyes:      Extraocular Movements: Extraocular movements intact.      Conjunctiva/sclera: Conjunctivae normal.      Pupils: Pupils are equal, round, and reactive to light.   Cardiovascular:      Rate and Rhythm: Normal rate and regular rhythm.   Pulmonary:      Effort: Pulmonary effort is normal. No respiratory distress.      Breath sounds: Normal breath sounds.   Abdominal:      General: Bowel sounds are normal.      Palpations: Abdomen is soft.      Tenderness: There is no abdominal tenderness. There is no right CVA tenderness or left CVA tenderness.   Musculoskeletal:         General: Normal range of motion.      Cervical back: Normal range of motion and neck supple. No rigidity or tenderness.      Right lower leg: No edema.      Left lower leg: No edema.   Lymphadenopathy:      Cervical: No cervical  adenopathy.   Skin:     General: Skin is warm and dry.      Capillary Refill: Capillary refill takes less than 2 seconds.      Findings: No rash.   Neurological:      General: No focal deficit present.      Mental Status: She is alert and oriented to person, place, and time. Mental status is at baseline.   Psychiatric:         Mood and Affect: Mood normal.         Behavior: Behavior normal.         Vital Signs  ED Triage Vitals   Temperature Pulse Respirations Blood Pressure SpO2   02/19/24 1041 02/19/24 1041 02/19/24 1041 02/19/24 1041 02/19/24 1041   97.7 °F (36.5 °C) 80 16 125/89 97 %      Temp Source Heart Rate Source Patient Position - Orthostatic VS BP Location FiO2 (%)   02/19/24 1041 02/19/24 1041 02/19/24 1041 02/19/24 1041 --   Temporal Monitor Sitting Right arm       Pain Score       02/19/24 1201       8           Vitals:    02/19/24 1041 02/19/24 1215 02/19/24 1300   BP: 125/89 116/74 115/73   Pulse: 80 67 67   Patient Position - Orthostatic VS: Sitting Sitting Sitting         Visual Acuity      ED Medications  Medications   sodium chloride 0.9 % bolus 1,000 mL (0 mL Intravenous Stopped 2/19/24 1314)   ondansetron (ZOFRAN) injection 4 mg (4 mg Intravenous Given 2/19/24 1201)   ketorolac (TORADOL) injection 15 mg (15 mg Intravenous Given 2/19/24 1201)       Diagnostic Studies  Results Reviewed       Procedure Component Value Units Date/Time    HS Troponin 0hr (reflex protocol) [[id number]]  (Normal) Collected: 02/19/24 1312    Lab Status: Final result Specimen: Blood from Arm, Left Updated: 02/19/24 1341     hs TnI 0hr 3 ng/L     HS Troponin I 2hr [[id number]]     Lab Status: No result Specimen: Blood     Urine Microscopic [[id number]]  (Normal) Collected: 02/19/24 1201    Lab Status: Final result Specimen: Urine, Clean Catch Updated: 02/19/24 1247     RBC, UA None Seen /hpf      WBC, UA 1-2 /hpf      Epithelial Cells None Seen /hpf      Bacteria, UA None Seen /hpf     Comprehensive metabolic panel  [[id number]] Collected: 02/19/24 1201    Lab Status: Final result Specimen: Blood from Arm, Left Updated: 02/19/24 1223     Sodium 140 mmol/L      Potassium 4.2 mmol/L      Chloride 107 mmol/L      CO2 27 mmol/L      ANION GAP 6 mmol/L      BUN 22 mg/dL      Creatinine 0.85 mg/dL      Glucose 84 mg/dL      Calcium 9.7 mg/dL      AST 16 U/L      ALT 13 U/L      Alkaline Phosphatase 80 U/L      Total Protein 6.8 g/dL      Albumin 4.4 g/dL      Total Bilirubin 0.44 mg/dL      eGFR 78 ml/min/1.73sq m     Narrative:      National Kidney Disease Foundation guidelines for Chronic Kidney Disease (CKD):     Stage 1 with normal or high GFR (GFR > 90 mL/min/1.73 square meters)    Stage 2 Mild CKD (GFR = 60-89 mL/min/1.73 square meters)    Stage 3A Moderate CKD (GFR = 45-59 mL/min/1.73 square meters)    Stage 3B Moderate CKD (GFR = 30-44 mL/min/1.73 square meters)    Stage 4 Severe CKD (GFR = 15-29 mL/min/1.73 square meters)    Stage 5 End Stage CKD (GFR <15 mL/min/1.73 square meters)  Note: GFR calculation is accurate only with a steady state creatinine    UA w Reflex to Microscopic w Reflex to Culture [[id number]]  (Abnormal) Collected: 02/19/24 1201    Lab Status: Final result Specimen: Urine, Clean Catch Updated: 02/19/24 1208     Color, UA Light Yellow     Clarity, UA Clear     Specific Gravity, UA <1.005     pH, UA 5.0     Leukocytes, UA Trace     Nitrite, UA Negative     Protein, UA Negative mg/dl      Glucose, UA Negative mg/dl      Ketones, UA Negative mg/dl      Urobilinogen, UA <2.0 mg/dl      Bilirubin, UA Negative     Occult Blood, UA Negative    CBC and differential [[id number]] Collected: 02/19/24 1201    Lab Status: Final result Specimen: Blood from Arm, Left Updated: 02/19/24 1208     WBC 5.06 Thousand/uL      RBC 4.39 Million/uL      Hemoglobin 13.7 g/dL      Hematocrit 40.9 %      MCV 93 fL      MCH 31.2 pg      MCHC 33.5 g/dL      RDW 12.0 %      MPV 10.1 fL      Platelets 225 Thousands/uL      nRBC 0 /100 WBCs       Neutrophils Relative 59 %      Immat GRANS % 0 %      Lymphocytes Relative 33 %      Monocytes Relative 6 %      Eosinophils Relative 1 %      Basophils Relative 1 %      Neutrophils Absolute 2.97 Thousands/µL      Immature Grans Absolute 0.02 Thousand/uL      Lymphocytes Absolute 1.68 Thousands/µL      Monocytes Absolute 0.31 Thousand/µL      Eosinophils Absolute 0.05 Thousand/µL      Basophils Absolute 0.03 Thousands/µL                    CT renal stone study abdomen pelvis without contrast   Final Result by Jhonny Mathews MD (02/19 1249)      Small nonobstructing left lower pole renal calculus. No hydronephrosis.         Workstation performed: [id number]                    Procedures  Procedures         ED Course  ED Course as of 02/19/24 1450   Mon Feb 19, 2024   1301 Initial EKG interpreted by me 64 bpm normal sinus rhythm no ST elevation normal axis QTc 451 similar to previous             HEART Risk Score      Flowsheet Row Most Recent Value   Heart Score Risk Calculator    History 0 Filed at: 02/19/2024 1344   ECG 0 Filed at: 02/19/2024 1344   Age 1 Filed at: 02/19/2024 1344   Risk Factors 2 Filed at: 02/19/2024 1344   Troponin 0 Filed at: 02/19/2024 1344   HEART Score 3 Filed at: 02/19/2024 1344                          SBIRT 20yo+      Flowsheet Row Most Recent Value   Initial Alcohol Screen: US AUDIT-C     1. How often do you have a drink containing alcohol? 0 Filed at: 02/19/2024 1217   2. How many drinks containing alcohol do you have on a typical day you are drinking?  0 Filed at: 02/19/2024 1217   3a. Male UNDER 65: How often do you have five or more drinks on one occasion? 0 Filed at: 02/19/2024 1217   3b. FEMALE Any Age, or MALE 65+: How often do you have 4 or more drinks on one occassion? 0 Filed at: 02/19/2024 1217   Audit-C Score 0 Filed at: 02/19/2024 1217   RAINE: How many times in the past year have you...    Used an illegal drug or used a prescription medication for non-medical reasons?  Never Filed at: 02/19/2024 1217                      Medical Decision Making  53-year-old female patient who was treated for UTI approximately 4 days ago with Keflex in urgent care.  Overall this is getting better but she started to get some pain more so pressure that starts in her bladder and then goes into her flank.  It comes with severe nausea but then she is describing a little bit of abdominal cramping.  No diarrhea no vomiting no fever or chills the urgency frequency dysuria has improved significantly.  There is no vaginal discharge.  Differential diagnose include not limited to pyonephritis, undertreated UTI, abdominal etiology.  Atypical ACS unlikely    Problems Addressed:  Abdominal pain: acute illness or injury     Details: Resolved after Toradol, after further discussion it sounds like cramping more so possibly due to Keflex told to finish Keflex today which should give her 4 days which would probably treat a UTI.  Troponin 3  Nausea: acute illness or injury     Details: Resolved after Zofran    Amount and/or Complexity of Data Reviewed  External Data Reviewed: notes.     Details: I attempted to review her urgent care notes however they are not in care everywhere  Labs: ordered. Decision-making details documented in ED Course.     Details: All labs were reviewed there is nothing acute that required immediate intervention it appears that the UTI has resolved  Radiology: ordered.     Details: I reviewed the CAT scan results there was no acute finding that would explain the pain that she is having  ECG/medicine tests: ordered and independent interpretation performed.     Details: I personally interpreted EKG there is no ST elevation  Discussion of management or test interpretation with external provider(s): Using joint decision-making patient will be discharged told to stop the Keflex after today, return with any worsening symptoms given prescription for Zofran and Bentyl to help with her abdominal pain.   Follow-up with PCP return with any worsening symptoms questions comes concerns verbalized understanding and agreement    Risk  Prescription drug management.             Disposition  Final diagnoses:   Nausea   Abdominal pain     Time reflects when diagnosis was documented in both MDM as applicable and the Disposition within this note       Time User Action Codes Description Comment    2/19/2024  1:13 PM Don Dudley Add [R11.0] Nausea     2/19/2024  1:13 PM Don Dudley Add [R10.9] Abdominal pain           ED Disposition       ED Disposition   Discharge    Condition   Stable    Date/Time   Mon Feb 19, 2024 1313    Comment   Sugar Jara discharge to home/self care.                   Follow-up Information       Follow up With Specialties Details Why Contact Info    Valentina Esteban DO Family Medicine Schedule an appointment as soon as possible for a visit   [address] [id number] [phone]              Discharge Medication List as of 2/19/2024  1:15 PM        START taking these medications    Details   dicyclomine (BENTYL) 20 mg tablet Take 1 tablet (20 mg total) by mouth 2 (two) times a day as needed (abdominal pain), Starting Mon 2/19/2024, Normal      ondansetron (ZOFRAN-ODT) 4 mg disintegrating tablet Take 1 tablet (4 mg total) by mouth every 6 (six) hours as needed for nausea or vomiting, Starting Mon 2/19/2024, Normal           CONTINUE these medications which have NOT CHANGED    Details   famotidine (PEPCID) 40 MG tablet TAKE 1 TABLET BY MOUTH EVERYDAY AT BEDTIME, Normal      fexofenadine (ALLEGRA ODT) 30 MG disintegrating tablet Take 30 mg by mouth daily, Historical Med      fexofenadine (ALLEGRA) 60 MG tablet Take 60 mg by mouth daily, Historical Med      !! lamoTRIgine (LaMICtal) 100 mg tablet TAKE 1 AND 1/2 TABLETS BY MOUTH 2 TIMES A DAY, Historical Med      !! lamoTRIgine (LaMICtal) 150 MG tablet Take 150 mg by mouth 2 (two) times a day, Starting Wed  11/13/2019, Historical Med      levocetirizine (XYZAL) 5 MG tablet Take 5 mg by mouth in the morning Takes in the evening, Historical Med       !! - Potential duplicate medications found. Please discuss with provider.          No discharge procedures on file.    PDMP Review         Value Time User    PDMP Reviewed  Yes 4/14/2021  2:40 PM Melody Brewer MD            ED Provider  Electronically Signed by             Don Clifford PA-C  02/19/24 2722

## 2024-02-20 ENCOUNTER — VBI (OUTPATIENT)
Dept: FAMILY MEDICINE CLINIC | Facility: CLINIC | Age: 54
End: 2024-02-20

## 2024-02-20 NOTE — TELEPHONE ENCOUNTER
02/20/24 11:59 AM    Patient contacted post ED visit, first outreach attempt made. Message was left for patient to return a call to the VBI Department at [facility]: Phone [phone].    Thank you.  Beatriz Hudson  [facility]

## 2024-02-21 NOTE — TELEPHONE ENCOUNTER
02/21/24 10:10 AM    Patient contacted post ED visit, second outreach attempt made. Message was left for patient to return a call to the VBI Department at [facility]: Phone [phone].    Thank you.  Beatriz Hudson  [facility]

## 2024-02-22 ENCOUNTER — TELEPHONE (OUTPATIENT)
Dept: SURGICAL ONCOLOGY | Facility: CLINIC | Age: 54
End: 2024-02-22

## 2024-02-22 LAB
ATRIAL RATE: 64 BPM
P AXIS: 62 DEGREES
PR INTERVAL: 126 MS
QRS AXIS: 39 DEGREES
QRSD INTERVAL: 92 MS
QT INTERVAL: 438 MS
QTC INTERVAL: 451 MS
T WAVE AXIS: 39 DEGREES
VENTRICULAR RATE: 64 BPM

## 2024-02-22 NOTE — TELEPHONE ENCOUNTER
02/22/24 10:15 AM    Patient contacted post ED visit, VBI department spoke with patient/caregiver and outreach was successful.    Thank you.  Beatriz Hudson  [facility]

## 2024-02-22 NOTE — TELEPHONE ENCOUNTER
Called patient regarding no show. Left voicemail with call back number to reschedule appointment.

## 2024-03-22 ENCOUNTER — HOSPITAL ENCOUNTER (OUTPATIENT)
Dept: MAMMOGRAPHY | Facility: CLINIC | Age: 54
Discharge: HOME | End: 2024-03-22
Payer: COMMERCIAL

## 2024-03-22 ENCOUNTER — TELEPHONE (OUTPATIENT)
Age: 54
End: 2024-03-22

## 2024-03-22 VITALS — HEIGHT: 67 IN | BODY MASS INDEX: 24.96 KG/M2 | WEIGHT: 159 LBS

## 2024-03-22 DIAGNOSIS — C50.111 MALIGNANT NEOPLASM OF CENTRAL PORTION OF RIGHT BREAST IN FEMALE, ESTROGEN RECEPTOR POSITIVE (HCC): ICD-10-CM

## 2024-03-22 DIAGNOSIS — Z17.0 MALIGNANT NEOPLASM OF CENTRAL PORTION OF RIGHT BREAST IN FEMALE, ESTROGEN RECEPTOR POSITIVE (HCC): ICD-10-CM

## 2024-03-22 PROCEDURE — G0279 TOMOSYNTHESIS, MAMMO: HCPCS

## 2024-03-22 PROCEDURE — 77066 DX MAMMO INCL CAD BI: CPT

## 2024-03-22 NOTE — TELEPHONE ENCOUNTER
Called patient and left a message on her voicemail relaying Kirsten's note. Left my teams number in case she needs to call back.

## 2024-03-22 NOTE — TELEPHONE ENCOUNTER
----- Message from YOLANDA Naidu sent at 3/22/2024  3:28 PM EDT -----  Please call patient to inform her that her mammogram was benign, no suspicious findings.    ----- Message -----  From: Kandice Griffin MD  Sent: 3/22/2024   3:00 PM EDT  To: YOLANDA Crow

## 2024-05-19 DIAGNOSIS — K21.9 GASTROESOPHAGEAL REFLUX DISEASE, UNSPECIFIED WHETHER ESOPHAGITIS PRESENT: ICD-10-CM

## 2024-05-28 RX ORDER — FAMOTIDINE 40 MG/1
TABLET, FILM COATED ORAL
Qty: 90 TABLET | Refills: 0 | OUTPATIENT
Start: 2024-05-28

## 2024-05-28 NOTE — TELEPHONE ENCOUNTER
Patient returned phone call and scheduled an appointment on 7/31/24. Will run out of medication before then. Is asking if a refill can be given to last until her appointment date.

## 2024-06-10 ENCOUNTER — OFFICE VISIT (OUTPATIENT)
Dept: URGENT CARE | Facility: CLINIC | Age: 54
End: 2024-06-10
Payer: COMMERCIAL

## 2024-06-10 VITALS
HEIGHT: 68 IN | OXYGEN SATURATION: 98 % | DIASTOLIC BLOOD PRESSURE: 78 MMHG | HEART RATE: 92 BPM | RESPIRATION RATE: 16 BRPM | TEMPERATURE: 97.9 F | BODY MASS INDEX: 23.95 KG/M2 | SYSTOLIC BLOOD PRESSURE: 132 MMHG | WEIGHT: 158 LBS

## 2024-06-10 DIAGNOSIS — S46.912A STRAIN OF LEFT SHOULDER, INITIAL ENCOUNTER: Primary | ICD-10-CM

## 2024-06-10 PROCEDURE — G0382 LEV 3 HOSP TYPE B ED VISIT: HCPCS

## 2024-06-10 RX ORDER — METHOCARBAMOL 500 MG/1
500 TABLET, FILM COATED ORAL 4 TIMES DAILY
Qty: 40 TABLET | Refills: 0 | Status: SHIPPED | OUTPATIENT
Start: 2024-06-10 | End: 2024-06-20

## 2024-06-10 NOTE — PROGRESS NOTES
"  [facility]        NAME: Sugar Jara is a 54 y.o. female  : 1970    MRN: [id number]  DATE: Jesenia 10, 2024  TIME: 9:35 AM    Assessment and Plan   Strain of left shoulder, initial encounter [S46.912A]  1. Strain of left shoulder, initial encounter  methocarbamol (ROBAXIN) 500 mg tablet    Ambulatory Referral to Orthopedic Surgery      Follow up with ortho if symptoms do not improve. Muscle relaxant prescribed for night time use or when not going to be driving. Recommend Aleve and Salonpas patches. Ice and heat as well.  Patient Instructions     Follow up with PCP in 3-5 days.  Proceed to  ER if symptoms worsen.    If tests have been performed at [facility], our office will contact you with results if changes need to be made to the care plan discussed with you at the visit.  [facility]    Chief Complaint     Chief Complaint   Patient presents with    Shoulder Pain     Pt reports left shoulder pain with onset three weeks ago with progression. Denies any known injury. C/o limited rom. States feels warm to the touch. C/o radiation down into hand. Managing with Advil with some relief.          History of Present Illness       Patient is a 54-year-old female presenting complaining of left shoulder pain x 3 weeks. Denies known injury. She states she feels the pain radiates down into her arm and it feels \"warm to the touch.\" She has been trying Advil with some relief. Denies any history of shoulder injuries. She does note some tingling and pain radiating into her fingers. She states the pain worsens at night.    Shoulder Pain   Pertinent negatives include no fever.       Review of Systems   Review of Systems   Constitutional:  Negative for chills and fever.   HENT: Negative.     Respiratory: Negative.     Gastrointestinal: Negative.    Musculoskeletal:  Positive for arthralgias, joint swelling, myalgias and neck pain.   Skin:  Negative for color change and " rash.   Neurological:  Negative for seizures and syncope.         Current Medications       Current Outpatient Medications:     famotidine (PEPCID) 40 MG tablet, TAKE 1 TABLET BY MOUTH EVERYDAY AT BEDTIME, Disp: 90 tablet, Rfl: 0    lamoTRIgine (LaMICtal) 150 MG tablet, Take 150 mg by mouth 2 (two) times a day, Disp: , Rfl:     methocarbamol (ROBAXIN) 500 mg tablet, Take 1 tablet (500 mg total) by mouth 4 (four) times a day for 10 days, Disp: 40 tablet, Rfl: 0    dicyclomine (BENTYL) 20 mg tablet, Take 1 tablet (20 mg total) by mouth 2 (two) times a day as needed (abdominal pain) (Patient not taking: Reported on 6/10/2024), Disp: 4 tablet, Rfl: 0    fexofenadine (ALLEGRA ODT) 30 MG disintegrating tablet, Take 30 mg by mouth daily (Patient not taking: Reported on 6/10/2024), Disp: , Rfl:     fexofenadine (ALLEGRA) 60 MG tablet, Take 60 mg by mouth daily (Patient not taking: Reported on 6/10/2024), Disp: , Rfl:     lamoTRIgine (LaMICtal) 100 mg tablet, TAKE 1 AND 1/2 TABLETS BY MOUTH 2 TIMES A DAY (Patient not taking: Reported on 6/10/2024), Disp: , Rfl:     levocetirizine (XYZAL) 5 MG tablet, Take 5 mg by mouth in the morning Takes in the evening (Patient not taking: Reported on 1/24/2024), Disp: , Rfl:     ondansetron (ZOFRAN-ODT) 4 mg disintegrating tablet, Take 1 tablet (4 mg total) by mouth every 6 (six) hours as needed for nausea or vomiting (Patient not taking: Reported on 6/10/2024), Disp: 10 tablet, Rfl: 0    Current Allergies     Allergies as of 06/10/2024 - Reviewed 06/10/2024   Allergen Reaction Noted    Latex Rash and Swelling 05/18/2015    Medical tape Swelling and Rash 05/18/2015    Wound dressings Swelling and Rash 04/25/2014    Other Sneezing and Rash 05/18/2015            The following portions of the patient's history were reviewed and updated as appropriate: allergies, current medications, past family history, past medical history, past social history, past surgical history and problem list.  "    Past Medical History:   Diagnosis Date    Abdominal pain     Anesthesia     \"after tape removed from both eyes/developed swelling\"    Anxiety     has autistic/special needs son    Breast cancer (HCC) 04/30/2021    RIGHT    Cancer (HCC)     Breast cancer - right breast 2021    Chronic UTI     2x/year or so /no symptoms today    Displaced fracture of shaft of fifth metacarpal bone, right hand, subsequent encounter for fracture with routine healing 3/1/2022    Dysuria     resolved 10/05/16/occas/not lately    Encounter for screening colonoscopy     Environmental and seasonal allergies     Exercise involving cycling     2-3x/week spin classes    GERD (gastroesophageal reflux disease)     occasional due to diet    Heavy menses     Hiatal hernia     History of kidney stones     History of radiation therapy 2021    Right    Intestinal ulcer     Irritable bowel syndrome     Kidney stone     10/20/22 Hx of kidney stone -passed on own- no surgery needed    Nausea     Seizures (HCC)     last seizure approx 5 yrs ago    Shortness of breath     \"sometimes just sitting in chair or activity and having Echo 4/29\"-10/20/22- Pt reports resolved no further SOB    Submucous leiomyoma of uterus     no recent problem    Terminal ileitis (HCC)     unclear etiology, work up w EGD/COLON/TI bx/SBC/CT done.    Wears glasses        Past Surgical History:   Procedure Laterality Date    BREAST BIOPSY Right 03/01/2021    stereo- invasive ductal ca    BREAST LUMPECTOMY Right 04/30/2021    Procedure: RIGHT BREAST BRACKETED DINA LOCALIZATION LUMPECTOMY;;  Surgeon: Melody Brewer MD;  [facility];  Service: Surgical Oncology    COLONOSCOPY  03/01/2017    5 yr recall    DILATION AND CURETTAGE OF UTERUS      EGD      EXAMINATION UNDER ANESTHESIA N/A 10/25/2022    Procedure: (EUA);  Surgeon: Karthik Henriquez MD;  [facility];  Service: Gynecology    HYSTEROSCOPY      INCONTINENCE SURGERY      bladder sling    LYMPH NODE BIOPSY Right " "04/30/2021    Procedure: Bx LYMPH NODE SENTINEL;  Surgeon: Melody Brewer MD;  [facility];  Service: Surgical Oncology    MAMMO NEEDLE LOCALIZATION LEFT (ALL INC) Right 04/19/2021    MAMMO NEEDLE LOCALIZATION LEFT (ALL INC) EACH ADD Right 04/19/2021    MAMMO STEREOTACTIC BREAST BIOPSY RIGHT (ALL INC) Right 03/01/2021    SC COLONOSCOPY FLX DX W/COLLJ SPEC WHEN PFRMD N/A 11/14/2016    Procedure: EGD AND COLONOSCOPY;  Surgeon: Brandt Garcia MD;  Location: [facility];  Service: Gastroenterology    SC HYSTEROSCOPY BX ENDOMETRIUM&/POLYPC W/WO D&C N/A 10/25/2022    Procedure: D&C W/HYSTEROSCOPY; PSB POLYPECTOMY;  Surgeon: Karthik Henriquez MD;  [facility];  Service: Gynecology    WISDOM TOOTH EXTRACTION         Family History   Problem Relation Age of Onset    Cancer Mother     Breast cancer Mother 55    Colon polyps Mother     Diabetes Father         Type 2    Hypertension Father     BRCA1 Negative Sister     BRCA2 Negative Sister     Breast cancer Maternal Aunt 56    Breast cancer Family     Colon cancer Neg Hx     Anesthesia problems Neg Hx          Medications have been verified.        Objective   /78 (BP Location: Left arm, Patient Position: Sitting)   Pulse 92   Temp 97.9 °F (36.6 °C)   Resp 16   Ht 5' 7.5\" (1.715 m)   Wt 71.7 kg (158 lb)   LMP 03/28/2020   SpO2 98%   BMI 24.38 kg/m²   Patient's last menstrual period was 03/28/2020.       Physical Exam     Physical Exam  Vitals and nursing note reviewed.   Constitutional:       General: She is not in acute distress.     Appearance: Normal appearance. She is normal weight. She is not ill-appearing, toxic-appearing or diaphoretic.   HENT:      Head: Normocephalic and atraumatic.      Right Ear: External ear normal.      Left Ear: External ear normal.      Nose: Nose normal.      Mouth/Throat:      Mouth: Mucous membranes are moist.   Eyes:      Conjunctiva/sclera: Conjunctivae normal.   Cardiovascular:      Rate and Rhythm: Normal rate. "      Pulses: Normal pulses.   Pulmonary:      Effort: Pulmonary effort is normal.      Breath sounds: Normal breath sounds.   Musculoskeletal:         General: Tenderness (over anterior left shoulder) present. No swelling, deformity or signs of injury. Normal range of motion.      Left shoulder: Tenderness present. No swelling, deformity, effusion, laceration, bony tenderness or crepitus. Normal range of motion. Normal strength. Normal pulse.      Left upper arm: No swelling, edema, deformity, lacerations, tenderness or bony tenderness.        Arms:       Cervical back: Normal range of motion. Tenderness (in left cervical musculature) present. No swelling, edema, deformity, erythema, signs of trauma, lacerations, rigidity, spasms, torticollis, bony tenderness or crepitus. No pain with movement. Normal range of motion.      Comments: Notes pain radiating into left arm and fingers  Drop arm and Empty can test negative       Skin:     General: Skin is warm and dry.      Capillary Refill: Capillary refill takes less than 2 seconds.   Neurological:      General: No focal deficit present.      Mental Status: She is alert. Mental status is at baseline.   Psychiatric:         Mood and Affect: Mood normal.         Behavior: Behavior normal.

## 2024-06-13 ENCOUNTER — OFFICE VISIT (OUTPATIENT)
Dept: OBGYN CLINIC | Facility: CLINIC | Age: 54
End: 2024-06-13
Payer: COMMERCIAL

## 2024-06-13 ENCOUNTER — APPOINTMENT (OUTPATIENT)
Dept: RADIOLOGY | Facility: CLINIC | Age: 54
End: 2024-06-13
Payer: COMMERCIAL

## 2024-06-13 VITALS
BODY MASS INDEX: 23.95 KG/M2 | DIASTOLIC BLOOD PRESSURE: 80 MMHG | HEIGHT: 68 IN | WEIGHT: 158 LBS | SYSTOLIC BLOOD PRESSURE: 118 MMHG | HEART RATE: 84 BPM | RESPIRATION RATE: 16 BRPM

## 2024-06-13 DIAGNOSIS — M25.512 LEFT SHOULDER PAIN, UNSPECIFIED CHRONICITY: ICD-10-CM

## 2024-06-13 DIAGNOSIS — S46.912A STRAIN OF LEFT SHOULDER, INITIAL ENCOUNTER: ICD-10-CM

## 2024-06-13 DIAGNOSIS — M75.31 CALCIFIC TENDONITIS OF RIGHT SHOULDER: Primary | ICD-10-CM

## 2024-06-13 PROCEDURE — 73030 X-RAY EXAM OF SHOULDER: CPT

## 2024-06-13 PROCEDURE — 20610 DRAIN/INJ JOINT/BURSA W/O US: CPT

## 2024-06-13 PROCEDURE — 99204 OFFICE O/P NEW MOD 45 MIN: CPT | Performed by: STUDENT IN AN ORGANIZED HEALTH CARE EDUCATION/TRAINING PROGRAM

## 2024-06-13 RX ORDER — BUPIVACAINE HYDROCHLORIDE 2.5 MG/ML
4 INJECTION, SOLUTION INFILTRATION; PERINEURAL
Status: COMPLETED | OUTPATIENT
Start: 2024-06-13 | End: 2024-06-13

## 2024-06-13 RX ORDER — TRIAMCINOLONE ACETONIDE 40 MG/ML
40 INJECTION, SUSPENSION INTRA-ARTICULAR; INTRAMUSCULAR
Status: COMPLETED | OUTPATIENT
Start: 2024-06-13 | End: 2024-06-13

## 2024-06-13 RX ADMIN — BUPIVACAINE HYDROCHLORIDE 4 ML: 2.5 INJECTION, SOLUTION INFILTRATION; PERINEURAL at 11:15

## 2024-06-13 RX ADMIN — TRIAMCINOLONE ACETONIDE 40 MG: 40 INJECTION, SUSPENSION INTRA-ARTICULAR; INTRAMUSCULAR at 11:15

## 2024-06-13 NOTE — PROGRESS NOTES
Ortho Sports Medicine Shoulder New Patient Visit     Assesment:   54 y.o. female left shoulder calcific tendinitis    Plan:  Sugar is present in office for evaluation of her left shoulder. X-rays performed in office, x-rays were reviewed with patient discussed that she has calcific tendinitis. At this time discussed and recommended subacromial injection of her left shoulder and physical therapy, patient in agreement. Referral was placed for physical therapy and was give exercises to perform at home along with exercise bands. Left shoulder subacromial corticosteroid injections was given without complications and was well tolerated. Discussed she can use ice, ibuprofen or tylenol as needed for discomfort. Explained no improvement would recommend needle barbotage for her calcific tendinitis, referral was placed for Dr. Justice, patient was told that if her symptoms do not improve with injection over the next couple weeks she should schedule an appointment with Dr. Justice. Referral for needle barbotage was placed.         Conservative treatment:    Ice to shoulder 1-2 times daily, for 20 minutes at a time.  PT for ROM and strengthening to shoulder, rotator cuff, scapular stabilizers.  Home exercise program for shoulder, including ROM and strenthening.  Instructions given to patient of what exercises to perform.  Let pain guide return to activities.      Imaging:    All imaging from today was reviewed by myself and explained to the patient.       Injection:    The risks and benefits of the injection (which include but are not limited to: infection, bleeding,damage to nerve/artery, need for further intervention), as well as the risks and benefits of all alternative treatments were explained and understood.  The patient elected to proceed with injection. The procedure was done with aseptic technique, and the patient tolerated the procedure well with no complications.  A corticosteroid injection of the subacromial space was  "performed.      Surgery:     No surgery is recommended at this point, continue with conservative treatment plan as noted.      Follow up:    No follow-ups on file.        Chief Complaint   Patient presents with    Left Shoulder - Pain       History of Present Illness:    The patient is a 54 y.o., right hand dominant female whose occupation is [employer] employee,  seen in clinic for evaluation of left shoulder pain.  At this time patient reports that she has been having significant dull achy pain for the past 2-3 weeks. Reports that she did not sustain any trauma or injury or any previous injuries. Has some lateral shoulder pain that radiates to her hand and having numbness and tingling in her left hand. Patient reports that she feels weak and has been waking up at night with pain, taking methocarbamol and OTC pain medications for discomfort. Patient denies any neck pain but states she has stiffness without any reported injury. Pain is aggravated by forward flexion and with abduction.     The patient denies a history of diabetes.  The patient denies a history of thyroid disorder.         The patient has tried rest and NSAIDS.          Shoulder Surgical History:  None    Past Medical, Social and Family History:  Past Medical History:   Diagnosis Date    Abdominal pain     Anesthesia     \"after tape removed from both eyes/developed swelling\"    Anxiety     has autistic/special needs son    Breast cancer (HCC) 04/30/2021    RIGHT    Cancer (HCC)     Breast cancer - right breast 2021    Chronic UTI     2x/year or so /no symptoms today    Displaced fracture of shaft of fifth metacarpal bone, right hand, subsequent encounter for fracture with routine healing 3/1/2022    Dysuria     resolved 10/05/16/occas/not lately    Encounter for screening colonoscopy     Environmental and seasonal allergies     Exercise involving cycling     2-3x/week spin classes    GERD (gastroesophageal reflux disease)     occasional due to diet    " "Heavy menses     Hiatal hernia     History of kidney stones     History of radiation therapy 2021    Right    Intestinal ulcer     Irritable bowel syndrome     Kidney stone     10/20/22 Hx of kidney stone -passed on own- no surgery needed    Nausea     Seizures (HCC)     last seizure approx 5 yrs ago    Shortness of breath     \"sometimes just sitting in chair or activity and having Echo 4/29\"-10/20/22- Pt reports resolved no further SOB    Submucous leiomyoma of uterus     no recent problem    Terminal ileitis (HCC)     unclear etiology, work up w EGD/COLON/TI bx/SBC/CT done.    Wears glasses      Past Surgical History:   Procedure Laterality Date    BREAST BIOPSY Right 03/01/2021    stereo- invasive ductal ca    BREAST LUMPECTOMY Right 04/30/2021    Procedure: RIGHT BREAST BRACKETED DINA LOCALIZATION LUMPECTOMY;;  Surgeon: Melody Brewer MD;  [facility];  Service: Surgical Oncology    COLONOSCOPY  03/01/2017    5 yr recall    DILATION AND CURETTAGE OF UTERUS      EGD      EXAMINATION UNDER ANESTHESIA N/A 10/25/2022    Procedure: (EUA);  Surgeon: Karthik Henriquez MD;  [facility];  Service: Gynecology    HYSTEROSCOPY      INCONTINENCE SURGERY      bladder sling    LYMPH NODE BIOPSY Right 04/30/2021    Procedure: Bx LYMPH NODE SENTINEL;  Surgeon: Melody Brewer MD;  [facility];  Service: Surgical Oncology    MAMMO NEEDLE LOCALIZATION LEFT (ALL INC) Right 04/19/2021    MAMMO NEEDLE LOCALIZATION LEFT (ALL INC) EACH ADD Right 04/19/2021    MAMMO STEREOTACTIC BREAST BIOPSY RIGHT (ALL INC) Right 03/01/2021    PA COLONOSCOPY FLX DX W/COLLJ SPEC WHEN PFRMD N/A 11/14/2016    Procedure: EGD AND COLONOSCOPY;  Surgeon: Brandt Garcia MD;  Location: [facility];  Service: Gastroenterology    PA HYSTEROSCOPY BX ENDOMETRIUM&/POLYPC W/WO D&C N/A 10/25/2022    Procedure: D&C W/HYSTEROSCOPY; PSB POLYPECTOMY;  Surgeon: Karthik Henriquez MD;  [facility];  Service: Gynecology    WISDOM TOOTH EXTRACTION   "     Allergies   Allergen Reactions    Latex Rash and Swelling     When tape from eyes removed after surgery developed high swelling of eyes/watery    Medical Tape Swelling and Rash     When tape from eyes removed after surgery developed high swelling of eyes/watery    Wound Dressings Swelling and Rash     After a surgery when tape removed from eyes high swelling noted/tears    Other Sneezing and Rash     seasonal     Current Outpatient Medications on File Prior to Visit   Medication Sig Dispense Refill    famotidine (PEPCID) 40 MG tablet TAKE 1 TABLET BY MOUTH EVERYDAY AT BEDTIME 90 tablet 0    lamoTRIgine (LaMICtal) 150 MG tablet Take 150 mg by mouth 2 (two) times a day      methocarbamol (ROBAXIN) 500 mg tablet Take 1 tablet (500 mg total) by mouth 4 (four) times a day for 10 days 40 tablet 0    dicyclomine (BENTYL) 20 mg tablet Take 1 tablet (20 mg total) by mouth 2 (two) times a day as needed (abdominal pain) (Patient not taking: Reported on 6/13/2024) 4 tablet 0    fexofenadine (ALLEGRA ODT) 30 MG disintegrating tablet Take 30 mg by mouth daily (Patient not taking: Reported on 6/10/2024)      fexofenadine (ALLEGRA) 60 MG tablet Take 60 mg by mouth daily (Patient not taking: Reported on 6/10/2024)      lamoTRIgine (LaMICtal) 100 mg tablet TAKE 1 AND 1/2 TABLETS BY MOUTH 2 TIMES A DAY (Patient not taking: Reported on 6/10/2024)      levocetirizine (XYZAL) 5 MG tablet Take 5 mg by mouth in the morning Takes in the evening (Patient not taking: Reported on 1/24/2024)      ondansetron (ZOFRAN-ODT) 4 mg disintegrating tablet Take 1 tablet (4 mg total) by mouth every 6 (six) hours as needed for nausea or vomiting (Patient not taking: Reported on 6/10/2024) 10 tablet 0     No current facility-administered medications on file prior to visit.     Social History     Socioeconomic History    Marital status: Legally      Spouse name: Not on file    Number of children: 2    Years of education: Not on file    Highest  education level: Not on file   Occupational History    Not on file   Tobacco Use    Smoking status: Never    Smokeless tobacco: Never    Tobacco comments:     Never a smoker or any tobacco products use   Vaping Use    Vaping status: Never Used   Substance and Sexual Activity    Alcohol use: Yes     Alcohol/week: 1.0 standard drink of alcohol     Types: 1 Cans of beer per week     Comment: 2 drinks on a weekend - beer use    Drug use: No     Comment: Denies any drug use    Sexual activity: Yes     Partners: Male     Birth control/protection: Male Sterilization     Comment: NO nuva ring - discontinued at time of dx with breast cancer - denies any chest pain or shortness of breath with activity   Other Topics Concern    Not on file   Social History Narrative    Caffeine use    [employer]    Uses safety equipment Seatbelts     Social Determinants of Health     Financial Resource Strain: Low Risk  (4/27/2021)    Overall Financial Resource Strain (CARDIA)     Difficulty of Paying Living Expenses: Not hard at all   Food Insecurity: No Food Insecurity (4/27/2021)    Hunger Vital Sign     Worried About Running Out of Food in the Last Year: Never true     Ran Out of Food in the Last Year: Never true   Transportation Needs: No Transportation Needs (4/27/2021)    PRAPARE - Transportation     Lack of Transportation (Medical): No     Lack of Transportation (Non-Medical): No   Physical Activity: Inactive (2/22/2021)    Exercise Vital Sign     Days of Exercise per Week: 0 days     Minutes of Exercise per Session: 0 min   Stress: No Stress Concern Present (2/22/2021)    [place] [employer] - Occupational Stress Questionnaire     Feeling of Stress : Only a little   Social Connections: Moderately Isolated (2/22/2021)    Social Connection and Isolation Panel [NHANES]     Frequency of Communication with Friends and Family: More than three times a week     Frequency of Social Gatherings with  "Friends and Family: Once a week     Attends Caodaism Services: Never     Active Member of Clubs or Organizations: No     Attends Club or Organization Meetings: Never     Marital Status:    Intimate Partner Violence: Not At Risk (6/6/2023)    Humiliation, Afraid, Rape, and Kick questionnaire     Fear of Current or Ex-Partner: No     Emotionally Abused: No     Physically Abused: No     Sexually Abused: No   Housing Stability: Not on file         I have reviewed the past medical, surgical, social and family history, medications and allergies as documented in the EMR.    Review of systems: ROS is negative other than that noted in the HPI.  Constitutional: Negative for fatigue and fever.   HENT: Negative for sore throat.    Respiratory: Negative for shortness of breath.    Cardiovascular: Negative for chest pain.   Gastrointestinal: Negative for abdominal pain.   Endocrine: Negative for cold intolerance and heat intolerance.   Genitourinary: Negative for flank pain.   Musculoskeletal: Negative for back pain.   Skin: Negative for rash.   Allergic/Immunologic: Negative for immunocompromised state.   Neurological: Negative for dizziness.   Psychiatric/Behavioral: Negative for agitation.      Physical Exam:    Blood pressure 118/80, pulse 84, resp. rate 16, height 5' 7.5\" (1.715 m), weight 71.7 kg (158 lb), last menstrual period 03/28/2020, not currently breastfeeding.    General/Constitutional: NAD, well developed, well nourished  HENT: Normocephalic, atraumatic  CV: Intact distal pulses, regular rate  Resp: No respiratory distress or labored breathing  Lymphatic: No lymphadenopathy palpated  Neuro: Alert and Oriented x 3, no focal deficits  Psych: Normal mood, normal affect, normal judgement, normal behavior  Skin: Warm, dry, no rashes, no erythema      Shoulder focused exam:     Visual inspection of the left shoulder demonstrates normal contour without atrophy  No evidence of scapular dyskinesia or atrophy.  No " scapular winging  Active and passive range of motion demonstrates forward flexion to 180, abduction to 100, external rotation is 60 with the arm the side, internal rotation to sacrum   Rotator cuff strength is 4+/5 to resisted forward flexion, 4+/5 resisted abduction, 5/5 resisted external rotation, 5/5 resisted internal rotation  No tenderness to palpation at the distal clavicle, AC joint, acromion, or scapular spine  Mild pain with cross-body adduction  + Neer's test, + modified Rosales impingement test  Negative external rotation lag sign  Negative belly press, negative lift-off  + speed's and Yergason's test  - tenderness to palpation at the bicipital groove  + Assawoman's test        UE NV Exam: +2 Radial pulses bilaterally  Sensation intact to light touch C5-T1 bilaterally, Radial/median/ulnar nerve motor intact      Bilateral elbow, wrist, and and forearm ROM full, painless with passive ROM, no ttp or crepitance throughout extremities below shoulder joint    Cervical ROM is full without pain, numbness or tingling      Shoulder Imaging    X-rays of the left shoulder were reviewed, which demonstrate a large calcific deposit within the infraspinatus tendon insertion. I do not currently have a radiology reading from [facility], but will check the result once the reading is performed.    Large joint arthrocentesis: L subacromial bursa  Universal Protocol:  Consent: Verbal consent obtained.  Risks and benefits: risks, benefits and alternatives were discussed  Consent given by: patient  Timeout called at: 6/13/2024 12:28 PM.  Patient understanding: patient states understanding of the procedure being performed  Site marked: the operative site was marked  Patient identity confirmed: verbally with patient  Supporting Documentation  Indications: pain   Procedure Details  Location: shoulder - L subacromial bursa  Needle size: 22 G  Ultrasound guidance: no  Medications administered: 4 mL bupivacaine 0.25 %; 40 mg  triamcinolone acetonide 40 mg/mL    Patient tolerance: patient tolerated the procedure well with no immediate complications  Dressing:  Sterile dressing applied             Scribe Attestation      I,:  Mary Kate Puente PA-C am acting as a scribe while in the presence of the attending physician.:       I,:  Eladio Fisher DO personally performed the services described in this documentation    as scribed in my presence.:

## 2024-06-13 NOTE — LETTER
June 13, 2024     Carol Rosen PA-C  [address]  [place] [address]    Patient: Sugar Jara   YOB: 1970   Date of Visit: 6/13/2024       Dear Dr. Rosen:    Thank you for referring Sugar Jara to me for evaluation. Below are my notes for this consultation.    If you have questions, please do not hesitate to call me. I look forward to following your patient along with you.         Sincerely,        Eladio Fisher, DO        CC: No Recipients    Eladio Fisher DO  6/13/2024 12:42 PM  Incomplete  Ortho Sports Medicine Shoulder New Patient Visit     Assesment:   54 y.o. female left shoulder calcific tendinitis    Plan:  Sugar is present in office for evaluation of her left shoulder. X-rays performed in office, x-rays were reviewed with patient discussed that she has calcific tendinitis. At this time discussed and recommended subacromial injection of her left shoulder and physical therapy, patient in agreement. Referral was placed for physical therapy and was give exercises to perform at home along with exercise bands. Left shoulder subacromial corticosteroid injections was given without complications and was well tolerated. Discussed she can use ice, ibuprofen or tylenol as needed for discomfort. Explained no improvement would recommend needle barbotage for her calcific tendinitis, referral was placed for Dr. Justice, patient was told that if her symptoms do not improve with injection over the next couple weeks she should schedule an appointment with Dr. Justice. Referral for needle barbotage was placed.         Conservative treatment:    Ice to shoulder 1-2 times daily, for 20 minutes at a time.  PT for ROM and strengthening to shoulder, rotator cuff, scapular stabilizers.  Home exercise program for shoulder, including ROM and strenthening.  Instructions given to patient of what exercises to perform.  Let pain guide return to activities.      Imaging:    All imaging from  "today was reviewed by myself and explained to the patient.       Injection:    The risks and benefits of the injection (which include but are not limited to: infection, bleeding,damage to nerve/artery, need for further intervention), as well as the risks and benefits of all alternative treatments were explained and understood.  The patient elected to proceed with injection. The procedure was done with aseptic technique, and the patient tolerated the procedure well with no complications.  A corticosteroid injection of the subacromial space was performed.      Surgery:     No surgery is recommended at this point, continue with conservative treatment plan as noted.      Follow up:    No follow-ups on file.        Chief Complaint   Patient presents with   • Left Shoulder - Pain       History of Present Illness:    The patient is a 54 y.o., right hand dominant female whose occupation is [employer] employee,  seen in clinic for evaluation of left shoulder pain.  At this time patient reports that she has been having significant dull achy pain for the past 2-3 weeks. Reports that she did not sustain any trauma or injury or any previous injuries. Has some lateral shoulder pain that radiates to her hand and having numbness and tingling in her left hand. Patient reports that she feels weak and has been waking up at night with pain, taking methocarbamol and OTC pain medications for discomfort. Patient denies any neck pain but states she has stiffness without any reported injury. Pain is aggravated by forward flexion and with abduction.     The patient denies a history of diabetes.  The patient denies a history of thyroid disorder.         The patient has tried rest and NSAIDS.          Shoulder Surgical History:  None    Past Medical, Social and Family History:  Past Medical History:   Diagnosis Date   • Abdominal pain    • Anesthesia     \"after tape removed from both eyes/developed swelling\"   • Anxiety     has autistic/special " "needs son   • Breast cancer (HCC) 04/30/2021    RIGHT   • Cancer (HCC)     Breast cancer - right breast 2021   • Chronic UTI     2x/year or so /no symptoms today   • Displaced fracture of shaft of fifth metacarpal bone, right hand, subsequent encounter for fracture with routine healing 3/1/2022   • Dysuria     resolved 10/05/16/occas/not lately   • Encounter for screening colonoscopy    • Environmental and seasonal allergies    • Exercise involving cycling     2-3x/week spin classes   • GERD (gastroesophageal reflux disease)     occasional due to diet   • Heavy menses    • Hiatal hernia    • History of kidney stones    • History of radiation therapy 2021    Right   • Intestinal ulcer    • Irritable bowel syndrome    • Kidney stone     10/20/22 Hx of kidney stone -passed on own- no surgery needed   • Nausea    • Seizures (HCC)     last seizure approx 5 yrs ago   • Shortness of breath     \"sometimes just sitting in chair or activity and having Echo 4/29\"-10/20/22- Pt reports resolved no further SOB   • Submucous leiomyoma of uterus     no recent problem   • Terminal ileitis (HCC)     unclear etiology, work up w EGD/COLON/TI bx/SBC/CT done.   • Wears glasses      Past Surgical History:   Procedure Laterality Date   • BREAST BIOPSY Right 03/01/2021    stereo- invasive ductal ca   • BREAST LUMPECTOMY Right 04/30/2021    Procedure: RIGHT BREAST BRACKETED DINA LOCALIZATION LUMPECTOMY;;  Surgeon: Melody Brewer MD;  [facility];  Service: Surgical Oncology   • COLONOSCOPY  03/01/2017    5 yr recall   • DILATION AND CURETTAGE OF UTERUS     • EGD     • EXAMINATION UNDER ANESTHESIA N/A 10/25/2022    Procedure: (EUA);  Surgeon: Karthik Henriquez MD;  [facility];  Service: Gynecology   • HYSTEROSCOPY     • INCONTINENCE SURGERY      bladder sling   • LYMPH NODE BIOPSY Right 04/30/2021    Procedure: Bx LYMPH NODE SENTINEL;  Surgeon: Melody Brewer MD;  [facility];  Service: Surgical Oncology   • MAMMO NEEDLE " LOCALIZATION LEFT (ALL INC) Right 04/19/2021   • MAMMO NEEDLE LOCALIZATION LEFT (ALL INC) EACH ADD Right 04/19/2021   • MAMMO STEREOTACTIC BREAST BIOPSY RIGHT (ALL INC) Right 03/01/2021   • RI COLONOSCOPY FLX DX W/COLLJ SPEC WHEN PFRMD N/A 11/14/2016    Procedure: EGD AND COLONOSCOPY;  Surgeon: Brandt Garcia MD;  Location: [facility];  Service: Gastroenterology   • RI HYSTEROSCOPY BX ENDOMETRIUM&/POLYPC W/WO D&C N/A 10/25/2022    Procedure: D&C W/HYSTEROSCOPY; PSB POLYPECTOMY;  Surgeon: Karthik Henriquez MD;  Location: [facility] OR;  Service: Gynecology   • WISDOM TOOTH EXTRACTION       Allergies   Allergen Reactions   • Latex Rash and Swelling     When tape from eyes removed after surgery developed high swelling of eyes/watery   • Medical Tape Swelling and Rash     When tape from eyes removed after surgery developed high swelling of eyes/watery   • Wound Dressings Swelling and Rash     After a surgery when tape removed from eyes high swelling noted/tears   • Other Sneezing and Rash     seasonal     Current Outpatient Medications on File Prior to Visit   Medication Sig Dispense Refill   • famotidine (PEPCID) 40 MG tablet TAKE 1 TABLET BY MOUTH EVERYDAY AT BEDTIME 90 tablet 0   • lamoTRIgine (LaMICtal) 150 MG tablet Take 150 mg by mouth 2 (two) times a day     • methocarbamol (ROBAXIN) 500 mg tablet Take 1 tablet (500 mg total) by mouth 4 (four) times a day for 10 days 40 tablet 0   • dicyclomine (BENTYL) 20 mg tablet Take 1 tablet (20 mg total) by mouth 2 (two) times a day as needed (abdominal pain) (Patient not taking: Reported on 6/13/2024) 4 tablet 0   • fexofenadine (ALLEGRA ODT) 30 MG disintegrating tablet Take 30 mg by mouth daily (Patient not taking: Reported on 6/10/2024)     • fexofenadine (ALLEGRA) 60 MG tablet Take 60 mg by mouth daily (Patient not taking: Reported on 6/10/2024)     • lamoTRIgine (LaMICtal) 100 mg tablet TAKE 1 AND 1/2 TABLETS BY MOUTH 2 TIMES A DAY (Patient not taking: Reported on  6/10/2024)     • levocetirizine (XYZAL) 5 MG tablet Take 5 mg by mouth in the morning Takes in the evening (Patient not taking: Reported on 1/24/2024)     • ondansetron (ZOFRAN-ODT) 4 mg disintegrating tablet Take 1 tablet (4 mg total) by mouth every 6 (six) hours as needed for nausea or vomiting (Patient not taking: Reported on 6/10/2024) 10 tablet 0     No current facility-administered medications on file prior to visit.     Social History     Socioeconomic History   • Marital status: Legally      Spouse name: Not on file   • Number of children: 2   • Years of education: Not on file   • Highest education level: Not on file   Occupational History   • Not on file   Tobacco Use   • Smoking status: Never   • Smokeless tobacco: Never   • Tobacco comments:     Never a smoker or any tobacco products use   Vaping Use   • Vaping status: Never Used   Substance and Sexual Activity   • Alcohol use: Yes     Alcohol/week: 1.0 standard drink of alcohol     Types: 1 Cans of beer per week     Comment: 2 drinks on a weekend - beer use   • Drug use: No     Comment: Denies any drug use   • Sexual activity: Yes     Partners: Male     Birth control/protection: Male Sterilization     Comment: NO nuva ring - discontinued at time of dx with breast cancer - denies any chest pain or shortness of breath with activity   Other Topics Concern   • Not on file   Social History Narrative    Caffeine use    [employer]    Uses safety equipment Seatbelts     Social Determinants of Health     Financial Resource Strain: Low Risk  (4/27/2021)    Overall Financial Resource Strain (CARDIA)    • Difficulty of Paying Living Expenses: Not hard at all   Food Insecurity: No Food Insecurity (4/27/2021)    Hunger Vital Sign    • Worried About Running Out of Food in the Last Year: Never true    • Ran Out of Food in the Last Year: Never true   Transportation Needs: No Transportation Needs (4/27/2021)    PRAPARE - Transportation  "   • Lack of Transportation (Medical): No    • Lack of Transportation (Non-Medical): No   Physical Activity: Inactive (2/22/2021)    Exercise Vital Sign    • Days of Exercise per Week: 0 days    • Minutes of Exercise per Session: 0 min   Stress: No Stress Concern Present (2/22/2021)    [employer] - Occupational Stress Questionnaire    • Feeling of Stress : Only a little   Social Connections: Moderately Isolated (2/22/2021)    Social Connection and Isolation Panel [NHANES]    • Frequency of Communication with Friends and Family: More than three times a week    • Frequency of Social Gatherings with Friends and Family: Once a week    • Attends Buddhist Services: Never    • Active Member of Clubs or Organizations: No    • Attends Club or Organization Meetings: Never    • Marital Status:    Intimate Partner Violence: Not At Risk (6/6/2023)    Humiliation, Afraid, Rape, and Kick questionnaire    • Fear of Current or Ex-Partner: No    • Emotionally Abused: No    • Physically Abused: No    • Sexually Abused: No   Housing Stability: Not on file         I have reviewed the past medical, surgical, social and family history, medications and allergies as documented in the EMR.    Review of systems: ROS is negative other than that noted in the HPI.  Constitutional: Negative for fatigue and fever.   HENT: Negative for sore throat.    Respiratory: Negative for shortness of breath.    Cardiovascular: Negative for chest pain.   Gastrointestinal: Negative for abdominal pain.   Endocrine: Negative for cold intolerance and heat intolerance.   Genitourinary: Negative for flank pain.   Musculoskeletal: Negative for back pain.   Skin: Negative for rash.   Allergic/Immunologic: Negative for immunocompromised state.   Neurological: Negative for dizziness.   Psychiatric/Behavioral: Negative for agitation.      Physical Exam:    Blood pressure 118/80, pulse 84, resp. rate 16, height 5' 7.5\" (1.715 m), " weight 71.7 kg (158 lb), last menstrual period 03/28/2020, not currently breastfeeding.    General/Constitutional: NAD, well developed, well nourished  HENT: Normocephalic, atraumatic  CV: Intact distal pulses, regular rate  Resp: No respiratory distress or labored breathing  Lymphatic: No lymphadenopathy palpated  Neuro: Alert and Oriented x 3, no focal deficits  Psych: Normal mood, normal affect, normal judgement, normal behavior  Skin: Warm, dry, no rashes, no erythema      Shoulder focused exam:     Visual inspection of the left shoulder demonstrates normal contour without atrophy  No evidence of scapular dyskinesia or atrophy.  No scapular winging  Active and passive range of motion demonstrates forward flexion to 180, abduction to 100, external rotation is 60 with the arm the side, internal rotation to sacrum   Rotator cuff strength is 4+/5 to resisted forward flexion, 4+/5 resisted abduction, 5/5 resisted external rotation, 5/5 resisted internal rotation  No tenderness to palpation at the distal clavicle, AC joint, acromion, or scapular spine  Mild pain with cross-body adduction  + Neer's test, + modified Rosales impingement test  Negative external rotation lag sign  Negative belly press, negative lift-off  + speed's and Yergason's test  - tenderness to palpation at the bicipital groove  + [place]'s test        UE NV Exam: +2 Radial pulses bilaterally  Sensation intact to light touch C5-T1 bilaterally, Radial/median/ulnar nerve motor intact      Bilateral elbow, wrist, and and forearm ROM full, painless with passive ROM, no ttp or crepitance throughout extremities below shoulder joint    Cervical ROM is full without pain, numbness or tingling      Shoulder Imaging    X-rays of the left shoulder were reviewed, which demonstrate a large calcific deposit within the infraspinatus tendon insertion. I do not currently have a radiology reading from [facility], but will check the result once the reading is  performed.    Large joint arthrocentesis: L subacromial bursa  Universal Protocol:  Consent: Verbal consent obtained.  Risks and benefits: risks, benefits and alternatives were discussed  Consent given by: patient  Timeout called at: 6/13/2024 12:28 PM.  Patient understanding: patient states understanding of the procedure being performed  Site marked: the operative site was marked  Patient identity confirmed: verbally with patient  Supporting Documentation  Indications: pain   Procedure Details  Location: shoulder - L subacromial bursa  Needle size: 22 G  Ultrasound guidance: no  Medications administered: 4 mL bupivacaine 0.25 %; 40 mg triamcinolone acetonide 40 mg/mL    Patient tolerance: patient tolerated the procedure well with no immediate complications  Dressing:  Sterile dressing applied             Scribe Attestation      I,:  Mary Kate Puente PA-C am acting as a scribe while in the presence of the attending physician.:       I,:  Eladio Fisher DO personally performed the services described in this documentation    as scribed in my presence.:              Mary Kate Puente PA-C  6/13/2024 12:29 PM  Sign when Signing Visit  Ortho Sports Medicine Shoulder New Patient Visit     Assesment:   54 y.o. female left shoulder calcific tendinitis    Plan:  Sugar is present in office for evaluation of her left shoulder. X-rays performed in office, x-rays were reviewed with patient discussed that she has calcific tendinitis, no acute osseous abnormalities. At this time discussed and recommended subacromial injection of her left shoulder and physical therapy, patient in agreement. Referral was placed for physical therapy and was give exercises to perform at home along with exercise bands. Left shoulder subacromial corticosteroid injections was given without complications and was well tolerated. Discussed she can use ice, ibuprofen or tylenol as needed for discomfort. At this time discussed needle barbotage for her calcific  tendinitis, referral was placed for Dr. Justice, patient was told that if her symptoms do not improve with injection over the next couple weeks she should schedule an appointment with Dr. Justice. Referral for needle maria doloresotajaja was placed.     Conservative treatment:    Ice to shoulder 1-2 times daily, for 20 minutes at a time.  PT for ROM and strengthening to shoulder, rotator cuff, scapular stabilizers.  Home exercise program for shoulder, including ROM and strenthening.  Instructions given to patient of what exercises to perform.  Let pain guide return to activities.      Imaging:    All imaging from today was reviewed by myself and explained to the patient.       Injection:    The risks and benefits of the injection (which include but are not limited to: infection, bleeding,damage to nerve/artery, need for further intervention), as well as the risks and benefits of all alternative treatments were explained and understood.  The patient elected to proceed with injection. The procedure was done with aseptic technique, and the patient tolerated the procedure well with no complications.  A corticosteroid injection of the subacromial space was performed.      Surgery:     No surgery is recommended at this point, continue with conservative treatment plan as noted.      Follow up:    No follow-ups on file.        Chief Complaint   Patient presents with   • Left Shoulder - Pain       History of Present Illness:    The patient is a 54 y.o., right hand dominant female whose occupation is [employer] employee,  seen in clinic for evaluation of left shoulder pain.  At this time patient reports that she has been having significant dull achy pain for the past 2-3 weeks. Reports that she did not sustain any trauma or injury or any previous injuries. Has some lateral shoulder pain that radiates to her hand and having numbness and tingling in her left hand. Patient reports that she feels weak and has been waking up at night with pain,  "taking methocarbamol and OTC pain medications for discomfort. Patient denies any neck pain but states she has stiffness without any reported injury. Pain is aggravated by forward flexion and with abduction.     The patient denies a history of diabetes.  The patient denies a history of thyroid disorder.         The patient has tried rest and NSAIDS.          Shoulder Surgical History:  None    Past Medical, Social and Family History:  Past Medical History:   Diagnosis Date   • Abdominal pain    • Anesthesia     \"after tape removed from both eyes/developed swelling\"   • Anxiety     has autistic/special needs son   • Breast cancer (HCC) 04/30/2021    RIGHT   • Cancer (HCC)     Breast cancer - right breast 2021   • Chronic UTI     2x/year or so /no symptoms today   • Displaced fracture of shaft of fifth metacarpal bone, right hand, subsequent encounter for fracture with routine healing 3/1/2022   • Dysuria     resolved 10/05/16/occas/not lately   • Encounter for screening colonoscopy    • Environmental and seasonal allergies    • Exercise involving cycling     2-3x/week spin classes   • GERD (gastroesophageal reflux disease)     occasional due to diet   • Heavy menses    • Hiatal hernia    • History of kidney stones    • History of radiation therapy 2021    Right   • Intestinal ulcer    • Irritable bowel syndrome    • Kidney stone     10/20/22 Hx of kidney stone -passed on own- no surgery needed   • Nausea    • Seizures ([facility])     last seizure approx 5 yrs ago   • Shortness of breath     \"sometimes just sitting in chair or activity and having Echo 4/29\"-10/20/22- Pt reports resolved no further SOB   • Submucous leiomyoma of uterus     no recent problem   • Terminal ileitis (HCC)     unclear etiology, work up w EGD/COLON/TI bx/SBC/CT done.   • Wears glasses      Past Surgical History:   Procedure Laterality Date   • BREAST BIOPSY Right 03/01/2021    stereo- invasive ductal ca   • BREAST LUMPECTOMY Right 04/30/2021    " Procedure: RIGHT BREAST BRACKETED DINA LOCALIZATION LUMPECTOMY;;  Surgeon: Melody Brewer MD;  [facility];  Service: Surgical Oncology   • COLONOSCOPY  03/01/2017    5 yr recall   • DILATION AND CURETTAGE OF UTERUS     • EGD     • EXAMINATION UNDER ANESTHESIA N/A 10/25/2022    Procedure: (EUA);  Surgeon: Karthik Henriquez MD;  [facility];  Service: Gynecology   • HYSTEROSCOPY     • INCONTINENCE SURGERY      bladder sling   • LYMPH NODE BIOPSY Right 04/30/2021    Procedure: Bx LYMPH NODE SENTINEL;  Surgeon: Melody Brewer MD;  [facility];  Service: Surgical Oncology   • MAMMO NEEDLE LOCALIZATION LEFT (ALL INC) Right 04/19/2021   • MAMMO NEEDLE LOCALIZATION LEFT (ALL INC) EACH ADD Right 04/19/2021   • MAMMO STEREOTACTIC BREAST BIOPSY RIGHT (ALL INC) Right 03/01/2021   • VT COLONOSCOPY FLX DX W/COLLJ SPEC WHEN PFRMD N/A 11/14/2016    Procedure: EGD AND COLONOSCOPY;  Surgeon: Brandt Garcia MD;  Location: [facility] LAB;  Service: Gastroenterology   • VT HYSTEROSCOPY BX ENDOMETRIUM&/POLYPC W/WO D&C N/A 10/25/2022    Procedure: D&C W/HYSTEROSCOPY; PSB POLYPECTOMY;  Surgeon: Karthik Henriquez MD;  [facility];  Service: Gynecology   • WISDOM TOOTH EXTRACTION       Allergies   Allergen Reactions   • Latex Rash and Swelling     When tape from eyes removed after surgery developed high swelling of eyes/watery   • Medical Tape Swelling and Rash     When tape from eyes removed after surgery developed high swelling of eyes/watery   • Wound Dressings Swelling and Rash     After a surgery when tape removed from eyes high swelling noted/tears   • Other Sneezing and Rash     seasonal     Current Outpatient Medications on File Prior to Visit   Medication Sig Dispense Refill   • famotidine (PEPCID) 40 MG tablet TAKE 1 TABLET BY MOUTH EVERYDAY AT BEDTIME 90 tablet 0   • lamoTRIgine (LaMICtal) 150 MG tablet Take 150 mg by mouth 2 (two) times a day     • methocarbamol (ROBAXIN) 500 mg tablet Take 1 tablet (500  mg total) by mouth 4 (four) times a day for 10 days 40 tablet 0   • dicyclomine (BENTYL) 20 mg tablet Take 1 tablet (20 mg total) by mouth 2 (two) times a day as needed (abdominal pain) (Patient not taking: Reported on 6/13/2024) 4 tablet 0   • fexofenadine (ALLEGRA ODT) 30 MG disintegrating tablet Take 30 mg by mouth daily (Patient not taking: Reported on 6/10/2024)     • fexofenadine (ALLEGRA) 60 MG tablet Take 60 mg by mouth daily (Patient not taking: Reported on 6/10/2024)     • lamoTRIgine (LaMICtal) 100 mg tablet TAKE 1 AND 1/2 TABLETS BY MOUTH 2 TIMES A DAY (Patient not taking: Reported on 6/10/2024)     • levocetirizine (XYZAL) 5 MG tablet Take 5 mg by mouth in the morning Takes in the evening (Patient not taking: Reported on 1/24/2024)     • ondansetron (ZOFRAN-ODT) 4 mg disintegrating tablet Take 1 tablet (4 mg total) by mouth every 6 (six) hours as needed for nausea or vomiting (Patient not taking: Reported on 6/10/2024) 10 tablet 0     No current facility-administered medications on file prior to visit.     Social History     Socioeconomic History   • Marital status: Legally      Spouse name: Not on file   • Number of children: 2   • Years of education: Not on file   • Highest education level: Not on file   Occupational History   • Not on file   Tobacco Use   • Smoking status: Never   • Smokeless tobacco: Never   • Tobacco comments:     Never a smoker or any tobacco products use   Vaping Use   • Vaping status: Never Used   Substance and Sexual Activity   • Alcohol use: Yes     Alcohol/week: 1.0 standard drink of alcohol     Types: 1 Cans of beer per week     Comment: 2 drinks on a weekend - beer use   • Drug use: No     Comment: Denies any drug use   • Sexual activity: Yes     Partners: Male     Birth control/protection: Male Sterilization     Comment: NO nuva ring - discontinued at time of dx with breast cancer - denies any chest pain or shortness of breath with activity   Other Topics Concern    • Not on file   Social History Narrative    Caffeine use    [employer]    Uses safety equipment Seatbelts     Social Determinants of Health     Financial Resource Strain: Low Risk  (4/27/2021)    Overall Financial Resource Strain (CARDIA)    • Difficulty of Paying Living Expenses: Not hard at all   Food Insecurity: No Food Insecurity (4/27/2021)    Hunger Vital Sign    • Worried About Running Out of Food in the Last Year: Never true    • Ran Out of Food in the Last Year: Never true   Transportation Needs: No Transportation Needs (4/27/2021)    PRAPARE - Transportation    • Lack of Transportation (Medical): No    • Lack of Transportation (Non-Medical): No   Physical Activity: Inactive (2/22/2021)    Exercise Vital Sign    • Days of Exercise per Week: 0 days    • Minutes of Exercise per Session: 0 min   Stress: No Stress Concern Present (2/22/2021)    [employer] - Occupational Stress Questionnaire    • Feeling of Stress : Only a little   Social Connections: Moderately Isolated (2/22/2021)    Social Connection and Isolation Panel [NHANES]    • Frequency of Communication with Friends and Family: More than three times a week    • Frequency of Social Gatherings with Friends and Family: Once a week    • Attends [employer] Services: Never    • Active Member of Clubs or Organizations: No    • Attends Club or Organization Meetings: Never    • Marital Status:    Intimate Partner Violence: Not At Risk (6/6/2023)    Humiliation, Afraid, Rape, and Kick questionnaire    • Fear of Current or Ex-Partner: No    • Emotionally Abused: No    • Physically Abused: No    • Sexually Abused: No   Housing Stability: Not on file         I have reviewed the past medical, surgical, social and family history, medications and allergies as documented in the EMR.    Review of systems: ROS is negative other than that noted in the HPI.  Constitutional: Negative for fatigue and fever.  "  HENT: Negative for sore throat.    Respiratory: Negative for shortness of breath.    Cardiovascular: Negative for chest pain.   Gastrointestinal: Negative for abdominal pain.   Endocrine: Negative for cold intolerance and heat intolerance.   Genitourinary: Negative for flank pain.   Musculoskeletal: Negative for back pain.   Skin: Negative for rash.   Allergic/Immunologic: Negative for immunocompromised state.   Neurological: Negative for dizziness.   Psychiatric/Behavioral: Negative for agitation.      Physical Exam:    Blood pressure 118/80, pulse 84, resp. rate 16, height 5' 7.5\" (1.715 m), weight 71.7 kg (158 lb), last menstrual period 03/28/2020, not currently breastfeeding.    General/Constitutional: NAD, well developed, well nourished  HENT: Normocephalic, atraumatic  CV: Intact distal pulses, regular rate  Resp: No respiratory distress or labored breathing  Lymphatic: No lymphadenopathy palpated  Neuro: Alert and Oriented x 3, no focal deficits  Psych: Normal mood, normal affect, normal judgement, normal behavior  Skin: Warm, dry, no rashes, no erythema      Shoulder focused exam:     Visual inspection of the left shoulder demonstrates normal contour without atrophy  No evidence of scapular dyskinesia or atrophy.  No scapular winging  Active and passive range of motion demonstrates forward flexion to 180, abduction to 100, external rotation is 60 with the arm the side, internal rotation to sacrum   Rotator cuff strength is 4+/5 to resisted forward flexion, 4+/5 resisted abduction, 5/5 resisted external rotation, 5/5 resisted internal rotation  No tenderness to palpation at the distal clavicle, AC joint, acromion, or scapular spine  Mild pain with cross-body adduction  + Neer's test, + modified Rosales impingement test  Negative external rotation lag sign  Negative belly press, negative lift-off  + speed's and Yergason's test  - tenderness to palpation at the bicipital groove  + Burlington's test        UE NV " Exam: +2 Radial pulses bilaterally  Sensation intact to light touch C5-T1 bilaterally, Radial/median/ulnar nerve motor intact      Bilateral elbow, wrist, and and forearm ROM full, painless with passive ROM, no ttp or crepitance throughout extremities below shoulder joint    Cervical ROM is full without pain, numbness or tingling      Shoulder Imaging    X-rays of the left shoulder were reviewed, which demonstrate posterior shoulder calcific tendinitis without acute osseous abnormalities.  I do not currently have a radiology reading from [facility], but will check the result once the reading is performed.    Large joint arthrocentesis: L subacromial bursa  Universal Protocol:  Consent: Verbal consent obtained.  Risks and benefits: risks, benefits and alternatives were discussed  Consent given by: patient  Timeout called at: 6/13/2024 12:28 PM.  Patient understanding: patient states understanding of the procedure being performed  Site marked: the operative site was marked  Patient identity confirmed: verbally with patient  Supporting Documentation  Indications: pain   Procedure Details  Location: shoulder - L subacromial bursa  Needle size: 22 G  Ultrasound guidance: no  Medications administered: 4 mL bupivacaine 0.25 %; 40 mg triamcinolone acetonide 40 mg/mL    Patient tolerance: patient tolerated the procedure well with no immediate complications  Dressing:  Sterile dressing applied             Scribe Attestation      I,:  Mary Kate Puente PA-C am acting as a scribe while in the presence of the attending physician.:       I,:  Eladio Fisher DO personally performed the services described in this documentation    as scribed in my presence.:

## 2024-06-27 RX ORDER — CEPHALEXIN 500 MG/1
CAPSULE ORAL
COMMUNITY

## 2024-06-27 RX ORDER — NITROFURANTOIN MONOHYDRATE/MACROCRYSTALLINE 25; 75 MG/1; MG/1
CAPSULE ORAL EVERY 12 HOURS
COMMUNITY
Start: 2024-05-02

## 2024-07-01 ENCOUNTER — OFFICE VISIT (OUTPATIENT)
Dept: OBGYN CLINIC | Facility: OTHER | Age: 54
End: 2024-07-01
Payer: COMMERCIAL

## 2024-07-01 VITALS
DIASTOLIC BLOOD PRESSURE: 77 MMHG | WEIGHT: 160.2 LBS | HEART RATE: 96 BPM | SYSTOLIC BLOOD PRESSURE: 115 MMHG | BODY MASS INDEX: 24.28 KG/M2 | HEIGHT: 68 IN

## 2024-07-01 DIAGNOSIS — M75.32 CALCIFIC TENDINITIS OF LEFT SHOULDER: Primary | ICD-10-CM

## 2024-07-01 DIAGNOSIS — R29.898 WEAKNESS OF LEFT SHOULDER: ICD-10-CM

## 2024-07-01 DIAGNOSIS — M25.512 LEFT SHOULDER PAIN, UNSPECIFIED CHRONICITY: ICD-10-CM

## 2024-07-01 DIAGNOSIS — M75.31 CALCIFIC TENDONITIS OF RIGHT SHOULDER: ICD-10-CM

## 2024-07-01 PROCEDURE — 99213 OFFICE O/P EST LOW 20 MIN: CPT | Performed by: ORTHOPAEDIC SURGERY

## 2024-07-01 NOTE — PROGRESS NOTES
Assessment:       1. Calcific tendinitis of left shoulder    2. Calcific tendonitis of right shoulder    3. Left shoulder pain, unspecified chronicity    4. Weakness of left shoulder          Plan:        I explained my current clinical findings and reviewed the radiological findings with the patient.  She still has residual pain and some weakness of the left shoulder despite trial of corticosteroid injection in the left subacromial space.  At this time I will request an MRI of the left shoulder to exclude any associated rotator cuff tear.  I did explain to her that if symptoms do persist and if her MRI does not reveal any significant tearing, further treatment options could include needle barbotage, ultrasound-guided percutaneous tenotomy or arthroscopic debridement of the calcification.  We also discussed the pathophysiology and the natural course of calcific tendinopathy.  I will see her back in the office following her left shoulder MRI.  In the interim, she is recommended to continue with left shoulder range of motion exercises as tolerated.            Subjective:     Patient ID: Sugar Jara is a 54 y.o. female.    Chief Complaint:    JORGE Wooten is a 54-year-old lady who has been referred by Dr. Eladio Fisher for consideration of left shoulder calcific tendinitis needle barbotage.  Patient reports left shoulder pain for over 2 months duration.  Symptoms are nontraumatic in onset.  Initially seen by Dr. Eladio Fisher on 6/13/2024 and received a subacromial corticosteroid injection on that visit.  Subsequently, patient reports that there has been partial improvement of her left shoulder pain.  She still has some mild to occasionally moderate pain of the left shoulder that radiates to her left lateral arm.  Symptoms are made worse with lifting activity or repetitive overhead motion.  No known history of previous left shoulder surgery.  Patient is a non-smoker and nondiabetic and she is not on any oral  "anticoagulation.    Past Medical History:   Diagnosis Date    Abdominal pain     Anesthesia     \"after tape removed from both eyes/developed swelling\"    Anxiety     has autistic/special needs son    Breast cancer (HCC) 04/30/2021    RIGHT    Cancer (HCC)     Breast cancer - right breast 2021    Chronic UTI     2x/year or so /no symptoms today    Displaced fracture of shaft of fifth metacarpal bone, right hand, subsequent encounter for fracture with routine healing 3/1/2022    Dysuria     resolved 10/05/16/occas/not lately    Encounter for screening colonoscopy     Environmental and seasonal allergies     Exercise involving cycling     2-3x/week spin classes    GERD (gastroesophageal reflux disease)     occasional due to diet    Heavy menses     Hiatal hernia     History of kidney stones     History of radiation therapy 2021    Right    Intestinal ulcer     Irritable bowel syndrome     Kidney stone     10/20/22 Hx of kidney stone -passed on own- no surgery needed    Nausea     Seizures (HCC)     last seizure approx 5 yrs ago    Shortness of breath     \"sometimes just sitting in chair or activity and having Echo 4/29\"-10/20/22- Pt reports resolved no further SOB    Submucous leiomyoma of uterus     no recent problem    Terminal ileitis (HCC)     unclear etiology, work up w EGD/COLON/TI bx/SBC/CT done.    Wears glasses      Past Surgical History:   Procedure Laterality Date    BREAST BIOPSY Right 03/01/2021    stereo- invasive ductal ca    BREAST LUMPECTOMY Right 04/30/2021    Procedure: RIGHT BREAST BRACKETED DINA LOCALIZATION LUMPECTOMY;;  Surgeon: Melody Brewer MD;  Location: [facility];  Service: Surgical Oncology    COLONOSCOPY  03/01/2017    5 yr recall    DILATION AND CURETTAGE OF UTERUS      EGD      EXAMINATION UNDER ANESTHESIA N/A 10/25/2022    Procedure: (EUA);  Surgeon: Karthik Henriquez MD;  Location: [facility];  Service: Gynecology    HYSTEROSCOPY      INCONTINENCE SURGERY      bladder sling    LYMPH NODE " BIOPSY Right 04/30/2021    Procedure: Bx LYMPH NODE SENTINEL;  Surgeon: Melody Brewer MD;  Location: [facility];  Service: Surgical Oncology    MAMMO NEEDLE LOCALIZATION LEFT (ALL INC) Right 04/19/2021    MAMMO NEEDLE LOCALIZATION LEFT (ALL INC) EACH ADD Right 04/19/2021    MAMMO STEREOTACTIC BREAST BIOPSY RIGHT (ALL INC) Right 03/01/2021    NJ COLONOSCOPY FLX DX W/COLLJ SPEC WHEN PFRMD N/A 11/14/2016    Procedure: EGD AND COLONOSCOPY;  Surgeon: Brandt Garcia MD;  Location: [facility];  Service: Gastroenterology    NJ HYSTEROSCOPY BX ENDOMETRIUM&/POLYPC W/WO D&C N/A 10/25/2022    Procedure: D&C W/HYSTEROSCOPY; PSB POLYPECTOMY;  Surgeon: Karthik Henriquez MD;  Location: [facility];  Service: Gynecology    WISDOM TOOTH EXTRACTION       Allergies   Allergen Reactions    Latex Rash and Swelling     When tape from eyes removed after surgery developed high swelling of eyes/watery    Medical Tape Swelling and Rash     When tape from eyes removed after surgery developed high swelling of eyes/watery    Wound Dressings Swelling and Rash     After a surgery when tape removed from eyes high swelling noted/tears    Other Sneezing and Rash     seasonal     Current Outpatient Medications on File Prior to Visit   Medication Sig Dispense Refill    famotidine (PEPCID) 40 MG tablet TAKE 1 TABLET BY MOUTH EVERYDAY AT BEDTIME 90 tablet 0    lamoTRIgine (LaMICtal) 150 MG tablet Take 150 mg by mouth 2 (two) times a day      cephalexin (KEFLEX) 500 mg capsule take 1 capsule by mouth every 12 hours for 7 days (Patient not taking: Reported on 7/1/2024)      dicyclomine (BENTYL) 20 mg tablet Take 1 tablet (20 mg total) by mouth 2 (two) times a day as needed (abdominal pain) (Patient not taking: Reported on 6/13/2024) 4 tablet 0    fexofenadine (ALLEGRA ODT) 30 MG disintegrating tablet Take 30 mg by mouth daily (Patient not taking: Reported on 6/10/2024)      fexofenadine (ALLEGRA) 60 MG tablet Take 60 mg by mouth daily (Patient not taking:  Reported on 6/10/2024)      lamoTRIgine (LaMICtal) 100 mg tablet TAKE 1 AND 1/2 TABLETS BY MOUTH 2 TIMES A DAY (Patient not taking: Reported on 6/10/2024)      levocetirizine (XYZAL) 5 MG tablet Take 5 mg by mouth in the morning Takes in the evening (Patient not taking: Reported on 1/24/2024)      Macrobid 100 MG capsule Every 12 hours (Patient not taking: Reported on 7/1/2024)      methocarbamol (ROBAXIN) 500 mg tablet Take 1 tablet (500 mg total) by mouth 4 (four) times a day for 10 days 40 tablet 0    ondansetron (ZOFRAN-ODT) 4 mg disintegrating tablet Take 1 tablet (4 mg total) by mouth every 6 (six) hours as needed for nausea or vomiting (Patient not taking: Reported on 6/10/2024) 10 tablet 0     No current facility-administered medications on file prior to visit.          Social History     Occupational History    Not on file   Tobacco Use    Smoking status: Never    Smokeless tobacco: Never    Tobacco comments:     Never a smoker or any tobacco products use   Vaping Use    Vaping status: Never Used   Substance and Sexual Activity    Alcohol use: Yes     Alcohol/week: 1.0 standard drink of alcohol     Types: 1 Cans of beer per week     Comment: 2 drinks on a weekend - beer use    Drug use: No     Comment: Denies any drug use    Sexual activity: Yes     Partners: Male     Birth control/protection: Male Sterilization     Comment: NO nuva ring - discontinued at time of dx with breast cancer - denies any chest pain or shortness of breath with activity      Review of Systems        Objective:     Left Shoulder Exam     Tenderness   Left shoulder tenderness location: Tender over the supraspinatus tendon and the subacromial bursa.    Range of Motion   Active abduction:  150   External rotation:  80   Forward flexion:  140   Internal rotation 0 degrees:  L2     Muscle Strength   Abduction: 4/5   Internal rotation: 5/5   External rotation: 4/5   Supraspinatus: 4/5   Subscapularis: 5/5   Biceps: 5/5     Tests    Apprehension: negative  Rosales test: positive  Cross arm: negative  Impingement: positive  Drop arm: negative    Other   Erythema: absent  Sensation: normal     Comments:  Negative Vanderwagen's.  Negative speeds.  No significant pain with acromioclavicular Maria D's compression test.        Physical Exam  Vitals and nursing note reviewed.   Constitutional:       Appearance: She is well-developed.   HENT:      Head: Normocephalic.   Cardiovascular:      Rate and Rhythm: Normal rate.   Pulmonary:      Effort: Pulmonary effort is normal. No respiratory distress.   Skin:     General: Skin is warm and dry.      Findings: No erythema.   Neurological:      Mental Status: She is alert and oriented to person, place, and time.      Cranial Nerves: No cranial nerve deficit.   Psychiatric:         Behavior: Behavior normal.         Thought Content: Thought content normal.         Judgment: Judgment normal.           I have personally reviewed pertinent films in PACS and my interpretation is plain radiograph of the left shoulder performed on 6/13/2024 does not reveal any acute osseous injury or significant degenerative changes.  Multiple small calcific densities noted adjacent to the lateral humeral head indicative of calcific tendinitis..

## 2024-07-15 ENCOUNTER — TELEPHONE (OUTPATIENT)
Age: 54
End: 2024-07-15

## 2024-07-15 NOTE — TELEPHONE ENCOUNTER
Called patient and left message that appt on 7/24 has been moved from 1140 am that day to 1040 am the same day.  Left [facility] number if new time does not work for patient.

## 2024-07-30 ENCOUNTER — TELEPHONE (OUTPATIENT)
Age: 54
End: 2024-07-30

## 2024-07-30 DIAGNOSIS — Z17.0 MALIGNANT NEOPLASM OF CENTRAL PORTION OF RIGHT BREAST IN FEMALE, ESTROGEN RECEPTOR POSITIVE (HCC): Primary | ICD-10-CM

## 2024-07-30 DIAGNOSIS — C50.111 MALIGNANT NEOPLASM OF CENTRAL PORTION OF RIGHT BREAST IN FEMALE, ESTROGEN RECEPTOR POSITIVE (HCC): Primary | ICD-10-CM

## 2024-07-30 NOTE — TELEPHONE ENCOUNTER
Left message reminding patient to have labs completed for upcoming appointment 8/5/24. Provided call back number for any questions/concerns [phone]

## 2024-07-31 NOTE — TELEPHONE ENCOUNTER
Follow up call. Left message reminding patient to have labs completed for upcoming appointment 8/5/2024. Provided call back number.

## 2024-08-01 ENCOUNTER — APPOINTMENT (OUTPATIENT)
Dept: LAB | Facility: CLINIC | Age: 54
End: 2024-08-01
Payer: COMMERCIAL

## 2024-08-01 DIAGNOSIS — C50.111 MALIGNANT NEOPLASM OF CENTRAL PORTION OF RIGHT BREAST IN FEMALE, ESTROGEN RECEPTOR POSITIVE (HCC): ICD-10-CM

## 2024-08-01 DIAGNOSIS — Z17.0 MALIGNANT NEOPLASM OF CENTRAL PORTION OF RIGHT BREAST IN FEMALE, ESTROGEN RECEPTOR POSITIVE (HCC): ICD-10-CM

## 2024-08-01 LAB
ALBUMIN SERPL BCG-MCNC: 4.4 G/DL (ref 3.5–5)
ALP SERPL-CCNC: 69 U/L (ref 34–104)
ALT SERPL W P-5'-P-CCNC: 13 U/L (ref 7–52)
ANION GAP SERPL CALCULATED.3IONS-SCNC: 7 MMOL/L (ref 4–13)
AST SERPL W P-5'-P-CCNC: 18 U/L (ref 13–39)
BASOPHILS # BLD AUTO: 0.02 THOUSANDS/ÂΜL (ref 0–0.1)
BASOPHILS NFR BLD AUTO: 1 % (ref 0–1)
BILIRUB SERPL-MCNC: 0.6 MG/DL (ref 0.2–1)
BUN SERPL-MCNC: 17 MG/DL (ref 5–25)
CALCIUM SERPL-MCNC: 9.5 MG/DL (ref 8.4–10.2)
CHLORIDE SERPL-SCNC: 104 MMOL/L (ref 96–108)
CO2 SERPL-SCNC: 29 MMOL/L (ref 21–32)
CREAT SERPL-MCNC: 0.88 MG/DL (ref 0.6–1.3)
EOSINOPHIL # BLD AUTO: 0.05 THOUSAND/ÂΜL (ref 0–0.61)
EOSINOPHIL NFR BLD AUTO: 1 % (ref 0–6)
ERYTHROCYTE [DISTWIDTH] IN BLOOD BY AUTOMATED COUNT: 12.8 % (ref 11.6–15.1)
GFR SERPL CREATININE-BSD FRML MDRD: 74 ML/MIN/1.73SQ M
GLUCOSE P FAST SERPL-MCNC: 74 MG/DL (ref 65–99)
HCT VFR BLD AUTO: 45.4 % (ref 34.8–46.1)
HGB BLD-MCNC: 15 G/DL (ref 11.5–15.4)
IMM GRANULOCYTES # BLD AUTO: 0.01 THOUSAND/UL (ref 0–0.2)
IMM GRANULOCYTES NFR BLD AUTO: 0 % (ref 0–2)
LYMPHOCYTES # BLD AUTO: 1.29 THOUSANDS/ÂΜL (ref 0.6–4.47)
LYMPHOCYTES NFR BLD AUTO: 30 % (ref 14–44)
MCH RBC QN AUTO: 32.3 PG (ref 26.8–34.3)
MCHC RBC AUTO-ENTMCNC: 33 G/DL (ref 31.4–37.4)
MCV RBC AUTO: 98 FL (ref 82–98)
MONOCYTES # BLD AUTO: 0.42 THOUSAND/ÂΜL (ref 0.17–1.22)
MONOCYTES NFR BLD AUTO: 10 % (ref 4–12)
NEUTROPHILS # BLD AUTO: 2.47 THOUSANDS/ÂΜL (ref 1.85–7.62)
NEUTS SEG NFR BLD AUTO: 58 % (ref 43–75)
NRBC BLD AUTO-RTO: 0 /100 WBCS
PLATELET # BLD AUTO: 254 THOUSANDS/UL (ref 149–390)
PMV BLD AUTO: 11.1 FL (ref 8.9–12.7)
POTASSIUM SERPL-SCNC: 4.5 MMOL/L (ref 3.5–5.3)
PROT SERPL-MCNC: 6.7 G/DL (ref 6.4–8.4)
RBC # BLD AUTO: 4.64 MILLION/UL (ref 3.81–5.12)
SODIUM SERPL-SCNC: 140 MMOL/L (ref 135–147)
WBC # BLD AUTO: 4.26 THOUSAND/UL (ref 4.31–10.16)

## 2024-08-01 PROCEDURE — 36415 COLL VENOUS BLD VENIPUNCTURE: CPT

## 2024-08-01 PROCEDURE — 80053 COMPREHEN METABOLIC PANEL: CPT

## 2024-08-01 PROCEDURE — 85025 COMPLETE CBC W/AUTO DIFF WBC: CPT

## 2024-08-04 NOTE — PROGRESS NOTES
HEMATOLOGY / ONCOLOGY CLINIC FOLLOW UP NOTE    Primary Care Provider: Valentina Esteban DO  Referring Provider: Valentina Esteban  MRN: [id number]  : 1970    Reason for Encounter: Follow-up for history of breast cancer       Oncology History   Malignant neoplasm of central portion of right breast in female, estrogen receptor positive (HCC)   3/1/2021 Biopsy    Right breast biopsy:  Invasive ductal carcinoma  Grade 2   ER 90%  UT 90%  HER2 negative     3/31/2021 -  Cancer Staged    Staging form: Breast, AJCC 8th Edition  - Clinical: Stage IA (cT1, cN0, cM0, G2, ER+, UT+, HER2-) - Signed by Melody Brewer MD on 3/31/2021  Stage prefix: Initial diagnosis  Method of lymph node assessment: Clinical  Histologic grading system: 3 grade system       2021 Surgery    Right breast lumpectomy with sentinel lymph node biopsy:  Margins clear  0/2 lymph nodes    Dr. Brewer     2021 Genetic Testing    [facility]    Genes analyzed: APC, ALEXANDER, AXIN2, BARD1, BMPR1A, BRCA1, BRCA2, BRIP1, CDH1, CDK4, CDKN2A, CHEK2, DICER1, MLH1, MSH2, MDH3, MSH6, MUTYH, NBN, NF1, NTHL1, PALB2, PMS2, PTEN, RAD51C, RAD51D, RECQL, SMAD4, SMARCA, STK11, TP53, HOXB13, POLD1, POLE, EPCAM, GREM1     Negative     2021 -  Cancer Staged    Staging form: Breast, AJCC 8th Edition  - Pathologic: Stage IA (pT1c, pN0(sn), cM0, G2, ER+, UT+, HER2-) - Signed by Melody Brewer MD on 2021  Stage prefix: Initial diagnosis  Method of lymph node assessment: [place] lymph node biopsy  Histologic grading system: 3 grade system        Genomic Testing    Oncotype score: 12     2021 - 10/2022 Hormone Therapy    Tamoxifen discontinued secondary to AUB/thickened endometrium         [facility]21 [facility]             Interval History: Patient returns for yearly follow-up for her history of early-stage breast cancer.  She has been on observation.  Yearly mammogram completed on 3/22/2024 was benign.  Her blood work shows mild  leukopenia with a white count of 4.26, no other concerns on CBC-D.  CMP normal  She denies any breast pain, changes to her breast tissue.  No new lumps    REVIEW OF SYSTEMS:  Please note that a 14-point review of systems was performed to include Constitutional, HEENT, Respiratory, CVS, GI, , Musculoskeletal, Integumentary, Neurologic, Rheumatologic, Endocrinologic, Psychiatric, Lymphatic, and Hematologic/Oncologic systems were reviewed and are negative unless otherwise stated in HPI. Positive and negative findings pertinent to this evaluation are incorporated into the history of present illness.      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 Malignant neoplasm of central portion of right breast in female, estrogen receptor positive (HCC)    2. Urinary tract infection without hematuria, site unspecified      Patient is a pleasant 54-year-old female with a history of stage Ia ER/WA positive, HER2 negative right breast cancer status post right lumpectomy 4/3/2021 followed by adjuvant radiation therapy at [facility].  Oncotype recurrence score was 12, low risk.  She was started on tamoxifen due to her perimenopausal state, however this was discontinued secondary to abnormal uterine bleeding.  She underwent an extensive gynecologic work-up which was negative and declined  any additional adjuvant hormonal therapy.  Due to significant family history of breast cancer she underwent genetic testing which resulted with negative findings.  Most recent mammogram was benign.  Clinically she is well-appearing with no concerning findings on exam today.  Breast work within normal range.  She will stay on observation.  I will see her back in 1 year with repeat blood work.  Orders placed for her annual mammogram due at the end of March  Patient is in agreement.  She knows to call anytime with questions or concerns    I spent 25 minutes on chart review, face to face counseling time, coordination of care and documentation.    Past Medical History:   has a past medical history of Abdominal pain, Anesthesia, Anxiety, Breast cancer (HCC) (04/30/2021), Cancer (HCC), Chronic UTI, Displaced fracture of shaft of fifth metacarpal bone, right hand, subsequent encounter for fracture with routine healing (3/1/2022), Dysuria, Encounter for screening colonoscopy, Environmental and seasonal allergies, Exercise involving cycling, GERD (gastroesophageal reflux disease), Heavy menses, Hiatal hernia, History of kidney stones, History of radiation therapy (2021), Intestinal ulcer, Irritable bowel syndrome, Kidney stone, Nausea, Seizures (HCC), Shortness of breath, Submucous leiomyoma of uterus, Terminal ileitis (HCC), and Wears glasses.    PAST SURGICAL HISTORY:   has a past surgical history that includes Incontinence surgery; Dilation and curettage of uterus; pr colonoscopy flx dx w/collj spec when pfrmd (N/A, 11/14/2016); Hysteroscopy; Colonoscopy (03/01/2017); Mammo stereotactic breast biopsy right (all inc) (Right, 03/01/2021); Breast biopsy (Right, 03/01/2021); Mammo needle localization left (all inc) each add (Right, 04/19/2021); Mammo needle localization left (all inc) (Right, 04/19/2021); EGD; [place] tooth extraction; Breast lumpectomy (Right, 04/30/2021); Lymph node biopsy (Right, 04/30/2021); pr hysteroscopy  bx endometrium&/polypc w/wo d&c (N/A, 10/25/2022); and Examination under anesthesia (N/A, 10/25/2022).    CURRENT MEDICATIONS  Current Outpatient Medications   Medication Sig Dispense Refill    cephalexin (KEFLEX) 500 mg capsule Take 1 capsule (500 mg total) by mouth every 12 (twelve) hours for 7 days 14 capsule 0    famotidine (PEPCID) 40 MG tablet TAKE 1 TABLET BY MOUTH EVERYDAY AT BEDTIME 90 tablet 0    lamoTRIgine (LaMICtal) 150 MG tablet Take 150 mg by mouth 2 (two) times a day      dicyclomine (BENTYL) 20 mg tablet Take 1 tablet (20 mg total) by mouth 2 (two) times a day as needed (abdominal pain) (Patient not taking: Reported on 6/13/2024) 4 tablet 0    fexofenadine (ALLEGRA ODT) 30 MG disintegrating tablet Take 30 mg by mouth daily (Patient not taking: Reported on 6/10/2024)      fexofenadine (ALLEGRA) 60 MG tablet Take 60 mg by mouth daily (Patient not taking: Reported on 6/10/2024)      lamoTRIgine (LaMICtal) 100 mg tablet TAKE 1 AND 1/2 TABLETS BY MOUTH 2 TIMES A DAY (Patient not taking: Reported on 6/10/2024)      levocetirizine (XYZAL) 5 MG tablet Take 5 mg by mouth in the morning Takes in the evening (Patient not taking: Reported on 1/24/2024)      Macrobid 100 MG capsule Every 12 hours (Patient not taking: Reported on 7/1/2024)      methocarbamol (ROBAXIN) 500 mg tablet Take 1 tablet (500 mg total) by mouth 4 (four) times a day for 10 days 40 tablet 0    ondansetron (ZOFRAN-ODT) 4 mg disintegrating tablet Take 1 tablet (4 mg total) by mouth every 6 (six) hours as needed for nausea or vomiting (Patient not taking: Reported on 6/10/2024) 10 tablet 0     No current facility-administered medications for this visit.     [unfilled]    SOCIAL HISTORY:   reports that she has never smoked. She has never used smokeless tobacco. She reports current alcohol use of about 1.0 standard drink of alcohol per week. She reports that she does not use drugs.     FAMILY HISTORY:  family history includes BRCA1 Negative in her  "sister; BRCA2 Negative in her sister; Breast cancer in her family; Breast cancer (age of onset: 55) in her mother; Breast cancer (age of onset: 56) in her maternal aunt; Cancer in her mother; Colon polyps in her mother; Diabetes in her father; Hypertension in her father.     ALLERGIES:  is allergic to latex, medical tape, wound dressings, and other.      Physical Exam:  Vital Signs:   Visit Vitals  /68 (BP Location: Left arm)   Pulse 85   Temp 97.6 °F (36.4 °C) (Tympanic)   Resp 18   Ht 5' 7.5\" (1.715 m)   Wt 71.2 kg (157 lb)   LMP 03/28/2020   SpO2 96%   BMI 24.23 kg/m²   OB Status Postmenopausal   Smoking Status Never   BSA 1.83 m²     Body mass index is 24.23 kg/m².  Body surface area is 1.83 meters squared.    Physical Exam  Constitutional:       General: She is not in acute distress.     Appearance: Normal appearance.   HENT:      Head: Normocephalic and atraumatic.   Eyes:      General: No scleral icterus.        Right eye: No discharge.         Left eye: No discharge.      Conjunctiva/sclera: Conjunctivae normal.   Cardiovascular:      Rate and Rhythm: Normal rate and regular rhythm.   Pulmonary:      Effort: Pulmonary effort is normal. No respiratory distress.      Breath sounds: Normal breath sounds.   Abdominal:      General: Bowel sounds are normal. There is no distension.      Palpations: Abdomen is soft. There is no mass.      Tenderness: There is no abdominal tenderness.   Musculoskeletal:         General: Normal range of motion.   Lymphadenopathy:      Cervical: No cervical adenopathy.      Upper Body:      Right upper body: No supraclavicular, axillary or pectoral adenopathy.      Left upper body: No supraclavicular, axillary or pectoral adenopathy.   Skin:     General: Skin is warm and dry.   Neurological:      General: No focal deficit present.      Mental Status: She is alert and oriented to person, place, and time.   Psychiatric:         Mood and Affect: Mood normal.         Behavior: " Behavior normal.         Labs:  Lab Results   Component Value Date    WBC 4.26 (L) 08/01/2024    HGB 15.0 08/01/2024    HCT 45.4 08/01/2024    MCV 98 08/01/2024     08/01/2024     Lab Results   Component Value Date     07/22/2015    SODIUM 140 08/01/2024    K 4.5 08/01/2024     08/01/2024    CO2 29 08/01/2024    ANIONGAP 10 07/22/2015    AGAP 7 08/01/2024    BUN 17 08/01/2024    CREATININE 0.88 08/01/2024    GLUC 84 02/19/2024    GLUF 74 08/01/2024    CALCIUM 9.5 08/01/2024    AST 18 08/01/2024    ALT 13 08/01/2024    ALKPHOS 69 08/01/2024    PROT 6.7 07/22/2015    TP 6.7 08/01/2024    BILITOT 0.43 07/22/2015    TBILI 0.60 08/01/2024    EGFR 74 08/01/2024

## 2024-08-05 ENCOUNTER — OFFICE VISIT (OUTPATIENT)
Age: 54
End: 2024-08-05
Payer: COMMERCIAL

## 2024-08-05 VITALS
WEIGHT: 157 LBS | DIASTOLIC BLOOD PRESSURE: 68 MMHG | OXYGEN SATURATION: 96 % | SYSTOLIC BLOOD PRESSURE: 118 MMHG | HEIGHT: 68 IN | TEMPERATURE: 97.6 F | RESPIRATION RATE: 18 BRPM | HEART RATE: 85 BPM | BODY MASS INDEX: 23.79 KG/M2

## 2024-08-05 DIAGNOSIS — C50.111 MALIGNANT NEOPLASM OF CENTRAL PORTION OF RIGHT BREAST IN FEMALE, ESTROGEN RECEPTOR POSITIVE (HCC): Primary | ICD-10-CM

## 2024-08-05 DIAGNOSIS — Z17.0 MALIGNANT NEOPLASM OF CENTRAL PORTION OF RIGHT BREAST IN FEMALE, ESTROGEN RECEPTOR POSITIVE (HCC): Primary | ICD-10-CM

## 2024-08-05 DIAGNOSIS — N39.0 URINARY TRACT INFECTION WITHOUT HEMATURIA, SITE UNSPECIFIED: ICD-10-CM

## 2024-08-05 PROBLEM — Z79.810 USE OF TAMOXIFEN (NOLVADEX): Status: RESOLVED | Noted: 2021-12-06 | Resolved: 2024-08-05

## 2024-08-05 PROCEDURE — 99213 OFFICE O/P EST LOW 20 MIN: CPT | Performed by: NURSE PRACTITIONER

## 2024-08-05 RX ORDER — CEPHALEXIN 500 MG/1
500 CAPSULE ORAL EVERY 12 HOURS SCHEDULED
Qty: 14 CAPSULE | Refills: 0 | Status: SHIPPED | OUTPATIENT
Start: 2024-08-05 | End: 2024-08-12

## 2024-08-12 ENCOUNTER — OFFICE VISIT (OUTPATIENT)
Dept: GASTROENTEROLOGY | Facility: CLINIC | Age: 54
End: 2024-08-12
Payer: COMMERCIAL

## 2024-08-12 VITALS
WEIGHT: 158.6 LBS | BODY MASS INDEX: 24.04 KG/M2 | SYSTOLIC BLOOD PRESSURE: 118 MMHG | DIASTOLIC BLOOD PRESSURE: 68 MMHG | HEIGHT: 68 IN

## 2024-08-12 DIAGNOSIS — K50.00 TERMINAL ILEITIS WITHOUT COMPLICATION (HCC): ICD-10-CM

## 2024-08-12 DIAGNOSIS — Z83.719 FAMILY HISTORY OF POLYPS IN THE COLON: ICD-10-CM

## 2024-08-12 DIAGNOSIS — K21.9 GASTROESOPHAGEAL REFLUX DISEASE WITHOUT ESOPHAGITIS: Primary | ICD-10-CM

## 2024-08-12 PROCEDURE — 99214 OFFICE O/P EST MOD 30 MIN: CPT | Performed by: PHYSICIAN ASSISTANT

## 2024-08-12 RX ORDER — FAMOTIDINE 40 MG/1
40 TABLET, FILM COATED ORAL
Qty: 90 TABLET | Refills: 3 | Status: SHIPPED | OUTPATIENT
Start: 2024-08-12 | End: 2024-08-12

## 2024-08-12 RX ORDER — DIPHENHYDRAMINE HCL 25 MG
25 CAPSULE ORAL EVERY 6 HOURS PRN
COMMUNITY

## 2024-08-12 RX ORDER — FAMOTIDINE 40 MG/1
40 TABLET, FILM COATED ORAL
Qty: 90 TABLET | Refills: 3 | Status: SHIPPED | OUTPATIENT
Start: 2024-08-12

## 2024-08-12 NOTE — PROGRESS NOTES
[facility] - Outpatient Follow-up Note  Sugar Jara 54 y.o. female MRN: [id number]  Encounter: [id number]    ASSESSMENT AND PLAN:    1. Gastroesophageal reflux disease without esophagitis  Controlled with famotidine 40 mg at bedtime.  She takes a second dose rarely when she eats spicy food.  Offered to dose reduce to Pepcid 20 mg daily but she declined.  An antireflux regimen and lifestyle modifications were reviewed.  Endoscopy 11/2020 negative for Cruz's    - famotidine (PEPCID) 40 MG tablet; Take 1 tablet (40 mg total) by mouth daily at bedtime  Dispense: 90 tablet; Refill: 3    2. Family history of polyps in the colon  Will be due for repeat colonoscopy 11/2027.  Reviewed with patient    3. Terminal ileitis without complication (HCC)  Has history of terminal ileitis back in 2017 thought to be NSAID induced workup negative for IBD.  Continue to monitor for signs or symptoms of IBD including bloody diarrhea, abdominal pain, unintentional weight loss, etc.        Follow up appointment: 1 year  ______________________________________________________________________    Chief Complaint   Patient presents with   • Medication Refill       HPI:     Patient is a 54 y.o. female with a significant PMH of breast cancer S/P lumpecomy and XRT 2021, kidney stones, seizure disorder (last seizure 2018) being evaluated in follow up for reflux.  No family history of GI malignancy or IBD. (M) colon polyps.    Last seen 9/2022 by Noah GUERRERO was having some breakthrough reflux and advised to increase Pepcid to 40 twice daily.  Had history of terminal ileitis while on NSAIDs in 2017 and workup was negative for IBD,  was experiencing some blood in her stool due for colonoscopy which was ordered.    EGD 11/2020 with small hiatal hernia status post 50 [place] dilation. Gastric and esoph bx neg.    Colonoscopy 11/2022 with BBPS of 6 and adequate prep showed internal hemorrhoids,  "otherwise normal, rec repeat in 5 years.    8/2024 CBC normal.  CMP showed creatinine 0.88 GFR 74.    CT A/P without contrast 2/2024 showed small left nonobstructing renal stone, S/P luz elena no biliary dilation.    I am happy to hear Sugar's reflux is well-controlled with famotidine 40 mg at bedtime.  She gets rare breakthrough with eating spicy foods and takes a second dose as needed with good improvement.  Uses NSAIDs about twice a month.  Admits to formed stools daily without bleeding.  Eating well and weight stable.  No abdominal pain or vomiting.  Currently on cephalexin for UTI    Pt denies all other GI and constitutional symptoms.      ROS:   Constitutional: denies fatigue, fever.  HEENT: denies visual disturbance, postnasal drip, sore throat.  Respiratory: denies cough, shortness of breath.  Cardiovascular: denies chest pain, leg swelling.  Gastrointestinal: as noted above in HPI.  : denies difficulty urinating, dysuria.  Musculoskeletal: denies arthralgias, back pain.  Neurological: denies dizziness, syncope.  Psychiatric: denies confusion, anxiety.    Historical Information   Past Medical History:   Diagnosis Date   • Abdominal pain    • Anesthesia     \"after tape removed from both eyes/developed swelling\"   • Anxiety     has autistic/special needs son   • Breast cancer (HCC) 04/30/2021    RIGHT   • Cancer (HCC)     Breast cancer - right breast 2021   • Chronic UTI     2x/year or so /no symptoms today   • Displaced fracture of shaft of fifth metacarpal bone, right hand, subsequent encounter for fracture with routine healing 3/1/2022   • Dysuria     resolved 10/05/16/occas/not lately   • Encounter for screening colonoscopy    • Environmental and seasonal allergies    • Exercise involving cycling     2-3x/week spin classes   • GERD (gastroesophageal reflux disease)     occasional due to diet   • Heavy menses    • Hiatal hernia    • History of kidney stones    • History of radiation therapy 2021    Right   • " "Intestinal ulcer    • Irritable bowel syndrome    • Kidney stone     10/20/22 Hx of kidney stone -passed on own- no surgery needed   • Nausea    • Seizures (HCC)     last seizure approx 5 yrs ago   • Shortness of breath     \"sometimes just sitting in chair or activity and having Echo 4/29\"-10/20/22- Pt reports resolved no further SOB   • Submucous leiomyoma of uterus     no recent problem   • Terminal ileitis (HCC)     unclear etiology, work up w EGD/COLON/TI bx/SBC/CT done.   • Wears glasses      Past Surgical History:   Procedure Laterality Date   • BREAST BIOPSY Right 03/01/2021    stereo- invasive ductal ca   • BREAST LUMPECTOMY Right 04/30/2021    Procedure: RIGHT BREAST BRACKETED DINA LOCALIZATION LUMPECTOMY;;  Surgeon: Melody Brewer MD;  Location: [facility];  Service: Surgical Oncology   • COLONOSCOPY  03/01/2017    5 yr recall   • DILATION AND CURETTAGE OF UTERUS     • EGD     • EXAMINATION UNDER ANESTHESIA N/A 10/25/2022    Procedure: (EUA);  Surgeon: Karthik Henriquez MD;  Location: [facility];  Service: Gynecology   • HYSTEROSCOPY     • INCONTINENCE SURGERY      bladder sling   • LYMPH NODE BIOPSY Right 04/30/2021    Procedure: Bx LYMPH NODE SENTINEL;  Surgeon: Melody Brewer MD;  Location: [facility];  Service: Surgical Oncology   • MAMMO NEEDLE LOCALIZATION LEFT (ALL INC) Right 04/19/2021   • MAMMO NEEDLE LOCALIZATION LEFT (ALL INC) EACH ADD Right 04/19/2021   • MAMMO STEREOTACTIC BREAST BIOPSY RIGHT (ALL INC) Right 03/01/2021   • RI COLONOSCOPY FLX DX W/COLLJ SPEC WHEN PFRMD N/A 11/14/2016    Procedure: EGD AND COLONOSCOPY;  Surgeon: Brandt Garcia MD;  Location: [facility];  Service: Gastroenterology   • RI HYSTEROSCOPY BX ENDOMETRIUM&/POLYPC W/WO D&C N/A 10/25/2022    Procedure: D&C W/HYSTEROSCOPY; PSB POLYPECTOMY;  Surgeon: Karthik Henriquez MD;  Location: [facility];  Service: Gynecology   • WISDOM TOOTH EXTRACTION       Social History     Substance and Sexual Activity   Alcohol Use Yes   • Alcohol/week: " "1.0 standard drink of alcohol   • Types: 1 Cans of beer per week    Comment: 2 drinks on a weekend - beer use     Social History     Substance and Sexual Activity   Drug Use No    Comment: Denies any drug use     Social History     Tobacco Use   Smoking Status Never   Smokeless Tobacco Never   Tobacco Comments    Never a smoker or any tobacco products use     Family History   Problem Relation Age of Onset   • Cancer Mother    • Breast cancer Mother 55   • Colon polyps Mother    • Diabetes Father         Type 2   • Hypertension Father    • BRCA1 Negative Sister    • BRCA2 Negative Sister    • Breast cancer Maternal Aunt 56   • Breast cancer Family    • Colon cancer Neg Hx    • Anesthesia problems Neg Hx          Current Outpatient Medications:   •  cephalexin (KEFLEX) 500 mg capsule  •  diphenhydrAMINE ([employer] Allergy) 25 mg capsule  •  famotidine (PEPCID) 40 MG tablet  •  lamoTRIgine (LaMICtal) 150 MG tablet  •  dicyclomine (BENTYL) 20 mg tablet  •  fexofenadine (ALLEGRA ODT) 30 MG disintegrating tablet  •  fexofenadine (ALLEGRA) 60 MG tablet  •  lamoTRIgine (LaMICtal) 100 mg tablet  •  levocetirizine (XYZAL) 5 MG tablet  •  Macrobid 100 MG capsule  •  methocarbamol (ROBAXIN) 500 mg tablet  •  ondansetron (ZOFRAN-ODT) 4 mg disintegrating tablet  Allergies   Allergen Reactions   • Latex Rash and Swelling     When tape from eyes removed after surgery developed high swelling of eyes/watery   • Medical Tape Swelling and Rash     When tape from eyes removed after surgery developed high swelling of eyes/watery   • Wound Dressings Swelling and Rash     After a surgery when tape removed from eyes high swelling noted/tears   • Other Sneezing and Rash     seasonal     Reviewed medications and allergies and updated as indicated    PHYSICAL EXAM:    Blood pressure 118/68, height 5' 7.5\" (1.715 m), weight 71.9 kg (158 lb 9.6 oz), last menstrual period 03/28/2020, not currently breastfeeding. Body mass index is 24.47 " "kg/m².    Constitutional: Well-developed, no acute distress  HEENT: normocephalic, mucous membranes moist.  Neck: Supple  Skin: warm and dry  Respiratory: Lungs are clear to auscultation B/L.  Cardiovascular: Heart is regular rate and rhythm.  Gastrointestinal: Soft, nontender, nondistended with normal active bowel sounds.  No masses, guarding, rebound.   Rectal Exam: Deferred.  Extremities: No edema.  Neurologic: Nonfocal. A & O ×3.   Psychiatric: Normal affect.    Lab Results:   Lab Results   Component Value Date    WBC 4.26 (L) 08/01/2024    WBC 5.06 02/19/2024    WBC 7.79 07/20/2023    HGB 15.0 08/01/2024    HGB 13.7 02/19/2024    HGB 14.1 07/20/2023    MCV 98 08/01/2024     08/01/2024     02/19/2024     07/20/2023     Lab Results   Component Value Date     07/22/2015    K 4.5 08/01/2024     08/01/2024    CO2 29 08/01/2024    ANIONGAP 10 07/22/2015    BUN 17 08/01/2024    CREATININE 0.88 08/01/2024    GLUCOSE 78 07/22/2015    GLUF 74 08/01/2024    CALCIUM 9.5 08/01/2024    CORRECTEDCA 9.8 10/14/2022    AST 18 08/01/2024    AST 16 02/19/2024    AST 19 07/20/2023    ALT 13 08/01/2024    ALT 13 02/19/2024    ALT 24 07/20/2023    ALKPHOS 69 08/01/2024    ALKPHOS 80 02/19/2024    ALKPHOS 115 07/20/2023    PROT 6.7 07/22/2015    BILITOT 0.43 07/22/2015    EGFR 74 08/01/2024     No results found for: \"IRON\", \"TIBC\", \"FERRITIN\"  Lab Results   Component Value Date    LIPASE 157 04/08/2020       Radiology Results:   No results found.      Patient expressed understanding and had all questions and concerns addressed.    Advised patient to call with any questions, failure to improve, or if symptoms worsen.    Vicky Gonzales PA-C  08/12/24  12:01 PM    This chart was completed in part utilizing [email or web address] speech voice recognition software. Random word insertions, pronoun errors, and incomplete sentences are an occasional consequence of this system due to software limitations, and " ambient noise. Any questions or concerns about the content, text, or information contained within the body of this dictation should be directly addressed to the provider for clarification.

## 2024-08-26 ENCOUNTER — APPOINTMENT (OUTPATIENT)
Dept: LAB | Facility: CLINIC | Age: 54
End: 2024-08-26

## 2024-08-26 ENCOUNTER — OFFICE VISIT (OUTPATIENT)
Dept: GYNECOLOGY | Facility: CLINIC | Age: 54
End: 2024-08-26
Payer: COMMERCIAL

## 2024-08-26 ENCOUNTER — NURSE TRIAGE (OUTPATIENT)
Age: 54
End: 2024-08-26

## 2024-08-26 VITALS — SYSTOLIC BLOOD PRESSURE: 112 MMHG | DIASTOLIC BLOOD PRESSURE: 68 MMHG | WEIGHT: 158 LBS | BODY MASS INDEX: 24.38 KG/M2

## 2024-08-26 DIAGNOSIS — N95.0 POST-MENOPAUSAL BLEEDING: ICD-10-CM

## 2024-08-26 DIAGNOSIS — Z11.3 SCREENING FOR STD (SEXUALLY TRANSMITTED DISEASE): Primary | ICD-10-CM

## 2024-08-26 DIAGNOSIS — N89.8 VAGINAL DISCHARGE: Primary | ICD-10-CM

## 2024-08-26 DIAGNOSIS — Z11.3 SCREENING FOR STD (SEXUALLY TRANSMITTED DISEASE): ICD-10-CM

## 2024-08-26 LAB
BV WHIFF TEST VAG QL: NEGATIVE
CLUE CELLS SPEC QL WET PREP: NEGATIVE
HIV 1+2 AB+HIV1 P24 AG SERPL QL IA: NORMAL
HIV 2 AB SERPL QL IA: NORMAL
HIV1 AB SERPL QL IA: NORMAL
HIV1 P24 AG SERPL QL IA: NORMAL
SL AMB POCT WET MOUNT: NORMAL
T VAGINALIS VAG QL WET PREP: NEGATIVE
TREPONEMA PALLIDUM IGG+IGM AB [PRESENCE] IN SERUM OR PLASMA BY IMMUNOASSAY: NORMAL
YEAST VAG QL WET PREP: NEGATIVE

## 2024-08-26 PROCEDURE — 36415 COLL VENOUS BLD VENIPUNCTURE: CPT

## 2024-08-26 PROCEDURE — 99213 OFFICE O/P EST LOW 20 MIN: CPT | Performed by: OBSTETRICS & GYNECOLOGY

## 2024-08-26 PROCEDURE — 86780 TREPONEMA PALLIDUM: CPT

## 2024-08-26 PROCEDURE — 87210 SMEAR WET MOUNT SALINE/INK: CPT | Performed by: OBSTETRICS & GYNECOLOGY

## 2024-08-26 PROCEDURE — 87389 HIV-1 AG W/HIV-1&-2 AB AG IA: CPT

## 2024-08-26 PROCEDURE — 80074 ACUTE HEPATITIS PANEL: CPT

## 2024-08-26 PROCEDURE — 87591 N.GONORRHOEAE DNA AMP PROB: CPT | Performed by: OBSTETRICS & GYNECOLOGY

## 2024-08-26 PROCEDURE — 87491 CHLMYD TRACH DNA AMP PROBE: CPT | Performed by: OBSTETRICS & GYNECOLOGY

## 2024-08-26 NOTE — TELEPHONE ENCOUNTER
"Pt called in complaining of increased clear runny discharge, light cramping and nausea that started a few days ago. States the discharge is so heavy she feels as though she is urinating. Denies odor, itching, irritation, rash, pain. Pt states she has a new partner, does not think there is concern for STD. Pt given appointment for this morning and is thankful.    Reason for Disposition   Patient wants to be seen    Answer Assessment - Initial Assessment Questions  1. DISCHARGE: \"Describe the discharge.\" (e.g., white, yellow, green, gray, foamy, cottage cheese-like)      Heavy clear discharge  2. ODOR: \"Is there a bad odor?\"      denies  3. ONSET: \"When did the discharge begin?\"      A few days ago  4. RASH: \"Is there a rash in that area?\" If Yes, ask: \"Describe it.\" (e.g., redness, blisters, sores, bumps)      denies  5. ABDOMINAL PAIN: \"Are you having any abdominal pain?\" If Yes, ask: \"What does it feel like? \" (e.g., crampy, dull, intermittent, constant)       denies  6. ABDOMINAL PAIN SEVERITY: If present, ask: \"How bad is it?\"  (e.g., mild, moderate, severe)   - MILD - doesn't interfere with normal activities    - MODERATE - interferes with normal activities or awakens from sleep    - SEVERE - patient doesn't want to move (R/O peritonitis)       denies  7. CAUSE: \"What do you think is causing the discharge?\" \"Have you had the same problem before? What happened then?\"      unsure  8. OTHER SYMPTOMS: \"Do you have any other symptoms?\" (e.g., fever, itching, vaginal bleeding, pain with urination, injury to genital area, vaginal foreign body)      denies  9. PREGNANCY: \"Is there any chance you are pregnant?\" \"When was your last menstrual period?\"      Denies, post menopausal    Protocols used: Vaginal Discharge-ADULT-[place]    "

## 2024-08-26 NOTE — PROGRESS NOTES
Assessment/Plan:     Vaginal discharge -her exam is normal and her wet mount is normal.  There is no sign of infection.  Since there was a question of some minimal spotting and she has a history of thickened endometrium, I would recommend a pelvic ultrasound and this was ordered.  If testing is normal and discharge does not resolve, she will call    New partner - discussed condom use. Chlamydia and gonorrhea done, HIV, hpepatitis and RPR ordered     There are no diagnoses linked to this encounter.      Subjective:     Patient ID: Sugar Jara is a 54 y.o. female.    Pt here for evaluation of vaginal discharge.  This started a few days ago, it is clear and copious.  Initially, she thought she noticed some brown or pink discharge but it was very minimal and difficult to tell.  She has some cramps but no significant pain.  She is sexually active and has a new partner however this started a few days after the most recent sexual activity.    She denies itching, odor or irritation.  She did have an upset stomach this morning and some nausea, no vomiting    She does have a history of breast cancer and was on tamoxifen but this was stopped approximately 2 years ago because she was having bleeding.  She does have a history of a thickened endometrium and an endometrial polyp.  She has had 2 biopsies and a D&C.  Her most recent endometrium was borderline at 4.6 mm however she had just had a recent biopsy that was normal.    She has been menopausal since 2020            Review of Systems   Constitutional: Negative.    Gastrointestinal:  Positive for nausea.   Genitourinary:  Positive for vaginal bleeding and vaginal discharge. Negative for pelvic pain.         Objective:     Physical Exam

## 2024-08-27 LAB
C TRACH DNA SPEC QL NAA+PROBE: NEGATIVE
HAV IGM SER QL: NORMAL
HBV CORE IGM SER QL: NORMAL
HBV SURFACE AG SER QL: NORMAL
HCV AB SER QL: NORMAL
N GONORRHOEA DNA SPEC QL NAA+PROBE: NEGATIVE

## 2024-09-12 ENCOUNTER — HOSPITAL ENCOUNTER (OUTPATIENT)
Dept: ULTRASOUND IMAGING | Facility: HOSPITAL | Age: 54
End: 2024-09-12
Attending: OBSTETRICS & GYNECOLOGY
Payer: COMMERCIAL

## 2024-09-12 DIAGNOSIS — N95.0 POST-MENOPAUSAL BLEEDING: ICD-10-CM

## 2024-09-12 PROCEDURE — 76830 TRANSVAGINAL US NON-OB: CPT

## 2024-09-12 PROCEDURE — 76856 US EXAM PELVIC COMPLETE: CPT

## 2024-10-14 ENCOUNTER — NURSE TRIAGE (OUTPATIENT)
Age: 54
End: 2024-10-14

## 2024-10-14 DIAGNOSIS — R35.0 URINARY FREQUENCY: Primary | ICD-10-CM

## 2024-10-14 NOTE — TELEPHONE ENCOUNTER
"UTI symptoms started yesterday with urgency and burning. Patient asked for antibiotic to be called in. No odor to urine. Had UTI before and she knows this is her usual symptoms. No blood in urine. No fever. Pain is in lower bladder area.   [facility] pharmacy on file preferred.  Offered appointment scheduling, patient was not able to schedule today.  Per protocol -Please order Urinalysis and C&S.   Labs ordered per protocol.   Patient will use [facility] lab. Patient made aware that urine C&S can take 24 hours or more to result and usually abx ordered per type of bacteria found. Advised patient to call back if she has increased pain, notices blood in urine and back discomfort, fever or chills or any other concerning symptoms. Patient verbalized understanding.       Answer Assessment - Initial Assessment Questions  1. SYMPTOM: \"What's the main symptom you're concerned about?\" (e.g., frequency, incontinence)      Urgency and urinary burning  2. ONSET: \"When did the  symptoms  start?\"      yesterday  3. PAIN: \"Is there any pain?\" If Yes, ask: \"How bad is it?\" (Scale: 1-10; mild, moderate, severe)      Mild pain  4. CAUSE: \"What do you think is causing the symptoms?\"      UTI  5. OTHER SYMPTOMS: \"Do you have any other symptoms?\" (e.g., fever, flank pain, blood in urine, pain with urination)      denies    Protocols used: Urinary Symptoms-ADULT-[place]    "

## 2024-10-15 ENCOUNTER — LAB (OUTPATIENT)
Dept: LAB | Facility: CLINIC | Age: 54
End: 2024-10-15
Payer: COMMERCIAL

## 2024-10-15 DIAGNOSIS — R35.0 URINARY FREQUENCY: ICD-10-CM

## 2024-10-15 LAB
AMORPH URATE CRY URNS QL MICRO: ABNORMAL
BACTERIA UR QL AUTO: ABNORMAL /HPF
BILIRUB UR QL STRIP: NEGATIVE
CLARITY UR: CLEAR
COLOR UR: COLORLESS
GLUCOSE UR STRIP-MCNC: NEGATIVE MG/DL
HGB UR QL STRIP.AUTO: ABNORMAL
KETONES UR STRIP-MCNC: NEGATIVE MG/DL
LEUKOCYTE ESTERASE UR QL STRIP: ABNORMAL
NITRITE UR QL STRIP: NEGATIVE
NON-SQ EPI CELLS URNS QL MICRO: ABNORMAL /HPF
PH UR STRIP.AUTO: 7.5 [PH]
PROT UR STRIP-MCNC: NEGATIVE MG/DL
RBC #/AREA URNS AUTO: ABNORMAL /HPF
SP GR UR STRIP.AUTO: 1.01 (ref 1–1.03)
UROBILINOGEN UR STRIP-ACNC: <2 MG/DL
WBC #/AREA URNS AUTO: ABNORMAL /HPF

## 2024-10-15 PROCEDURE — 87077 CULTURE AEROBIC IDENTIFY: CPT

## 2024-10-15 PROCEDURE — 81001 URINALYSIS AUTO W/SCOPE: CPT

## 2024-10-15 PROCEDURE — 87086 URINE CULTURE/COLONY COUNT: CPT

## 2024-10-16 ENCOUNTER — TELEPHONE (OUTPATIENT)
Age: 54
End: 2024-10-16

## 2024-10-16 DIAGNOSIS — N39.0 URINARY TRACT INFECTION WITHOUT HEMATURIA, SITE UNSPECIFIED: Primary | ICD-10-CM

## 2024-10-16 RX ORDER — SULFAMETHOXAZOLE AND TRIMETHOPRIM 800; 160 MG/1; MG/1
1 TABLET ORAL EVERY 12 HOURS SCHEDULED
Qty: 6 TABLET | Refills: 0 | Status: SHIPPED | OUTPATIENT
Start: 2024-10-16 | End: 2024-10-19

## 2024-10-16 NOTE — TELEPHONE ENCOUNTER
Bridget from [facility] called in requesting last office notes from Dr. Brewer for pt. She stated she sent a request and the last notes she received was from 7/27/24. Bridget provided a fax number of [phone] and a phone number of [phone] press opt 0.

## 2024-10-16 NOTE — PROGRESS NOTES
Patient called back and spoke with the nurse.  Symptoms are worsened.  Urine analysis does show some WBCs and RBCs.  Prescription for Bactrim DS twice daily for 3 days sent to [facility] pharmacy.  Still await urine culture result.

## 2024-10-16 NOTE — TELEPHONE ENCOUNTER
Pt called in concerned for her UTI symptoms- dysuria, urgency, vaginal pain. States her symptoms worsened last night. Advised urine culture is still pending at this time. Pt states she is so uncomfortable she may not be able to go to work today. Advised will review with Dr. Henriquez. Pt confirms [facility] in her chart.

## 2024-10-17 LAB — BACTERIA UR CULT: ABNORMAL

## 2024-10-17 NOTE — RESULT ENCOUNTER NOTE
Urine culture with Staphylococcus saprophyticus, generally sensitive to Bactrim which was prescribed.  Patient should finish the medication.  If still symptomatic about 5 to 7 days after antibiotics are finished, would recommend follow-up with me to evaluate.  Otherwise, follow-up as scheduled in 1/22/2025.

## 2024-10-25 ENCOUNTER — NURSE TRIAGE (OUTPATIENT)
Age: 54
End: 2024-10-25

## 2024-10-25 ENCOUNTER — OFFICE VISIT (OUTPATIENT)
Dept: URGENT CARE | Facility: CLINIC | Age: 54
End: 2024-10-25
Payer: COMMERCIAL

## 2024-10-25 VITALS
TEMPERATURE: 97.7 F | HEART RATE: 91 BPM | SYSTOLIC BLOOD PRESSURE: 110 MMHG | WEIGHT: 158 LBS | RESPIRATION RATE: 18 BRPM | BODY MASS INDEX: 23.95 KG/M2 | HEIGHT: 68 IN | DIASTOLIC BLOOD PRESSURE: 80 MMHG | OXYGEN SATURATION: 99 %

## 2024-10-25 DIAGNOSIS — N39.0 URINARY TRACT INFECTION WITH HEMATURIA, SITE UNSPECIFIED: ICD-10-CM

## 2024-10-25 DIAGNOSIS — R31.9 URINARY TRACT INFECTION WITH HEMATURIA, SITE UNSPECIFIED: ICD-10-CM

## 2024-10-25 DIAGNOSIS — R30.0 DYSURIA: Primary | ICD-10-CM

## 2024-10-25 LAB
SL AMB  POCT GLUCOSE, UA: ABNORMAL
SL AMB LEUKOCYTE ESTERASE,UA: ABNORMAL
SL AMB POCT BILIRUBIN,UA: ABNORMAL
SL AMB POCT BLOOD,UA: ABNORMAL
SL AMB POCT CLARITY,UA: ABNORMAL
SL AMB POCT COLOR,UA: YELLOW
SL AMB POCT KETONES,UA: 40
SL AMB POCT NITRITE,UA: ABNORMAL
SL AMB POCT PH,UA: 7
SL AMB POCT SPECIFIC GRAVITY,UA: 1.02
SL AMB POCT URINE PROTEIN: 100
SL AMB POCT UROBILINOGEN: 0.2

## 2024-10-25 PROCEDURE — 81002 URINALYSIS NONAUTO W/O SCOPE: CPT

## 2024-10-25 PROCEDURE — G0382 LEV 3 HOSP TYPE B ED VISIT: HCPCS

## 2024-10-25 PROCEDURE — 87086 URINE CULTURE/COLONY COUNT: CPT

## 2024-10-25 PROCEDURE — 87077 CULTURE AEROBIC IDENTIFY: CPT

## 2024-10-25 RX ORDER — NITROFURANTOIN 25; 75 MG/1; MG/1
100 CAPSULE ORAL 2 TIMES DAILY
Qty: 10 CAPSULE | Refills: 0 | Status: SHIPPED | OUTPATIENT
Start: 2024-10-25 | End: 2024-10-30

## 2024-10-25 NOTE — TELEPHONE ENCOUNTER
"Sugar reports sudden onset of painful urination, pain leve 8-10.  Recent UA and treatment with bactrim. Advised to follow up if no improvement.      Recommended urgent care evaluation at [facility] due to current pain level.  C/B Monday with update and to schedule f/u if indicated.       Reason for Disposition   Painful urination AND EITHER frequency or urgency    Answer Assessment - Initial Assessment Questions  1. SEVERITY: \"How bad is the pain?\"  (e.g., Scale 1-10; mild, moderate, or severe)      10 prior to taking advil, now an 8  2. FREQUENCY: \"How many times have you had painful urination today?\"       3 or 4 since 9:45  3. PATTERN: \"Is pain present every time you urinate or just sometimes?\"       Every time  4. ONSET: \"When did the painful urination start?\"       This afternoon  5. FEVER: \"Do you have a fever?\" If Yes, ask: \"What is your temperature, how was it measured, and when did it start?\"      Feels chills,sweats  6. PAST UTI: \"Have you had a urine infection before?\" If Yes, ask: \"When was the last time?\" and \"What happened that time?\"       Yes, just finished antibiotic for UTI  7. CAUSE: \"What do you think is causing the painful urination?\"  (e.g., UTI, scratch, Herpes sore)      UTI  8. OTHER SYMPTOMS: \"Do you have any other symptoms?\" (e.g., blood in urine, flank pain, genital sores, urgency, vaginal discharge)      pain  9. PREGNANCY: \"Is there any chance you are pregnant?\" \"When was your last menstrual period?\"      Post menopausal    Protocols used: Urination Pain - Female-Adult-[place]    "

## 2024-10-25 NOTE — LETTER
October 25, 2024     Patient: Sugar Jara   YOB: 1970   Date of Visit: 10/25/2024       To Whom It May Concern:    It is my medical opinion that Sugar Jara may return to work on 10/26/24 .    If you have any questions or concerns, please don't hesitate to call.         Sincerely,        YOLANDA Bazzi    CC: No Recipients

## 2024-10-25 NOTE — PATIENT INSTRUCTIONS
Increase your fluids  Take the antibiotic as prescribed  Follow-up with your OB/GYN if symptoms or not improving

## 2024-10-25 NOTE — PROGRESS NOTES
[facility]        NAME: Sugar Jara is a 54 y.o. female  : 1970    MRN: [id number]  DATE: 2024  TIME: 5:27 PM    Assessment and Plan   Dysuria [R30.0]  1. Dysuria  POCT urine dip    Urine culture      2. Urinary tract infection with hematuria, site unspecified  nitrofurantoin (MACROBID) 100 mg capsule            Patient Instructions   Increase your fluids  Take the antibiotic as prescribed  Follow-up with your OB/GYN if symptoms or not improving    Follow up with PCP in 3-5 days.  Proceed to  ER if symptoms worsen.    If tests have been performed at [facility], our office will contact you with results if changes need to be made to the care plan discussed with you at the visit.  You can review your full results on [facility] MyChart.    Chief Complaint     Chief Complaint   Patient presents with    Difficulty Urinating     C/o urinary freq, painful urination, fever, chills, body aches, pain surrounding urethra onset today. Denies back pain. Pt given bactrim for UTI 10/16. Pt finished antibiotics as prescribed.          History of Present Illness       This is a 54-year-old female who presents today with worsening UTI symptoms.  She states 10/15/2024 she started with burning with urination and perineal discomfort.  She called her OB/GYN they called in a urine culture and UA and started on Bactrim DS twice daily for 3 days.  She states she finished it and felt better today she started again with the burning and urgency and urinating small amounts she states she had some chills at work no fever.  She did take Advil she denies any back but has some lower pelvic cramping.  No tenderness to palpation.  She does states she is sexually active but she denies any vaginal discharge or pain.  Urine culture shows Staphylococcus Saprophyticus >100,000.  Urine dip in the office moderate leukocytes, 100 protein, and large blood.    Difficulty Urinating   Associated symptoms include urgency.  Pertinent negatives include no flank pain.       Review of Systems   Review of Systems   Constitutional: Negative.    HENT: Negative.     Respiratory: Negative.     Genitourinary:  Positive for decreased urine volume, dysuria and urgency. Negative for flank pain, vaginal bleeding and vaginal discharge.   Musculoskeletal: Negative.    Neurological: Negative.          Current Medications       Current Outpatient Medications:     famotidine (PEPCID) 40 MG tablet, Take 1 tablet (40 mg total) by mouth daily at bedtime, Disp: 90 tablet, Rfl: 3    fexofenadine (ALLEGRA) 60 MG tablet, Take 60 mg by mouth daily, Disp: , Rfl:     lamoTRIgine (LaMICtal) 150 MG tablet, Take 150 mg by mouth 2 (two) times a day, Disp: , Rfl:     nitrofurantoin (MACROBID) 100 mg capsule, Take 1 capsule (100 mg total) by mouth 2 (two) times a day for 5 days, Disp: 10 capsule, Rfl: 0    dicyclomine (BENTYL) 20 mg tablet, Take 1 tablet (20 mg total) by mouth 2 (two) times a day as needed (abdominal pain) (Patient not taking: Reported on 6/13/2024), Disp: 4 tablet, Rfl: 0    diphenhydrAMINE ([employer] [facility]ergy) 25 mg capsule, Take 25 mg by mouth every 6 (six) hours as needed for itching (Patient not taking: Reported on 10/25/2024), Disp: , Rfl:     fexofenadine (ALLEGRA ODT) 30 MG disintegrating tablet, Take 30 mg by mouth daily (Patient not taking: Reported on 10/25/2024), Disp: , Rfl:     lamoTRIgine (LaMICtal) 100 mg tablet, TAKE 1 AND 1/2 TABLETS BY MOUTH 2 TIMES A DAY (Patient not taking: Reported on 6/10/2024), Disp: , Rfl:     levocetirizine (XYZAL) 5 MG tablet, Take 5 mg by mouth in the morning Takes in the evening (Patient not taking: Reported on 1/24/2024), Disp: , Rfl:     Macrobid 100 MG capsule, Every 12 hours (Patient not taking: Reported on 7/1/2024), Disp: , Rfl:     methocarbamol (ROBAXIN) 500 mg tablet, Take 1 tablet (500 mg total) by mouth 4 (four) times a day for 10 days (Patient not taking: Reported on 8/12/2024), Disp: 40 tablet, Rfl:  "0    ondansetron (ZOFRAN-ODT) 4 mg disintegrating tablet, Take 1 tablet (4 mg total) by mouth every 6 (six) hours as needed for nausea or vomiting (Patient not taking: Reported on 6/10/2024), Disp: 10 tablet, Rfl: 0    Current Allergies     Allergies as of 10/25/2024 - Reviewed 10/25/2024   Allergen Reaction Noted    Latex Rash and Swelling 05/18/2015    Medical tape Swelling and Rash 05/18/2015    Wound dressings Swelling and Rash 04/25/2014    Other Sneezing and Rash 05/18/2015            The following portions of the patient's history were reviewed and updated as appropriate: allergies, current medications, past family history, past medical history, past social history, past surgical history and problem list.     Past Medical History:   Diagnosis Date    Abdominal pain     Anesthesia     \"after tape removed from both eyes/developed swelling\"    Anxiety     has autistic/special needs son    Breast cancer (HCC) 04/30/2021    RIGHT    Cancer (HCC)     Breast cancer - right breast 2021    Chronic UTI     2x/year or so /no symptoms today    Displaced fracture of shaft of fifth metacarpal bone, right hand, subsequent encounter for fracture with routine healing 3/1/2022    Dysuria     resolved 10/05/16/occas/not lately    Encounter for screening colonoscopy     Environmental and seasonal allergies     Exercise involving cycling     2-3x/week spin classes    GERD (gastroesophageal reflux disease)     occasional due to diet    Heavy menses     Hiatal hernia     History of kidney stones     History of radiation therapy 2021    Right    Intestinal ulcer     Irritable bowel syndrome     Kidney stone     10/20/22 Hx of kidney stone -passed on own- no surgery needed    Nausea     Seizures ([facility])     last seizure approx 5 yrs ago    Shortness of breath     \"sometimes just sitting in chair or activity and having Echo 4/29\"-10/20/22- Pt reports resolved no further SOB    Submucous leiomyoma of uterus     no recent problem    " "Terminal ileitis (HCC)     unclear etiology, work up w EGD/COLON/TI bx/SBC/CT done.    Wears glasses        Past Surgical History:   Procedure Laterality Date    BREAST BIOPSY Right 03/01/2021    stereo- invasive ductal ca    BREAST LUMPECTOMY Right 04/30/2021    Procedure: RIGHT BREAST BRACKETED DINA LOCALIZATION LUMPECTOMY;;  Surgeon: Melody Brewer MD;  Location: [facility];  Service: Surgical Oncology    COLONOSCOPY  03/01/2017    5 yr recall    DILATION AND CURETTAGE OF UTERUS      EGD      EXAMINATION UNDER ANESTHESIA N/A 10/25/2022    Procedure: (EUA);  Surgeon: Karthik Henriquez MD;  Location: [facility];  Service: Gynecology    HYSTEROSCOPY      INCONTINENCE SURGERY      bladder sling    LYMPH NODE BIOPSY Right 04/30/2021    Procedure: Bx LYMPH NODE SENTINEL;  Surgeon: Melody Brewer MD;  Location: [facility];  Service: Surgical Oncology    MAMMO NEEDLE LOCALIZATION LEFT (ALL INC) Right 04/19/2021    MAMMO NEEDLE LOCALIZATION LEFT (ALL INC) EACH ADD Right 04/19/2021    MAMMO STEREOTACTIC BREAST BIOPSY RIGHT (ALL INC) Right 03/01/2021    GA COLONOSCOPY FLX DX W/COLLJ SPEC WHEN PFRMD N/A 11/14/2016    Procedure: EGD AND COLONOSCOPY;  Surgeon: Brandt Garcia MD;  Location: [facility];  Service: Gastroenterology    GA HYSTEROSCOPY BX ENDOMETRIUM&/POLYPC W/WO D&C N/A 10/25/2022    Procedure: D&C W/HYSTEROSCOPY; PSB POLYPECTOMY;  Surgeon: Karthik Henriquez MD;  Location: [facility];  Service: Gynecology    WISDOM TOOTH EXTRACTION         Family History   Problem Relation Age of Onset    Cancer Mother     Breast cancer Mother 55    Colon polyps Mother     Diabetes Father         Type 2    Hypertension Father     BRCA1 Negative Sister     BRCA2 Negative Sister     Breast cancer Maternal Aunt 56    Breast cancer Family     Colon cancer Neg Hx     Anesthesia problems Neg Hx          Medications have been verified.        Objective   /80   Pulse 91   Temp 97.7 °F (36.5 °C)   Resp 18   Ht 5' 7.5\" (1.715 m)   Wt 71.7 " kg (158 lb)   LMP 03/28/2020   SpO2 99%   BMI 24.38 kg/m²   Patient's last menstrual period was 03/28/2020.       Physical Exam     Physical Exam  Vitals reviewed.   Constitutional:       General: She is not in acute distress.     Appearance: Normal appearance. She is not ill-appearing.   HENT:      Head: Normocephalic and atraumatic.   Eyes:      Extraocular Movements: Extraocular movements intact.      Conjunctiva/sclera: Conjunctivae normal.   Pulmonary:      Effort: Pulmonary effort is normal.   Abdominal:      General: Abdomen is flat. Bowel sounds are normal.      Tenderness: There is no abdominal tenderness. There is no right CVA tenderness, left CVA tenderness, guarding or rebound.   Skin:     General: Skin is warm.   Neurological:      General: No focal deficit present.      Mental Status: She is alert.   Psychiatric:         Mood and Affect: Mood normal.         Behavior: Behavior normal.         Judgment: Judgment normal.

## 2024-10-27 LAB — BACTERIA UR CULT: ABNORMAL

## 2024-10-29 ENCOUNTER — TELEPHONE (OUTPATIENT)
Age: 54
End: 2024-10-29

## 2024-10-29 NOTE — TELEPHONE ENCOUNTER
Pt called back  to r/s  but nothing  is available she said she is still having symptoms and Dr. Henriquez advised her to  schedule appointment right away

## 2024-11-14 ENCOUNTER — HOSPITAL ENCOUNTER (EMERGENCY)
Facility: HOSPITAL | Age: 54
Discharge: HOME | End: 2024-11-14
Attending: EMERGENCY MEDICINE
Payer: COMMERCIAL

## 2024-11-14 ENCOUNTER — APPOINTMENT (EMERGENCY)
Dept: CT IMAGING | Facility: HOSPITAL | Age: 54
End: 2024-11-14
Payer: COMMERCIAL

## 2024-11-14 ENCOUNTER — TELEPHONE (OUTPATIENT)
Dept: OTHER | Facility: HOSPITAL | Age: 54
End: 2024-11-14

## 2024-11-14 VITALS
HEART RATE: 65 BPM | SYSTOLIC BLOOD PRESSURE: 122 MMHG | DIASTOLIC BLOOD PRESSURE: 84 MMHG | OXYGEN SATURATION: 98 % | RESPIRATION RATE: 18 BRPM | TEMPERATURE: 98.1 F

## 2024-11-14 DIAGNOSIS — N20.0 KIDNEY STONE ON LEFT SIDE: ICD-10-CM

## 2024-11-14 DIAGNOSIS — K76.89 LIVER CYST: ICD-10-CM

## 2024-11-14 DIAGNOSIS — R91.1 LEFT LOWER LOBE PULMONARY NODULE: ICD-10-CM

## 2024-11-14 DIAGNOSIS — N30.90 CYSTITIS: Primary | ICD-10-CM

## 2024-11-14 LAB
ALBUMIN SERPL BCG-MCNC: 4.3 G/DL (ref 3.5–5)
ALP SERPL-CCNC: 67 U/L (ref 34–104)
ALT SERPL W P-5'-P-CCNC: 16 U/L (ref 7–52)
ANION GAP SERPL CALCULATED.3IONS-SCNC: 4 MMOL/L (ref 4–13)
AST SERPL W P-5'-P-CCNC: 20 U/L (ref 13–39)
BACTERIA UR QL AUTO: ABNORMAL /HPF
BASOPHILS # BLD AUTO: 0.03 THOUSANDS/ÂΜL (ref 0–0.1)
BASOPHILS NFR BLD AUTO: 0 % (ref 0–1)
BILIRUB SERPL-MCNC: 0.39 MG/DL (ref 0.2–1)
BILIRUB UR QL STRIP: NEGATIVE
BUN SERPL-MCNC: 24 MG/DL (ref 5–25)
CALCIUM SERPL-MCNC: 8.7 MG/DL (ref 8.4–10.2)
CHLORIDE SERPL-SCNC: 107 MMOL/L (ref 96–108)
CLARITY UR: ABNORMAL
CO2 SERPL-SCNC: 29 MMOL/L (ref 21–32)
COLOR UR: YELLOW
CREAT SERPL-MCNC: 0.75 MG/DL (ref 0.6–1.3)
EOSINOPHIL # BLD AUTO: 0.11 THOUSAND/ÂΜL (ref 0–0.61)
EOSINOPHIL NFR BLD AUTO: 1 % (ref 0–6)
ERYTHROCYTE [DISTWIDTH] IN BLOOD BY AUTOMATED COUNT: 12.1 % (ref 11.6–15.1)
GFR SERPL CREATININE-BSD FRML MDRD: 90 ML/MIN/1.73SQ M
GLUCOSE SERPL-MCNC: 91 MG/DL (ref 65–140)
GLUCOSE UR STRIP-MCNC: NEGATIVE MG/DL
HCT VFR BLD AUTO: 40.8 % (ref 34.8–46.1)
HGB BLD-MCNC: 13.7 G/DL (ref 11.5–15.4)
HGB UR QL STRIP.AUTO: ABNORMAL
IMM GRANULOCYTES # BLD AUTO: 0.01 THOUSAND/UL (ref 0–0.2)
IMM GRANULOCYTES NFR BLD AUTO: 0 % (ref 0–2)
KETONES UR STRIP-MCNC: NEGATIVE MG/DL
LACTATE SERPL-SCNC: 0.9 MMOL/L (ref 0.5–2)
LEUKOCYTE ESTERASE UR QL STRIP: ABNORMAL
LYMPHOCYTES # BLD AUTO: 2.4 THOUSANDS/ÂΜL (ref 0.6–4.47)
LYMPHOCYTES NFR BLD AUTO: 28 % (ref 14–44)
MAGNESIUM SERPL-MCNC: 2 MG/DL (ref 1.9–2.7)
MCH RBC QN AUTO: 31.1 PG (ref 26.8–34.3)
MCHC RBC AUTO-ENTMCNC: 33.6 G/DL (ref 31.4–37.4)
MCV RBC AUTO: 93 FL (ref 82–98)
MONOCYTES # BLD AUTO: 0.75 THOUSAND/ÂΜL (ref 0.17–1.22)
MONOCYTES NFR BLD AUTO: 9 % (ref 4–12)
MUCOUS THREADS UR QL AUTO: ABNORMAL
NEUTROPHILS # BLD AUTO: 5.37 THOUSANDS/ÂΜL (ref 1.85–7.62)
NEUTS SEG NFR BLD AUTO: 62 % (ref 43–75)
NITRITE UR QL STRIP: POSITIVE
NON-SQ EPI CELLS URNS QL MICRO: ABNORMAL /HPF
NRBC BLD AUTO-RTO: 0 /100 WBCS
PH UR STRIP.AUTO: 6 [PH]
PLATELET # BLD AUTO: 228 THOUSANDS/UL (ref 149–390)
PMV BLD AUTO: 10.1 FL (ref 8.9–12.7)
POTASSIUM SERPL-SCNC: 4.1 MMOL/L (ref 3.5–5.3)
PROT SERPL-MCNC: 6.3 G/DL (ref 6.4–8.4)
PROT UR STRIP-MCNC: ABNORMAL MG/DL
RBC # BLD AUTO: 4.4 MILLION/UL (ref 3.81–5.12)
RBC #/AREA URNS AUTO: ABNORMAL /HPF
SODIUM SERPL-SCNC: 140 MMOL/L (ref 135–147)
SP GR UR STRIP.AUTO: 1.02 (ref 1–1.03)
UROBILINOGEN UR STRIP-ACNC: <2 MG/DL
WBC # BLD AUTO: 8.67 THOUSAND/UL (ref 4.31–10.16)
WBC #/AREA URNS AUTO: ABNORMAL /HPF

## 2024-11-14 PROCEDURE — 83605 ASSAY OF LACTIC ACID: CPT

## 2024-11-14 PROCEDURE — 96360 HYDRATION IV INFUSION INIT: CPT

## 2024-11-14 PROCEDURE — 99283 EMERGENCY DEPT VISIT LOW MDM: CPT

## 2024-11-14 PROCEDURE — 36415 COLL VENOUS BLD VENIPUNCTURE: CPT | Performed by: EMERGENCY MEDICINE

## 2024-11-14 PROCEDURE — 87186 SC STD MICRODIL/AGAR DIL: CPT | Performed by: EMERGENCY MEDICINE

## 2024-11-14 PROCEDURE — 99284 EMERGENCY DEPT VISIT MOD MDM: CPT

## 2024-11-14 PROCEDURE — 74176 CT ABD & PELVIS W/O CONTRAST: CPT

## 2024-11-14 PROCEDURE — 85025 COMPLETE CBC W/AUTO DIFF WBC: CPT | Performed by: EMERGENCY MEDICINE

## 2024-11-14 PROCEDURE — 87086 URINE CULTURE/COLONY COUNT: CPT | Performed by: EMERGENCY MEDICINE

## 2024-11-14 PROCEDURE — 80053 COMPREHEN METABOLIC PANEL: CPT | Performed by: EMERGENCY MEDICINE

## 2024-11-14 PROCEDURE — 87077 CULTURE AEROBIC IDENTIFY: CPT | Performed by: EMERGENCY MEDICINE

## 2024-11-14 PROCEDURE — 83735 ASSAY OF MAGNESIUM: CPT

## 2024-11-14 PROCEDURE — 81001 URINALYSIS AUTO W/SCOPE: CPT | Performed by: EMERGENCY MEDICINE

## 2024-11-14 RX ORDER — CEPHALEXIN 500 MG/1
500 CAPSULE ORAL EVERY 6 HOURS SCHEDULED
Qty: 39 CAPSULE | Refills: 0 | Status: SHIPPED | OUTPATIENT
Start: 2024-11-14 | End: 2024-11-24

## 2024-11-14 RX ADMIN — SODIUM CHLORIDE 1000 ML: 0.9 INJECTION, SOLUTION INTRAVENOUS at 19:34

## 2024-11-14 RX ADMIN — CEPHALEXIN 500 MG: 250 CAPSULE ORAL at 22:05

## 2024-11-15 ENCOUNTER — TELEPHONE (OUTPATIENT)
Age: 54
End: 2024-11-15

## 2024-11-15 NOTE — ED NOTES
Pt refused molecular vaginal panel swab. Provider notified.      Rosalee Cortes RN  11/14/24 [id number]

## 2024-11-15 NOTE — ED PROVIDER NOTES
Time reflects when diagnosis was documented in both MDM as applicable and the Disposition within this note       Time User Action Codes Description Comment    11/14/2024 10:10 PM Flaquito Davisga Add [N30.90] Cystitis     11/14/2024 10:13 PM Grygiel, Latricia Add [N20.0] Kidney stone on left side     11/14/2024 10:13 PM Grygiel Latricia Modify [N20.0] Kidney stone on left side non obstructing    11/14/2024 10:13 PM Gryglalitha Latricia Add [R91.1] Left lower lobe pulmonary nodule     11/14/2024 10:14 PM Grygiel, Latricia Add [K76.89] Liver cyst           ED Disposition       ED Disposition   Discharge    Condition   Stable    Date/Time   Thu Nov 14, 2024 10:10 PM    Comment   Sugar Jara discharge to home/self care.                   Assessment & Plan       Medical Decision Making  This 54-year-old patient presents with symptoms consistent with acute cystitis. No systemic symptoms. Not septic. Well appearing. Low suspicion for acute pyelonephritis given lack of fever, CVAT, or systemic features. Low suspicion for kidney stone or infected stone. Will obtain labs and imaging.    No leukocytosis, no significant electrolyte imbalances, lactic 0.9.  UA with leukocytes, nitrates, and blood.  CT findings suggestive of cystitis.  No ureteral or urinary bladder calculi, no hydronephrosis, 2 nonobstructing left-sided intrarenal calculi, see report for other incidental findings (all were discussed with patient).    Discussed patient case with urology, considering patient's prior outpatient antibiotic treatment over the past month. Urology recommends Keflex 4 times daily for a 10-day course. See ED course for additional details. All findings and recommendations discussed with the patient at the bedside.  Patient verbalized understanding and agreement to plan.  Antibiotic sent to the pharmacy. Urology referral placed.    Amount and/or Complexity of Data Reviewed  Labs: ordered. Decision-making details documented in ED Course.  Radiology:  "ordered.    Risk  Prescription drug management.        ED Course as of 11/15/24 0027   u Nov 14, 2024 1951 Molecular Vaginal Panel  Lab called reporting they don't have the swab but are working on attaining it   2119 Urology Jhonny Castorena and Laura Le: \"Yeah I think Keflex would be an appropriate option. Can maybe do a 10-day course with 500 mg 4 times daily alternatives could include Augmentin or fluoroquinolone. We probably also want a get susceptibility report if the urine culture comes back with Staphylococcus again just to ensure that it is not for some reason resistant and that we are treating it appropriately. I will ensure the patient has outpatient follow-up can review ER return parameters with her please\"   2206 Patient reports she is no longer interested in the molecular vaginal swab   2207 Discussed all lab and incidental findings as well as urology findings with the patient at the bedside, she is agreeable to plan. Will place urology referral for follow up       Medications   sodium chloride 0.9 % bolus 1,000 mL (0 mL Intravenous Stopped 11/14/24 2034)   cephalexin (KEFLEX) capsule 500 mg (500 mg Oral Given 11/14/24 2205)       ED Risk Strat Scores                           SBIRT 22yo+      Flowsheet Row Most Recent Value   Initial Alcohol Screen: US AUDIT-C     1. How often do you have a drink containing alcohol? 0 Filed at: 11/14/2024 1859   2. How many drinks containing alcohol do you have on a typical day you are drinking?  0 Filed at: 11/14/2024 1859   3a. Male UNDER 65: How often do you have five or more drinks on one occasion? 0 Filed at: 11/14/2024 1859   3b. FEMALE Any Age, or MALE 65+: How often do you have 4 or more drinks on one occassion? 0 Filed at: 11/14/2024 1859   Audit-C Score 0 Filed at: 11/14/2024 1859   RAINE: How many times in the past year have you...    Used an illegal drug or used a prescription medication for non-medical reasons? Never Filed at: 11/14/2024 1859 " "                           History of Present Illness       Chief Complaint   Patient presents with    Possible UTI     Pt reports \"uti symptoms\", pt reports pain on urination, blood in urine, chills, pt reports hx of kidney stones, pt report she hasn't laura kinza to get rid of this uti, pt reports she gets on antibiotics and feels better but in a week or so it comes back, pt denies flank pain abd pain chest pain       Past Medical History:   Diagnosis Date    Abdominal pain     Anesthesia     \"after tape removed from both eyes/developed swelling\"    Anxiety     has autistic/special needs son    Breast cancer (HCC) 04/30/2021    RIGHT    Cancer (HCC)     Breast cancer - right breast 2021    Chronic UTI     2x/year or so /no symptoms today    Displaced fracture of shaft of fifth metacarpal bone, right hand, subsequent encounter for fracture with routine healing 3/1/2022    Dysuria     resolved 10/05/16/occas/not lately    Encounter for screening colonoscopy     Environmental and seasonal allergies     Exercise involving cycling     2-3x/week spin classes    GERD (gastroesophageal reflux disease)     occasional due to diet    Heavy menses     Hiatal hernia     History of kidney stones     History of radiation therapy 2021    Right    Intestinal ulcer     Irritable bowel syndrome     Kidney stone     10/20/22 Hx of kidney stone -passed on own- no surgery needed    Nausea     Seizures ([facility])     last seizure approx 5 yrs ago    Shortness of breath     \"sometimes just sitting in chair or activity and having Echo 4/29\"-10/20/22- Pt reports resolved no further SOB    Submucous leiomyoma of uterus     no recent problem    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    Wears glasses       Past Surgical History:   Procedure Laterality Date    BREAST BIOPSY Right 03/01/2021    stereo- invasive ductal ca    BREAST LUMPECTOMY Right 04/30/2021    Procedure: RIGHT BREAST BRACKETED DINA " LOCALIZATION LUMPECTOMY;;  Surgeon: Melody Brewer MD;  Location: [facility];  Service: Surgical Oncology    COLONOSCOPY  03/01/2017    5 yr recall    DILATION AND CURETTAGE OF UTERUS      EGD      EXAMINATION UNDER ANESTHESIA N/A 10/25/2022    Procedure: (EUA);  Surgeon: Karthik Henriquez MD;  Location: [facility];  Service: Gynecology    HYSTEROSCOPY      INCONTINENCE SURGERY      bladder sling    LYMPH NODE BIOPSY Right 04/30/2021    Procedure: Bx LYMPH NODE SENTINEL;  Surgeon: Melody Brewer MD;  [facility];  Service: Surgical Oncology    MAMMO NEEDLE LOCALIZATION LEFT (ALL INC) Right 04/19/2021    MAMMO NEEDLE LOCALIZATION LEFT (ALL INC) EACH ADD Right 04/19/2021    MAMMO STEREOTACTIC BREAST BIOPSY RIGHT (ALL INC) Right 03/01/2021    VT COLONOSCOPY FLX DX W/COLLJ SPEC WHEN PFRMD N/A 11/14/2016    Procedure: EGD AND COLONOSCOPY;  Surgeon: Brandt Garcia MD;  [facility];  Service: Gastroenterology    VT HYSTEROSCOPY BX ENDOMETRIUM&/POLYPC W/WO D&C N/A 10/25/2022    Procedure: D&C W/HYSTEROSCOPY; PSB POLYPECTOMY;  Surgeon: Karthik Henriquez MD;  [facility];  Service: Gynecology    WISDOM TOOTH EXTRACTION        Family History   Problem Relation Age of Onset    Cancer Mother     Breast cancer Mother 55    Colon polyps Mother     Diabetes Father         Type 2    Hypertension Father     BRCA1 Negative Sister     BRCA2 Negative Sister     Breast cancer Maternal Aunt 56    Breast cancer Family     Colon cancer Neg Hx     Anesthesia problems Neg Hx       Social History     Tobacco Use    Smoking status: Never    Smokeless tobacco: Never    Tobacco comments:     Never a smoker or any tobacco products use   Vaping Use    Vaping status: Never Used   Substance Use Topics    Alcohol use: Yes     Alcohol/week: 1.0 standard drink of alcohol     Types: 1 Cans of beer per week     Comment: 2 drinks on a weekend - beer use    Drug use: No     Comment: Denies any drug use      E-Cigarette/Vaping     E-Cigarette Use Never User     Comments Denies any use       E-Cigarette/Vaping Substances      I have reviewed and agree with the history as documented.     Patient is a 54-year-old female who presents to the emergency department today for evaluation of dysuria, urgency, frequency and hematuria. Patient reports that her symptoms have been ongoing and fluctuating in severity for the past month. She reports her OBGYN prescribed Bactrim DS twice daily for 3 days about 1 mo ago and that upon finishing the antibiotic, her symptoms improved however returned and she sought evaluation at urgent care on 10/25/2024 who prescribed Nitrofurantoin.  She reports that once again, her symptoms improved however started again this past week. She reports a new symptom that started today which is hematuria.  She reports she is sexually active does not use any birth control or barrier methods.  She reports she was recently tested for STI by her OB/GYN and results were negative.  She denies vaginal discharge. She denies abdominal pain, nausea, vomiting, diarrhea, constipation.  According to chart review, urine culture 10/25 showed >100,000 cfu/ml Staphylococcus saprophyticus.          Review of Systems   Constitutional:  Negative for chills, fatigue and fever.   HENT:  Negative for congestion and rhinorrhea.    Respiratory:  Negative for cough and shortness of breath.    Cardiovascular:  Negative for chest pain.   Gastrointestinal:  Negative for abdominal pain, constipation, diarrhea, nausea and vomiting.   Genitourinary:  Positive for dysuria, frequency, hematuria and urgency. Negative for vaginal bleeding and vaginal discharge.   Musculoskeletal:  Negative for arthralgias and myalgias.   Skin:  Negative for rash.   Neurological:  Negative for dizziness, tremors, weakness, numbness and headaches.           Objective       ED Triage Vitals [11/14/24 1856]   Temperature Pulse Blood Pressure Respirations SpO2 Patient Position -  Orthostatic VS   98.1 °F (36.7 °C) 65 122/84 18 98 % Sitting      Temp Source Heart Rate Source BP Location FiO2 (%) Pain Score    Temporal Monitor Right arm -- --      Vitals      Date and Time Temp Pulse SpO2 Resp BP Pain Score FACES Pain Rating User   11/14/24 1856 98.1 °F (36.7 °C) 65 98 % 18 122/84 -- -- CK            Physical Exam  Vitals and nursing note reviewed.   Constitutional:       General: She is not in acute distress.     Appearance: Normal appearance. She is well-developed. She is not ill-appearing.   HENT:      Head: Normocephalic and atraumatic.   Eyes:      Conjunctiva/sclera: Conjunctivae normal.   Cardiovascular:      Rate and Rhythm: Normal rate and regular rhythm.   Pulmonary:      Effort: Pulmonary effort is normal. No respiratory distress.      Breath sounds: Normal breath sounds.   Abdominal:      General: Abdomen is flat. There is no distension.      Palpations: Abdomen is soft.      Tenderness: There is abdominal tenderness (mild) in the suprapubic area. There is no right CVA tenderness, left CVA tenderness, guarding or rebound.   Musculoskeletal:         General: No swelling.      Cervical back: Neck supple.   Skin:     General: Skin is warm and dry.      Capillary Refill: Capillary refill takes less than 2 seconds.   Neurological:      General: No focal deficit present.      Mental Status: She is alert and oriented to person, place, and time. Mental status is at baseline.   Psychiatric:         Mood and Affect: Mood normal.         Results Reviewed       Procedure Component Value Units Date/Time    Urine Microscopic [[id number]]  (Abnormal) Collected: 11/14/24 1932    Lab Status: Final result Specimen: Urine, Clean Catch Updated: 11/14/24 2116     RBC, UA       Field obscured, unable to enumerate     /hpf     WBC, UA Innumerable /hpf      Epithelial Cells Occasional /hpf      Bacteria, UA Innumerable /hpf      MUCUS THREADS Occasional    Urine culture [[id number]] Collected: 11/14/24 1932     Lab Status: In process Specimen: Urine, Clean Catch Updated: 11/14/24 2116    UA w Reflex to Microscopic w Reflex to Culture [[id number]]  (Abnormal) Collected: 11/14/24 1932    Lab Status: Final result Specimen: Urine, Clean Catch Updated: 11/14/24 2021     Color, UA Yellow     Clarity, UA Cloudy     Specific Gravity, UA 1.020     pH, UA 6.0     Leukocytes, UA Large     Nitrite, UA Positive     Protein, UA 30 (1+) mg/dl      Glucose, UA Negative mg/dl      Ketones, UA Negative mg/dl      Urobilinogen, UA <2.0 mg/dl      Bilirubin, UA Negative     Occult Blood, UA Large    Comprehensive metabolic panel [[id number]]  (Abnormal) Collected: 11/14/24 1934    Lab Status: Final result Specimen: Blood from Arm, Left Updated: 11/14/24 2012     Sodium 140 mmol/L      Potassium 4.1 mmol/L      Chloride 107 mmol/L      CO2 29 mmol/L      ANION GAP 4 mmol/L      BUN 24 mg/dL      Creatinine 0.75 mg/dL      Glucose 91 mg/dL      Calcium 8.7 mg/dL      AST 20 U/L      ALT 16 U/L      Alkaline Phosphatase 67 U/L      Total Protein 6.3 g/dL      Albumin 4.3 g/dL      Total Bilirubin 0.39 mg/dL      eGFR 90 ml/min/1.73sq m     Narrative:      National Kidney Disease Foundation guidelines for Chronic Kidney Disease (CKD):     Stage 1 with normal or high GFR (GFR > 90 mL/min/1.73 square meters)    Stage 2 Mild CKD (GFR = 60-89 mL/min/1.73 square meters)    Stage 3A Moderate CKD (GFR = 45-59 mL/min/1.73 square meters)    Stage 3B Moderate CKD (GFR = 30-44 mL/min/1.73 square meters)    Stage 4 Severe CKD (GFR = 15-29 mL/min/1.73 square meters)    Stage 5 End Stage CKD (GFR <15 mL/min/1.73 square meters)  Note: GFR calculation is accurate only with a steady state creatinine    Lactic acid, plasma (w/reflex if result > 2.0) [[id number]]  (Normal) Collected: 11/14/24 1934    Lab Status: Final result Specimen: Blood from Arm, Left Updated: 11/14/24 2010     LACTIC ACID 0.9 mmol/L     Narrative:      Result may be elevated if tourniquet was  used during collection.    Magnesium [[id number]]  (Normal) Collected: 11/14/24 1934    Lab Status: Final result Specimen: Blood from Arm, Left Updated: 11/14/24 2010     Magnesium 2.0 mg/dL     CBC and differential [[id number]] Collected: 11/14/24 1934    Lab Status: Final result Specimen: Blood from Arm, Left Updated: 11/14/24 1952     WBC 8.67 Thousand/uL      RBC 4.40 Million/uL      Hemoglobin 13.7 g/dL      Hematocrit 40.8 %      MCV 93 fL      MCH 31.1 pg      MCHC 33.6 g/dL      RDW 12.1 %      MPV 10.1 fL      Platelets 228 Thousands/uL      nRBC 0 /100 WBCs      Segmented % 62 %      Immature Grans % 0 %      Lymphocytes % 28 %      Monocytes % 9 %      Eosinophils Relative 1 %      Basophils Relative 0 %      Absolute Neutrophils 5.37 Thousands/µL      Absolute Immature Grans 0.01 Thousand/uL      Absolute Lymphocytes 2.40 Thousands/µL      Absolute Monocytes 0.75 Thousand/µL      Eosinophils Absolute 0.11 Thousand/µL      Basophils Absolute 0.03 Thousands/µL     Molecular Vaginal Panel [[id number]]     Lab Status: No result Specimen: Genital from Vaginal             CT renal stone study abdomen pelvis wo contrast   Final Interpretation by Dot Vazquez MD (11/14 2012)      1) Minimal perivesical fat stranding, suggestive of cystitis.      2) No ureteral or urinary bladder calculi. No hydronephrosis.      3) Two nonobstructing left-sided intrarenal calculi measuring up to 3 mm.      4) Additional findings as above.               Workstation performed: [id number]             Procedures    ED Medication and Procedure Management   Prior to Admission Medications   Prescriptions Last Dose Informant Patient Reported? Taking?   Macrobid 100 MG capsule  Self Yes No   Sig: Every 12 hours   Patient not taking: Reported on 7/1/2024   dicyclomine (BENTYL) 20 mg tablet  Self No No   Sig: Take 1 tablet (20 mg total) by mouth 2 (two) times a day as needed (abdominal pain)   Patient not taking: Reported on 6/13/2024    diphenhydrAMINE ([employer] Allergy) 25 mg capsule  Self Yes No   Sig: Take 25 mg by mouth every 6 (six) hours as needed for itching   Patient not taking: Reported on 10/25/2024   famotidine (PEPCID) 40 MG tablet   No No   Sig: Take 1 tablet (40 mg total) by mouth daily at bedtime   fexofenadine (ALLEGRA ODT) 30 MG disintegrating tablet  Self Yes No   Sig: Take 30 mg by mouth daily   Patient not taking: Reported on 10/25/2024   fexofenadine (ALLEGRA) 60 MG tablet  Self Yes No   Sig: Take 60 mg by mouth daily   lamoTRIgine (LaMICtal) 100 mg tablet  Self Yes No   Sig: TAKE 1 AND 1/2 TABLETS BY MOUTH 2 TIMES A DAY   Patient not taking: Reported on 6/10/2024   lamoTRIgine (LaMICtal) 150 MG tablet  Self Yes No   Sig: Take 150 mg by mouth 2 (two) times a day   levocetirizine (XYZAL) 5 MG tablet  Self Yes No   Sig: Take 5 mg by mouth in the morning Takes in the evening   Patient not taking: Reported on 1/24/2024   methocarbamol (ROBAXIN) 500 mg tablet  Self No No   Sig: Take 1 tablet (500 mg total) by mouth 4 (four) times a day for 10 days   Patient not taking: Reported on 8/12/2024   ondansetron (ZOFRAN-ODT) 4 mg disintegrating tablet  Self No No   Sig: Take 1 tablet (4 mg total) by mouth every 6 (six) hours as needed for nausea or vomiting   Patient not taking: Reported on 6/10/2024      Facility-Administered Medications: None     Discharge Medication List as of 11/14/2024 10:14 PM        START taking these medications    Details   cephalexin (KEFLEX) 500 mg capsule Take 1 capsule (500 mg total) by mouth every 6 (six) hours for 10 days, Starting Thu 11/14/2024, Until Sun 11/24/2024, Normal           CONTINUE these medications which have NOT CHANGED    Details   dicyclomine (BENTYL) 20 mg tablet Take 1 tablet (20 mg total) by mouth 2 (two) times a day as needed (abdominal pain), Starting Mon 2/19/2024, Normal      diphenhydrAMINE ([employer] Allergy) 25 mg capsule Take 25 mg by mouth every 6 (six) hours as needed for itching,  Historical Med      famotidine (PEPCID) 40 MG tablet Take 1 tablet (40 mg total) by mouth daily at bedtime, Starting Mon 8/12/2024, Normal      fexofenadine (ALLEGRA ODT) 30 MG disintegrating tablet Take 30 mg by mouth daily, Historical Med      fexofenadine (ALLEGRA) 60 MG tablet Take 60 mg by mouth daily, Historical Med      !! lamoTRIgine (LaMICtal) 100 mg tablet TAKE 1 AND 1/2 TABLETS BY MOUTH 2 TIMES A DAY, Historical Med      !! lamoTRIgine (LaMICtal) 150 MG tablet Take 150 mg by mouth 2 (two) times a day, Starting Wed 11/13/2019, Historical Med      levocetirizine (XYZAL) 5 MG tablet Take 5 mg by mouth in the morning Takes in the evening, Historical Med      Macrobid 100 MG capsule Every 12 hours, Starting Thu 5/2/2024, Historical Med      methocarbamol (ROBAXIN) 500 mg tablet Take 1 tablet (500 mg total) by mouth 4 (four) times a day for 10 days, Starting Mon 6/10/2024, Until Thu 6/20/2024, Normal      ondansetron (ZOFRAN-ODT) 4 mg disintegrating tablet Take 1 tablet (4 mg total) by mouth every 6 (six) hours as needed for nausea or vomiting, Starting Mon 2/19/2024, Normal       !! - Potential duplicate medications found. Please discuss with provider.          ED SEPSIS DOCUMENTATION   Time reflects when diagnosis was documented in both MDM as applicable and the Disposition within this note       Time User Action Codes Description Comment    11/14/2024 10:10 PM Latricia Davis Add [N30.90] Cystitis     11/14/2024 10:13 PM Latricia Davis Add [N20.0] Kidney stone on left side     11/14/2024 10:13 PM Latricia Davis Modify [N20.0] Kidney stone on left side non obstructing    11/14/2024 10:13 PM Latricia Davis Add [R91.1] Left lower lobe pulmonary nodule     11/14/2024 10:14 PM Latricia Davis Add [K76.89] Liver cyst                  Latricia Davis PA-C  11/15/24 0027

## 2024-11-15 NOTE — TELEPHONE ENCOUNTER
New Patient    What is the reason for the patient's appointment?: Cystitis, suspected UTI    What office location does the patient prefer?: [place] or [place]    Does patient have Imaging/Lab Results:  Labs & CT renal 11/14/24    Have patient records been requested?:  If No, are the records showing in Epic: Yes    INSURANCE:   Do we accept the patient's insurance or is the patient Self-Pay?: Yes    Insurance Provider: [employer]   Plan Type/Number:   Member ID#: [id number]     HISTORY:   Has the patient had any previous Urologist(s)?: No    Was the patient seen in the ED?: [facility] 11/14/24    Has the patient had any outside testing done?: Labs & CT renal 11/14/24    Does the patient have a personal history of cancer?: N/A    Pt scheduled for 12/11/2024 with THEODORA Hernandez and placed on wait list.

## 2024-11-15 NOTE — TELEPHONE ENCOUNTER
Patient presents to ER due to recurrent/persistent urinary tract infection with symptoms such as dysuria, frequency, urgency, gross hematuria.  CT scan without significant urologic abnormality.  Patient hemodynamically stable currently afebrile.  Do not think that she needs to be admitted for IV antibiotics at this point in time but I think that another trial of outpatient antibiotics for an extended course would be reasonable.  Reviewed with ER provider.  Lets please get patient in for an urgent new patient follow-up to discuss her recurrent/persistent UTI symptoms and ways to prevent these from happening in the future

## 2024-11-15 NOTE — DISCHARGE INSTRUCTIONS
Please follow-up with urology outpatient, a referral has been placed for you.  Please take the antibiotic that has been sent to your pharmacy, 4 times a day for 10 days.    Please return to the emergency department if you are experiencing worsening urinary symptoms, pain, fever, chills, or any new or worsening symptoms.

## 2024-11-15 NOTE — PROGRESS NOTES
Name: Sugar Jara      : 1970      MRN: [id number]  Encounter Provider: Yani Dan PA-C  Encounter Date: 2024   Encounter department: [facility]  :  Assessment & Plan  Recurrent UTI  Reports more frequent UTIs since menopause   Evaluated in ED 24 -- recurrent/persistent UTI with dysuria, frequency/urgency and gross hematuria   UA with large leukocytes, positive nitrites, and large blood   CT stone study - Minimal perivesical fat stranding, suggestive of cystitis  Bactrim BID x3 days (PCP)  Macrobid x7 days (Urgent care)   Symptoms went away and returned directly following completion of course  Initiated on Keflex 500 mg TID x 10 days (started 11/15)   Recent UC 10/25 positive for staphylococcus saprophyticus   Repeat UC  positive for e.coli   Reported decreased dosages  Ongoing symptoms -- fatigued, cramps, dysuria (improving)  Some fever/chills     Begin taking cranberry + d-mannose daily supplements  Begin using Hiprex 1 g daily (once antibiotics are completed)   Refill as needed  Follow up annually / reach out with new onset symptoms     Orders:    methenamine hippurate (HIPREX) 1 g tablet; Take 1 tablet (1 g total) by mouth 2 (two) times a day with meals    Nephrolithiasis  History of nephrolithiasis May 2020   CT stone study  -- no ureteral or urinary bladder calculi. No hydronephrosis; two nonobstructing left-sided intrarenal calculi measuring up to 3 mm.  Denies flank pain   Malignant neoplasm of central portion of right breast in female, estrogen receptor positive (HCC)  Hx breast cancer 2021 - s/p radiation therapy   Discussed starting vaginal estrace cream 3x weekly as her UTIs became more prevelant following menopause   Hesitant to proceed with estrace cream with her history of breast cancer - discussed that it is not contraindicated   Will reach out to GYN for their recommendations         History of Present Illness    Sugar Jara is a 54 y.o. female with past medical history of breast cancer--who presents to the office as a new patient to discuss recurrent UTIs.  She was recently seen in the ED 11/14/24 for persistent UTI with dysuria and hematuria.  She was originally started on Bactrim twice daily x 3 days by her PCP.  Symptoms persisted following completion of antibiotics and she return to urgent care -- was initiated on 7 days of Macrobid.  Again, following completion of the antibiotic her symptoms returned.  In the ED she was started on Keflex 4 times daily for 10 days.  She reports since starting the medication her symptoms have subsided.  Mild dysuria is ongoing.  Urine culture returned positive for E. coli bacteria.  According to chart review, the patient has suffered from a variety of UTIs over the last year with a variety of different bacteria present in the urine.  She currently does not take any medication to prevent recurrent UTIs.    At this time, I recommend continuing Keflex antibiotics and ensuring her symptoms have resolved following completion of antibiotic course.  She can begin taking Hiprex 1 g daily to prevent further UTIs.  We also discussed utilizing cranberry plus d-mannose supplements.  She will increase her water intake and avoid bladder irritants as well as constipation.  Vaginal Estrace cream was discussed with the patient but due to her history of breast cancer she is hesitant to proceed.  Plan to discuss vaginal Estrace cream with her GYN further recommendations.  Plan to follow-up in 6 months to recheck how she is doing overall.    ----------------------------------------  We discussed ways to help to prevent urinary tract infections.     Good fluid hydration, control of bowel habits with avoidance of constipation and diarrhea, reviewed appropriate bladder and sexual hygiene, 100% cranberry juice or cranberry supplement tabs, pro- or pre- biotics, and D-mannose (a supplement available OTC  "which reduces bacterial binding to the bladder wall) have all been shown to improve recurrent urinary infection. Cystex urinary health maintenance supplement was recommended which conveniently combines several of these agents in one dose. Postcoital antibiotic use, topical estrogen replacement, and suppressive antibiotic use were discussed including which situations these would be indicated.      Review of Systems   Constitutional:  Negative for activity change, chills, fatigue and fever.   Respiratory:  Negative for apnea, cough and shortness of breath.    Cardiovascular:  Negative for chest pain and leg swelling.   Gastrointestinal:  Negative for abdominal distention, abdominal pain, constipation, diarrhea, nausea and vomiting.   Genitourinary:  Positive for dysuria, frequency and urgency. Negative for difficulty urinating, flank pain, hematuria, pelvic pain, vaginal bleeding and vaginal discharge.   Musculoskeletal:  Negative for arthralgias and back pain.   Neurological:  Negative for dizziness and headaches.   Psychiatric/Behavioral: Negative.     All other systems reviewed and are negative.    Past Medical History   Past Medical History:   Diagnosis Date    Abdominal pain     Anesthesia     \"after tape removed from both eyes/developed swelling\"    Anxiety     has autistic/special needs son    Breast cancer (HCC) 04/30/2021    RIGHT    Cancer ([facility])     Breast cancer - right breast 2021    Chronic UTI     2x/year or so /no symptoms today    Displaced fracture of shaft of fifth metacarpal bone, right hand, subsequent encounter for fracture with routine healing 3/1/2022    Dysuria     resolved 10/05/16/occas/not lately    Encounter for screening colonoscopy     Environmental and seasonal allergies     Exercise involving cycling     2-3x/week spin classes    GERD (gastroesophageal reflux disease)     occasional due to diet    Heavy menses     Hiatal hernia     History of kidney stones     History of radiation " "therapy 2021    Right    Intestinal ulcer     Irritable bowel syndrome     Kidney stone     10/20/22 Hx of kidney stone -passed on own- no surgery needed    Nausea     Seizures (HCC)     last seizure approx 5 yrs ago    Shortness of breath     \"sometimes just sitting in chair or activity and having Echo 4/29\"-10/20/22- Pt reports resolved no further SOB    Submucous leiomyoma of uterus     no recent problem    Terminal ileitis (HCC)     unclear etiology, work up w EGD/COLON/TI bx/SBC/CT done.    Wears glasses      Past Surgical History:   Procedure Laterality Date    BREAST BIOPSY Right 03/01/2021    stereo- invasive ductal ca    BREAST LUMPECTOMY Right 04/30/2021    Procedure: RIGHT BREAST BRACKETED DINA LOCALIZATION LUMPECTOMY;;  Surgeon: Melody Brewer MD;  Location: [facility];  Service: Surgical Oncology    COLONOSCOPY  03/01/2017    5 yr recall    DILATION AND CURETTAGE OF UTERUS      EGD      EXAMINATION UNDER ANESTHESIA N/A 10/25/2022    Procedure: (EUA);  Surgeon: Karthik Henriquez MD;  Location: [facility];  Service: Gynecology    HYSTEROSCOPY      INCONTINENCE SURGERY      bladder sling    LYMPH NODE BIOPSY Right 04/30/2021    Procedure: Bx LYMPH NODE SENTINEL;  Surgeon: Melody Brewer MD;  Location: [facility];  Service: Surgical Oncology    MAMMO NEEDLE LOCALIZATION LEFT (ALL INC) Right 04/19/2021    MAMMO NEEDLE LOCALIZATION LEFT (ALL INC) EACH ADD Right 04/19/2021    MAMMO STEREOTACTIC BREAST BIOPSY RIGHT (ALL INC) Right 03/01/2021    AL COLONOSCOPY FLX DX W/COLLJ SPEC WHEN PFRMD N/A 11/14/2016    Procedure: EGD AND COLONOSCOPY;  Surgeon: Brandt Garcia MD;  Location: [facility];  Service: Gastroenterology    AL HYSTEROSCOPY BX ENDOMETRIUM&/POLYPC W/WO D&C N/A 10/25/2022    Procedure: D&C W/HYSTEROSCOPY; PSB POLYPECTOMY;  Surgeon: Karthik Henriquez MD;  Location: [facility];  Service: Gynecology    WISDOM TOOTH EXTRACTION       Family History   Problem Relation Age of Onset    Cancer Mother     Breast cancer " Mother 55    Colon polyps Mother     Diabetes Father         Type 2    Hypertension Father     BRCA1 Negative Sister     BRCA2 Negative Sister     Breast cancer Maternal Aunt 56    Breast cancer Family     Colon cancer Neg Hx     Anesthesia problems Neg Hx       reports that she has never smoked. She has never used smokeless tobacco. She reports current alcohol use of about 1.0 standard drink of alcohol per week. She reports that she does not use drugs.  Current Outpatient Medications on File Prior to Visit   Medication Sig Dispense Refill    cephalexin (KEFLEX) 500 mg capsule Take 1 capsule (500 mg total) by mouth every 6 (six) hours for 10 days 39 capsule 0    famotidine (PEPCID) 40 MG tablet Take 1 tablet (40 mg total) by mouth daily at bedtime 90 tablet 3    fexofenadine (ALLEGRA) 60 MG tablet Take 60 mg by mouth daily      lamoTRIgine (LaMICtal) 150 MG tablet Take 150 mg by mouth 2 (two) times a day      Magnesium 400 MG CAPS Take by mouth      VITAMIN D, CHOLECALCIFEROL, PO Take by mouth      dicyclomine (BENTYL) 20 mg tablet Take 1 tablet (20 mg total) by mouth 2 (two) times a day as needed (abdominal pain) (Patient not taking: Reported on 6/13/2024) 4 tablet 0    diphenhydrAMINE ([facility] All[facility]) 25 mg capsule Take 25 mg by mouth every 6 (six) hours as needed for itching (Patient not taking: Reported on 10/25/2024)      fexofenadine (ALLEGRA ODT) 30 MG disintegrating tablet Take 30 mg by mouth daily (Patient not taking: Reported on 10/25/2024)      lamoTRIgine (LaMICtal) 100 mg tablet TAKE 1 AND 1/2 TABLETS BY MOUTH 2 TIMES A DAY (Patient not taking: Reported on 6/10/2024)      levocetirizine (XYZAL) 5 MG tablet Take 5 mg by mouth in the morning Takes in the evening (Patient not taking: Reported on 1/24/2024)      methocarbamol (ROBAXIN) 500 mg tablet Take 1 tablet (500 mg total) by mouth 4 (four) times a day for 10 days (Patient not taking: Reported on 8/12/2024) 40 tablet 0    ondansetron (ZOFRAN-ODT) 4 mg  disintegrating tablet Take 1 tablet (4 mg total) by mouth every 6 (six) hours as needed for nausea or vomiting (Patient not taking: Reported on 6/10/2024) 10 tablet 0    [DISCONTINUED] Macrobid 100 MG capsule Every 12 hours (Patient not taking: Reported on 7/1/2024)       No current facility-administered medications on file prior to visit.     Allergies   Allergen Reactions    Latex Rash and Swelling     When tape from eyes removed after surgery developed high swelling of eyes/watery    Medical Tape Swelling and Rash     When tape from eyes removed after surgery developed high swelling of eyes/watery    Wound Dressings Swelling and Rash     After a surgery when tape removed from eyes high swelling noted/tears    Other Sneezing and Rash     seasonal      Medical History Reviewed by provider this encounter:  Tobacco  Allergies  Meds  Problems  Med Hx  Surg Hx  Fam Hx     .  Current Outpatient Medications on File Prior to Visit   Medication Sig Dispense Refill    cephalexin (KEFLEX) 500 mg capsule Take 1 capsule (500 mg total) by mouth every 6 (six) hours for 10 days 39 capsule 0    famotidine (PEPCID) 40 MG tablet Take 1 tablet (40 mg total) by mouth daily at bedtime 90 tablet 3    fexofenadine (ALLEGRA) 60 MG tablet Take 60 mg by mouth daily      lamoTRIgine (LaMICtal) 150 MG tablet Take 150 mg by mouth 2 (two) times a day      Magnesium 400 MG CAPS Take by mouth      VITAMIN D, CHOLECALCIFEROL, PO Take by mouth      dicyclomine (BENTYL) 20 mg tablet Take 1 tablet (20 mg total) by mouth 2 (two) times a day as needed (abdominal pain) (Patient not taking: Reported on 6/13/2024) 4 tablet 0    diphenhydrAMINE ([facility]) 25 mg capsule Take 25 mg by mouth every 6 (six) hours as needed for itching (Patient not taking: Reported on 10/25/2024)      fexofenadine (ALLEGRA ODT) 30 MG disintegrating tablet Take 30 mg by mouth daily (Patient not taking: Reported on 10/25/2024)      lamoTRIgine (LaMICtal) 100 mg tablet  "TAKE 1 AND 1/2 TABLETS BY MOUTH 2 TIMES A DAY (Patient not taking: Reported on 6/10/2024)      levocetirizine (XYZAL) 5 MG tablet Take 5 mg by mouth in the morning Takes in the evening (Patient not taking: Reported on 1/24/2024)      methocarbamol (ROBAXIN) 500 mg tablet Take 1 tablet (500 mg total) by mouth 4 (four) times a day for 10 days (Patient not taking: Reported on 8/12/2024) 40 tablet 0    ondansetron (ZOFRAN-ODT) 4 mg disintegrating tablet Take 1 tablet (4 mg total) by mouth every 6 (six) hours as needed for nausea or vomiting (Patient not taking: Reported on 6/10/2024) 10 tablet 0    [DISCONTINUED] Macrobid 100 MG capsule Every 12 hours (Patient not taking: Reported on 7/1/2024)       No current facility-administered medications on file prior to visit.      Social History     Tobacco Use    Smoking status: Never    Smokeless tobacco: Never    Tobacco comments:     Never a smoker or any tobacco products use   Vaping Use    Vaping status: Never Used   Substance and Sexual Activity    Alcohol use: Yes     Alcohol/week: 1.0 standard drink of alcohol     Types: 1 Cans of beer per week     Comment: 2 drinks on a weekend - beer use    Drug use: No     Comment: Denies any drug use    Sexual activity: Yes     Partners: Male     Birth control/protection: Male Sterilization     Comment: NO nuva ring - discontinued at time of dx with breast cancer - denies any chest pain or shortness of breath with activity        Objective   /78 (BP Location: Left arm, Patient Position: Sitting, Cuff Size: Adult)   Pulse 73   Ht 5' 7.5\" (1.715 m)   Wt 72.3 kg (159 lb 6.4 oz)   LMP 03/28/2020   SpO2 98%   BMI 24.60 kg/m²     Physical Exam  Vitals and nursing note reviewed.   Constitutional:       General: She is not in acute distress.     Appearance: Normal appearance. She is well-developed. She is not ill-appearing.   HENT:      Head: Normocephalic and atraumatic.   Eyes:      Conjunctiva/sclera: Conjunctivae normal. " "  Cardiovascular:      Rate and Rhythm: Normal rate and regular rhythm.      Heart sounds: No murmur heard.  Pulmonary:      Effort: Pulmonary effort is normal. No respiratory distress.      Breath sounds: Normal breath sounds.   Abdominal:      General: Abdomen is flat. There is no distension.      Palpations: Abdomen is soft.      Tenderness: There is no abdominal tenderness.   Musculoskeletal:         General: No swelling.      Cervical back: Neck supple.   Skin:     General: Skin is warm and dry.      Capillary Refill: Capillary refill takes less than 2 seconds.   Neurological:      Mental Status: She is alert.   Psychiatric:         Mood and Affect: Mood normal.         Results  No results found for: \"PSA\"  Lab Results   Component Value Date    GLUCOSE 78 07/22/2015    CALCIUM 8.7 11/14/2024     07/22/2015    K 4.1 11/14/2024    CO2 29 11/14/2024     11/14/2024    BUN 24 11/14/2024    CREATININE 0.75 11/14/2024     Lab Results   Component Value Date    WBC 8.67 11/14/2024    HGB 13.7 11/14/2024    HCT 40.8 11/14/2024    MCV 93 11/14/2024     11/14/2024       Office Urine Dip  No results found for this or any previous visit (from the past hour).]    Administrative Statements   I have spent a total time of 47 minutes in caring for this patient on the day of the visit/encounter including Diagnostic results, Prognosis, Risks and benefits of tx options, Instructions for management, Patient and family education, Risk factor reductions, Impressions, Counseling / Coordination of care, Documenting in the medical record, Reviewing / ordering tests, medicine, procedures  , and Obtaining or reviewing history  . Topics discussed with the patient / family include symptom assessment and management, medication review, medication adjustment, goals of care, supportive listening, and anticipatory guidance.  "

## 2024-11-15 NOTE — TELEPHONE ENCOUNTER
Called [facility] and scheduled for New paptint apointment in [place] with Yani on Monday - kept appointment with Jonas  - will cancel once seen - as she can go to [place] or [place] which ever is closer since she live is [place]

## 2024-11-16 ENCOUNTER — RESULTS FOLLOW-UP (OUTPATIENT)
Dept: EMERGENCY DEPT | Facility: HOSPITAL | Age: 54
End: 2024-11-16

## 2024-11-17 LAB — BACTERIA UR CULT: ABNORMAL

## 2024-11-18 ENCOUNTER — OFFICE VISIT (OUTPATIENT)
Dept: UROLOGY | Facility: HOSPITAL | Age: 54
End: 2024-11-18
Payer: COMMERCIAL

## 2024-11-18 VITALS
HEART RATE: 73 BPM | HEIGHT: 68 IN | DIASTOLIC BLOOD PRESSURE: 78 MMHG | SYSTOLIC BLOOD PRESSURE: 118 MMHG | BODY MASS INDEX: 24.16 KG/M2 | OXYGEN SATURATION: 98 % | WEIGHT: 159.4 LBS

## 2024-11-18 DIAGNOSIS — C50.111 MALIGNANT NEOPLASM OF CENTRAL PORTION OF RIGHT BREAST IN FEMALE, ESTROGEN RECEPTOR POSITIVE (HCC): ICD-10-CM

## 2024-11-18 DIAGNOSIS — N39.0 RECURRENT UTI: Primary | ICD-10-CM

## 2024-11-18 DIAGNOSIS — Z17.0 MALIGNANT NEOPLASM OF CENTRAL PORTION OF RIGHT BREAST IN FEMALE, ESTROGEN RECEPTOR POSITIVE (HCC): ICD-10-CM

## 2024-11-18 DIAGNOSIS — N20.0 NEPHROLITHIASIS: ICD-10-CM

## 2024-11-18 PROCEDURE — 99204 OFFICE O/P NEW MOD 45 MIN: CPT

## 2024-11-18 RX ORDER — METHENAMINE HIPPURATE 1000 MG/1
1 TABLET ORAL 2 TIMES DAILY WITH MEALS
Qty: 60 TABLET | Refills: 0 | Status: SHIPPED | OUTPATIENT
Start: 2024-11-18

## 2024-11-18 NOTE — PATIENT INSTRUCTIONS
Cranberry + d-mannose   Hiprex (twice a day) -- NOT while on antibiotic   GYN -- vaginal estrace cream x 3

## 2024-11-18 NOTE — ASSESSMENT & PLAN NOTE
History of nephrolithiasis May 2020   CT stone study 11/14 -- no ureteral or urinary bladder calculi. No hydronephrosis; two nonobstructing left-sided intrarenal calculi measuring up to 3 mm.  Denies flank pain

## 2024-11-18 NOTE — ASSESSMENT & PLAN NOTE
Hx breast cancer March 2021 - s/p radiation therapy   Discussed starting vaginal estrace cream 3x weekly as her UTIs became more prevelant following menopause   Hesitant to proceed with estrace cream with her history of breast cancer - discussed that it is not contraindicated   Will reach out to GYN for their recommendations

## 2024-11-20 LAB
BACTERIA UR CULT: ABNORMAL
BACTERIA UR CULT: ABNORMAL

## 2025-01-22 ENCOUNTER — ANNUAL EXAM (OUTPATIENT)
Dept: GYNECOLOGY | Facility: CLINIC | Age: 55
End: 2025-01-22
Payer: COMMERCIAL

## 2025-01-22 VITALS
DIASTOLIC BLOOD PRESSURE: 64 MMHG | HEIGHT: 68 IN | WEIGHT: 158 LBS | BODY MASS INDEX: 23.95 KG/M2 | SYSTOLIC BLOOD PRESSURE: 112 MMHG

## 2025-01-22 DIAGNOSIS — Z17.0 MALIGNANT NEOPLASM OF CENTRAL PORTION OF RIGHT BREAST IN FEMALE, ESTROGEN RECEPTOR POSITIVE (HCC): ICD-10-CM

## 2025-01-22 DIAGNOSIS — N95.2 VAGINAL ATROPHY: ICD-10-CM

## 2025-01-22 DIAGNOSIS — Z01.419 WOMEN'S ANNUAL ROUTINE GYNECOLOGICAL EXAMINATION: Primary | ICD-10-CM

## 2025-01-22 DIAGNOSIS — R93.89 THICKENED ENDOMETRIUM: ICD-10-CM

## 2025-01-22 DIAGNOSIS — C50.111 MALIGNANT NEOPLASM OF CENTRAL PORTION OF RIGHT BREAST IN FEMALE, ESTROGEN RECEPTOR POSITIVE (HCC): ICD-10-CM

## 2025-01-22 PROCEDURE — 99396 PREV VISIT EST AGE 40-64: CPT | Performed by: OBSTETRICS & GYNECOLOGY

## 2025-01-22 NOTE — PROGRESS NOTES
Assessment & Plan   Diagnoses and all orders for this visit:    Women's annual routine gynecological examination    Vaginal atrophy    Malignant neoplasm of central portion of right breast in female, estrogen receptor positive (HCC)    Thickened endometrium    1.  Yearly exam-Pap smear deferred, self breast awareness reviewed, calcium/vitamin D recommendations discussed, mammogram request given, colonoscopy up-to-date follow-up as per specialist.  2. prior history of bilateral ovarian cyst-exam today is negative.  Ultrasound 9/12/2024 was negative with normal ovaries.  3. previous history UTI-did see urologist recently.  Started on methenamine she takes with intercourse with good results.  She will call return with any issues.  4. prior history of postmenopausal bleeding-was related to tamoxifen use.  She stopped tamoxifen with resolution of symptoms.  5.  Prior thickened endometrium-noted with the bleeding with tamoxifen use.  Most recent ultrasound 9/12/2024 with 2 mm endometrium.  6.  Uterine fibroids-10 mm and 6 mm myoma was noted on 9/12/2024 ultrasound.  The 10 mm fibroid is decreased from 18 mm on prior ultrasound.  7.  Menopausal-denies any current symptoms  8.  Vaginal atrophy-mild changes noted on exam.  She uses coconut oil with great results.  9.  History of seizure disorder/yeast-no current concerns  10. prior adenomyosis-noted on previous ultrasounds  11.  History of skin cyst left breast-no current concerns  12.  History of breast cancer-status post lumpectomy, radiation therapy, and tamoxifen until stopping October 2022.  Continues to follow-up with oncology and myself.  Mammogram study 3/22/2024 was dense with postsurgical changes with no suspicious findings.  Breast exam today was normal with well-healed incision of the right breast.  To call or return with any issues.  13.  Other-doing well.  Working 40 hours/week at [employer].  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  She is very  "happy.  My congratulations were given.  She continues caring for intellectually disabled son age 26 with her coparent.  My support was given.  Follow-up 1 year for yearly exam.      Subjective   Patient ID: Sugar Jara is a 54 y.o. female.    Vitals:    01/22/25 1438   BP: 112/64     Patient was seen today for yearly exam.  Please see assessment plan for details.        The following portions of the patient's history were reviewed and updated as appropriate: allergies, current medications, past family history, past medical history, past social history, past surgical history, and problem list.  Past Medical History:   Diagnosis Date    Abdominal pain     Anesthesia     \"after tape removed from both eyes/developed swelling\"    Anxiety     has autistic/special needs son    Breast cancer (HCC) 04/30/2021    RIGHT    Cancer (HCC)     Breast cancer - right breast 2021    Chronic UTI     2x/year or so /no symptoms today    Displaced fracture of shaft of fifth metacarpal bone, right hand, subsequent encounter for fracture with routine healing 03/01/2022    Dysuria     resolved 10/05/16/occas/not lately    Encounter for screening colonoscopy     Environmental and seasonal allergies     Exercise involving cycling     2-3x/week spin classes    GERD (gastroesophageal reflux disease)     occasional due to diet    Heavy menses     Hiatal hernia     History of kidney stones     History of radiation therapy 2021    Right    Intestinal ulcer     Irritable bowel syndrome     Kidney stone     10/20/22 Hx of kidney stone -passed on own- no surgery needed    Nausea     Seizures (HCC)     last seizure approx 5 yrs ago    Shortness of breath     \"sometimes just sitting in chair or activity and having Echo 4/29\"-10/20/22- Pt reports resolved no further SOB    Submucous leiomyoma of uterus     no recent problem    Terminal ileitis (HCC)     unclear etiology, work up w EGD/COLON/TI bx/SBC/CT done.    Urinary tract infection     Wears " glasses      Past Surgical History:   Procedure Laterality Date    BREAST BIOPSY Right 2021    stereo- invasive ductal ca    BREAST LUMPECTOMY Right 2021    Procedure: RIGHT BREAST BRACKETED DINA LOCALIZATION LUMPECTOMY;;  Surgeon: Melody Brewer MD;  Location: [facility];  Service: Surgical Oncology    COLONOSCOPY  2017    5 yr recall    DILATION AND CURETTAGE OF UTERUS      EGD      EXAMINATION UNDER ANESTHESIA N/A 10/25/2022    Procedure: (EUA);  Surgeon: Karthik Henriquez MD;  Location: [facility];  Service: Gynecology    HYSTEROSCOPY      INCONTINENCE SURGERY      bladder sling    LYMPH NODE BIOPSY Right 2021    Procedure: Bx LYMPH NODE SENTINEL;  Surgeon: Melody Brewer MD;  [facility];  Service: Surgical Oncology    MAMMO NEEDLE LOCALIZATION LEFT (ALL INC) Right 2021    MAMMO NEEDLE LOCALIZATION LEFT (ALL INC) EACH ADD Right 2021    MAMMO STEREOTACTIC BREAST BIOPSY RIGHT (ALL INC) Right 2021    N[place] COLONOSCOPY FLX DX W/COLLJ SPEC WHEN PFRMD N/A 2016    Procedure: EGD AND COLONOSCOPY;  Surgeon: Brandt Garcia MD;  Location: [facility];  Service: Gastroenterology    NJ HYSTEROSCOPY BX ENDOMETRIUM&/POLYPC W/WO D&C N/A 10/25/2022    Procedure: D&C W/HYSTEROSCOPY; PSB POLYPECTOMY;  Surgeon: Karthik Henriquez MD;  [facility];  Service: Gynecology    WISDOM TOOTH EXTRACTION       OB History    Para Term  AB Living   2 2 2   2   SAB IAB Ectopic Multiple Live Births       2      # Outcome Date GA Lbr Noam/2nd Weight Sex Type Anes PTL Lv   2 Term     M Vag-Spont   KEREN   1 Term     M Vag-Spont   KEREN      Obstetric Comments   Menarche-13   Age at first pregnancy-26   nuvaring-about 10 yrs, currently using       Current Outpatient Medications:     famotidine (PEPCID) 40 MG tablet, Take 1 tablet (40 mg total) by mouth daily at bedtime, Disp: 90 tablet, Rfl: 3    fexofenadine (ALLEGRA) 60 MG tablet, Take 60 mg by mouth daily, Disp: , Rfl:      Magnesium 400 MG CAPS, Take by mouth, Disp: , Rfl:     methenamine hippurate (HIPREX) 1 g tablet, Take 1 tablet (1 g total) by mouth 2 (two) times a day with meals (Patient taking differently: Take 1 g by mouth 2 (two) times a day with meals As needed), Disp: 60 tablet, Rfl: 0    VITAMIN D, CHOLECALCIFEROL, PO, Take by mouth, Disp: , Rfl:     dicyclomine (BENTYL) 20 mg tablet, Take 1 tablet (20 mg total) by mouth 2 (two) times a day as needed (abdominal pain) (Patient not taking: Reported on 6/13/2024), Disp: 4 tablet, Rfl: 0    diphenhydrAMINE ([facility] Allergy) 25 mg capsule, Take 25 mg by mouth every 6 (six) hours as needed for itching (Patient not taking: Reported on 10/25/2024), Disp: , Rfl:     fexofenadine (ALLEGRA ODT) 30 MG disintegrating tablet, Take 30 mg by mouth daily (Patient not taking: Reported on 10/25/2024), Disp: , Rfl:     lamoTRIgine (LaMICtal) 100 mg tablet, TAKE 1 AND 1/2 TABLETS BY MOUTH 2 TIMES A DAY (Patient not taking: Reported on 6/10/2024), Disp: , Rfl:     lamoTRIgine (LaMICtal) 150 MG tablet, Take 150 mg by mouth 2 (two) times a day (Patient not taking: Reported on 1/22/2025), Disp: , Rfl:     levocetirizine (XYZAL) 5 MG tablet, Take 5 mg by mouth in the morning Takes in the evening (Patient not taking: Reported on 1/24/2024), Disp: , Rfl:     methocarbamol (ROBAXIN) 500 mg tablet, Take 1 tablet (500 mg total) by mouth 4 (four) times a day for 10 days (Patient not taking: Reported on 8/12/2024), Disp: 40 tablet, Rfl: 0    ondansetron (ZOFRAN-ODT) 4 mg disintegrating tablet, Take 1 tablet (4 mg total) by mouth every 6 (six) hours as needed for nausea or vomiting (Patient not taking: Reported on 6/10/2024), Disp: 10 tablet, Rfl: 0  Allergies   Allergen Reactions    Latex Rash and Swelling     When tape from eyes removed after surgery developed high swelling of eyes/watery    Medical Tape Swelling and Rash     When tape from eyes removed after surgery developed high swelling of eyes/watery     Wound Dressings Swelling and Rash     After a surgery when tape removed from eyes high swelling noted/tears    Other Sneezing and Rash     seasonal     Social History     Socioeconomic History    Marital status: Legally      Spouse name: None    Number of children: 2    Years of education: None    Highest education level: None   Occupational History    None   Tobacco Use    Smoking status: Never    Smokeless tobacco: Never    Tobacco comments:     Never a smoker or any tobacco products use   Vaping Use    Vaping status: Never Used   Substance and Sexual Activity    Alcohol use: Yes     Alcohol/week: 1.0 standard drink of alcohol     Types: 1 Cans of beer per week     Comment: 2 drinks on a weekend - beer use    Drug use: No     Comment: Denies any drug use    Sexual activity: Yes     Partners: Male     Birth control/protection: Male Sterilization     Comment: NO nuva ring - discontinued at time of dx with breast cancer - denies any chest pain or shortness of breath with activity   Other Topics Concern    None   Social History Narrative    Caffeine use    [employer]    Uses safety equipment Seatbelts     Social Drivers of Health     Financial Resource Strain: Low Risk  (4/27/2021)    Overall Financial Resource Strain (CARDIA)     Difficulty of Paying Living Expenses: Not hard at all   Food Insecurity: No Food Insecurity (4/27/2021)    Hunger Vital Sign     Worried About Running Out of Food in the Last Year: Never true     Ran Out of Food in the Last Year: Never true   Transportation Needs: No Transportation Needs (4/27/2021)    PRAPARE - Transportation     Lack of Transportation (Medical): No     Lack of Transportation (Non-Medical): No   Physical Activity: Inactive (2/22/2021)    Exercise Vital Sign     Days of Exercise per Week: 0 days     Minutes of Exercise per Session: 0 min   Stress: No Stress Concern Present (2/22/2021)    [employer] -  Occupational Stress Questionnaire     Feeling of Stress : Only a little   Social Connections: Moderately Isolated (2/22/2021)    Social Connection and Isolation Panel [NHANES]     Frequency of Communication with Friends and Family: More than three times a week     Frequency of Social Gatherings with Friends and Family: Once a week     Attends [employer] Services: Never     Active Member of Clubs or Organizations: No     Attends Club or Organization Meetings: Never     Marital Status:    Intimate Partner Violence: Not At Risk (6/6/2023)    Humiliation, Afraid, Rape, and Kick questionnaire     Fear of Current or Ex-Partner: No     Emotionally Abused: No     Physically Abused: No     Sexually Abused: No   Housing Stability: Not on file     Family History   Problem Relation Age of Onset    Cancer Mother     Breast cancer Mother 55    Colon polyps Mother     Diabetes Father         Type 2    Hypertension Father     BRCA1 Negative Sister     BRCA2 Negative Sister     Breast cancer Maternal Aunt 56    Breast cancer Family     Colon cancer Neg Hx     Anesthesia problems Neg Hx        Review of Systems   Constitutional:  Negative for chills, diaphoresis, fatigue and fever.   Respiratory:  Negative for apnea, cough, chest tightness, shortness of breath and wheezing.    Cardiovascular:  Negative for chest pain, palpitations and leg swelling.   Gastrointestinal:  Negative for abdominal distention, abdominal pain, anal bleeding, constipation, diarrhea, nausea, rectal pain and vomiting.   Genitourinary:  Negative for difficulty urinating, dyspareunia, dysuria, frequency, hematuria, menstrual problem, pelvic pain, urgency, vaginal bleeding, vaginal discharge and vaginal pain.   Musculoskeletal:  Negative for arthralgias, back pain and myalgias.   Skin:  Negative for color change and rash.   Neurological:  Negative for dizziness, syncope, light-headedness, numbness and headaches.   Hematological:  Negative for adenopathy.  "Does not bruise/bleed easily.   Psychiatric/Behavioral:  Negative for dysphoric mood and sleep disturbance. The patient is not nervous/anxious.        Objective   Physical Exam  OBGyn Exam     Objective      /64 (BP Location: Left arm, Patient Position: Sitting)   Ht 5' 7.5\" (1.715 m)   Wt 71.7 kg (158 lb)   LMP 03/28/2020   BMI 24.38 kg/m²     General:   alert and oriented, in no acute distress   Neck: normal to inspection and palpation   Breast: normal appearance, no masses or tenderness   Heart:    Lungs:    Abdomen: soft, non-tender, without masses or organomegaly   Vulva: normal   Vagina: Mildly atrophic, without erythema or lesions or discharge.   Cervix: Mildly atrophic, without lesions or discharge or cervicitis.  No CMT   Uterus: top normal size, anteverted, non-tender   Adnexa: no mass, fullness, tenderness   Rectum: negative    Psych:  Normal mood and affect   Skin:  Without obvious lesions   Eyes: symmetric, with normal movements and reactivity   Musculoskeletal:  Normal muscle tone and movements appreciated       "

## 2025-02-25 ENCOUNTER — OFFICE VISIT (OUTPATIENT)
Dept: FAMILY MEDICINE CLINIC | Facility: CLINIC | Age: 55
End: 2025-02-25
Payer: COMMERCIAL

## 2025-02-25 VITALS
SYSTOLIC BLOOD PRESSURE: 120 MMHG | HEART RATE: 85 BPM | DIASTOLIC BLOOD PRESSURE: 78 MMHG | OXYGEN SATURATION: 98 % | BODY MASS INDEX: 24.01 KG/M2 | TEMPERATURE: 98.3 F | RESPIRATION RATE: 16 BRPM | HEIGHT: 68 IN | WEIGHT: 158.4 LBS

## 2025-02-25 DIAGNOSIS — K50.00 TERMINAL ILEITIS WITHOUT COMPLICATION (HCC): ICD-10-CM

## 2025-02-25 DIAGNOSIS — G40.B09 NONINTRACTABLE JUVENILE MYOCLONIC EPILEPSY WITHOUT STATUS EPILEPTICUS (HCC): ICD-10-CM

## 2025-02-25 DIAGNOSIS — F41.9 ANXIETY: Primary | ICD-10-CM

## 2025-02-25 PROCEDURE — 99214 OFFICE O/P EST MOD 30 MIN: CPT | Performed by: STUDENT IN AN ORGANIZED HEALTH CARE EDUCATION/TRAINING PROGRAM

## 2025-02-25 RX ORDER — CEPHALEXIN 500 MG/1
1 CAPSULE ORAL EVERY 12 HOURS
COMMUNITY

## 2025-02-25 RX ORDER — VENLAFAXINE HYDROCHLORIDE 37.5 MG/1
37.5 CAPSULE, EXTENDED RELEASE ORAL DAILY
Qty: 90 CAPSULE | Refills: 1 | Status: SHIPPED | OUTPATIENT
Start: 2025-02-25

## 2025-02-25 NOTE — ASSESSMENT & PLAN NOTE
C/w Neurology follow up and management     Discussed fani of seizures; pt recently seen by Neurology and they recommended stopping all tx. Pt aware of effexor as well as other depression/anxiety medications potentially increasing risk of seizures. ED return precautions discussed

## 2025-02-25 NOTE — PROGRESS NOTES
Name: Sugar Jara      : 1970      MRN: [id number]  Encounter Provider: Barbra Crook MD  Encounter Date: 2025   Encounter department: [facility]  :  Assessment & Plan  Anxiety  Predominant sx of anxiety; discussed prior use of effexor when going through breast cancer tx - no AE while on tx (noted likely due to vasomotor sx but pt reported helped with mood as well); AE discussed and pt opted to retry effexor    Discussed fani of seizures; pt recently seen by Neurology and they recommended stopping all tx. Pt aware of effexor as well as other depression/anxiety medications potentially increasing risk of seizures. 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 Pt aware of effexor as well as other depression/anxiety medications potentially increasing risk of seizures. ED return precautions discussed        Terminal ileitis without complication (HCC)  C/w GI follow up and management               History of Present Illness   53 yo F w/ fani of epilepsy, breast cancer presenting for concerns for anxiety and depression. Notes she feels like she has butterlies in her stomach constantly; notes thoughts continue. Notes intermittent terafulness. Going through divorce and notes stressful life events.     Depression  Pertinent negatives include no abdominal pain, chest pain, chills, fever, numbness, rash or weakness.   Anxiety  Symptoms include nervous/anxious behavior. Patient reports no chest pain, palpitations, shortness of breath or suicidal ideas.         Review of Systems   Constitutional:  Negative for chills, fever and unexpected  "weight change.   HENT:  Negative for trouble swallowing.    Eyes:  Negative for visual disturbance.   Respiratory:  Negative for shortness of breath.    Cardiovascular:  Negative for chest pain and palpitations.   Gastrointestinal:  Negative for abdominal pain.   Genitourinary:  Negative for difficulty urinating.   Musculoskeletal:  Negative for gait problem.   Skin:  Negative for color change and rash.   Neurological:  Negative for seizures, syncope, weakness and numbness.   Psychiatric/Behavioral:  Positive for depression. Negative for self-injury, sleep disturbance and suicidal ideas. The patient is nervous/anxious.    All other systems reviewed and are negative.      Objective   /78 (BP Location: Left arm, Patient Position: Sitting, Cuff Size: Large)   Pulse 85   Temp 98.3 °F (36.8 °C) (Tympanic)   Resp 16   Ht 5' 7.5\" (1.715 m)   Wt 71.8 kg (158 lb 6.4 oz)   LMP 03/28/2020   SpO2 98%   BMI 24.44 kg/m²      Physical Exam  Vitals and nursing note reviewed.   Constitutional:       General: She is not in acute distress.     Appearance: Normal appearance. She is well-developed.   HENT:      Head: Normocephalic and atraumatic.      Nose: Nose normal.      Mouth/Throat:      Mouth: Mucous membranes are moist.      Pharynx: Oropharynx is clear.   Eyes:      Extraocular Movements: Extraocular movements intact.      Conjunctiva/sclera: Conjunctivae normal.      Pupils: Pupils are equal, round, and reactive to light.   Cardiovascular:      Rate and Rhythm: Normal rate and regular rhythm.      Pulses: Normal pulses.      Heart sounds: Normal heart sounds. No murmur heard.  Pulmonary:      Effort: Pulmonary effort is normal. No respiratory distress.      Breath sounds: Normal breath sounds. No wheezing or rales.   Abdominal:      General: Bowel sounds are normal.      Palpations: Abdomen is soft.      Tenderness: There is no abdominal tenderness.   Musculoskeletal:         General: No swelling. Normal range of " motion.      Cervical back: Normal range of motion and neck supple.   Skin:     General: Skin is warm and dry.      Capillary Refill: Capillary refill takes less than 2 seconds.   Neurological:      General: No focal deficit present.      Mental Status: She is alert.      Gait: Gait normal.   Psychiatric:         Mood and Affect: Mood normal.         Behavior: Behavior normal.         Thought Content: Thought content normal.         Judgment: Judgment normal.       Administrative Statements   I have spent a total time of 30 minutes in caring for this patient on the day of the visit/encounter including Prognosis, Risks and benefits of tx options, Instructions for management, Patient and family education, Importance of tx compliance, Risk factor reductions, Impressions, Counseling / Coordination of care, Documenting in the medical record, and Reviewing/placing orders in the medical record (including tests, medications, and/or procedures).

## 2025-03-03 ENCOUNTER — OFFICE VISIT (OUTPATIENT)
Dept: URGENT CARE | Facility: CLINIC | Age: 55
End: 2025-03-03
Payer: COMMERCIAL

## 2025-03-03 VITALS
SYSTOLIC BLOOD PRESSURE: 130 MMHG | HEART RATE: 84 BPM | DIASTOLIC BLOOD PRESSURE: 76 MMHG | TEMPERATURE: 97.9 F | OXYGEN SATURATION: 98 % | RESPIRATION RATE: 16 BRPM | BODY MASS INDEX: 23.23 KG/M2 | WEIGHT: 148 LBS | HEIGHT: 67 IN

## 2025-03-03 DIAGNOSIS — J40 BRONCHITIS: ICD-10-CM

## 2025-03-03 DIAGNOSIS — J01.00 ACUTE MAXILLARY SINUSITIS, RECURRENCE NOT SPECIFIED: Primary | ICD-10-CM

## 2025-03-03 DIAGNOSIS — Z20.822 ENCOUNTER FOR LABORATORY TESTING FOR COVID-19 VIRUS: ICD-10-CM

## 2025-03-03 LAB
SARS-COV-2 AG UPPER RESP QL IA: NEGATIVE
VALID CONTROL: NORMAL

## 2025-03-03 PROCEDURE — G0382 LEV 3 HOSP TYPE B ED VISIT: HCPCS | Performed by: PHYSICIAN ASSISTANT

## 2025-03-03 PROCEDURE — 87811 SARS-COV-2 COVID19 W/OPTIC: CPT | Performed by: PHYSICIAN ASSISTANT

## 2025-03-03 PROCEDURE — 87636 SARSCOV2 & INF A&B AMP PRB: CPT | Performed by: PHYSICIAN ASSISTANT

## 2025-03-03 RX ORDER — AZITHROMYCIN 250 MG/1
TABLET, FILM COATED ORAL
Qty: 6 TABLET | Refills: 0 | Status: SHIPPED | OUTPATIENT
Start: 2025-03-03 | End: 2025-03-07

## 2025-03-03 RX ORDER — FLUTICASONE PROPIONATE 50 MCG
1 SPRAY, SUSPENSION (ML) NASAL DAILY
Qty: 9.9 ML | Refills: 0 | Status: SHIPPED | OUTPATIENT
Start: 2025-03-03

## 2025-03-03 RX ORDER — BENZONATATE 100 MG/1
100 CAPSULE ORAL 3 TIMES DAILY PRN
Qty: 20 CAPSULE | Refills: 0 | Status: SHIPPED | OUTPATIENT
Start: 2025-03-03

## 2025-03-03 NOTE — PROGRESS NOTES
"[facility]        NAME: Sugar Jara is a 54 y.o. female  : 1970    MRN: [id number]  DATE: March 3, 2025  TIME: 10:16 AM    /76 (BP Location: Left arm, Patient Position: Sitting, Cuff Size: Standard)   Pulse 84   Temp 97.9 °F (36.6 °C) (Tympanic)   Resp 16   Ht 5' 7\" (1.702 m)   Wt 67.1 kg (148 lb)   LMP 2020   SpO2 98%   BMI 23.18 kg/m²     Assessment and Plan   Acute maxillary sinusitis, recurrence not specified [J01.00]  1. Acute maxillary sinusitis, recurrence not specified  Poct Covid 19 Rapid Antigen Test    Covid/Flu- Office Collect Normal    Covid/Flu- Office Collect Normal    azithromycin (ZITHROMAX) 250 mg tablet    benzonatate (TESSALON PERLES) 100 mg capsule    fluticasone (FLONASE) 50 mcg/act nasal spray      2. Bronchitis  azithromycin (ZITHROMAX) 250 mg tablet    benzonatate (TESSALON PERLES) 100 mg capsule    fluticasone (FLONASE) 50 mcg/act nasal spray      3. Encounter for laboratory testing for COVID-19 virus              Patient Instructions       Follow up with PCP in 3-5 days.  Proceed to  ER if symptoms worsen.    Chief Complaint     Chief Complaint   Patient presents with    Cold Like Symptoms     Pt reports cold like symptoms with sneezing and productive cough with onset one week ago. C/o nausea/vomiting, and episodes of diarrhea with onset Wednesday. Denies any other symptoms. Managing symptoms with nyQuil last dose last night and Tylenol two days ago.          History of Present Illness       Pt with 6-7 days cough congestion   had some nausea and stomache pain that has resolved         Review of Systems   Review of Systems   Constitutional: Negative.    HENT:  Positive for congestion, sinus pressure and sinus pain.    Eyes: Negative.    Respiratory:  Positive for cough.    Cardiovascular: Negative.    Gastrointestinal: Negative.    Endocrine: Negative.    Genitourinary: Negative.    Musculoskeletal: Negative.    Skin: Negative.  " "  Allergic/Immunologic: Negative.    Neurological: Negative.    Hematological: Negative.    Psychiatric/Behavioral: Negative.     All other systems reviewed and are negative.        Current Medications       Current Outpatient Medications:     azithromycin (ZITHROMAX) 250 mg tablet, Take 2 tablets today then 1 tablet daily x 4 days, Disp: 6 tablet, Rfl: 0    benzonatate (TESSALON PERLES) 100 mg capsule, Take 1 capsule (100 mg total) by mouth 3 (three) times a day as needed for cough, Disp: 20 capsule, Rfl: 0    famotidine (PEPCID) 40 MG tablet, Take 1 tablet (40 mg total) by mouth daily at bedtime, Disp: 90 tablet, Rfl: 3    fexofenadine (ALLEGRA) 60 MG tablet, Take 60 mg by mouth daily, Disp: , Rfl:     fluticasone (FLONASE) 50 mcg/act nasal spray, 1 spray into each nostril daily, Disp: 9.9 mL, Rfl: 0    Magnesium 400 MG CAPS, Take by mouth, Disp: , Rfl:     venlafaxine (EFFEXOR-XR) 37.5 mg 24 hr capsule, Take 1 capsule (37.5 mg total) by mouth daily, Disp: 90 capsule, Rfl: 1    cephalexin (KEFLEX) 500 mg capsule, 1 capsule Every 12 hours (Patient not taking: Reported on 2/25/2025), Disp: , Rfl:     Current Allergies     Allergies as of 03/03/2025 - Reviewed 03/03/2025   Allergen Reaction Noted    Latex Rash and Swelling 05/18/2015    Medical tape Swelling and Rash 05/18/2015    Silver Rash and Swelling 04/25/2014    Wound dressings Swelling and Rash 04/25/2014    Other Sneezing and Rash 05/18/2015            The following portions of the patient's history were reviewed and updated as appropriate: allergies, current medications, past family history, past medical history, past social history, past surgical history and problem list.     Past Medical History:   Diagnosis Date    Abdominal pain     Anesthesia     \"after tape removed from both eyes/developed swelling\"    Anxiety     has autistic/special needs son    Breast cancer (HCC) 04/30/2021    RIGHT    Cancer (HCC)     Breast cancer - right breast 2021    Chronic " "UTI     2x/year or so /no symptoms today    Displaced fracture of shaft of fifth metacarpal bone, right hand, subsequent encounter for fracture with routine healing 03/01/2022    Dysuria     resolved 10/05/16/occas/not lately    Encounter for screening colonoscopy     Environmental and seasonal allergies     Exercise involving cycling     2-3x/week spin classes    GERD (gastroesophageal reflux disease)     occasional due to diet    Heavy menses     Hiatal hernia     History of kidney stones     History of radiation therapy 2021    Right    Intestinal ulcer     Irritable bowel syndrome     Kidney stone     10/20/22 Hx of kidney stone -passed on own- no surgery needed    Nausea     Seizures (HCC)     last seizure approx 5 yrs ago    Shortness of breath     \"sometimes just sitting in chair or activity and having Echo 4/29\"-10/20/22- Pt reports resolved no further SOB    Submucous leiomyoma of uterus     no recent problem    Terminal ileitis (HCC)     unclear etiology, work up w EGD/COLON/TI bx/SBC/CT done.    Urinary tract infection     Wears glasses        Past Surgical History:   Procedure Laterality Date    BREAST BIOPSY Right 03/01/2021    stereo- invasive ductal ca    BREAST LUMPECTOMY Right 04/30/2021    Procedure: RIGHT BREAST BRACKETED DINA LOCALIZATION LUMPECTOMY;;  Surgeon: Melody Brewer MD;  Location: [facility];  Service: Surgical Oncology    COLONOSCOPY  03/01/2017    5 yr recall    DILATION AND CURETTAGE OF UTERUS      EGD      EXAMINATION UNDER ANESTHESIA N/A 10/25/2022    Procedure: (EUA);  Surgeon: Karthik Henriquez MD;  Location: [facility];  Service: Gynecology    HYSTEROSCOPY      INCONTINENCE SURGERY      bladder sling    LYMPH NODE BIOPSY Right 04/30/2021    Procedure: Bx LYMPH NODE SENTINEL;  Surgeon: Melody Brewer MD;  Location: [facility];  Service: Surgical Oncology    MAMMO NEEDLE LOCALIZATION LEFT (ALL INC) Right 04/19/2021    MAMMO NEEDLE LOCALIZATION LEFT (ALL INC) EACH ADD Right 04/19/2021    " "MAMMO STEREOTACTIC BREAST BIOPSY RIGHT (ALL INC) Right 03/01/2021    OK COLONOSCOPY FLX DX W/COLLJ SPEC WHEN PFRMD N/A 11/14/2016    Procedure: EGD AND COLONOSCOPY;  Surgeon: Brandt Garcia MD;  Location: [facility];  Service: Gastroenterology    OK HYSTEROSCOPY BX ENDOMETRIUM&/POLYPC W/WO D&C N/A 10/25/2022    Procedure: D&C W/HYSTEROSCOPY; PSB POLYPECTOMY;  Surgeon: Karthik Henriquez MD;  Location: [facility] OR;  Service: Gynecology    WISDOM TOOTH EXTRACTION         Family History   Problem Relation Age of Onset    Cancer Mother     Breast cancer Mother 55    Colon polyps Mother     Diabetes Father         Type 2    Hypertension Father     BRCA1 Negative Sister     BRCA2 Negative Sister     Breast cancer Maternal Aunt 56    Breast cancer Family     Colon cancer Neg Hx     Anesthesia problems Neg Hx          Medications have been verified.        Objective   /76 (BP Location: Left arm, Patient Position: Sitting, Cuff Size: Standard)   Pulse 84   Temp 97.9 °F (36.6 °C) (Tympanic)   Resp 16   Ht 5' 7\" (1.702 m)   Wt 67.1 kg (148 lb)   LMP 03/28/2020   SpO2 98%   BMI 23.18 kg/m²        Physical Exam     Physical Exam  Vitals and nursing note reviewed.   Constitutional:       Appearance: Normal appearance. She is normal weight.   HENT:      Head: Normocephalic and atraumatic.      Right Ear: Tympanic membrane, ear canal and external ear normal.      Left Ear: Tympanic membrane, ear canal and external ear normal.      Nose: Congestion and rhinorrhea present.      Comments: Boggy mucosa  max sinus tender to palp      Mouth/Throat:      Mouth: Mucous membranes are moist.      Pharynx: Oropharynx is clear.   Eyes:      Extraocular Movements: Extraocular movements intact.      Conjunctiva/sclera: Conjunctivae normal.      Pupils: Pupils are equal, round, and reactive to light.   Cardiovascular:      Rate and Rhythm: Normal rate and regular rhythm.      Pulses: Normal pulses.      Heart sounds: Normal heart " sounds.   Pulmonary:      Effort: Pulmonary effort is normal.      Comments: Minor coarse sounds cleared with cough   Abdominal:      General: Bowel sounds are normal.      Palpations: Abdomen is soft.   Musculoskeletal:         General: Normal range of motion.      Cervical back: Normal range of motion and neck supple.   Skin:     General: Skin is warm.      Capillary Refill: Capillary refill takes less than 2 seconds.   Neurological:      Mental Status: She is alert and oriented to person, place, and time.

## 2025-03-03 NOTE — LETTER
March 3, 2025     Patient: Sugar Jara   YOB: 1970   Date of Visit: 3/3/2025       To Whom It May Concern:    It is my medical opinion that Sugar Jara may return to work on 3/5/2025 .    If you have any questions or concerns, please don't hesitate to call.         Sincerely,        Gurinder Ellison Jr, PASusanC    CC: No Recipients   Topical Retinoid counseling:  Patient advised to apply a pea-sized amount only at bedtime and wait 30 minutes after washing their face before applying.  If too drying, patient may add a non-comedogenic moisturizer. The patient verbalized understanding of the proper use and possible adverse effects of retinoids.  All of the patient's questions and concerns were addressed.

## 2025-03-04 LAB
FLUAV RNA RESP QL NAA+PROBE: NEGATIVE
FLUBV RNA RESP QL NAA+PROBE: NEGATIVE
SARS-COV-2 RNA RESP QL NAA+PROBE: NEGATIVE

## 2025-03-31 ENCOUNTER — TELEPHONE (OUTPATIENT)
Age: 55
End: 2025-03-31

## 2025-03-31 NOTE — TELEPHONE ENCOUNTER
venlafaxine (EFFEXOR-XR) 37.5 mg 24 hr capsule     Pt stating she's having trouble getting her words out and this medication is affecting her speech.  Can someone call her back about that.  Should she stop it?  Thanks.

## 2025-03-31 NOTE — TELEPHONE ENCOUNTER
Called patient to stop the medication(Venlafaxine)and scheduled a follow up with Dr. Crook 04/08/25   N/A

## 2025-05-18 ENCOUNTER — TELEPHONE (OUTPATIENT)
Dept: OTHER | Facility: OTHER | Age: 55
End: 2025-05-18

## 2025-05-18 NOTE — TELEPHONE ENCOUNTER
Patient is calling regarding cancelling an appointment.    Date/Time: 5/19/2025 8:00    Patient was rescheduled: YES [] NO [x]    Patient requesting call back to reschedule: YES [] NO [x]

## 2025-06-11 ENCOUNTER — TELEPHONE (OUTPATIENT)
Age: 55
End: 2025-06-11

## 2025-06-11 ENCOUNTER — PATIENT MESSAGE (OUTPATIENT)
Age: 55
End: 2025-06-11

## 2025-06-11 NOTE — TELEPHONE ENCOUNTER
Pt's appt needs to be rescheduled d/t provider not being in the office. Attempted to reach patient about need of appointment change with no success. Detailed VM left with [employer] number [phone] for patient to return call to reschedule.     Additional message sent Via [employer].

## 2025-06-12 NOTE — TELEPHONE ENCOUNTER
Pt's appt needs to be rescheduled d/t provider not being in the office. Attempted to reach patient about need of appointment change with no success. Detailed VM left with [facility] number [phone] for patient to return call to reschedule.     Additional message sent Via [employer].   Left arm;

## 2025-06-30 ENCOUNTER — OFFICE VISIT (OUTPATIENT)
Dept: GYNECOLOGY | Facility: CLINIC | Age: 55
End: 2025-06-30
Payer: COMMERCIAL

## 2025-06-30 ENCOUNTER — NURSE TRIAGE (OUTPATIENT)
Age: 55
End: 2025-06-30

## 2025-06-30 VITALS — SYSTOLIC BLOOD PRESSURE: 124 MMHG | DIASTOLIC BLOOD PRESSURE: 76 MMHG

## 2025-06-30 DIAGNOSIS — N89.8 VAGINAL DISCHARGE: ICD-10-CM

## 2025-06-30 DIAGNOSIS — Z11.3 SCREEN FOR STD (SEXUALLY TRANSMITTED DISEASE): ICD-10-CM

## 2025-06-30 DIAGNOSIS — R10.32 LLQ PAIN: Primary | ICD-10-CM

## 2025-06-30 LAB
BV WHIFF TEST VAG QL: NEGATIVE
CLUE CELLS SPEC QL WET PREP: NEGATIVE
PH SMN: 5.5 [PH]
SL AMB POCT WET MOUNT: NORMAL
T VAGINALIS VAG QL WET PREP: NEGATIVE
YEAST VAG QL WET PREP: NEGATIVE

## 2025-06-30 PROCEDURE — 99213 OFFICE O/P EST LOW 20 MIN: CPT | Performed by: OBSTETRICS & GYNECOLOGY

## 2025-06-30 PROCEDURE — 87210 SMEAR WET MOUNT SALINE/INK: CPT | Performed by: OBSTETRICS & GYNECOLOGY

## 2025-06-30 PROCEDURE — 87491 CHLMYD TRACH DNA AMP PROBE: CPT | Performed by: OBSTETRICS & GYNECOLOGY

## 2025-06-30 PROCEDURE — 87591 N.GONORRHOEAE DNA AMP PROB: CPT | Performed by: OBSTETRICS & GYNECOLOGY

## 2025-06-30 RX ORDER — BUTALBITAL, ACETAMINOPHEN AND CAFFEINE 50; 325; 40 MG/1; MG/1; MG/1
1-2 TABLET ORAL EVERY 6 HOURS PRN
COMMUNITY
Start: 2025-04-01 | End: 2026-04-01

## 2025-06-30 RX ORDER — ACETAMINOPHEN 500 MG
500 TABLET ORAL EVERY 6 HOURS PRN
COMMUNITY

## 2025-06-30 RX ORDER — ACETAZOLAMIDE 250 MG/1
250 TABLET ORAL 3 TIMES DAILY
COMMUNITY
Start: 2025-05-28 | End: 2025-11-24

## 2025-06-30 NOTE — PROGRESS NOTES
Name: Sugar Jara      : 1970      MRN: [id number]  Encounter Provider: Teresa Baird DO  Encounter Date: 2025   Encounter department: [facility]  :  Assessment & Plan  LLQ pain    Orders:    US pelvis complete w transvaginal; Future    Vaginal discharge    Orders:    POCT wet mount    Screen for STD (sexually transmitted disease)    Orders:    Chlamydia/GC amplified DNA by PCR    Vaginal discharge-there was very minimal discharge noted on exam today.  Wet mount is normal.  Chlamydia and gonorrhea done.  We discussed the possibility of atrophic vaginitis.    Left lower quadrant pain-she does not have any symptoms of a UTI.  She has had them in the past.  Bowel function is normal.  She states that she is up-to-date on colonoscopy.  I ordered an ultrasound to rule out an ovarian cyst.  She will schedule this.    History of Present Illness   HPI  Sugar Jara is a 55 y.o. female who presents for evaluation of pain and vaginal discharge.  This started about 2 to 3 weeks ago.  She noted yellow discharge without odor, itching or irritation.  At times, the discharge is copious.  There are days where it is much lighter.  She has had no bleeding.  She is menopausal.    Around the same time, she began having left lower quadrant cramping that radiates around to her back.  There is no pain on the right side.  She denies dysuria, urinary frequency, bowel changes.    In April, she was diagnosed with idiopathic intracranial hypertension and she was started on Diamox.  She does not feel that this correlated with the onset of discharge.    She has had the same partner for about 2 years.  Last year, she had a negative STD workup.          Review of Systems   Constitutional: Negative.    Gastrointestinal: Negative.    Genitourinary:  Positive for pelvic pain and vaginal discharge.          Objective   LMP 2020      Physical Exam  Abdominal:      Tenderness: There is no  right CVA tenderness or left CVA tenderness.   Genitourinary:     General: Normal vulva.      Vagina: Normal.      Cervix: Normal.      Uterus: Normal.       Adnexa: Right adnexa normal and left adnexa normal.      Comments: Minimal discharge noted  No masses or tenderness noted on bimanual

## 2025-06-30 NOTE — TELEPHONE ENCOUNTER
"REASON FOR CONVERSATION: Pelvic Pain    SYMPTOMS: Lower pelvic pain wrapping around to lower back; abnormal vaginal discharge (clear-yellow).    OTHER HEALTH INFORMATION: Patient calling with concerns for lower pelvic pain and cramping the past 2 weeks, that she states came on suddenly, but is intermittent. States pain wraps around to lower back as well, and can become as severe as 8/10. Patient also noting abnormal vaginal discharge she describes as clear-yellow. Denies odor, vaginal itching. Patient does note some intermittent lower grade temperature (99.0), and observed having softer stools. Unsure if related to gynecological symptoms. Denies burning with urination. Patient states ibuprofen does help with symptoms of pelvic pain. Patient asking to be seen today by provider.     PROTOCOL DISPOSITION: See Today or Tomorrow in Office    CARE ADVICE PROVIDED: RN able to schedule patient to be seen in the office later today for further evaluation. Advised patient to call back if symptoms worsen prior to appointment time. Patient agreeable.     PRACTICE FOLLOW-UP: NA.    Reason for Disposition   Patient wants to be seen    Answer Assessment - Initial Assessment Questions  1. LOCATION: \"Where does it hurt?\"       Lower pelvis  2. RADIATION: \"Does the pain shoot anywhere else?\" (e.g., lower back, groin, thighs)      Wraps around to lower back  3. ONSET: \"When did the pain begin?\" (e.g., minutes, hours or days ago)       A couple of weeks  4. SUDDEN: \"Gradual or sudden onset?\"      Sudden  5. PATTERN \"Does the pain come and go, or is it constant?\"      Comes and goes  6. SEVERITY: \"How bad is the pain?\"  (e.g., Scale 1-10; mild, moderate, or severe)      Can go up to an 8/10, but varies and comes and goes  7. RECURRENT SYMPTOM: \"Have you ever had this type of pelvic pain before?\" If Yes, ask: \"When was the last time?\" and \"What happened that time?\"       Denies  8. CAUSE: \"What do you think is causing the pelvic pain?\"    " "  Unsure  9. RELIEVING/AGGRAVATING FACTORS: \"What makes it better or worse?\" (e.g., activity/rest, sexual intercourse, voiding, passing stool)      Ibuprofen helps.   10. OTHER SYMPTOMS: \"Has there been any other symptoms?\" (e.g., fever, constipation, diarrhea, urine problems, vaginal bleeding, vaginal discharge, or vomiting?\"        Abnormal discharge (yellow-clear), softer stools    Protocols used: Pelvic Pain - Female-Adult-[place]    "

## 2025-07-01 ENCOUNTER — RESULTS FOLLOW-UP (OUTPATIENT)
Dept: GYNECOLOGY | Facility: CLINIC | Age: 55
End: 2025-07-01

## 2025-07-01 LAB
C TRACH DNA SPEC QL NAA+PROBE: NEGATIVE
N GONORRHOEA DNA SPEC QL NAA+PROBE: NEGATIVE

## 2025-07-07 DIAGNOSIS — R10.32 LLQ PAIN: Primary | ICD-10-CM

## 2025-07-07 NOTE — TELEPHONE ENCOUNTER
Patient reports LLQ pain and left lower back pain is worsening.  She has a pelvic u/s scheduled for wed, but is asking she she can have her kidney checked as well.  She reports hx of kidney stones and she is also having nausea and decreased appetite.

## 2025-07-08 NOTE — TELEPHONE ENCOUNTER
Spoke with patient regarding provider result note.  She did schedule them last night but 12 hours apart.  She was advised provider would like them done at the same time.  She is unsure why this is all taking so long as she is in pain.  She was advised to go the ED for evaluation if having severe pain.  She verbalized understanding.  She was transferred to Central Scheduling to see if test can be scheduled together.

## 2025-07-09 ENCOUNTER — HOSPITAL ENCOUNTER (OUTPATIENT)
Dept: RADIOLOGY | Facility: HOSPITAL | Age: 55
Discharge: HOME | End: 2025-07-09
Attending: OBSTETRICS & GYNECOLOGY
Payer: COMMERCIAL

## 2025-07-09 DIAGNOSIS — R10.32 LLQ PAIN: ICD-10-CM

## 2025-07-09 PROCEDURE — 76856 US EXAM PELVIC COMPLETE: CPT

## 2025-07-09 PROCEDURE — 76775 US EXAM ABDO BACK WALL LIM: CPT

## 2025-07-09 PROCEDURE — 76830 TRANSVAGINAL US NON-OB: CPT

## 2025-07-16 ENCOUNTER — TELEPHONE (OUTPATIENT)
Age: 55
End: 2025-07-16

## 2025-07-16 NOTE — TELEPHONE ENCOUNTER
Patient called in to check on results of ultrasound completed 7/8. Reviewed that final results have not been released, this may take 7-10 days. Patient verbalized understanding and is thankful.

## 2025-08-15 ENCOUNTER — OFFICE VISIT (OUTPATIENT)
Dept: URGENT CARE | Facility: CLINIC | Age: 55
End: 2025-08-15
Payer: COMMERCIAL

## 2025-08-21 DIAGNOSIS — K21.9 GASTROESOPHAGEAL REFLUX DISEASE WITHOUT ESOPHAGITIS: ICD-10-CM

## 2025-08-21 RX ORDER — FAMOTIDINE 40 MG/1
40 TABLET, FILM COATED ORAL
Qty: 90 TABLET | Refills: 3 | Status: SHIPPED | OUTPATIENT
Start: 2025-08-21

## (undated) DEVICE — SUT MONOCRYL 4-0 PS-2 27 IN Y426H

## (undated) DEVICE — BETHLEHEM UNIVERSAL MINOR GEN: Brand: CARDINAL HEALTH

## (undated) DEVICE — SUPER SPONGES,MEDIUM: Brand: KERLIX

## (undated) DEVICE — SUT MONOCRYL 3-0 SH 27 IN Y416H

## (undated) DEVICE — PLUMEPEN PRO 10FT

## (undated) DEVICE — GLOVE INDICATOR PI UNDERGLOVE SZ 8 BLUE

## (undated) DEVICE — PREMIUM DRY TRAY LF: Brand: MEDLINE INDUSTRIES, INC.

## (undated) DEVICE — ENDOMETRIAL BX PIPELLE

## (undated) DEVICE — DRAPE PROBE NEO-PROBE/ULTRASOUND

## (undated) DEVICE — DRAPE EQUIPMENT RF WAND

## (undated) DEVICE — SCD SEQUENTIAL COMPRESSION COMFORT SLEEVE MEDIUM KNEE LENGTH: Brand: KENDALL SCD

## (undated) DEVICE — LIGACLIP MCA MULTIPLE CLIP APPLIERS, 20 MEDIUM CLIPS: Brand: LIGACLIP

## (undated) DEVICE — GLOVE PI ULTRA TOUCH SZ.8.0

## (undated) DEVICE — UNDER BUTTOCKS DRAPE W/FLUID CONTROL POUCH: Brand: CONVERTORS

## (undated) DEVICE — GAUZE SPONGES,16 PLY: Brand: CURITY

## (undated) DEVICE — ADHESIVE SKIN HIGH VISCOSITY EXOFIN 1ML

## (undated) DEVICE — 2000CC GUARDIAN II: Brand: GUARDIAN

## (undated) DEVICE — STRL ALLENTOWN HYSTEROSCOPY PK: Brand: CARDINAL HEALTH

## (undated) DEVICE — ABDOMINAL PAD: Brand: DERMACEA

## (undated) DEVICE — IV EXTENSION TUBING 33 IN

## (undated) DEVICE — PVC URETHRAL CATHETER: Brand: DOVER

## (undated) DEVICE — HEMOSTAT POWDER ADSORB SURGICEL 3GM

## (undated) DEVICE — TISSUE REMOVAL SYSTEM FLUID MANAGEMENT ACCESSORIES: Brand: SYMPHION

## (undated) DEVICE — DRAPE SHEET THREE QUARTER

## (undated) DEVICE — CHLORAPREP HI-LITE 26ML ORANGE

## (undated) DEVICE — BETHLEHEM UNIVERSAL MINOR VAG: Brand: CARDINAL HEALTH

## (undated) DEVICE — CYSTO TUBING SINGLE IRRIGATION

## (undated) DEVICE — MEDI-VAC YANKAUER SUCTION HANDLE W/BULBOUS AND CONTROL VENT: Brand: CARDINAL HEALTH

## (undated) DEVICE — TUBING SUCTION 5MM X 12 FT

## (undated) DEVICE — INTENDED FOR TISSUE SEPARATION, AND OTHER PROCEDURES THAT REQUIRE A SHARP SURGICAL BLADE TO PUNCTURE OR CUT.: Brand: BARD-PARKER SAFETY BLADES SIZE 15, STERILE

## (undated) DEVICE — BRA SURGICAL SZ LGE (36-39)

## (undated) DEVICE — GLOVE SRG BIOGEL 6

## (undated) DEVICE — SURGICAL GOWN, XL SMARTSLEEVE: Brand: CONVERTORS